# Patient Record
Sex: FEMALE | Race: WHITE | Employment: OTHER | URBAN - METROPOLITAN AREA
[De-identification: names, ages, dates, MRNs, and addresses within clinical notes are randomized per-mention and may not be internally consistent; named-entity substitution may affect disease eponyms.]

---

## 2017-02-14 ENCOUNTER — GENERIC CONVERSION - ENCOUNTER (OUTPATIENT)
Dept: OTHER | Facility: OTHER | Age: 77
End: 2017-02-14

## 2017-03-24 ENCOUNTER — GENERIC CONVERSION - ENCOUNTER (OUTPATIENT)
Dept: OTHER | Facility: OTHER | Age: 77
End: 2017-03-24

## 2017-05-11 ENCOUNTER — GENERIC CONVERSION - ENCOUNTER (OUTPATIENT)
Dept: OTHER | Facility: OTHER | Age: 77
End: 2017-05-11

## 2017-05-18 ENCOUNTER — ALLSCRIPTS OFFICE VISIT (OUTPATIENT)
Dept: OTHER | Facility: OTHER | Age: 77
End: 2017-05-18

## 2017-06-01 ENCOUNTER — ALLSCRIPTS OFFICE VISIT (OUTPATIENT)
Dept: OTHER | Facility: OTHER | Age: 77
End: 2017-06-01

## 2017-06-01 DIAGNOSIS — R10.30 LOWER ABDOMINAL PAIN: ICD-10-CM

## 2017-06-01 DIAGNOSIS — R10.9 ABDOMINAL PAIN: ICD-10-CM

## 2017-06-02 ENCOUNTER — ALLSCRIPTS OFFICE VISIT (OUTPATIENT)
Dept: OTHER | Facility: OTHER | Age: 77
End: 2017-06-02

## 2017-06-02 LAB
BILIRUB UR QL STRIP: NEGATIVE
CLARITY UR: NORMAL
COLOR UR: NORMAL
GLUCOSE (HISTORICAL): NORMAL
HGB UR QL STRIP.AUTO: NEGATIVE
KETONES UR STRIP-MCNC: NORMAL MG/DL
LEUKOCYTE ESTERASE UR QL STRIP: NEGATIVE
NITRITE UR QL STRIP: NEGATIVE
PH UR STRIP.AUTO: 5 [PH]
PROT UR STRIP-MCNC: NORMAL MG/DL
SP GR UR STRIP.AUTO: 1
UROBILINOGEN UR QL STRIP.AUTO: 1

## 2017-06-09 ENCOUNTER — GENERIC CONVERSION - ENCOUNTER (OUTPATIENT)
Dept: OTHER | Facility: OTHER | Age: 77
End: 2017-06-09

## 2017-07-05 ENCOUNTER — GENERIC CONVERSION - ENCOUNTER (OUTPATIENT)
Dept: OTHER | Facility: OTHER | Age: 77
End: 2017-07-05

## 2017-10-03 ENCOUNTER — ALLSCRIPTS OFFICE VISIT (OUTPATIENT)
Dept: OTHER | Facility: OTHER | Age: 77
End: 2017-10-03

## 2017-10-04 NOTE — PROGRESS NOTES
Assessment  1  Left lower quadrant pain (789 04) (R10 32)    Plan  Left lower quadrant pain    · Follow-up visit in 1 day Evaluation and Treatment  Follow-up  Status: Hold For -  Scheduling  Requested for: 25NOI3177   · Eat only clear liquids for 24 hours ; Status:Complete;   Done: 58MVX1896 11:14AM   · Call [phone] if: The symptoms seem worse ; Status:Complete;   Done: 43NRI0919  11:14AM   · Seek Immediate Medical Attention if: The symptoms are suddenly worse ;  Status:Complete;   Done: 51YGF5276 11:14AM   · Seek Immediate Medical Attention if: Your abdomen is getting large and round and you  are not eating more than usual ; Status:Complete;   Done: 06OBB2339 11:14AM   · Seek Immediate Medical Attention if: Your abdominal pain is worse ; Status:Complete;    Done: 64VIA6414 11:14AM    Discussion/Summary    Will call this afternoon if symptoms worsen and tomorrow with progress report  Chief Complaint  Left side abdominal pain (dli)      History of Present Illness  HPI: Patient presents with LLQ pain  Similar to the episode last year thought to be related to adhesions  CT scan and MRI failed to reveal source of pain  No fever  Review of Systems    Constitutional: No fever, no chills, feels well, no tiredness, no recent weight gain or loss  Cardiovascular: no complaints of slow or fast heart rate, no chest pain, no palpitations, no leg claudication or lower extremity edema  Respiratory: no complaints of shortness of breath, no wheezing, no dyspnea on exertion, no orthopnea or PND  Gastrointestinal: as noted in HPI  Genitourinary: no complaints of dysuria, no incontinence, no pelvic pain, no dysmenorrhea, no vaginal discharge or abnormal vaginal bleeding  Active Problems  1  Abdominal pain (789 00) (R10 9)   2  Accidental fall, initial encounter (E888 9) (W19 XXXA)   3  AF (atrial fibrillation) (427 31) (I48 91)   4  Anemia (285 9) (D64 9)   5  Atrial fibrillation (427 31) (I48 91)   6  Chronic anticoagulation (V58 61) (Z79 01)   7  Contusion of rib, left, initial encounter (922 1) (S20 212A)   8  Fracture of wrist, left, closed, initial encounter (814 00) (S62 102A)   9  Hyperlipidemia (272 4) (E78 5)   10  Hypertension (401 9) (I10)   11  Hypothyroidism (244 9) (E03 9)   12  Knee osteoarthritis (715 36) (M17 10)   13  Left sided abdominal pain (789 09) (R10 9)   14  Lower abdominal pain (789 09) (R10 30)   15  Sciatica of left side (724 3) (M54 32)   16  Separation of acromioclavicular joint, left, initial encounter (831 04) (S43 102A)    Past Medical History  1  History of Age related cataract (366 10) (H25 9)   2  History of Atrial premature beats (427 61) (I49 1)   3  History of Cervicitis (616 0) (N72)   4  History of Chronic atrial fibrillation (427 31) (I48 2)   5  History of Degeneration of cervical intervertebral disc (722 4) (M50 90)   6  History of Muscle weakness (728 87) (M62 81)   7  History of Occipital infarction (434 91) (I63 9)   8  History of Osteoporosis (733 00) (M81 0)   9  History of Wrist fracture, right (814 00) (S62 101A)  Active Problems And Past Medical History Reviewed: The active problems and past medical history were reviewed and updated today  Family History  Mother    1  Family history of    2  Family history of Myocardial infarction   3  Family history of Valvular heart disease  Father    4  Family history of    5  Family history of Myocardial infarction  Sister    10  Family history of Hypertension  Family History    7  Denied: FH: mental illness  Family History Reviewed: The family history was reviewed and updated today  Social History   · Dental care, regularly   · Drinks wine (V49 89) (Z78 9)   · Former smoker (V15 82) (F10 798)   · Moderate alcohol use   · No caffeine use   · Tea  The social history was reviewed and updated today  Surgical History  1  History of Colon Surgery   2   History of Knee Arthroscopy With Medial Meniscus Repair  Surgical History Reviewed: The surgical history was reviewed and updated today  Current Meds   1  Adult Aspirin Low Strength 81 MG CHEW; CHEW AND SWALLOW 1 TABLET DAILY; Therapy: (Recorded:17Oct2014) to Recorded   2  Alendronate Sodium 70 MG Oral Tablet; 1 Tab PO Weekly; Therapy: 92WXW9256 to (Evaluate:13May2018)  Requested for: 92ECJ3645; Last   Rx:11Hef4349 Ordered   3  Benazepril HCl - 20 MG Oral Tablet; take one tablet by mouth every day  Requested for:   49XNM9389; Last FJ:85LXU1034 Ordered   4  Eliquis 5 MG Oral Tablet; take 2 tablet daily; Therapy: (Recorded:13Oct2014) to Recorded   5  HydroCHLOROthiazide 12 5 MG Oral Capsule; TAKE 1 CAPSULE DAILY; Therapy: 86Bcg1981 to (Dean Santiago)  Requested for: 86RHN1136; Last   Rx:19Gpw4605 Ordered   6  Levothyroxine Sodium 75 MCG Oral Tablet; take one tablet by mouth every day; Therapy: 69Ydi2643 to (Evaluate:65Dti1500)  Requested for: 11Kkm3018; Last   Rx:03Tmi0188 Ordered   7  Metoprolol Succinate ER 25 MG Oral Tablet Extended Release 24 Hour; TAKE 1 TABLET   DAILY; Therapy: 31Yiu4943 to (Evaluate:15Oct2014) Recorded   8  Risedronate Sodium 35 MG Oral Tablet; TAKE 1 TABLET WEEKLY ON AN EMPTY   STOMACH 30-60 MINUTES BEFORE BREAKFAST WITH 8-12 OUNCES OF WATER  DO NOT LIE DOWN AFTER TAKING PILL; Therapy: 43FXY5197 to (Evaluate:13May2018)  Requested for: 96CBE6114; Last   Rx:32Vdg6249 Ordered   9  Simvastatin 20 MG Oral Tablet; TAKE 1 TABLET DAILY AS     DIRECTED; Therapy: 64ZHG8623 to (Giuseppe Vasquez)  Requested for: 77Xay3066; Last   Rx:77Bps2821; Status: ACTIVE - Transmit to [facility]ryn Verification Ordered    The medication list was reviewed and updated today  Allergies  1  Lidocaine HCl SOLN   2  Penicillins   3   Sulfa Drugs    Vitals   Recorded: 05VLF2599 10:48AM   Temperature 97 8 F   Heart Rate 68   Respiration 16   Systolic 108   Diastolic 78   Height 5 ft 3 in   Weight 124 lb    BMI Calculated 21 97   BSA Calculated 1 58     Physical Exam    Pulmonary   Respiratory effort: No increased work of breathing or signs of respiratory distress  Abdomen   Abdomen: Abnormal  -Mild to moderate tenderness LLQ  +BS  No distension  No hyperactive BS  Liver and spleen: No hepatomegaly or splenomegaly      Psychiatric   Orientation to person, place, and time: Normal     Mood and affect: Normal          Signatures   Electronically signed by : Payton Moser MD; Oct  3 2017 11:19AM EST                       (Author)

## 2018-01-11 NOTE — PROGRESS NOTES
Assessment   1  Encounter for preventive health examination (V70 0) (Z00 00)    Plan  Health Maintenance    · Start: Risedronate Sodium 35 MG Oral Tablet; TAKE 1 TABLET WEEKLY ON AN EMPTY  STOMACH 30-60 MINUTES BEFORE BREAKFAST WITH 8-12 OUNCES OF WATER  DO NOT LIE DOWN AFTER TAKING PILL   · *VB - Urinary Incontinence Screen (Dx Z13 89 Screen for UI); Status:Complete -  Retrospective Authorization;   Done: 07UJQ4420 01:12PM   · Follow-up visit in 1 year Evaluation and Treatment  Follow-up  Status: Hold For -  Scheduling  Requested for: 40MKD6200   · Always use a seat belt and shoulder strap when riding or driving a motor vehicle ;  Status:Complete;   Done: 23KLR4490 01:36PM   · Use a sun block product with an SPF of 15 or more ; Status:Complete;   Done:  35NKS6573 01:36PM   · We recommend that you follow the "Mediterranean diet "; Status:Complete;   Done:  90PSR8892 01:36PM    Discussion/Summary  Impression: Subsequent Annual Wellness Visit  Cardiovascular screening and counseling: screening is current  Diabetes screening and counseling: screening is current  Colorectal cancer screening and counseling: screening is current  Breast cancer screening and counseling: screening is current  Cervical cancer screening and counseling: screening not indicated  Osteoporosis screening and counseling: screening not indicated  Glaucoma screening and counseling: screening is current  HIV screening and counseling: screening not indicated  Advance Directive Planning: complete and up to date  Patient Discussion: plan discussed with the patient, follow-up visit needed in one year  Chief Complaint  AWV      History of Present Illness  Welcome to Medicare and Wellness Visits: The patient is being seen for the subsequent annual wellness visit  Medicare Screening and Risk Factors   Hospitalizations: no previous hospitalizations    Medicare Screening Tests Risk Questions   Abdominal aortic aneurysm risk assessment: none indicated  HIV risk assessment: none indicated  Drug and Alcohol Use: The patient is a former cigarette smoker  The patient reports frequent alcohol use and drinking 3 glasses of wine drinks per day  She has never used illicit drugs  Diet and Physical Activity: Current diet includes well balanced meals and 3 cups of tea per day  The patient does not exercise  Mood Disorder and Cognitive Impairment Screening: PHQ-9 Depression Scale   Over the past 2 weeks, how often have you been bothered by the following problems? 1 ) Little interest or pleasure in doing things? Not at all    2 ) Feeling down, depressed or hopeless? Not at all  Cognitive impairment screening: denies difficulty learning/retaining new information, denies difficulty handling complex tasks, denies difficulty with reasoning, denies difficulty with spatial ability and orientation, denies difficulty with language and denies difficulty with behavior  Functional Ability/Level of Safety: Hearing is slightly decreased and a hearing aid is not used  She denies hearing difficulties  The patient is currently able to do activities of daily living without limitations, able to do instrumental activities of daily living without limitations, able to participate in social activities without limitations and able to drive without limitations  Activities of daily living details: does not need help using the phone, no transportation help needed, does not need help shopping, no meal preparation help needed, does not need help doing housework, does not need help doing laundry, does not need help managing medications and does not need help managing money  Home safety risk factors:  no unfamiliar surroundings, no loose rugs, no poor household lighting, no uneven floors, no household clutter, grab bars in the bathroom and handrails on the stairs     Advance Directives: Advance directives: living will, durable power of  for health care directives and advance directives  end of life decisions were reviewed with the patient and I agree with the patient's decisions  Co-Managers and Medical Equipment/Suppliers: See Patient Care Team   Preventive Quality Program 65 and Older: Falls Risk: The patient fell 1 times in the past 12 months  The patient currently has no urinary incontinence symptoms  Patient Care Team    Care Team Member Role Specialty Office Number   Laureano Martinez MD  Cardiology [phone]   Caitlyn Fritz MD  Obstetrics/Gynecology [phone]     Review of Systems    Constitutional: negative  Eyes: negative  ENT: negative  Cardiovascular: negative  Respiratory: negative  Gastrointestinal: negative  Genitourinary: negative  Musculoskeletal: negative  Integumentary and Breasts: negative  Neurological: negative  Psychiatric: negative  Endocrine: negative  Hematologic and Lymphatic: negative  Active Problems   1  Accidental fall, initial encounter (E888 9) (W19 XXXA)  2  AF (atrial fibrillation) (427 31) (I48 91)  3  Anemia (285 9) (D64 9)  4  Atrial fibrillation (427 31) (I48 91)  5  Chronic anticoagulation (V58 61) (Z79 01)  6  Contusion of rib, left, initial encounter (922 1) (S20 212A)  7  Fracture of wrist, left, closed, initial encounter (814 00) (S62 102A)  8  Hyperlipidemia (272 4) (E78 5)  9  Hypertension (401 9) (I10)  10  Hypothyroidism (244 9) (E03 9)  11  Knee osteoarthritis (715 36) (M17 10)  12  Sciatica of left side (724 3) (M54 32)  13   Separation of acromioclavicular joint, left, initial encounter (831 04) (S43 102A)    Past Medical History    · History of Age related cataract (366 10) (H25 9)   · History of Atrial premature beats (427 61) (I49 1)   · History of Cervicitis (616 0) (N72)   · History of Chronic atrial fibrillation (427 31) (I48 2)   · History of Degeneration of cervical intervertebral disc (722 4) (M50 90)   · History of Muscle weakness (728 87) (M62 81)   · History of Occipital infarction (434 91) (I63 9)   · History of Osteoporosis (733 00) (M81 0)   · History of Wrist fracture, right (814 00) (S62 101A)    The active problems and past medical history were reviewed and updated today  Surgical History    · History of Colon Surgery   · History of Knee Arthroscopy With Medial Meniscus Repair    The surgical history was reviewed and updated today  Family History  Mother    · Family history of    · Family history of Myocardial infarction   · Family history of Valvular heart disease  Father    · Family history of    · Family history of Myocardial infarction  Sister    · Family history of Hypertension  Family History    · Denied: FH: mental illness    The family history was reviewed and updated today  Social History    · Dental care, regularly   · Drinks wine (V49 89) (Z78 9)   · Former smoker (V15 82) (J46 700)   · Moderate alcohol use   · No caffeine use   · Tea  The social history was reviewed and updated today  Current Meds  1  Adult Aspirin Low Strength 81 MG CHEW; CHEW AND SWALLOW 1 TABLET DAILY; Therapy: (Recorded:2014) to Recorded  2  Benazepril HCl - 20 MG Oral Tablet; take one tablet by mouth every day  Requested for:   01GNL8900; Last EL:14IKR0522 Ordered  3  Eliquis 5 MG Oral Tablet; take 2 tablet daily; Therapy: (Recorded:2014) to Recorded  4  HydroCHLOROthiazide 12 5 MG Oral Capsule; TAKE 1 CAPSULE DAILY; Therapy: 01Ltr5372 to (Sonya Cutler)  Requested for: 78WTX6151; Last   Rx:53Tcg7025 Ordered  5  Levothyroxine Sodium 75 MCG Oral Tablet; take one tablet by mouth every day; Therapy: 02Wfd7171 to (Evaluate:95Xss9847)  Requested for: 82Ytk3660; Last   Rx:94Mmz8787 Ordered  6  Metoprolol Succinate ER 25 MG Oral Tablet Extended Release 24 Hour; TAKE 1 TABLET   DAILY; Therapy: 82Zly7480 to (Evaluate:2014) Recorded  7  Simvastatin 20 MG Oral Tablet; TAKE 1 TABLET DAILY AS     DIRECTED;    Therapy: 29MBU3956 to (Mita Saunders)  Requested for: 37Htx4061; Last   Rx:94Udz9571; Status: ACTIVE - Transmit to [facility] Verification Ordered    The medication list was reviewed and updated today  Allergies   1  Lidocaine HCl SOLN  2  Penicillins  3  Sulfa Drugs    Immunizations   1 2    Influenza  26-Aug-2015 31-Aug-2016    PPSV  Approx [id number]      Vitals  Signs    Temperature: 97 7 F  Heart Rate: 76  Respiration: 16  Height: 5 ft 3 in  Weight: 123 lb   BMI Calculated: 21 79  BSA Calculated: 1 57    Physical Exam    Constitutional   General appearance: No acute distress, well appearing and well nourished  Eyes   Conjunctiva and lids: No swelling, erythema or discharge  Pupils and irises: Equal, round, reactive to light  Ophthalmoscopic examination: Normal fundi and optic discs  Ears, Nose, Mouth, and Throat   External inspection of ears and nose: Normal     Otoscopic examination: Tympanic membranes translucent with normal light reflex  Canals patent without erythema  Hearing: Normal     Nasal mucosa, septum, and turbinates: Normal without edema or erythema  Lips, teeth, and gums: Normal, good dentition  Oropharynx: Normal with no erythema, edema, exudate or lesions  Neck   Neck: Supple, symmetric, trachea midline, no masses  Thyroid: Normal, no thyromegaly  Pulmonary   Respiratory effort: No increased work of breathing or signs of respiratory distress  Auscultation of lungs: Clear to auscultation  Cardiovascular   Auscultation of heart: Normal rate and rhythm, normal S1 and S2, no murmurs  Carotid pulses: 2+ bilaterally  Abdominal aorta: Normal     Examination of extremities for edema and/or varicosities: Normal     Abdomen   Abdomen: Non-tender, no masses  Liver and spleen: No hepatomegaly or splenomegaly  Lymphatic   Palpation of lymph nodes in neck: No lymphadenopathy      Musculoskeletal   Gait and station: Normal     Digits and nails: Normal without clubbing or cyanosis  Joints, bones, and muscles: Normal     Range of motion: Normal     Stability: Normal     Muscle strength/tone: Normal     Skin   Skin and subcutaneous tissue: Normal without rashes or lesions  Palpation of skin and subcutaneous tissue: Normal turgor  Neurologic   Cranial nerves: Cranial nerves II-XII intact  Reflexes: 2+ and symmetric  Sensation: No sensory loss  Psychiatric   Judgment and insight: Normal     Orientation to person, place, and time: Normal     Recent and remote memory: Intact  Mood and affect: Normal        Results/Data  PHQ-2 Adult Depression Screening 96GTA2577 01:15PM User, [facility]     Test Name Result Flag Reference   PHQ-2 Adult Depression Score 0     Over the last two weeks, how often have you been bothered by any of the following problems?   Little interest or pleasure in doing things: Not at all - 0  Feeling down, depressed, or hopeless: Not at all - 0   PHQ-2 Adult Depression Screening Negative       *VB - Urinary Incontinence Screen (Dx Z13 89 Screen for UI) 66INM2004 01:12PM Rocio Huitrono     Test Name Result Flag Reference   Urinary Incontinence Assessment 10SDM3830         Signatures   Electronically signed by : Phebe Gosselin, MD; May 18 2017  4:05PM EST                       (Author)

## 2018-01-12 NOTE — MISCELLANEOUS
Provider Comments  Provider Comments:   LMOM FOR PT TO CALL BACK AND RESCHEDULE   SF      Signatures   Electronically signed by : Jerri Galicia MD; May 11 2017  5:05PM EST                       (Author)

## 2018-01-13 VITALS
HEIGHT: 63 IN | RESPIRATION RATE: 16 BRPM | HEART RATE: 76 BPM | WEIGHT: 123 LBS | BODY MASS INDEX: 21.79 KG/M2 | TEMPERATURE: 97.7 F

## 2018-01-14 VITALS
HEART RATE: 72 BPM | SYSTOLIC BLOOD PRESSURE: 110 MMHG | HEIGHT: 63 IN | DIASTOLIC BLOOD PRESSURE: 78 MMHG | BODY MASS INDEX: 21.44 KG/M2 | RESPIRATION RATE: 16 BRPM | TEMPERATURE: 98.9 F | WEIGHT: 121 LBS

## 2018-01-14 VITALS
DIASTOLIC BLOOD PRESSURE: 82 MMHG | HEIGHT: 63 IN | BODY MASS INDEX: 21.44 KG/M2 | HEART RATE: 74 BPM | RESPIRATION RATE: 16 BRPM | WEIGHT: 121 LBS | SYSTOLIC BLOOD PRESSURE: 110 MMHG | TEMPERATURE: 98 F

## 2018-01-14 VITALS
TEMPERATURE: 97.8 F | BODY MASS INDEX: 21.97 KG/M2 | DIASTOLIC BLOOD PRESSURE: 78 MMHG | HEART RATE: 68 BPM | HEIGHT: 63 IN | WEIGHT: 124 LBS | RESPIRATION RATE: 16 BRPM | SYSTOLIC BLOOD PRESSURE: 102 MMHG

## 2018-02-07 ENCOUNTER — OFFICE VISIT (OUTPATIENT)
Dept: FAMILY MEDICINE CLINIC | Facility: CLINIC | Age: 78
End: 2018-02-07
Payer: COMMERCIAL

## 2018-02-07 VITALS
TEMPERATURE: 97.6 F | DIASTOLIC BLOOD PRESSURE: 80 MMHG | HEIGHT: 64 IN | SYSTOLIC BLOOD PRESSURE: 120 MMHG | WEIGHT: 130 LBS | HEART RATE: 60 BPM | RESPIRATION RATE: 18 BRPM | BODY MASS INDEX: 22.2 KG/M2

## 2018-02-07 DIAGNOSIS — E03.9 HYPOTHYROIDISM, UNSPECIFIED TYPE: ICD-10-CM

## 2018-02-07 DIAGNOSIS — R06.00 DYSPNEA ON EXERTION: Primary | ICD-10-CM

## 2018-02-07 PROCEDURE — 99213 OFFICE O/P EST LOW 20 MIN: CPT | Performed by: FAMILY MEDICINE

## 2018-02-07 RX ORDER — SIMVASTATIN 20 MG
1 TABLET ORAL DAILY
COMMUNITY
Start: 2014-10-16

## 2018-02-07 RX ORDER — RISEDRONATE SODIUM 35 MG/1
1 TABLET, FILM COATED ORAL WEEKLY
COMMUNITY
Start: 2017-05-18 | End: 2020-03-26 | Stop reason: ALTCHOICE

## 2018-02-07 RX ORDER — HYDROCHLOROTHIAZIDE 12.5 MG/1
1 CAPSULE, GELATIN COATED ORAL DAILY
COMMUNITY
Start: 2014-08-23

## 2018-02-07 RX ORDER — ASPIRIN 81 MG/1
1 TABLET, CHEWABLE ORAL DAILY
COMMUNITY

## 2018-02-07 RX ORDER — LEVOTHYROXINE SODIUM 0.07 MG/1
1 TABLET ORAL DAILY
COMMUNITY
Start: 2014-08-23 | End: 2018-07-30 | Stop reason: SDUPTHER

## 2018-02-07 RX ORDER — BENAZEPRIL HYDROCHLORIDE 20 MG/1
1 TABLET ORAL DAILY
COMMUNITY
End: 2018-04-30 | Stop reason: SDUPTHER

## 2018-02-07 RX ORDER — ALENDRONATE SODIUM 70 MG/1
1 TABLET ORAL DAILY
COMMUNITY
Start: 2017-05-18 | End: 2022-07-20

## 2018-02-07 RX ORDER — METOPROLOL SUCCINATE 25 MG/1
1 TABLET, EXTENDED RELEASE ORAL DAILY
COMMUNITY
Start: 2014-09-15 | End: 2022-03-28 | Stop reason: SDUPTHER

## 2018-02-07 NOTE — PROGRESS NOTES
Assessment/Plan:  Hypothyroidism-level slightly on the overactive side  Exertional dyspnea  Follow-up TSH in 4 months  Pulmonary function tests  Follow up with pulmonary physician  Consideration for bronchodilators and maintenance medications if obstructive changes are found on PFTs  Diagnoses and all orders for this visit:    Dyspnea on exertion  -     Complete pulmonary function test; Future  -     Ambulatory referral to Pulmonology; Future    Other orders  -     aspirin 81 mg chewable tablet; Chew 1 tablet daily  -     alendronate (FOSAMAX) 70 mg tablet; Take 1 tablet by mouth once a week  -     benazepril (LOTENSIN) 20 mg tablet; Take 1 tablet by mouth daily  -     apixaban (ELIQUIS) 5 mg; Take 2 tablets by mouth daily  -     hydrochlorothiazide (MICROZIDE) 12 5 mg capsule; Take 1 capsule by mouth daily  -     levothyroxine 75 mcg tablet; Take 1 tablet by mouth daily  -     metoprolol succinate (TOPROL-XL) 25 mg 24 hr tablet; Take 1 tablet by mouth daily  -     risedronate (ACTONEL) 35 mg tablet; Take 1 tablet by mouth once a week  -     simvastatin (ZOCOR) 20 mg tablet; Take 1 tablet by mouth daily          Subjective:     Patient ID: Renuka Jackson is a 76 y o  female  This is a 26-year-old female who presents for follow-up of hypothyroidism  Recent TSH low  The patient also notes exertional dyspnea for which she was placed on oxygen  Review of Systems   Constitutional: Negative  Respiratory: Negative for apnea, cough, choking, chest tightness, wheezing and stridor  Shortness of breath: Exertional dyspnea  Cardiovascular: Negative  Objective:     Physical Exam   Constitutional: She appears well-developed and well-nourished  HENT:   Head: Normocephalic and atraumatic  Cardiovascular: Normal rate and regular rhythm  Exam reveals no gallop and no friction rub  Murmur: 1/6 systolic murmur    Pulmonary/Chest: Effort normal and breath sounds normal  No respiratory distress  She has no wheezes  She has no rales  She exhibits no tenderness

## 2018-04-30 DIAGNOSIS — I10 ESSENTIAL HYPERTENSION: Primary | ICD-10-CM

## 2018-04-30 RX ORDER — BENAZEPRIL HYDROCHLORIDE 20 MG/1
TABLET ORAL
Qty: 90 TABLET | Refills: 3 | Status: SHIPPED | OUTPATIENT
Start: 2018-04-30 | End: 2019-04-05 | Stop reason: SDUPTHER

## 2018-06-20 ENCOUNTER — OFFICE VISIT (OUTPATIENT)
Dept: FAMILY MEDICINE CLINIC | Facility: CLINIC | Age: 78
End: 2018-06-20
Payer: COMMERCIAL

## 2018-06-20 VITALS
HEIGHT: 64 IN | OXYGEN SATURATION: 97 % | DIASTOLIC BLOOD PRESSURE: 70 MMHG | WEIGHT: 127 LBS | SYSTOLIC BLOOD PRESSURE: 120 MMHG | RESPIRATION RATE: 18 BRPM | BODY MASS INDEX: 21.68 KG/M2 | HEART RATE: 60 BPM | TEMPERATURE: 97.6 F

## 2018-06-20 DIAGNOSIS — I10 ESSENTIAL HYPERTENSION: Primary | ICD-10-CM

## 2018-06-20 PROCEDURE — 4040F PNEUMOC VAC/ADMIN/RCVD: CPT | Performed by: FAMILY MEDICINE

## 2018-06-20 PROCEDURE — 99213 OFFICE O/P EST LOW 20 MIN: CPT | Performed by: FAMILY MEDICINE

## 2018-06-20 NOTE — PROGRESS NOTES
Assessment/Plan:  Essential hypertension  Continue healthy diet  Walking for exercise  Continue current medications  Follow-up 3 months  Subjective:     Patient ID: Eliot Gonzalez is a [de-identified] y o  female  This is an 29-year-old female following up for central hypertension  Review of Systems   Constitutional: Negative  Respiratory: Negative  Cardiovascular: Negative  Neurological: Negative  Objective:     Physical Exam   Constitutional: She is oriented to person, place, and time  She appears well-developed and well-nourished  HENT:   Head: Normocephalic and atraumatic  Cardiovascular: Normal rate, regular rhythm and normal heart sounds  Exam reveals no gallop and no friction rub  No murmur heard  Pulmonary/Chest: Effort normal and breath sounds normal  No respiratory distress  She has no wheezes  She has no rales  She exhibits no tenderness  Neurological: She is alert and oriented to person, place, and time  No cranial nerve deficit  Psychiatric: She has a normal mood and affect   Her behavior is normal  Judgment and thought content normal

## 2018-07-30 DIAGNOSIS — E03.9 ACQUIRED HYPOTHYROIDISM: Primary | ICD-10-CM

## 2018-07-30 RX ORDER — LEVOTHYROXINE SODIUM 0.07 MG/1
75 TABLET ORAL DAILY
Qty: 30 TABLET | Refills: 0 | Status: SHIPPED | OUTPATIENT
Start: 2018-07-30 | End: 2018-08-27 | Stop reason: SDUPTHER

## 2018-07-30 NOTE — TELEPHONE ENCOUNTER
I gave her a one month supply of her medication but she needs to follow up for thyroid recheck, has not been checked since February (per our records)  Thanks!

## 2018-08-27 DIAGNOSIS — E03.9 ACQUIRED HYPOTHYROIDISM: ICD-10-CM

## 2018-08-27 RX ORDER — LEVOTHYROXINE SODIUM 0.07 MG/1
75 TABLET ORAL DAILY
Qty: 90 TABLET | Refills: 0 | Status: SHIPPED | OUTPATIENT
Start: 2018-08-27 | End: 2018-11-23 | Stop reason: SDUPTHER

## 2018-11-23 DIAGNOSIS — E03.9 ACQUIRED HYPOTHYROIDISM: ICD-10-CM

## 2018-11-23 RX ORDER — LEVOTHYROXINE SODIUM 0.07 MG/1
75 TABLET ORAL DAILY
Qty: 90 TABLET | Refills: 3 | Status: SHIPPED | OUTPATIENT
Start: 2018-11-23 | End: 2019-11-16 | Stop reason: SDUPTHER

## 2019-04-05 DIAGNOSIS — I10 ESSENTIAL HYPERTENSION: ICD-10-CM

## 2019-04-08 RX ORDER — BENAZEPRIL HYDROCHLORIDE 20 MG/1
TABLET ORAL
Qty: 90 TABLET | Refills: 3 | Status: SHIPPED | OUTPATIENT
Start: 2019-04-08 | End: 2020-03-20 | Stop reason: SDUPTHER

## 2019-09-12 ENCOUNTER — OFFICE VISIT (OUTPATIENT)
Dept: FAMILY MEDICINE CLINIC | Facility: CLINIC | Age: 79
End: 2019-09-12
Payer: COMMERCIAL

## 2019-09-12 VITALS
HEART RATE: 88 BPM | DIASTOLIC BLOOD PRESSURE: 58 MMHG | TEMPERATURE: 97.5 F | HEIGHT: 63 IN | SYSTOLIC BLOOD PRESSURE: 104 MMHG | OXYGEN SATURATION: 92 % | BODY MASS INDEX: 22.32 KG/M2 | WEIGHT: 126 LBS | RESPIRATION RATE: 18 BRPM

## 2019-09-12 DIAGNOSIS — M10.9 GOUT, ARTHROPATHY: Primary | ICD-10-CM

## 2019-09-12 DIAGNOSIS — I48.0 PAROXYSMAL ATRIAL FIBRILLATION (HCC): ICD-10-CM

## 2019-09-12 DIAGNOSIS — Z23 NEED FOR INFLUENZA VACCINATION: ICD-10-CM

## 2019-09-12 PROCEDURE — 90662 IIV NO PRSV INCREASED AG IM: CPT

## 2019-09-12 PROCEDURE — 99213 OFFICE O/P EST LOW 20 MIN: CPT | Performed by: FAMILY MEDICINE

## 2019-09-12 PROCEDURE — G0008 ADMIN INFLUENZA VIRUS VAC: HCPCS

## 2019-09-12 RX ORDER — COLCHICINE 0.6 MG/1
TABLET ORAL
Qty: 30 TABLET | Refills: 2 | Status: SHIPPED | OUTPATIENT
Start: 2019-09-12 | End: 2019-12-16 | Stop reason: SDUPTHER

## 2019-09-12 NOTE — PROGRESS NOTES
Assessment/Plan:    No problem-specific Assessment & Plan notes found for this encounter  Diagnoses and all orders for this visit:    Gout, arthropathy  -     colchicine (COLCRYS) 0 6 mg tablet; 1 PO BID X 3-4 DAY OR UNTIL SYMPTOMS RESOLVE    Paroxysmal atrial fibrillation (HCC)          Patient Instructions   REST  TYLENOL    TRIAL OF COLCHICINE    RV IF SYMPTOMS PERSIST OR WORSEN      Return in about 2 months (around 11/12/2019) for  URIC ACID  Subjective:      Patient ID: Jimbo Scott is a 78 y o  female  Chief Complaint   Patient presents with    Gout     right foot       PAINFUL R GREAT TOE  SWOLLEN RED  RECURRENT  PREV DX OF GOUT    CANNOT TAKE NSAID - ON ELIQUIS FOR  A FIB      The following portions of the patient's history were reviewed and updated as appropriate: allergies, current medications, past family history, past medical history, past social history, past surgical history and problem list     Review of Systems   Constitutional: Negative for fever  HENT: Negative for congestion and sore throat  Eyes: Negative for discharge  Respiratory: Negative for chest tightness  Cardiovascular: Negative for chest pain and palpitations  Gastrointestinal: Negative for abdominal pain, diarrhea, nausea and vomiting  Musculoskeletal: Positive for arthralgias and joint swelling  Neurological: Negative for numbness  Psychiatric/Behavioral: The patient is not nervous/anxious            Current Outpatient Medications   Medication Sig Dispense Refill    alendronate (FOSAMAX) 70 mg tablet Take 1 tablet by mouth once a week      apixaban (ELIQUIS) 5 mg Take 2 5 mg by mouth daily        aspirin 81 mg chewable tablet Chew 1 tablet daily      benazepril (LOTENSIN) 20 mg tablet TAKE ONE TABLET BY MOUTH EVERY DAY 90 tablet 3    hydrochlorothiazide (MICROZIDE) 12 5 mg capsule Take 1 capsule by mouth daily      levothyroxine 75 mcg tablet TAKE 1 TABLET (75 MCG TOTAL) BY MOUTH DAILY 90 tablet 3  metoprolol succinate (TOPROL-XL) 25 mg 24 hr tablet Take 1 tablet by mouth daily      risedronate (ACTONEL) 35 mg tablet Take 1 tablet by mouth once a week      simvastatin (ZOCOR) 20 mg tablet Take 1 tablet by mouth daily      colchicine (COLCRYS) 0 6 mg tablet 1 PO BID X 3-4 DAY OR UNTIL SYMPTOMS RESOLVE 30 tablet 2     No current facility-administered medications for this visit  Objective:    /58   Pulse 88   Temp 97 5 °F (36 4 °C) (Temporal)   Resp 18   Ht 5' 3" (1 6 m)   Wt 57 2 kg (126 lb)   SpO2 92%   BMI 22 32 kg/m²        Physical Exam   Constitutional: She is oriented to person, place, and time  She appears well-developed and well-nourished  HENT:   Head: Normocephalic and atraumatic  Eyes: Pupils are equal, round, and reactive to light  Conjunctivae and EOM are normal  Right eye exhibits no discharge  Left eye exhibits no discharge  Neck: Normal range of motion  Neck supple  No thyromegaly present  Cardiovascular: Normal rate, regular rhythm and normal heart sounds  No murmur heard  Pulmonary/Chest: Effort normal and breath sounds normal  No respiratory distress  She has no wheezes  She has no rales  Abdominal: Soft  Bowel sounds are normal  There is no tenderness  Musculoskeletal: She exhibits tenderness  She exhibits no edema  R GREAT TOE  MCT SWELLING AND REDNESS  TENDER TO PALPATION   Lymphadenopathy:     She has no cervical adenopathy  Neurological: She is alert and oriented to person, place, and time  Skin: Skin is warm and dry  No rash noted  No erythema  Psychiatric: She has a normal mood and affect   Her behavior is normal  Judgment and thought content normal               Christel Kapoor MD

## 2019-11-14 ENCOUNTER — OFFICE VISIT (OUTPATIENT)
Dept: FAMILY MEDICINE CLINIC | Facility: CLINIC | Age: 79
End: 2019-11-14
Payer: COMMERCIAL

## 2019-11-14 DIAGNOSIS — M10.9 GOUT, ARTHROPATHY: Primary | ICD-10-CM

## 2019-11-14 PROCEDURE — 36415 COLL VENOUS BLD VENIPUNCTURE: CPT | Performed by: FAMILY MEDICINE

## 2019-11-14 PROCEDURE — 99213 OFFICE O/P EST LOW 20 MIN: CPT | Performed by: FAMILY MEDICINE

## 2019-11-14 NOTE — PROGRESS NOTES
Assessment/Plan:    No problem-specific Assessment & Plan notes found for this encounter  Diagnoses and all orders for this visit:    Gout, arthropathy  -     Uric acid          Patient Instructions   CONTINUE CURRENT TREATMENT PLAN    BW WILL BE OBTAINED    FURTHER PLANS PENDING BW      Return for Next scheduled follow up  Subjective:      Patient ID: Amy Smith is a 78 y o  female  No chief complaint on file  PATIENT RETURNS FOR RE CHECK ON HER GOUT  FEELS WELL      NO CONCERNS  COLCHICINE HELPED      The following portions of the patient's history were reviewed and updated as appropriate: allergies, current medications, past family history, past medical history, past social history, past surgical history and problem list     Review of Systems      Current Outpatient Medications   Medication Sig Dispense Refill    alendronate (FOSAMAX) 70 mg tablet Take 1 tablet by mouth once a week      apixaban (ELIQUIS) 5 mg Take 2 5 mg by mouth daily        aspirin 81 mg chewable tablet Chew 1 tablet daily      benazepril (LOTENSIN) 20 mg tablet TAKE ONE TABLET BY MOUTH EVERY DAY 90 tablet 3    colchicine (COLCRYS) 0 6 mg tablet 1 PO BID X 3-4 DAY OR UNTIL SYMPTOMS RESOLVE 30 tablet 2    hydrochlorothiazide (MICROZIDE) 12 5 mg capsule Take 1 capsule by mouth daily      levothyroxine 75 mcg tablet TAKE 1 TABLET (75 MCG TOTAL) BY MOUTH DAILY 90 tablet 3    metoprolol succinate (TOPROL-XL) 25 mg 24 hr tablet Take 1 tablet by mouth daily      risedronate (ACTONEL) 35 mg tablet Take 1 tablet by mouth once a week      simvastatin (ZOCOR) 20 mg tablet Take 1 tablet by mouth daily       No current facility-administered medications for this visit  Objective: There were no vitals taken for this visit         Physical Exam         NO PE WAS PERFORMED    LENGTH OF VISIT 15 MIN  LENGTH OF  13 MIN    Hari Ramirez MD

## 2019-11-15 LAB — URATE SERPL-MCNC: 8.3 MG/DL (ref 2.5–7.1)

## 2019-11-16 DIAGNOSIS — E03.9 ACQUIRED HYPOTHYROIDISM: ICD-10-CM

## 2019-11-16 RX ORDER — LEVOTHYROXINE SODIUM 0.07 MG/1
75 TABLET ORAL DAILY
Qty: 90 TABLET | Refills: 3 | Status: SHIPPED | OUTPATIENT
Start: 2019-11-16 | End: 2020-11-10 | Stop reason: SDUPTHER

## 2019-11-18 DIAGNOSIS — M10.9 GOUT, ARTHROPATHY: Primary | ICD-10-CM

## 2019-11-18 RX ORDER — ALLOPURINOL 100 MG/1
100 TABLET ORAL DAILY
Qty: 30 TABLET | Refills: 5 | Status: SHIPPED | OUTPATIENT
Start: 2019-11-18 | End: 2020-06-08

## 2019-12-03 ENCOUNTER — OFFICE VISIT (OUTPATIENT)
Dept: FAMILY MEDICINE CLINIC | Facility: CLINIC | Age: 79
End: 2019-12-03
Payer: COMMERCIAL

## 2019-12-03 VITALS
HEIGHT: 63 IN | HEART RATE: 50 BPM | OXYGEN SATURATION: 95 % | BODY MASS INDEX: 22.68 KG/M2 | SYSTOLIC BLOOD PRESSURE: 130 MMHG | WEIGHT: 128 LBS | DIASTOLIC BLOOD PRESSURE: 70 MMHG | RESPIRATION RATE: 18 BRPM | TEMPERATURE: 97.3 F

## 2019-12-03 DIAGNOSIS — J98.01 BRONCHOSPASM: ICD-10-CM

## 2019-12-03 DIAGNOSIS — J40 BRONCHITIS: Primary | ICD-10-CM

## 2019-12-03 PROCEDURE — 99213 OFFICE O/P EST LOW 20 MIN: CPT | Performed by: FAMILY MEDICINE

## 2019-12-03 RX ORDER — AZITHROMYCIN 250 MG/1
TABLET, FILM COATED ORAL
Qty: 6 TABLET | Refills: 0 | Status: SHIPPED | OUTPATIENT
Start: 2019-12-03 | End: 2019-12-06 | Stop reason: SDUPTHER

## 2019-12-03 RX ORDER — PREDNISONE 20 MG/1
40 TABLET ORAL DAILY
Qty: 8 TABLET | Refills: 0 | Status: SHIPPED | OUTPATIENT
Start: 2019-12-03 | End: 2019-12-07

## 2019-12-03 NOTE — PROGRESS NOTES
Assessment/Plan:    No problem-specific Assessment & Plan notes found for this encounter  Diagnoses and all orders for this visit:    Bronchitis  -     azithromycin (ZITHROMAX) 250 mg tablet; Take 2 tablets today then 1 tablet daily x 4 days    Bronchospasm  -     predniSONE 20 mg tablet; Take 2 tablets (40 mg total) by mouth daily for 4 days          Patient Instructions   PLENTY FLUIDS  REST  MUCINEX  MEDICATION AS DIRECTED  IF SYMPTOMS PERSIST OR WORSEN, PLEASE CALL        Return for Next scheduled follow up  Subjective:      Patient ID: Daane Gill is a 78 y o  female  Chief Complaint   Patient presents with    Cough     x 5 days    Fatigue    chest congestion    ear pain in both ears    Sore Throat       Cough   This is a new problem  The current episode started in the past 7 days  The problem has been gradually worsening  The problem occurs every few minutes  The cough is non-productive  Associated symptoms include chills, ear congestion, nasal congestion, postnasal drip, rhinorrhea, a sore throat and wheezing  Pertinent negatives include no chest pain, ear pain, fever, headaches, heartburn, hemoptysis, myalgias, rash, shortness of breath, sweats or weight loss  The symptoms are aggravated by lying down  She has tried body position changes for the symptoms  The treatment provided mild relief  Her past medical history is significant for pneumonia  There is no history of asthma, bronchiectasis, bronchitis, COPD, emphysema or environmental allergies  The following portions of the patient's history were reviewed and updated as appropriate: allergies, current medications, past family history, past medical history, past social history, past surgical history and problem list     Review of Systems   Constitutional: Positive for chills  Negative for fever and weight loss  HENT: Positive for postnasal drip, rhinorrhea and sore throat  Negative for congestion and ear pain      Eyes: Negative for discharge  Respiratory: Positive for cough and wheezing  Negative for hemoptysis, chest tightness and shortness of breath  Cardiovascular: Negative for chest pain and palpitations  Gastrointestinal: Negative for abdominal pain, diarrhea, heartburn, nausea and vomiting  Musculoskeletal: Negative for arthralgias, joint swelling and myalgias  Skin: Negative for rash  Allergic/Immunologic: Negative for environmental allergies  Neurological: Negative for numbness and headaches  Psychiatric/Behavioral: The patient is not nervous/anxious  Current Outpatient Medications   Medication Sig Dispense Refill    alendronate (FOSAMAX) 70 mg tablet Take 1 tablet by mouth once a week      allopurinol (ZYLOPRIM) 100 mg tablet Take 1 tablet (100 mg total) by mouth daily 30 tablet 5    apixaban (ELIQUIS) 5 mg Take 2 5 mg by mouth daily        aspirin 81 mg chewable tablet Chew 1 tablet daily      benazepril (LOTENSIN) 20 mg tablet TAKE ONE TABLET BY MOUTH EVERY DAY 90 tablet 3    colchicine (COLCRYS) 0 6 mg tablet 1 PO BID X 3-4 DAY OR UNTIL SYMPTOMS RESOLVE 30 tablet 2    hydrochlorothiazide (MICROZIDE) 12 5 mg capsule Take 1 capsule by mouth daily      levothyroxine 75 mcg tablet Take 1 tablet (75 mcg total) by mouth daily 90 tablet 3    metoprolol succinate (TOPROL-XL) 25 mg 24 hr tablet Take 1 tablet by mouth daily      risedronate (ACTONEL) 35 mg tablet Take 1 tablet by mouth once a week      simvastatin (ZOCOR) 20 mg tablet Take 1 tablet by mouth daily      azithromycin (ZITHROMAX) 250 mg tablet Take 2 tablets today then 1 tablet daily x 4 days 6 tablet 0    predniSONE 20 mg tablet Take 2 tablets (40 mg total) by mouth daily for 4 days 8 tablet 0     No current facility-administered medications for this visit          Objective:    /70   Pulse (!) 50   Temp (!) 97 3 °F (36 3 °C) (Temporal)   Resp 18   Ht 5' 3" (1 6 m)   Wt 58 1 kg (128 lb)   SpO2 95%   BMI 22 67 kg/m² Physical Exam   Constitutional: She is oriented to person, place, and time  She appears well-developed and well-nourished  HENT:   Head: Normocephalic and atraumatic  Right Ear: External ear normal  A middle ear effusion is present  Left Ear: External ear normal  A middle ear effusion is present  Nose: Mucosal edema and rhinorrhea present  Mouth/Throat: Uvula is midline  Posterior oropharyngeal erythema present  Eyes: Pupils are equal, round, and reactive to light  Right eye exhibits no discharge  Left eye exhibits no discharge  Neck: Normal range of motion  Neck supple  Cardiovascular: Normal rate, regular rhythm and normal heart sounds  No murmur heard  Pulmonary/Chest: Effort normal  No respiratory distress  She has no wheezes  She has rhonchi in the right middle field, the right lower field, the left middle field and the left lower field  She has no rales  SL EXP WHEEZE   Abdominal: Soft  Bowel sounds are normal  There is no tenderness  There is no rebound  Lymphadenopathy:     She has no cervical adenopathy  Neurological: She is alert and oriented to person, place, and time  Skin: Skin is warm and dry  No rash noted  Psychiatric: She has a normal mood and affect   Her behavior is normal  Judgment and thought content normal               Manjinder Taylor MD

## 2019-12-06 ENCOUNTER — TELEPHONE (OUTPATIENT)
Dept: FAMILY MEDICINE CLINIC | Facility: CLINIC | Age: 79
End: 2019-12-06

## 2019-12-06 DIAGNOSIS — J40 BRONCHITIS: ICD-10-CM

## 2019-12-06 RX ORDER — AZITHROMYCIN 250 MG/1
250 TABLET, FILM COATED ORAL EVERY 24 HOURS
Qty: 5 TABLET | Refills: 0 | Status: SHIPPED | OUTPATIENT
Start: 2019-12-06 | End: 2019-12-11

## 2019-12-06 NOTE — TELEPHONE ENCOUNTER
Calling with an update    Feeling much better  Still coughing a lot  Finished her prescription this morning  Can she has another prescription for the next couple of days  Send to [facility]      Cell # [phone]

## 2019-12-16 DIAGNOSIS — M10.9 GOUT, ARTHROPATHY: ICD-10-CM

## 2019-12-16 RX ORDER — COLCHICINE 0.6 MG/1
TABLET ORAL
Qty: 30 TABLET | Refills: 2 | Status: SHIPPED | OUTPATIENT
Start: 2019-12-16 | End: 2020-03-20 | Stop reason: SDUPTHER

## 2020-03-20 DIAGNOSIS — M10.9 GOUT, ARTHROPATHY: ICD-10-CM

## 2020-03-20 DIAGNOSIS — I10 ESSENTIAL HYPERTENSION: ICD-10-CM

## 2020-03-20 RX ORDER — BENAZEPRIL HYDROCHLORIDE 20 MG/1
20 TABLET ORAL DAILY
Qty: 90 TABLET | Refills: 3 | Status: SHIPPED | OUTPATIENT
Start: 2020-03-20 | End: 2021-02-16 | Stop reason: SDUPTHER

## 2020-03-20 RX ORDER — COLCHICINE 0.6 MG/1
0.6 TABLET ORAL 2 TIMES DAILY
Qty: 90 TABLET | Refills: 2 | Status: SHIPPED | OUTPATIENT
Start: 2020-03-20 | End: 2021-01-11 | Stop reason: SDUPTHER

## 2020-03-26 ENCOUNTER — TELEPHONE (OUTPATIENT)
Dept: FAMILY MEDICINE CLINIC | Facility: CLINIC | Age: 80
End: 2020-03-26

## 2020-03-26 ENCOUNTER — TELEMEDICINE (OUTPATIENT)
Dept: FAMILY MEDICINE CLINIC | Facility: CLINIC | Age: 80
End: 2020-03-26
Payer: COMMERCIAL

## 2020-03-26 DIAGNOSIS — E78.2 MIXED HYPERLIPIDEMIA: ICD-10-CM

## 2020-03-26 DIAGNOSIS — I10 ESSENTIAL HYPERTENSION: ICD-10-CM

## 2020-03-26 DIAGNOSIS — M81.0 AGE-RELATED OSTEOPOROSIS WITHOUT CURRENT PATHOLOGICAL FRACTURE: ICD-10-CM

## 2020-03-26 DIAGNOSIS — I48.91 ATRIAL FIBRILLATION, UNSPECIFIED TYPE (HCC): ICD-10-CM

## 2020-03-26 DIAGNOSIS — E03.9 HYPOTHYROIDISM, UNSPECIFIED TYPE: ICD-10-CM

## 2020-03-26 DIAGNOSIS — Z00.00 MEDICARE ANNUAL WELLNESS VISIT, SUBSEQUENT: Primary | ICD-10-CM

## 2020-03-26 PROBLEM — J40 BRONCHITIS: Status: RESOLVED | Noted: 2019-12-03 | Resolved: 2020-03-26

## 2020-03-26 PROCEDURE — 3075F SYST BP GE 130 - 139MM HG: CPT | Performed by: NURSE PRACTITIONER

## 2020-03-26 PROCEDURE — G0439 PPPS, SUBSEQ VISIT: HCPCS | Performed by: NURSE PRACTITIONER

## 2020-03-26 PROCEDURE — 1036F TOBACCO NON-USER: CPT | Performed by: NURSE PRACTITIONER

## 2020-03-26 PROCEDURE — 1125F AMNT PAIN NOTED PAIN PRSNT: CPT | Performed by: NURSE PRACTITIONER

## 2020-03-26 PROCEDURE — 1170F FXNL STATUS ASSESSED: CPT | Performed by: NURSE PRACTITIONER

## 2020-03-26 PROCEDURE — 3078F DIAST BP <80 MM HG: CPT | Performed by: NURSE PRACTITIONER

## 2020-03-26 PROCEDURE — 4040F PNEUMOC VAC/ADMIN/RCVD: CPT | Performed by: NURSE PRACTITIONER

## 2020-03-26 PROCEDURE — 1160F RVW MEDS BY RX/DR IN RCRD: CPT | Performed by: NURSE PRACTITIONER

## 2020-03-26 RX ORDER — APIXABAN 2.5 MG/1
2.5 TABLET, FILM COATED ORAL 2 TIMES DAILY
COMMUNITY
Start: 2020-02-05

## 2020-03-26 RX ORDER — PANTOPRAZOLE SODIUM 20 MG/1
TABLET, DELAYED RELEASE ORAL
COMMUNITY
Start: 2020-02-26 | End: 2022-07-26 | Stop reason: SDUPTHER

## 2020-03-26 NOTE — ASSESSMENT & PLAN NOTE
Tolerating levothyroxine 75mcg  She is due for thyroid check  Advise of indications for testing  Rx to be sent in mail  Verbalized understanding

## 2020-03-26 NOTE — PROGRESS NOTES
Assessment and Plan:     Problem List Items Addressed This Visit        Endocrine    Hypothyroidism     Tolerating levothyroxine 75mcg  She is due for thyroid check  Advise of indications for testing  Rx to be sent in mail  Verbalized understanding  Relevant Orders    TSH, 3rd generation with Free T4 reflex       Cardiovascular and Mediastinum    Atrial fibrillation (HCC)     Taking Eliquis 2 5mg BID without issues  Stable  No changes today  Hypertension     Tolerating BP medications without issues  No chest pain, shortness of breath or swelling reported  No changes  Musculoskeletal and Integument    Age-related osteoporosis without current pathological fracture     No longer taking alendronate for bone health  She is doing well  Recommend calcium and vitamin D supplementation  Other    Hyperlipidemia     Taking simvastatin 20 mg tablets daily without issues  No changes today  Other Visit Diagnoses     Medicare annual wellness visit, subsequent    -  Primary    Age appropriate screenings and recommendations discussed  Falls Plan of Care: Medications that increase falls were reviewed  Home safety education provided  Preventive health issues were discussed with patient, and age appropriate screening tests were ordered as noted in patient's After Visit Summary  Personalized health advice and appropriate referrals for health education or preventive services given if needed, as noted in patient's After Visit Summary       History of Present Illness:     Patient presents for Medicare Annual Wellness visit    Patient Care Team:  Reyna Barrios as PCP - General (Family Medicine)  Barbara Porter MD as PCP - [address] [place], [place] [address]Th [facility]  MD Roldan Lozano MD     Problem List:     Patient Active Problem List   Diagnosis    Anemia    Atrial fibrillation ([address] )    Hyperlipidemia    Hypertension    Hypothyroidism    Knee osteoarthritis    Sciatica of left side    Gout, arthropathy    Bronchospasm    Age-related osteoporosis without current pathological fracture      Past Medical and Surgical History:     Past Medical History:   Diagnosis Date    Anemia     Arthritis     Cataract     Chronic atrial fibrillation     Degeneration of cervical intervertebral disc     Disease of thyroid gland     Occipital infarction (HCC)     Wrist fracture     right      Past Surgical History:   Procedure Laterality Date    COLON SURGERY      KNEE ARTHROSCOPY      with medial meniscus repair       Family History:     Family History   Problem Relation Age of Onset    Heart attack Mother     Valvular heart disease Mother     Heart attack Father     Hypertension Sister     Mental illness Neg Hx       Social History:        Social History     Socioeconomic History    Marital status: Single     Spouse name: Not on file    Number of children: Not on file    Years of education: Not on file    Highest education level: Not on file   Occupational History    Not on file   Social Needs    Financial resource strain: Not on file    Food insecurity:     Worry: Not on file     Inability: Not on file    Transportation needs:     Medical: Not on file     Non-medical: Not on file   Tobacco Use    Smoking status: Former Smoker    Smokeless tobacco: Never Used   Substance and Sexual Activity    Alcohol use: Yes     Comment: occasionally, drinks wine , moderate     Drug use: No    Sexual activity: Not on file   Lifestyle    Physical activity:     Days per week: Not on file     Minutes per session: Not on file    Stress: Not on file   Relationships    Social connections:     Talks on phone: Not on file     Gets together: Not on file     Attends [employer]ism service: Not on file     Active member of club or organization: Not on file     Attends meetings of clubs or organizations: Not on file     Relationship status: Not on file    Intimate 03/07/1951      Medicare Health Risk Assessment:     There were no vitals taken for this visit  Rojas Harvey is here for her Subsequent Wellness visit  Last Medicare Wellness visit information reviewed, patient interviewed and updates made to the record today  Health Risk Assessment:   Patient rates overall health as excellent  Patient feels that their physical health rating is same  Eyesight was rated as same  Hearing was rated as same  Patient feels that their emotional and mental health rating is much better  Pain experienced in the last 7 days has been none  Depression Screening:   PHQ-2 Score: 0      Fall Risk Screening: In the past year, patient has experienced: no history of falling in past year      Urinary Incontinence Screening:   Patient has not leaked urine accidently in the last six months  Home Safety:  Patient does not have trouble with stairs inside or outside of their home  Patient has working smoke alarms and has no working carbon monoxide detector  Home safety hazards include: loose rugs on the floor  Nutrition:   Current diet is Regular and Other (please comment)  vegetarian     Medications:   Patient is not currently taking any over-the-counter supplements  Patient is able to manage medications  Activities of Daily Living (ADLs)/Instrumental Activities of Daily Living (IADLs):   Walk and transfer into and out of bed and chair?: Yes  Dress and groom yourself?: Yes    Bathe or shower yourself?: Yes    Feed yourself? Yes  Do your laundry/housekeeping?: Yes  Manage your money, pay your bills and track your expenses?: Yes  Make your own meals?: Yes    Do your own shopping?: Yes    Previous Hospitalizations:   Any hospitalizations or ED visits within the last 12 months?: No      Advance Care Planning:   Living will: Yes    Durable POA for healthcare:  Yes    Advanced directive: Yes      PREVENTIVE SCREENINGS      Cardiovascular Screening:    General: Screening Not Indicated and History Lipid Disorder      Cervical Cancer Screening:    General: Screening Not Indicated      Osteoporosis Screening:    General: Screening Not Indicated and History Osteoporosis      Lung Cancer Screening:     General: Screening Not Indicated      JORDAN Batres  Virtual AWV Consent    Reason for visit is AWV    Encounter provider JORDAN Batres    Provider located at [address] [place] [address]  [id number]      Recent Visits  No visits were found meeting these conditions  Showing recent visits within past 7 days and meeting all other requirements     Today's Visits  Date Type Provider Dept   03/26/20 Telephone Elliott Barcenas LPN [facility]   03/26/20 Telemedicine Renee Batres [place] [address] [facility]   Showing today's visits and meeting all other requirements     Future Appointments  Date Type Provider Dept   03/26/20 Telephone Elliottveronica Barcenas PIPO [facility]   Showing future appointments within next 150 days and meeting all other requirements        After connecting through [employer], the patient was identified by name and date of birth  Mya Osei was informed that this is a telemedicine visit and that the visit is being conducted through [employer] and patient was informed that this is not a secure, HIPAA-complaint platform  she agrees to proceed  which may not be secure and therefore, might not be HIPAA-compliant  My office door was closed  No one else was in the room  She acknowledged consent and understanding of privacy and security of the video platform   The patient has agreed to participate and understands they can discontinue the visit at any time

## 2020-03-26 NOTE — ASSESSMENT & PLAN NOTE
Tolerating BP medications without issues  No chest pain, shortness of breath or swelling reported  No changes

## 2020-03-26 NOTE — ASSESSMENT & PLAN NOTE
No longer taking alendronate for bone health  She is doing well  Recommend calcium and vitamin D supplementation

## 2020-06-08 DIAGNOSIS — M10.9 GOUT, ARTHROPATHY: ICD-10-CM

## 2020-06-08 RX ORDER — ALLOPURINOL 100 MG/1
100 TABLET ORAL DAILY
Qty: 30 TABLET | Refills: 4 | Status: SHIPPED | OUTPATIENT
Start: 2020-06-08 | End: 2020-11-10 | Stop reason: SDUPTHER

## 2020-08-18 ENCOUNTER — TELEPHONE (OUTPATIENT)
Dept: FAMILY MEDICINE CLINIC | Facility: CLINIC | Age: 80
End: 2020-08-18

## 2020-08-18 DIAGNOSIS — Z12.39 SCREENING FOR BREAST CANCER: Primary | ICD-10-CM

## 2020-08-18 NOTE — TELEPHONE ENCOUNTER
Would like to have her mammo done  Can you please place an order      Fax to Shaun when done and notify patient

## 2020-08-19 NOTE — TELEPHONE ENCOUNTER
Done and faxed to [facility] at [phone] as requested  Informed Patricia orders were faxed  No further action required

## 2020-09-15 DIAGNOSIS — Z12.39 SCREENING FOR BREAST CANCER: ICD-10-CM

## 2020-09-16 DIAGNOSIS — R92.8 ABNORMAL MAMMOGRAM OF RIGHT BREAST: Primary | ICD-10-CM

## 2020-10-22 ENCOUNTER — IMMUNIZATIONS (OUTPATIENT)
Dept: FAMILY MEDICINE CLINIC | Facility: CLINIC | Age: 80
End: 2020-10-22
Payer: COMMERCIAL

## 2020-10-22 DIAGNOSIS — Z23 ENCOUNTER FOR IMMUNIZATION: ICD-10-CM

## 2020-10-22 PROCEDURE — 90662 IIV NO PRSV INCREASED AG IM: CPT

## 2020-10-22 PROCEDURE — G0008 ADMIN INFLUENZA VIRUS VAC: HCPCS

## 2020-11-10 DIAGNOSIS — M10.9 GOUT, ARTHROPATHY: ICD-10-CM

## 2020-11-10 DIAGNOSIS — E03.9 ACQUIRED HYPOTHYROIDISM: ICD-10-CM

## 2020-11-10 RX ORDER — LEVOTHYROXINE SODIUM 0.07 MG/1
75 TABLET ORAL DAILY
Qty: 90 TABLET | Refills: 3 | Status: SHIPPED | OUTPATIENT
Start: 2020-11-10 | End: 2021-04-01

## 2020-11-10 RX ORDER — ALLOPURINOL 100 MG/1
100 TABLET ORAL DAILY
Qty: 30 TABLET | Refills: 4 | Status: SHIPPED | OUTPATIENT
Start: 2020-11-10 | End: 2021-04-28 | Stop reason: SDUPTHER

## 2021-01-11 DIAGNOSIS — M10.9 GOUT, ARTHROPATHY: ICD-10-CM

## 2021-01-11 RX ORDER — COLCHICINE 0.6 MG/1
0.6 TABLET ORAL 2 TIMES DAILY
Qty: 90 TABLET | Refills: 2 | Status: SHIPPED | OUTPATIENT
Start: 2021-01-11 | End: 2021-04-26

## 2021-01-15 ENCOUNTER — APPOINTMENT (OUTPATIENT)
Dept: RADIOLOGY | Facility: CLINIC | Age: 81
End: 2021-01-15
Payer: COMMERCIAL

## 2021-01-15 ENCOUNTER — OFFICE VISIT (OUTPATIENT)
Dept: OBGYN CLINIC | Facility: CLINIC | Age: 81
End: 2021-01-15
Payer: COMMERCIAL

## 2021-01-15 VITALS
HEIGHT: 63 IN | WEIGHT: 122 LBS | HEART RATE: 75 BPM | DIASTOLIC BLOOD PRESSURE: 62 MMHG | SYSTOLIC BLOOD PRESSURE: 96 MMHG | BODY MASS INDEX: 21.62 KG/M2

## 2021-01-15 DIAGNOSIS — M25.561 CHRONIC PAIN OF RIGHT KNEE: ICD-10-CM

## 2021-01-15 DIAGNOSIS — M11.261 CHONDROCALCINOSIS OF RIGHT KNEE: ICD-10-CM

## 2021-01-15 DIAGNOSIS — M17.11 PRIMARY OSTEOARTHRITIS OF RIGHT KNEE: Primary | ICD-10-CM

## 2021-01-15 DIAGNOSIS — G89.29 CHRONIC PAIN OF RIGHT KNEE: ICD-10-CM

## 2021-01-15 PROCEDURE — 99203 OFFICE O/P NEW LOW 30 MIN: CPT | Performed by: ORTHOPAEDIC SURGERY

## 2021-01-15 PROCEDURE — 73562 X-RAY EXAM OF KNEE 3: CPT

## 2021-01-15 PROCEDURE — 20610 DRAIN/INJ JOINT/BURSA W/O US: CPT | Performed by: ORTHOPAEDIC SURGERY

## 2021-01-15 RX ORDER — TRIAMCINOLONE ACETONIDE 40 MG/ML
80 INJECTION, SUSPENSION INTRA-ARTICULAR; INTRAMUSCULAR
Status: COMPLETED | OUTPATIENT
Start: 2021-01-15 | End: 2021-01-15

## 2021-01-15 RX ORDER — FUROSEMIDE 40 MG/1
1 TABLET ORAL DAILY
COMMUNITY
Start: 2020-04-01

## 2021-01-15 RX ADMIN — TRIAMCINOLONE ACETONIDE 80 MG: 40 INJECTION, SUSPENSION INTRA-ARTICULAR; INTRAMUSCULAR at 14:52

## 2021-01-15 NOTE — PROGRESS NOTES
Assessment/Plan:  1  Primary osteoarthritis of right knee     2  Chondrocalcinosis of right knee     3  Chronic pain of right knee  XR knee 3 vw right non injury     Scribe Attestation    I,:  Joycelyn Vicente am acting as a scribe while in the presence of the attending physician :       I,:  Allan Tsai, DO personally performed the services described in this documentation    as scribed in my presence :         Chasidy Lange is a very pleasant 78-year-old male who presents today for initial evaluation of right knee pain  After reviewing her imaging and performing a thorough history and physical exam I explained that she is suffering with underlying osteoarthritis as well as chondrocalcinosis  We discussed treatment options today  She avoids NSAID medication due to Eliquis  She also has a history seizure following a lidocaine injection administered as part of a preoperative nerve block  As such, I did offer to perform a corticosteroid injection today in the office utilizing a mixture of Kenalog and normal saline  After discussing the risks and benefits, she elected to proceed  The injection was administered today in the office without issue is well tolerated by the patient  Post injection instructions were provided  She does plan to use Tylenol for any breakthrough pain  We will see her back on an as-needed basis  Large joint arthrocentesis: R knee  Universal Protocol:  Consent: Verbal consent obtained    Risks and benefits: risks, benefits and alternatives were discussed  Consent given by: patient  Patient understanding: patient states understanding of the procedure being performed  Site marked: the operative site was marked  Patient identity confirmed: verbally with patient    Supporting Documentation  Indications: pain   Procedure Details  Location: knee - R knee  Preparation: Patient was prepped and draped in the usual sterile fashion  Needle size: 20 G  Ultrasound guidance: no  Approach: anterolateral  Medications administered: 80 mg triamcinolone acetonide 40 mg/mL (And 3 cc normal saline)    Patient tolerance: patient tolerated the procedure well with no immediate complications  Dressing:  Sterile dressing applied          Subjective:  Initial evaluation of chronic right knee pain    Patient ID: Mingo Han is a 78 y o  female who presents today for initial evaluation of right knee pain  She does have a history of unicompartmental medial left knee arthroplasty performed approximately six years ago  She states that her right knee became painful on [place] Day  She states that she went for a walk and lost her balance  She did not suffer a fall but she did twist her knee  Since that time she has experienced aching discomfort about the medial aspect of the right knee that can reach 5/10 on the pain scale  She finds her pain is worse with activity and somewhat better at rest   She also complains of start-up stiffness and pain today  She states that she avoids taking medication if necessary  She did take one Tylenol the day the onset of pain but has not taken medications since then  She denies any mechanical symptoms about the knee  She denies any distal paresthesias of the lower extremity  Review of Systems   Constitutional: Negative for chills, fever and unexpected weight change  HENT: Negative for hearing loss, nosebleeds and sore throat  Eyes: Negative for pain, redness and visual disturbance  Respiratory: Negative for cough, shortness of breath and wheezing  Cardiovascular: Negative for chest pain, palpitations and leg swelling  Gastrointestinal: Negative for abdominal pain, nausea and vomiting  Endocrine: Negative for polydipsia and polyuria  Genitourinary: Negative for dysuria and hematuria  Musculoskeletal: Positive for arthralgias  Negative for joint swelling and myalgias  See HPI   Skin: Negative for rash and wound     Neurological: Negative for dizziness, numbness and headaches  Psychiatric/Behavioral: Negative for decreased concentration and suicidal ideas  The patient is not nervous/anxious            Past Medical History:   Diagnosis Date    Anemia     Arthritis     Cataract     Chronic atrial fibrillation (HCC)     Degeneration of cervical intervertebral disc     Disease of thyroid gland     Occipital infarction ([address] )     Wrist fracture     right        Past Surgical History:   Procedure Laterality Date    COLON SURGERY      KNEE ARTHROSCOPY      with medial meniscus repair        Family History   Problem Relation Age of Onset    Heart attack Mother     Valvular heart disease Mother     Heart attack Father     Hypertension Sister     Mental illness Neg Hx        Social History     Occupational History    Not on file   Tobacco Use    Smoking status: Former Smoker    Smokeless tobacco: Never Used   Substance and Sexual Activity    Alcohol use: Yes     Comment: occasionally, drinks wine , moderate     Drug use: No    Sexual activity: Not on file         Current Outpatient Medications:     alendronate (FOSAMAX) 70 mg tablet, Take 1 tablet by mouth once a week, Disp: , Rfl:     allopurinol (ZYLOPRIM) 100 mg tablet, Take 1 tablet (100 mg total) by mouth daily, Disp: 30 tablet, Rfl: 4    aspirin 81 mg chewable tablet, Chew 1 tablet daily, Disp: , Rfl:     benazepril (LOTENSIN) 20 mg tablet, Take 1 tablet (20 mg total) by mouth daily, Disp: 90 tablet, Rfl: 3    colchicine (COLCRYS) 0 6 mg tablet, Take 1 tablet (0 6 mg total) by mouth 2 (two) times a day UNTIL SYMPTOMS RESOLVE, Disp: 90 tablet, Rfl: 2    ELIQUIS 2 5 MG, , Disp: , Rfl:     furosemide (Lasix) 40 mg tablet, Take 1 tablet by mouth Daily, Disp: , Rfl:     hydrochlorothiazide (MICROZIDE) 12 5 mg capsule, Take 1 capsule by mouth daily, Disp: , Rfl:     levothyroxine 75 mcg tablet, Take 1 tablet (75 mcg total) by mouth daily, Disp: 90 tablet, Rfl: 3    metoprolol succinate (TOPROL-XL) 25 mg 24 hr tablet, Take 1 tablet by mouth daily, Disp: , Rfl:     pantoprazole (PROTONIX) 20 mg tablet, , Disp: , Rfl:     simvastatin (ZOCOR) 20 mg tablet, Take 1 tablet by mouth daily, Disp: , Rfl:     Allergies   Allergen Reactions    Lidocaine      [facility] - [id number]: Seizure with 20 cc during a surgical block  FS    Penicillins     Sulfa Antibiotics Edema       Objective:  Vitals:    01/15/21 1352   BP: 96/62   Pulse: 75       Body mass index is 21 61 kg/m²  Right Knee Exam     Tenderness   The patient is experiencing tenderness in the medial joint line  Range of Motion   Right knee extension: 0  Right knee flexion: 120  Tests   Varus: negative Valgus: negative  Drawer:  Anterior - negative    Posterior - negative    Other   Erythema: absent  Scars: absent  Sensation: normal  Pulse: present  Swelling: none  Effusion: no effusion present    Comments:  Stable at 0, 30 and 90°  Neurovascularly intact distally  No warmth, erythema or signs of infection  Parapatellar crepitance noted  Patellofemoral grind:  Negative          Observations     Right Knee   Negative for effusion  Physical Exam  Vitals signs and nursing note reviewed  Constitutional:       Appearance: She is well-developed  HENT:      Head: Normocephalic and atraumatic  Eyes:      General: No scleral icterus  Conjunctiva/sclera: Conjunctivae normal    Neck:      Musculoskeletal: Normal range of motion and neck supple  Cardiovascular:      Rate and Rhythm: Normal rate  Pulmonary:      Effort: Pulmonary effort is normal  No respiratory distress  Musculoskeletal:      Right knee: She exhibits no effusion  Comments: As noted in HPI   Skin:     General: Skin is warm and dry  Neurological:      Mental Status: She is alert and oriented to person, place, and time  Psychiatric:         Behavior: Behavior normal            I have personally reviewed pertinent films in PACS      X-ray of the right knee obtained on 01/15/2021 reviewed demonstrating mild to moderate degenerative change as evidenced by joint space narrowing in the medial patellofemoral compartments, sclerosis, and marginal osteophytosis  There is chondrocalcinosis noted  The visualized left knee demonstrates a well-positioned and aligned unicompartmental medial arthroplasty  There is no acute fracture, dislocation, lytic or blastic lesion

## 2021-01-18 ENCOUNTER — TELEPHONE (OUTPATIENT)
Dept: OBGYN CLINIC | Facility: MEDICAL CENTER | Age: 81
End: 2021-01-18

## 2021-01-18 NOTE — TELEPHONE ENCOUNTER
Patient sees Dr Kathrin Danielson  Patient was in on the 15th and she may have left a pair of pink glasses in a brown leather case with a zipper  If they are found please call her at [phone]

## 2021-01-23 DIAGNOSIS — Z23 ENCOUNTER FOR IMMUNIZATION: ICD-10-CM

## 2021-02-09 ENCOUNTER — IMMUNIZATIONS (OUTPATIENT)
Dept: FAMILY MEDICINE CLINIC | Facility: HOSPITAL | Age: 81
End: 2021-02-09

## 2021-02-09 DIAGNOSIS — Z23 ENCOUNTER FOR IMMUNIZATION: Primary | ICD-10-CM

## 2021-02-09 PROCEDURE — 91300 SARS-COV-2 / COVID-19 MRNA VACCINE (PFIZER-BIONTECH) 30 MCG: CPT

## 2021-02-09 PROCEDURE — 0001A SARS-COV-2 / COVID-19 MRNA VACCINE (PFIZER-BIONTECH) 30 MCG: CPT

## 2021-02-16 DIAGNOSIS — I10 ESSENTIAL HYPERTENSION: ICD-10-CM

## 2021-02-16 RX ORDER — BENAZEPRIL HYDROCHLORIDE 20 MG/1
20 TABLET ORAL DAILY
Qty: 30 TABLET | Refills: 0 | Status: SHIPPED | OUTPATIENT
Start: 2021-02-16 | End: 2021-03-25 | Stop reason: SDUPTHER

## 2021-03-02 ENCOUNTER — IMMUNIZATIONS (OUTPATIENT)
Dept: FAMILY MEDICINE CLINIC | Facility: HOSPITAL | Age: 81
End: 2021-03-02

## 2021-03-02 DIAGNOSIS — Z23 ENCOUNTER FOR IMMUNIZATION: Primary | ICD-10-CM

## 2021-03-02 PROCEDURE — 0002A SARS-COV-2 / COVID-19 MRNA VACCINE (PFIZER-BIONTECH) 30 MCG: CPT

## 2021-03-02 PROCEDURE — 91300 SARS-COV-2 / COVID-19 MRNA VACCINE (PFIZER-BIONTECH) 30 MCG: CPT

## 2021-03-25 DIAGNOSIS — I10 ESSENTIAL HYPERTENSION: ICD-10-CM

## 2021-03-25 RX ORDER — BENAZEPRIL HYDROCHLORIDE 20 MG/1
20 TABLET ORAL DAILY
Qty: 30 TABLET | Refills: 0 | Status: SHIPPED | OUTPATIENT
Start: 2021-03-25 | End: 2021-04-19 | Stop reason: SDUPTHER

## 2021-03-26 RX ORDER — TRIAMCINOLONE ACETONIDE 1 MG/G
CREAM TOPICAL
COMMUNITY
Start: 2021-02-16

## 2021-03-26 RX ORDER — MOMETASONE FUROATE 1 MG/ML
SOLUTION TOPICAL
COMMUNITY
Start: 2021-01-28 | End: 2022-07-27 | Stop reason: ALTCHOICE

## 2021-03-29 ENCOUNTER — OFFICE VISIT (OUTPATIENT)
Dept: FAMILY MEDICINE CLINIC | Facility: CLINIC | Age: 81
End: 2021-03-29
Payer: COMMERCIAL

## 2021-03-29 VITALS
TEMPERATURE: 96.1 F | WEIGHT: 125 LBS | DIASTOLIC BLOOD PRESSURE: 70 MMHG | BODY MASS INDEX: 22.15 KG/M2 | HEART RATE: 68 BPM | SYSTOLIC BLOOD PRESSURE: 140 MMHG | RESPIRATION RATE: 16 BRPM | HEIGHT: 63 IN

## 2021-03-29 DIAGNOSIS — M81.6 LOCALIZED OSTEOPOROSIS WITHOUT CURRENT PATHOLOGICAL FRACTURE: ICD-10-CM

## 2021-03-29 DIAGNOSIS — I71.2 THORACIC AORTIC ANEURYSM, WITHOUT RUPTURE (HCC): ICD-10-CM

## 2021-03-29 DIAGNOSIS — Z00.00 MEDICARE ANNUAL WELLNESS VISIT, SUBSEQUENT: Primary | ICD-10-CM

## 2021-03-29 DIAGNOSIS — H25.9 SENILE CATARACT, UNSPECIFIED AGE-RELATED CATARACT TYPE, UNSPECIFIED LATERALITY: ICD-10-CM

## 2021-03-29 DIAGNOSIS — I48.91 ATRIAL FIBRILLATION, UNSPECIFIED TYPE (HCC): ICD-10-CM

## 2021-03-29 DIAGNOSIS — M54.32 LEFT SIDED SCIATICA: ICD-10-CM

## 2021-03-29 DIAGNOSIS — E03.9 HYPOTHYROIDISM, UNSPECIFIED TYPE: ICD-10-CM

## 2021-03-29 DIAGNOSIS — I27.20 PULMONARY HYPERTENSION, UNSPECIFIED (HCC): ICD-10-CM

## 2021-03-29 DIAGNOSIS — I10 ESSENTIAL HYPERTENSION: ICD-10-CM

## 2021-03-29 PROBLEM — Z79.82 LONG TERM CURRENT USE OF ASPIRIN: Status: ACTIVE | Noted: 2019-02-06

## 2021-03-29 PROBLEM — Z86.73 HISTORY OF CEREBROVASCULAR ACCIDENT: Status: ACTIVE | Noted: 2019-02-06

## 2021-03-29 PROBLEM — E78.00 PURE HYPERCHOLESTEROLEMIA: Status: ACTIVE | Noted: 2019-02-06

## 2021-03-29 PROBLEM — Z79.01 LONG TERM CURRENT USE OF ANTICOAGULANT: Status: ACTIVE | Noted: 2019-02-06

## 2021-03-29 PROBLEM — I71.20 THORACIC AORTIC ANEURYSM, WITHOUT RUPTURE: Status: ACTIVE | Noted: 2021-03-29

## 2021-03-29 PROBLEM — I07.1 TRICUSPID INCOMPETENCE: Status: ACTIVE | Noted: 2019-03-18

## 2021-03-29 PROBLEM — I25.10 ATHEROSCLEROSIS OF NATIVE CORONARY ARTERY OF NATIVE HEART WITHOUT ANGINA PECTORIS: Status: ACTIVE | Noted: 2019-02-06

## 2021-03-29 PROCEDURE — 36415 COLL VENOUS BLD VENIPUNCTURE: CPT | Performed by: NURSE PRACTITIONER

## 2021-03-29 PROCEDURE — 99214 OFFICE O/P EST MOD 30 MIN: CPT | Performed by: NURSE PRACTITIONER

## 2021-03-29 PROCEDURE — G0439 PPPS, SUBSEQ VISIT: HCPCS | Performed by: NURSE PRACTITIONER

## 2021-03-29 RX ORDER — DEXAMETHASONE SODIUM PHOSPHATE 4 MG/ML
4 INJECTION, SOLUTION INTRA-ARTICULAR; INTRALESIONAL; INTRAMUSCULAR; INTRAVENOUS; SOFT TISSUE ONCE
Status: COMPLETED | OUTPATIENT
Start: 2021-03-29 | End: 2021-03-29

## 2021-03-29 RX ADMIN — DEXAMETHASONE SODIUM PHOSPHATE 4 MG: 4 INJECTION, SOLUTION INTRA-ARTICULAR; INTRALESIONAL; INTRAMUSCULAR; INTRAVENOUS; SOFT TISSUE at 12:04

## 2021-03-29 NOTE — PROGRESS NOTES
Assessment/Plan:      1  Left sided sciatica  -     dexamethasone (DECADRON) injection 4 mg    2  Pulmonary hypertension, unspecified ([address] )  Assessment & Plan:  No issues at this time  Advise continued medication use as tolerated  3  Thoracic aortic aneurysm, without rupture [facility])  Assessment & Plan:  Following up with Dr Asher Barcenas, cardiology  Stable  4  Localized osteoporosis without current pathological fracture  -     DXA bone density spine hip and pelvis; Future; Expected date: 03/29/2021    5  Senile cataract, unspecified age-related cataract type, unspecified laterality  -     Ambulatory referral to Ophthalmology; Future    6  Hypothyroidism, unspecified type  Assessment & Plan: Tolerating levothyroxine 75 mcg without issues  Recheck thyroid studies today  Orders:  -     TSH, 3rd generation  -     T4, free    7  Atrial fibrillation, unspecified type [facility])  Assessment & Plan:  Pt taking eliquis  No signs of bleeding currently  Continue as directed  Stable, no changes today  8  Essential hypertension  Assessment & Plan:  BP wnl in office today  Continue medications and cardiology follow up as directed  Exercise and nutrition as tolerated  Return in about 1 year (around 3/29/2022) for Annual physical     Subjective:      Patient ID: Allan Barnard is a 78 y o  female  Chief Complaint   Patient presents with    Medicare Wellness Visit     NO BW DONE    Medication Management     would like to change some meds to 90day suppy - Allopurinol & Maximiano Remedies is an 78year old female who presents to the office for her annual medicare wellness visit  In addition, she reports she has been having left leg pain that radiates downward  States she fell a few months ago on her right hip and had been overcomensating  States the left hip has started hurting her now despite her right hip pain being resolved  Denies specific injury   Denies fever, chills, chest pain, shortness of breath, n/v/d  Reports she is doing well overall  The following portions of the patient's history were reviewed and updated as appropriate: allergies, current medications, past family history, past medical history, past social history, past surgical history and problem list     Review of Systems   Constitutional: Negative for diaphoresis, fatigue and fever  HENT: Negative for ear pain and hearing loss  Eyes: Negative for pain and visual disturbance  Respiratory: Negative for chest tightness and shortness of breath  Cardiovascular: Negative for chest pain, palpitations and leg swelling  Gastrointestinal: Negative for abdominal pain, constipation and diarrhea  Genitourinary: Negative for difficulty urinating  Musculoskeletal: Positive for back pain and gait problem  Negative for arthralgias, joint swelling and myalgias  Skin: Negative for rash  Neurological: Negative for dizziness, numbness and headaches  Psychiatric/Behavioral: Negative for sleep disturbance           Current Outpatient Medications   Medication Sig Dispense Refill    alendronate (FOSAMAX) 70 mg tablet Take 1 tablet by mouth once a week      allopurinol (ZYLOPRIM) 100 mg tablet Take 1 tablet (100 mg total) by mouth daily 30 tablet 4    aspirin 81 mg chewable tablet Chew 1 tablet daily      benazepril (LOTENSIN) 20 mg tablet Take 1 tablet (20 mg total) by mouth daily 30 tablet 0    colchicine (COLCRYS) 0 6 mg tablet Take 1 tablet (0 6 mg total) by mouth 2 (two) times a day UNTIL SYMPTOMS RESOLVE 90 tablet 2    ELIQUIS 2 5 MG       furosemide (Lasix) 40 mg tablet Take 1 tablet by mouth Daily      hydrochlorothiazide (MICROZIDE) 12 5 mg capsule Take 1 capsule by mouth daily      levothyroxine 75 mcg tablet Take 1 tablet (75 mcg total) by mouth daily 90 tablet 3    metoprolol succinate (TOPROL-XL) 25 mg 24 hr tablet Take 1 tablet by mouth daily      mometasone (ELOCON) 0 1 % lotion       pantoprazole (PROTONIX) 20 mg tablet       simvastatin (ZOCOR) 20 mg tablet Take 1 tablet by mouth daily      triamcinolone (KENALOG) 0 1 % cream        No current facility-administered medications for this visit  Objective:    /70   Pulse 68   Temp (!) 96 1 °F (35 6 °C) (Temporal)   Resp 16   Ht 5' 2 5" (1 588 m)   Wt 56 7 kg (125 lb)   BMI 22 50 kg/m²        Physical Exam  Vitals signs reviewed  Constitutional:       General: She is not in acute distress  Appearance: She is well-developed  She is not diaphoretic  HENT:      Head: Normocephalic and atraumatic  Right Ear: Tympanic membrane, ear canal and external ear normal       Left Ear: Tympanic membrane, ear canal and external ear normal    Eyes:      General: Lids are normal          Right eye: No discharge  Left eye: No discharge  Extraocular Movements: Extraocular movements intact  Conjunctiva/sclera: Conjunctivae normal       Pupils: Pupils are equal, round, and reactive to light  Pupils are equal    Neck:      Musculoskeletal: Full passive range of motion without pain, normal range of motion and neck supple  Thyroid: No thyroid mass or thyromegaly  Vascular: No carotid bruit  Cardiovascular:      Rate and Rhythm: Normal rate and regular rhythm  Pulses: Normal pulses  Heart sounds: Normal heart sounds  Pulmonary:      Effort: Pulmonary effort is normal       Breath sounds: Normal breath sounds  No decreased breath sounds, wheezing, rhonchi or rales  Abdominal:      General: Bowel sounds are normal  There is no distension  Tenderness: There is no abdominal tenderness  Musculoskeletal:      Lumbar back: She exhibits normal range of motion and no tenderness  Right lower leg: No edema  Left lower leg: No edema  Lymphadenopathy:      Cervical: No cervical adenopathy  Skin:     General: Skin is warm and dry  Findings: No rash     Neurological:      Mental Status: She is alert and oriented to person, place, and time  Motor: Motor function is intact  Deep Tendon Reflexes: Reflexes are normal and symmetric  Psychiatric:         Behavior: Behavior normal          Thought Content:  Thought content normal          Judgment: Judgment normal                 JORDAN Edouard

## 2021-03-29 NOTE — PROGRESS NOTES
Assessment and Plan:         Medicare annual wellness visit, subsequent    -  Primary    Age appropriate screenings and recommendations discussed  Preventive health issues were discussed with patient, and age appropriate screening tests were ordered as noted in patient's After Visit Summary  Personalized health advice and appropriate referrals for health education or preventive services given if needed, as noted in patient's After Visit Summary       History of Present Illness:     Patient presents for Medicare Annual Wellness visit    Patient Care Team:  Jennifer Zamora as PCP - General (Family Medicine)  Yanet Lipscomb MD as PCP - [address] [place] [address] ([facility])  Hiral Glasgow MD     Problem List:     Patient Active Problem List   Diagnosis    Anemia    Atrial fibrillation ([address] )    Hyperlipidemia    Hypertension    Hypothyroidism    Knee osteoarthritis    Sciatica of left side    Gout, arthropathy    Bronchospasm    Age-related osteoporosis without current pathological fracture    Pulmonary hypertension, unspecified ([address] )    Thoracic aortic aneurysm, without rupture ([address] )      Past Medical and Surgical History:     Past Medical History:   Diagnosis Date    Anemia     Arthritis     Cataract     Chronic atrial fibrillation (HCC)     Degeneration of cervical intervertebral disc     Disease of thyroid gland     Occipital infarction ([address] )     Wrist fracture     right      Past Surgical History:   Procedure Laterality Date    COLON SURGERY      KNEE ARTHROSCOPY      with medial meniscus repair       Family History:     Family History   Problem Relation Age of Onset    Heart attack Mother     Valvular heart disease Mother     Heart attack Father     Hypertension Sister     Mental illness Neg Hx       Social History:     E-Cigarette/Vaping    E-Cigarette Use Never User      E-Cigarette/Vaping Substances    Nicotine No     THC No     CBD No     Flavoring No     Other No     Unknown No      Social History     Socioeconomic History    Marital status: Single     Spouse name: None    Number of children: None    Years of education: None    Highest education level: None   Occupational History    None   Social Needs    Financial resource strain: None    Food insecurity     Worry: None     Inability: None    Transportation needs     Medical: None     Non-medical: None   Tobacco Use    Smoking status: Former Smoker    Smokeless tobacco: Never Used   Substance and Sexual Activity    Alcohol use: Yes     Frequency: 4 or more times a week     Drinks per session: 1 or 2     Comment: occasionally, drinks wine , moderate     Drug use: No    Sexual activity: None   Lifestyle    Physical activity     Days per week: None     Minutes per session: None    Stress: None   Relationships    Social connections     Talks on phone: None     Gets together: None     Attends Congregation service: None     Active member of club or organization: None     Attends meetings of clubs or organizations: None     Relationship status: None    Intimate partner violence     Fear of current or ex partner: None     Emotionally abused: None     Physically abused: None     Forced sexual activity: None   Other Topics Concern    None   Social History Narrative    Dental care reg    No caffeine use      Medications and Allergies:     Current Outpatient Medications   Medication Sig Dispense Refill    alendronate (FOSAMAX) 70 mg tablet Take 1 tablet by mouth once a week      allopurinol (ZYLOPRIM) 100 mg tablet Take 1 tablet (100 mg total) by mouth daily 30 tablet 4    aspirin 81 mg chewable tablet Chew 1 tablet daily      benazepril (LOTENSIN) 20 mg tablet Take 1 tablet (20 mg total) by mouth daily 30 tablet 0    colchicine (COLCRYS) 0 6 mg tablet Take 1 tablet (0 6 mg total) by mouth 2 (two) times a day UNTIL SYMPTOMS RESOLVE 90 tablet 2    ELIQUIS 2 5 MG       furosemide (Lasix) 40 mg tablet Take 1 tablet by mouth Daily      hydrochlorothiazide (MICROZIDE) 12 5 mg capsule Take 1 capsule by mouth daily      levothyroxine 75 mcg tablet Take 1 tablet (75 mcg total) by mouth daily 90 tablet 3    metoprolol succinate (TOPROL-XL) 25 mg 24 hr tablet Take 1 tablet by mouth daily      mometasone (ELOCON) 0 1 % lotion       pantoprazole (PROTONIX) 20 mg tablet       simvastatin (ZOCOR) 20 mg tablet Take 1 tablet by mouth daily      triamcinolone (KENALOG) 0 1 % cream        No current facility-administered medications for this visit  Allergies   Allergen Reactions    Lidocaine      Annotation - [id number]: Seizure with 20 cc during a surgical block  FS    Penicillins     Sulfa Antibiotics Edema      Immunizations:     Immunization History   Administered Date(s) Administered    Influenza Split High Dose Preservative Free IM 08/26/2015, 08/31/2016, 08/11/2017, 09/24/2018    Influenza Whole 11/02/2000, 10/18/2001    Influenza, high dose seasonal 0 7 mL 09/12/2019, 10/22/2020    Influenza, seasonal, injectable 10/11/2002, 10/06/2003    Pneumococcal Conjugate 13-Valent 08/23/2017    Pneumococcal Polysaccharide PPV23 03/01/2014    SARS-CoV-2 / COVID-19 mRNA IM ([place]) 02/09/2021, 03/02/2021      Health Maintenance: There are no preventive care reminders to display for this patient  Topic Date Due    DTaP,Tdap,and Td Vaccines (1 - Tdap) Never done      Medicare Health Risk Assessment:     /70   Pulse 68   Temp (!) 96 1 °F (35 6 °C) (Temporal)   Resp 16   Ht 5' 2 5" (1 588 m)   Wt 56 7 kg (125 lb)   BMI 22 50 kg/m²      Kathy Bean is here for her Subsequent Wellness visit  Last Medicare Wellness visit information reviewed, patient interviewed and updates made to the record today  Health Risk Assessment:   Patient rates overall health as very good  Patient feels that their physical health rating is same  Patient is satisfied with their life  Eyesight was rated as same   Hearing was rated as same  Patient feels that their emotional and mental health rating is same  Patients states they are never, rarely angry  Patient states they are never, rarely unusually tired/fatigued  Pain experienced in the last 7 days has been some  Patient's pain rating has been 2/10  Patient states that she has experienced no weight loss or gain in last 6 months  Depression Screening:   PHQ-2 Score: 0      Fall Risk Screening: In the past year, patient has experienced: no history of falling in past year      Urinary Incontinence Screening:   Patient has leaked urine accidently in the last six months  Home Safety:  Patient does not have trouble with stairs inside or outside of their home  Patient has working smoke alarms and has no working carbon monoxide detector  Home safety hazards include: none  Nutrition:   Current diet is Regular  vegatarian    Medications:   Patient is currently taking over-the-counter supplements  OTC medications include: see medication list  Patient is able to manage medications  Activities of Daily Living (ADLs)/Instrumental Activities of Daily Living (IADLs):   Walk and transfer into and out of bed and chair?: Yes  Dress and groom yourself?: Yes    Bathe or shower yourself?: Yes    Feed yourself? Yes  Do your laundry/housekeeping?: Yes  Manage your money, pay your bills and track your expenses?: Yes  Make your own meals?: Yes    Do your own shopping?: Yes    Previous Hospitalizations:   Any hospitalizations or ED visits within the last 12 months?: No      Advance Care Planning:   Living will: Yes    Durable POA for healthcare:  Yes    Advanced directive: Yes    Five wishes given: No      Cognitive Screening:   Provider or family/friend/caregiver concerned regarding cognition?: No    PREVENTIVE SCREENINGS      Cardiovascular Screening:    General: Screening Not Indicated and History Lipid Disorder      Breast Cancer Screening:     General: Screening Current      Cervical Cancer Screening:    General: Screening Not Indicated      Osteoporosis Screening:    General: Screening Not Indicated and History Osteoporosis      Lung Cancer Screening:     General: Screening Not Indicated    Other Counseling Topics:   Car/seat belt/driving safety, skin self-exam, sunscreen and calcium and vitamin D intake and regular weightbearing exercise         Patric Macias  [facility]

## 2021-03-29 NOTE — PATIENT INSTRUCTIONS
Medicare Preventive Visit Patient Instructions  Thank you for completing your Welcome to Medicare Visit or Medicare Annual Wellness Visit today  Your next wellness visit will be due in one year (3/30/2022)  The screening/preventive services that you may require over the next 5-10 years are detailed below  Some tests may not apply to you based off risk factors and/or age  Screening tests ordered at today's visit but not completed yet may show as past due  Also, please note that scanned in results may not display below  Preventive Screenings:  Service Recommendations Previous Testing/Comments   Colorectal Cancer Screening  * Colonoscopy    * Fecal Occult Blood Test (FOBT)/Fecal Immunochemical Test (FIT)  * Fecal DNA/Cologuard Test  * Flexible Sigmoidoscopy Age: 54-65 years old   Colonoscopy: every 10 years (may be performed more frequently if at higher risk)  OR  FOBT/FIT: every 1 year  OR  Cologuard: every 3 years  OR  Sigmoidoscopy: every 5 years  Screening may be recommended earlier than age 48 if at higher risk for colorectal cancer  Also, an individualized decision between you and your healthcare provider will decide whether screening between the ages of 74-80 would be appropriate  Colonoscopy: Not on file  FOBT/FIT: Not on file  Cologuard: Not on file  Sigmoidoscopy: Not on file          Breast Cancer Screening Age: 36 years old  Frequency: every 1-2 years  Not required if history of left and right mastectomy Mammogram: 09/10/2020    Screening Current   Cervical Cancer Screening Between the ages of 21-29, pap smear recommended once every 3 years  Between the ages of 33-67, can perform pap smear with HPV co-testing every 5 years     Recommendations may differ for women with a history of total hysterectomy, cervical cancer, or abnormal pap smears in past  Pap Smear: Not on file    Screening Not Indicated   Hepatitis C Screening Once for adults born between 1945 and 1965  More frequently in patients at high risk for Hepatitis C Hep C Antibody: Not on file        Diabetes Screening 1-2 times per year if you're at risk for diabetes or have pre-diabetes Fasting glucose: No results in last 5 years   A1C: No results in last 5 years        Cholesterol Screening Once every 5 years if you don't have a lipid disorder  May order more often based on risk factors  Lipid panel: Not on file    Screening Not Indicated  History Lipid Disorder     Other Preventive Screenings Covered by Medicare:  1  Abdominal Aortic Aneurysm (AAA) Screening: covered once if your at risk  You're considered to be at risk if you have a family history of AAA  2  Lung Cancer Screening: covers low dose CT scan once per year if you meet all of the following conditions: (1) Age 50-69; (2) No signs or symptoms of lung cancer; (3) Current smoker or have quit smoking within the last 15 years; (4) You have a tobacco smoking history of at least 30 pack years (packs per day multiplied by number of years you smoked); (5) You get a written order from a healthcare provider  3  Glaucoma Screening: covered annually if you're considered high risk: (1) You have diabetes OR (2) Family history of glaucoma OR (3)  aged 48 and older OR (3)  American aged 72 and older  3  Osteoporosis Screening: covered every 2 years if you meet one of the following conditions: (1) You're estrogen deficient and at risk for osteoporosis based off medical history and other findings; (2) Have a vertebral abnormality; (3) On glucocorticoid therapy for more than 3 months; (4) Have primary hyperparathyroidism; (5) On osteoporosis medications and need to assess response to drug therapy  · Last bone density test (DXA Scan): Not on file  5  HIV Screening: covered annually if you're between the age of 12-76  Also covered annually if you are younger than 13 and older than 72 with risk factors for HIV infection   For pregnant patients, it is covered up to 3 times per pregnancy  Immunizations:  Immunization Recommendations   Influenza Vaccine Annual influenza vaccination during flu season is recommended for all persons aged >= 6 months who do not have contraindications   Pneumococcal Vaccine (Prevnar and Pneumovax)  * Prevnar = PCV13  * Pneumovax = PPSV23   Adults 25-60 years old: 1-3 doses may be recommended based on certain risk factors  Adults 72 years old: Prevnar (PCV13) vaccine recommended followed by Pneumovax (PPSV23) vaccine  If already received PPSV23 since turning 65, then PCV13 recommended at least one year after PPSV23 dose  Hepatitis B Vaccine 3 dose series if at intermediate or high risk (ex: diabetes, end stage renal disease, liver disease)   Tetanus (Td) Vaccine - COST NOT COVERED BY MEDICARE PART B Following completion of primary series, a booster dose should be given every 10 years to maintain immunity against tetanus  Td may also be given as tetanus wound prophylaxis  Tdap Vaccine - COST NOT COVERED BY MEDICARE PART B Recommended at least once for all adults  For pregnant patients, recommended with each pregnancy  Shingles Vaccine (Shingrix) - COST NOT COVERED BY MEDICARE PART B  2 shot series recommended in those aged 48 and above     Health Maintenance Due:  There are no preventive care reminders to display for this patient  Immunizations Due:      Topic Date Due    DTaP,Tdap,and Td Vaccines (1 - Tdap) Never done     Advance Directives   What are advance directives? Advance directives are legal documents that state your wishes and plans for medical care  These plans are made ahead of time in case you lose your ability to make decisions for yourself  Advance directives can apply to any medical decision, such as the treatments you want, and if you want to donate organs  What are the types of advance directives? There are many types of advance directives, and each state has rules about how to use them   You may choose a combination of any of the following:  · Living will: This is a written record of the treatment you want  You can also choose which treatments you do not want, which to limit, and which to stop at a certain time  This includes surgery, medicine, IV fluid, and tube feedings  · Durable power of  for healthcare [place] [facility]): This is a written record that states who you want to make healthcare choices for you when you are unable to make them for yourself  This person, called a proxy, is usually a family member or a friend  You may choose more than 1 proxy  · Do not resuscitate (DNR) order:  A DNR order is used in case your heart stops beating or you stop breathing  It is a request not to have certain forms of treatment, such as CPR  A DNR order may be included in other types of advance directives  · Medical directive: This covers the care that you want if you are in a coma, near death, or unable to make decisions for yourself  You can list the treatments you want for each condition  Treatment may include pain medicine, surgery, blood transfusions, dialysis, IV or tube feedings, and a ventilator (breathing machine)  · Values history: This document has questions about your views, beliefs, and how you feel and think about life  This information can help others choose the care that you would choose  Why are advance directives important? An advance directive helps you control your care  Although spoken wishes may be used, it is better to have your wishes written down  Spoken wishes can be misunderstood, or not followed  Treatments may be given even if you do not want them  An advance directive may make it easier for your family to make difficult choices about your care  Urinary Incontinence   Urinary incontinence (UI)  is when you lose control of your bladder  UI develops because your bladder cannot store or empty urine properly  The 3 most common types of UI are stress incontinence, urge incontinence, or both    Medicines:   · May be given to help strengthen your bladder control  Report any side effects of medication to your healthcare provider  Do pelvic muscle exercises often:  Your pelvic muscles help you stop urinating  Squeeze these muscles tight for 5 seconds, then relax for 5 seconds  Gradually work up to squeezing for 10 seconds  Do 3 sets of 15 repetitions a day, or as directed  This will help strengthen your pelvic muscles and improve bladder control  Train your bladder:  Go to the bathroom at set times, such as every 2 hours, even if you do not feel the urge to go  You can also try to hold your urine when you feel the urge to go  For example, hold your urine for 5 minutes when you feel the urge to go  As that becomes easier, hold your urine for 10 minutes  Self-care:   · Keep a UI record  Write down how often you leak urine and how much you leak  Make a note of what you were doing when you leaked urine  · Drink liquids as directed  You may need to limit the amount of liquid you drink to help control your urine leakage  Do not drink any liquid right before you go to bed  Limit or do not have drinks that contain caffeine or alcohol  · Prevent constipation  Eat a variety of high-fiber foods  Good examples are high-fiber cereals, beans, vegetables, and whole-grain breads  Walking is the best way to trigger your intestines to have a bowel movement  · Exercise regularly and maintain a healthy weight  Weight loss and exercise will decrease pressure on your bladder and help you control your leakage  · Use a catheter as directed  to help empty your bladder  A catheter is a tiny, plastic tube that is put into your bladder to drain your urine  · Go to behavior therapy as directed  Behavior therapy may be used to help you learn to control your urge to urinate  © Copyright [employer] 2018 Information is for End User's use only and may not be sold, redistributed or otherwise used for commercial purposes   All illustrations and images included in CareNotes® are the copyrighted property of A D A M , Inc  or Neeraj Newberry

## 2021-03-29 NOTE — ASSESSMENT & PLAN NOTE
BP wnl in office today  Continue medications and cardiology follow up as directed  Exercise and nutrition as tolerated

## 2021-03-30 LAB
T4 FREE SERPL-MCNC: 1.36 NG/DL (ref 0.82–1.77)
TSH SERPL DL<=0.005 MIU/L-ACNC: 0.28 UIU/ML (ref 0.45–4.5)

## 2021-04-01 DIAGNOSIS — E03.9 HYPOTHYROIDISM, UNSPECIFIED TYPE: Primary | ICD-10-CM

## 2021-04-01 RX ORDER — LEVOTHYROXINE SODIUM 0.05 MG/1
50 TABLET ORAL
Qty: 90 TABLET | Refills: 3 | Status: SHIPPED | OUTPATIENT
Start: 2021-04-01 | End: 2022-03-29 | Stop reason: SDUPTHER

## 2021-04-19 DIAGNOSIS — I10 ESSENTIAL HYPERTENSION: ICD-10-CM

## 2021-04-19 RX ORDER — BENAZEPRIL HYDROCHLORIDE 20 MG/1
20 TABLET ORAL DAILY
Qty: 90 TABLET | Refills: 3 | Status: SHIPPED | OUTPATIENT
Start: 2021-04-19 | End: 2022-03-11 | Stop reason: SDUPTHER

## 2021-04-25 DIAGNOSIS — M10.9 GOUT, ARTHROPATHY: ICD-10-CM

## 2021-04-26 RX ORDER — COLCHICINE 0.6 MG/1
TABLET ORAL
Qty: 90 TABLET | Refills: 1 | Status: SHIPPED | OUTPATIENT
Start: 2021-04-26 | End: 2021-08-30 | Stop reason: SDUPTHER

## 2021-04-28 DIAGNOSIS — M10.9 GOUT, ARTHROPATHY: ICD-10-CM

## 2021-04-28 RX ORDER — ALLOPURINOL 100 MG/1
100 TABLET ORAL DAILY
Qty: 90 TABLET | Refills: 3 | Status: SHIPPED | OUTPATIENT
Start: 2021-04-28 | End: 2021-09-30 | Stop reason: SDUPTHER

## 2021-04-28 RX ORDER — ALLOPURINOL 100 MG/1
100 TABLET ORAL DAILY
Qty: 30 TABLET | Refills: 4 | Status: SHIPPED | OUTPATIENT
Start: 2021-04-28 | End: 2021-04-28 | Stop reason: SDUPTHER

## 2021-05-19 ENCOUNTER — HOSPITAL ENCOUNTER (OUTPATIENT)
Dept: RADIOLOGY | Facility: HOSPITAL | Age: 81
Discharge: HOME | End: 2021-05-19
Attending: ORTHOPAEDIC SURGERY
Payer: COMMERCIAL

## 2021-05-19 ENCOUNTER — TELEPHONE (OUTPATIENT)
Dept: PAIN MEDICINE | Facility: CLINIC | Age: 81
End: 2021-05-19

## 2021-05-19 ENCOUNTER — APPOINTMENT (OUTPATIENT)
Dept: RADIOLOGY | Facility: CLINIC | Age: 81
End: 2021-05-19
Payer: COMMERCIAL

## 2021-05-19 ENCOUNTER — OFFICE VISIT (OUTPATIENT)
Dept: OBGYN CLINIC | Facility: CLINIC | Age: 81
End: 2021-05-19
Payer: COMMERCIAL

## 2021-05-19 VITALS
WEIGHT: 131 LBS | DIASTOLIC BLOOD PRESSURE: 84 MMHG | HEART RATE: 70 BPM | SYSTOLIC BLOOD PRESSURE: 138 MMHG | BODY MASS INDEX: 23.21 KG/M2 | HEIGHT: 63 IN

## 2021-05-19 DIAGNOSIS — M25.552 PAIN IN LEFT HIP: ICD-10-CM

## 2021-05-19 DIAGNOSIS — M76.32 ILIOTIBIAL BAND SYNDROME AFFECTING LOWER LEG, LEFT: ICD-10-CM

## 2021-05-19 DIAGNOSIS — M16.12 PRIMARY OSTEOARTHRITIS OF ONE HIP, LEFT: Primary | ICD-10-CM

## 2021-05-19 PROCEDURE — 99214 OFFICE O/P EST MOD 30 MIN: CPT | Performed by: ORTHOPAEDIC SURGERY

## 2021-05-19 PROCEDURE — 73502 X-RAY EXAM HIP UNI 2-3 VIEWS: CPT

## 2021-05-19 NOTE — TELEPHONE ENCOUNTER
Scheduled pt for hip injection for 5/28/21  Went over pre-procedure instructions below:  Nothing to eat or drink 1 hr prior to procedure  Need to arrange transportation  Proper clothing for procedure  No vaccines 2 weeks prior or after procedure  If ill or placed on antibiotics please call to reschedule  COVID test schedule for 5/22/21 at [facility]      L/m at [address] office for a medication hold  P#[phone]   Need a fax #

## 2021-05-19 NOTE — PROGRESS NOTES
Assessment/Plan:  1  Primary osteoarthritis of one hip, left     2  Iliotibial band syndrome affecting lower leg, left     3  Pain in left hip  XR hip/pelv 2-3 vws left if performed    CANCELED: XR hip/pelv 2-3 vws left if performed     Scribe Attestation    I,:  Lauryn Richter am acting as a scribe while in the presence of the attending physician :       I,:  Lauren Linares, DO personally performed the services described in this documentation    as scribed in my presence :         Tam Trujillo is a pleasant 78-year-old female who returns today for initial evaluation of her left hip pain  After reviewing her imaging and performing a thorough history and physical exam, I explained that she does have severe underlying osteoarthritis in her left hip, however her clinical presentation today also demonstrates signs and symptoms consistent with iliotibial band syndrome and potentially lumbar radiculopathy  We discussed treatment options and I recommended undergoing an intra-articular corticosteroid injection for diagnostic and hopefully therapeutic purposes  This will be administered under fluoroscopic versus ultrasound guidance by Dr Ld Cotto  We will reach out to her to schedule the injection  I would like to see her back 4 weeks following the injection to review her pain profile and adjust our treatment plan if necessary  Subjective: Initial evaluation for left hip pain    Patient ID: Florinda Perez is a 78 y o  female who returns today for initial evaluation of her left hip pain  This has been ongoing over the last few months  At today's visit, she complains of aching pain about the lateral aspect of her leg that can reach 5/10 on the pain scale  She also complains more sharp and aching pain about the posterior aspect of her hip that can reach 8/10 on the pain scale  She states that she has difficulty with start-up stiffness and pain  She is quite active but states that she has been sedentary recently  She denies any recent injury or trauma  She denies any groin pain  Review of Systems   Constitutional: Positive for activity change  Negative for chills, fever and unexpected weight change  HENT: Negative for hearing loss, nosebleeds and sore throat  Eyes: Negative for pain, redness and visual disturbance  Respiratory: Negative for cough, shortness of breath and wheezing  Cardiovascular: Negative for chest pain, palpitations and leg swelling  Gastrointestinal: Negative for abdominal pain, nausea and vomiting  Endocrine: Negative for polydipsia and polyuria  Genitourinary: Negative for dysuria and hematuria  Musculoskeletal: Positive for arthralgias and myalgias  Negative for joint swelling  See HPI   Skin: Negative for rash and wound  Neurological: Negative for dizziness, numbness and headaches  Psychiatric/Behavioral: Negative for decreased concentration and suicidal ideas  The patient is not nervous/anxious            Past Medical History:   Diagnosis Date    Anemia     Arthritis     Atherosclerosis of native coronary artery of native heart without angina pectoris 2/6/2019    Cataract     Chronic atrial fibrillation (HCC)     Degeneration of cervical intervertebral disc     Disease of thyroid gland     Occipital infarction ([address] )     Wrist fracture     right        Past Surgical History:   Procedure Laterality Date    COLON SURGERY      KNEE ARTHROSCOPY      with medial meniscus repair        Family History   Problem Relation Age of Onset    Heart attack Mother     Valvular heart disease Mother     Heart attack Father     Hypertension Sister     Mental illness Neg Hx        Social History     Occupational History    Not on file   Tobacco Use    Smoking status: Former Smoker    Smokeless tobacco: Never Used   Substance and Sexual Activity    Alcohol use: Yes     Frequency: 4 or more times a week     Drinks per session: 1 or 2     Comment: occasionally, drinks wine , moderate     Drug use: No    Sexual activity: Not on file         Current Outpatient Medications:     alendronate (FOSAMAX) 70 mg tablet, Take 1 tablet by mouth once a week, Disp: , Rfl:     allopurinol (ZYLOPRIM) 100 mg tablet, Take 1 tablet (100 mg total) by mouth daily, Disp: 90 tablet, Rfl: 3    aspirin 81 mg chewable tablet, Chew 1 tablet daily, Disp: , Rfl:     benazepril (LOTENSIN) 20 mg tablet, Take 1 tablet (20 mg total) by mouth daily, Disp: 90 tablet, Rfl: 3    colchicine (COLCRYS) 0 6 mg tablet, TAKE 1 TABLET (0 6 MG TOTAL) BY MOUTH TWO (TWO) TIMES A DAY UNTIL SYMPTOMS RESOLVE, Disp: 90 tablet, Rfl: 1    ELIQUIS 2 5 MG, , Disp: , Rfl:     furosemide (Lasix) 40 mg tablet, Take 1 tablet by mouth Daily, Disp: , Rfl:     hydrochlorothiazide (MICROZIDE) 12 5 mg capsule, Take 1 capsule by mouth daily, Disp: , Rfl:     levothyroxine 50 mcg tablet, Take 1 tablet (50 mcg total) by mouth daily in the early morning, Disp: 90 tablet, Rfl: 3    metoprolol succinate (TOPROL-XL) 25 mg 24 hr tablet, Take 1 tablet by mouth daily, Disp: , Rfl:     mometasone (ELOCON) 0 1 % lotion, , Disp: , Rfl:     pantoprazole (PROTONIX) 20 mg tablet, , Disp: , Rfl:     simvastatin (ZOCOR) 20 mg tablet, Take 1 tablet by mouth daily, Disp: , Rfl:     triamcinolone (KENALOG) 0 1 % cream, , Disp: , Rfl:     Allergies   Allergen Reactions    Lidocaine      Annotation - [id number]: Seizure with 20 cc during a surgical block  FS    Penicillins     Sulfa Antibiotics Edema       Objective:  Vitals:    05/19/21 1115   BP: 138/84   Pulse: 70       Body mass index is 23 58 kg/m²  Left Hip Exam     Tenderness   The patient is experiencing tenderness in the lateral     Range of Motion   Left hip abduction: 45  Left hip adduction: 30  Left hip extension: 0  Left hip flexion: 120 with posterior pain  Left hip external rotation: 40     Left hip internal rotation: 20      Muscle Strength   Flexion: 5/5     Tests   KASH: negative    Other   Erythema: absent  Scars: absent  Sensation: normal  Pulse: present    Comments:  Stinchfield: negative  FADIR: negative  No tenderness at the SI joint            Physical Exam  Vitals signs and nursing note reviewed  Constitutional:       Appearance: She is well-developed  HENT:      Head: Normocephalic and atraumatic  Eyes:      General: No scleral icterus  Conjunctiva/sclera: Conjunctivae normal    Neck:      Musculoskeletal: Normal range of motion and neck supple  Cardiovascular:      Rate and Rhythm: Normal rate  Pulmonary:      Effort: Pulmonary effort is normal  No respiratory distress  Musculoskeletal:      Comments: As noted in HPI   Skin:     General: Skin is warm and dry  Neurological:      Mental Status: She is alert and oriented to person, place, and time  Psychiatric:         Behavior: Behavior normal          I have personally reviewed pertinent films in PACS  X-ray of the left hip obtained on 05/19/2021 reviewed demonstrating severe degenerative change with joint space narrowing, sclerosis, and osteophytosis  There is no acute fracture, dislocation, lytic or blastic lesion

## 2021-05-19 NOTE — TELEPHONE ENCOUNTER
----- Message from [facility] sent at 5/19/2021 12:22 PM EDT -----  Regarding: Left hip intra-articular corticosteroid injection   Please call this patient to schedule a fluoroscopic versus ultrasound-guided left hip corticosteroid injection  Thank you!

## 2021-05-19 NOTE — H&P (VIEW-ONLY)
Assessment/Plan:  1  Primary osteoarthritis of one hip, left     2  Iliotibial band syndrome affecting lower leg, left     3  Pain in left hip  XR hip/pelv 2-3 vws left if performed    CANCELED: XR hip/pelv 2-3 vws left if performed     Scribe Attestation    I,:  Santana Bartholomew am acting as a scribe while in the presence of the attending physician :       I,:  Maurizio Lynn DO personally performed the services described in this documentation    as scribed in my presence :         Matt Colon is a pleasant 75-year-old female who returns today for initial evaluation of her left hip pain  After reviewing her imaging and performing a thorough history and physical exam, I explained that she does have severe underlying osteoarthritis in her left hip, however her clinical presentation today also demonstrates signs and symptoms consistent with iliotibial band syndrome and potentially lumbar radiculopathy  We discussed treatment options and I recommended undergoing an intra-articular corticosteroid injection for diagnostic and hopefully therapeutic purposes  This will be administered under fluoroscopic versus ultrasound guidance by Dr Leigh Gerber  We will reach out to her to schedule the injection  I would like to see her back 4 weeks following the injection to review her pain profile and adjust our treatment plan if necessary  Subjective: Initial evaluation for left hip pain    Patient ID: Evalee Kawasaki is a 78 y o  female who returns today for initial evaluation of her left hip pain  This has been ongoing over the last few months  At today's visit, she complains of aching pain about the lateral aspect of her leg that can reach 5/10 on the pain scale  She also complains more sharp and aching pain about the posterior aspect of her hip that can reach 8/10 on the pain scale  She states that she has difficulty with start-up stiffness and pain  She is quite active but states that she has been sedentary recently  She denies any recent injury or trauma  She denies any groin pain  Review of Systems   Constitutional: Positive for activity change  Negative for chills, fever and unexpected weight change  HENT: Negative for hearing loss, nosebleeds and sore throat  Eyes: Negative for pain, redness and visual disturbance  Respiratory: Negative for cough, shortness of breath and wheezing  Cardiovascular: Negative for chest pain, palpitations and leg swelling  Gastrointestinal: Negative for abdominal pain, nausea and vomiting  Endocrine: Negative for polydipsia and polyuria  Genitourinary: Negative for dysuria and hematuria  Musculoskeletal: Positive for arthralgias and myalgias  Negative for joint swelling  See HPI   Skin: Negative for rash and wound  Neurological: Negative for dizziness, numbness and headaches  Psychiatric/Behavioral: Negative for decreased concentration and suicidal ideas  The patient is not nervous/anxious            Past Medical History:   Diagnosis Date    Anemia     Arthritis     Atherosclerosis of native coronary artery of native heart without angina pectoris 2/6/2019    Cataract     Chronic atrial fibrillation (HCC)     Degeneration of cervical intervertebral disc     Disease of thyroid gland     Occipital infarction ([address] )     Wrist fracture     right        Past Surgical History:   Procedure Laterality Date    COLON SURGERY      KNEE ARTHROSCOPY      with medial meniscus repair        Family History   Problem Relation Age of Onset    Heart attack Mother     Valvular heart disease Mother     Heart attack Father     Hypertension Sister     Mental illness Neg Hx        Social History     Occupational History    Not on file   Tobacco Use    Smoking status: Former Smoker    Smokeless tobacco: Never Used   Substance and Sexual Activity    Alcohol use: Yes     Frequency: 4 or more times a week     Drinks per session: 1 or 2     Comment: occasionally, drinks wine , moderate     Drug use: No    Sexual activity: Not on file         Current Outpatient Medications:     alendronate (FOSAMAX) 70 mg tablet, Take 1 tablet by mouth once a week, Disp: , Rfl:     allopurinol (ZYLOPRIM) 100 mg tablet, Take 1 tablet (100 mg total) by mouth daily, Disp: 90 tablet, Rfl: 3    aspirin 81 mg chewable tablet, Chew 1 tablet daily, Disp: , Rfl:     benazepril (LOTENSIN) 20 mg tablet, Take 1 tablet (20 mg total) by mouth daily, Disp: 90 tablet, Rfl: 3    colchicine (COLCRYS) 0 6 mg tablet, TAKE 1 TABLET (0 6 MG TOTAL) BY MOUTH TWO (TWO) TIMES A DAY UNTIL SYMPTOMS RESOLVE, Disp: 90 tablet, Rfl: 1    ELIQUIS 2 5 MG, , Disp: , Rfl:     furosemide (Lasix) 40 mg tablet, Take 1 tablet by mouth Daily, Disp: , Rfl:     hydrochlorothiazide (MICROZIDE) 12 5 mg capsule, Take 1 capsule by mouth daily, Disp: , Rfl:     levothyroxine 50 mcg tablet, Take 1 tablet (50 mcg total) by mouth daily in the early morning, Disp: 90 tablet, Rfl: 3    metoprolol succinate (TOPROL-XL) 25 mg 24 hr tablet, Take 1 tablet by mouth daily, Disp: , Rfl:     mometasone (ELOCON) 0 1 % lotion, , Disp: , Rfl:     pantoprazole (PROTONIX) 20 mg tablet, , Disp: , Rfl:     simvastatin (ZOCOR) 20 mg tablet, Take 1 tablet by mouth daily, Disp: , Rfl:     triamcinolone (KENALOG) 0 1 % cream, , Disp: , Rfl:     Allergies   Allergen Reactions    Lidocaine      Annotation - [id number]: Seizure with 20 cc during a surgical block  FS    Penicillins     Sulfa Antibiotics Edema       Objective:  Vitals:    05/19/21 1115   BP: 138/84   Pulse: 70       Body mass index is 23 58 kg/m²  Left Hip Exam     Tenderness   The patient is experiencing tenderness in the lateral     Range of Motion   Left hip abduction: 45  Left hip adduction: 30  Left hip extension: 0  Left hip flexion: 120 with posterior pain  Left hip external rotation: 40     Left hip internal rotation: 20      Muscle Strength   Flexion: 5/5     Tests   KASH: negative    Other   Erythema: absent  Scars: absent  Sensation: normal  Pulse: present    Comments:  Stinchfield: negative  FADIR: negative  No tenderness at the SI joint            Physical Exam  Vitals signs and nursing note reviewed  Constitutional:       Appearance: She is well-developed  HENT:      Head: Normocephalic and atraumatic  Eyes:      General: No scleral icterus  Conjunctiva/sclera: Conjunctivae normal    Neck:      Musculoskeletal: Normal range of motion and neck supple  Cardiovascular:      Rate and Rhythm: Normal rate  Pulmonary:      Effort: Pulmonary effort is normal  No respiratory distress  Musculoskeletal:      Comments: As noted in HPI   Skin:     General: Skin is warm and dry  Neurological:      Mental Status: She is alert and oriented to person, place, and time  Psychiatric:         Behavior: Behavior normal          I have personally reviewed pertinent films in PACS  X-ray of the left hip obtained on 05/19/2021 reviewed demonstrating severe degenerative change with joint space narrowing, sclerosis, and osteophytosis  There is no acute fracture, dislocation, lytic or blastic lesion

## 2021-05-22 DIAGNOSIS — Z11.59 SPECIAL SCREENING EXAMINATION FOR UNSPECIFIED VIRAL DISEASE: ICD-10-CM

## 2021-05-22 PROCEDURE — U0003 INFECTIOUS AGENT DETECTION BY NUCLEIC ACID (DNA OR RNA); SEVERE ACUTE RESPIRATORY SYNDROME CORONAVIRUS 2 (SARS-COV-2) (CORONAVIRUS DISEASE [COVID-19]), AMPLIFIED PROBE TECHNIQUE, MAKING USE OF HIGH THROUGHPUT TECHNOLOGIES AS DESCRIBED BY CMS-2020-01-R: HCPCS

## 2021-05-22 PROCEDURE — U0005 INFEC AGEN DETEC AMPLI PROBE: HCPCS

## 2021-05-23 LAB — SARS-COV-2 RNA RESP QL NAA+PROBE: NEGATIVE

## 2021-05-26 ENCOUNTER — TELEPHONE (OUTPATIENT)
Dept: OBGYN CLINIC | Facility: HOSPITAL | Age: 81
End: 2021-05-26

## 2021-05-26 NOTE — TELEPHONE ENCOUNTER
Patient sees Dr Grace Alcala  Patient called asking, at what time she needs to be in the hospital for the hip injection on Friday, 5/28?

## 2021-05-28 ENCOUNTER — HOSPITAL ENCOUNTER (OUTPATIENT)
Facility: AMBULARY SURGERY CENTER | Age: 81
Setting detail: OUTPATIENT SURGERY
Discharge: HOME | End: 2021-05-28
Attending: ANESTHESIOLOGY | Admitting: ANESTHESIOLOGY
Payer: COMMERCIAL

## 2021-05-28 ENCOUNTER — APPOINTMENT (OUTPATIENT)
Dept: RADIOLOGY | Facility: HOSPITAL | Age: 81
End: 2021-05-28
Payer: COMMERCIAL

## 2021-05-28 VITALS
SYSTOLIC BLOOD PRESSURE: 144 MMHG | TEMPERATURE: 96.9 F | WEIGHT: 123 LBS | BODY MASS INDEX: 21 KG/M2 | RESPIRATION RATE: 16 BRPM | HEART RATE: 63 BPM | DIASTOLIC BLOOD PRESSURE: 70 MMHG | HEIGHT: 64 IN | OXYGEN SATURATION: 97 %

## 2021-05-28 PROCEDURE — 77002 NEEDLE LOCALIZATION BY XRAY: CPT

## 2021-05-28 PROCEDURE — 20610 DRAIN/INJ JOINT/BURSA W/O US: CPT | Performed by: ANESTHESIOLOGY

## 2021-05-28 PROCEDURE — 20610 DRAIN/INJ JOINT/BURSA W/O US: CPT

## 2021-05-28 PROCEDURE — 77002 NEEDLE LOCALIZATION BY XRAY: CPT | Performed by: ANESTHESIOLOGY

## 2021-05-28 RX ORDER — METHYLPREDNISOLONE ACETATE 80 MG/ML
INJECTION, SUSPENSION INTRA-ARTICULAR; INTRALESIONAL; INTRAMUSCULAR; SOFT TISSUE AS NEEDED
Status: DISCONTINUED | OUTPATIENT
Start: 2021-05-28 | End: 2021-05-28 | Stop reason: HOSPADM

## 2021-05-28 RX ORDER — LIDOCAINE WITH 8.4% SOD BICARB 0.9%(10ML)
SYRINGE (ML) INJECTION AS NEEDED
Status: DISCONTINUED | OUTPATIENT
Start: 2021-05-28 | End: 2021-05-28 | Stop reason: HOSPADM

## 2021-05-28 RX ORDER — BUPIVACAINE HYDROCHLORIDE 2.5 MG/ML
INJECTION, SOLUTION EPIDURAL; INFILTRATION; INTRACAUDAL AS NEEDED
Status: DISCONTINUED | OUTPATIENT
Start: 2021-05-28 | End: 2021-05-28 | Stop reason: HOSPADM

## 2021-05-28 NOTE — INTERVAL H&P NOTE
H&P reviewed  After examining the patient I find no changes in the patients condition since the H&P had been written      Vitals:    05/28/21 0936   BP: 141/82   Pulse: 65   Resp: 18   Temp: (!) 96 9 °F (36 1 °C)   SpO2: 100%

## 2021-05-28 NOTE — OP NOTE
ATTENDING PHYSICIAN:  Esther Zamora MD      PREPROCEDURE DIAGNOSIS:  Left hip pain  POSTPROCEDURE DIAGNOSIS: Left hip pain  PROCEDURE: Left intraarticular hip injection under fluoroscopic guidance  ANESTHESIA:  Local     ESTIMATED BLOOD LOSS:  Minimal     COMPLICATIONS:  None  LOCATION:  [address]  CONSENT:  Today's procedure, its risks, benefits, and alternatives were explained in detail to the patient  Risks include, but are not limited bleeding, infection, hematoma formation, abscess formation, weakness, nerve irritation or damage, failure of the pain to improve, or potential worsening of the pain  The patient verbalized understanding and wished to proceed with the procedure  Written informed consent was thereby obtained  DESCRIPTION OF THE PROCEDURE:  After written informed consent was obtained, the patient was taken to the fluoroscopy suite and placed in the supine position  Anatomical landmarks were identified by way of fluoroscopy in the AP and oblique views  The patient's hip region was prepped using antiseptic solution and draped in the usual sterile fashion  Strict aseptic technique was utilized  The skin and subcutaneous tissues at the needle entry site was infiltrated with a small amount of 1% preservative free Lidocaine using a 25-gauge 1-1/2 inch needle  A 22 gauge 3-1/2 inch needle was then advanced incrementally under fluoroscopic guidance towards the identified point until os was contacted and the joint space was entered  After negative aspiration, 1 ml of contrast solution was injected showing an appropriate arthrogram   Subsequently, a solution consisting of 4 ml of 0 25% Bupivacaine mixed with 1 ml of Depo-Medrol 80 mg/ml was injected slowly  All needles were removed with the tips intact and hemostasis was maintained throughout    The patient tolerated the procedure well, and there were no apparent paresthesias or complications noted during or following this procedure  The antiseptic was wiped clean and Band-Aids were placed as appropriate  The patient was transferred to the recovery area and was observed for an appropriate period of time during which the patient remained hemodynamically stable and neurovascularly intact, as the patient was prior to the procedure  The patient was subsequently discharged to home in stable condition with supervision  The patient was instructed to call the office in a few days for an update  Discharge instructions were provided  I was present and participated in all key and critical portions of this procedure      Sagar Nieves MD  5/28/2021  10:01 AM

## 2021-05-28 NOTE — PERIOPERATIVE NURSING NOTE
Pt states that she drove herself here today, Dr Dat Keyes advised and said she can stay here for 1 hour then leave  Pt in a chair comfortable until 11:01 AM as instructed, warm blanket placed, will wait

## 2021-05-28 NOTE — DISCHARGE INSTRUCTIONS

## 2021-06-30 ENCOUNTER — OFFICE VISIT (OUTPATIENT)
Dept: OBGYN CLINIC | Facility: CLINIC | Age: 81
End: 2021-06-30
Payer: COMMERCIAL

## 2021-06-30 VITALS
BODY MASS INDEX: 21 KG/M2 | DIASTOLIC BLOOD PRESSURE: 86 MMHG | WEIGHT: 123 LBS | HEIGHT: 64 IN | HEART RATE: 75 BPM | SYSTOLIC BLOOD PRESSURE: 130 MMHG

## 2021-06-30 DIAGNOSIS — M76.32 ILIOTIBIAL BAND SYNDROME AFFECTING LOWER LEG, LEFT: ICD-10-CM

## 2021-06-30 DIAGNOSIS — M54.16 LUMBAR RADICULOPATHY: Primary | ICD-10-CM

## 2021-06-30 DIAGNOSIS — M16.12 PRIMARY OSTEOARTHRITIS OF ONE HIP, LEFT: ICD-10-CM

## 2021-06-30 PROCEDURE — 99213 OFFICE O/P EST LOW 20 MIN: CPT | Performed by: ORTHOPAEDIC SURGERY

## 2021-06-30 NOTE — PROGRESS NOTES
Assessment/Plan:  1  Lumbar radiculopathy  Ambulatory referral to Physical Therapy   2  Iliotibial band syndrome affecting lower leg, left  Ambulatory referral to Physical Therapy   3  Primary osteoarthritis of one hip, left  Ambulatory referral to Physical Therapy     Scribe Attestation    I,:  Cherry Ozuna am acting as a scribe while in the presence of the attending physician :       I,:  Danya Cobb,  personally performed the services described in this documentation    as scribed in my presence :         Patient has lumbar radiculopathy, left hip osteoarthritis, left IT band syndrome  She had little relief out of the intra-articular left hip injection and states she is asymptomatic today  At this point recommend a course of physical therapy focusing on the lumbar spine and left IT band  She may be WBAT, Tylenol/NSAIDs as needed for pain  Contact the office with any further questions or concerns prior to next follow-up  We will plan to see her back in 3 months for re-evaluation  If she has had no improvement with physical therapy will consider MRI of the lumbar spine and follow up with pain management  Subjective: Left hip pain    Patient ID: Sravanthi Martínez is a 78 y o  female presenting for follow-up of left hip osteoarthritis and IT band syndrome  Our last visit we ordered a fluoroscopic guided left hip injection which she had done on 05/28/2021  She states she had about 3 days of complete relief from the injection, after that her pain returned intermittently  She continues to have anterior left hip pain and lateral thigh pain down to her knee  She also complains of night cramps in both feet and calves  She states it "feels like nerve pain"  Patient does state that she is completely asymptomatic today  Denies numbness and tingling  Patient offers no additional complaints at this time  Review of Systems   Constitutional: Negative for appetite change and unexpected weight change     HENT: Negative for congestion and trouble swallowing  Eyes: Negative for visual disturbance  Respiratory: Negative for cough and shortness of breath  Cardiovascular: Negative for chest pain and palpitations  Gastrointestinal: Negative for nausea and vomiting  Endocrine: Negative for cold intolerance and heat intolerance  Musculoskeletal: Positive for arthralgias and myalgias  Skin: Negative for rash  Neurological: Negative for numbness  Past Medical History:   Diagnosis Date    Anemia     Arthritis     Atherosclerosis of native coronary artery of native heart without angina pectoris 2/6/2019    Cataract     Chronic atrial fibrillation (HCC)     Degeneration of cervical intervertebral disc     Disease of thyroid gland     Occipital infarction ([address] )     Wrist fracture     right        Past Surgical History:   Procedure Laterality Date    COLON SURGERY      FL INJECTION LEFT HIP (NON ARTHROGRAM)  5/28/2021    KNEE ARTHROSCOPY      with medial meniscus repair     WA ARTHROCENTESIS ASPIR&/INJ MAJOR JT/BURSA W/O US Left 5/28/2021    Procedure: Hip corticosteroid injection ([id number]);   Surgeon: Vincent Klinefelter, MD;  Location: [facility] MAIN OR;  Service: Pain Management        Family History   Problem Relation Age of Onset    Heart attack Mother     Valvular heart disease Mother     Heart attack Father     Hypertension Sister     Mental illness Neg Hx        Social History     Occupational History    Not on file   Tobacco Use    Smoking status: Former Smoker    Smokeless tobacco: Never Used   Vaping Use    Vaping Use: Never used   Substance and Sexual Activity    Alcohol use: Yes     Comment: occasionally, drinks wine , moderate     Drug use: No    Sexual activity: Not on file         Current Outpatient Medications:     alendronate (FOSAMAX) 70 mg tablet, Take 1 tablet by mouth once a week, Disp: , Rfl:     allopurinol (ZYLOPRIM) 100 mg tablet, Take 1 tablet (100 mg total) by mouth daily, Disp: 90 tablet, Rfl: 3    aspirin 81 mg chewable tablet, Chew 1 tablet daily, Disp: , Rfl:     benazepril (LOTENSIN) 20 mg tablet, Take 1 tablet (20 mg total) by mouth daily, Disp: 90 tablet, Rfl: 3    colchicine (COLCRYS) 0 6 mg tablet, TAKE 1 TABLET (0 6 MG TOTAL) BY MOUTH TWO (TWO) TIMES A DAY UNTIL SYMPTOMS RESOLVE, Disp: 90 tablet, Rfl: 1    ELIQUIS 2 5 MG, , Disp: , Rfl:     furosemide (Lasix) 40 mg tablet, Take 1 tablet by mouth Daily, Disp: , Rfl:     hydrochlorothiazide (MICROZIDE) 12 5 mg capsule, Take 1 capsule by mouth daily, Disp: , Rfl:     levothyroxine 50 mcg tablet, Take 1 tablet (50 mcg total) by mouth daily in the early morning, Disp: 90 tablet, Rfl: 3    metoprolol succinate (TOPROL-XL) 25 mg 24 hr tablet, Take 1 tablet by mouth daily, Disp: , Rfl:     mometasone (ELOCON) 0 1 % lotion, , Disp: , Rfl:     pantoprazole (PROTONIX) 20 mg tablet, , Disp: , Rfl:     simvastatin (ZOCOR) 20 mg tablet, Take 1 tablet by mouth daily, Disp: , Rfl:     triamcinolone (KENALOG) 0 1 % cream, , Disp: , Rfl:     Allergies   Allergen Reactions    Lidocaine      Annotation - [id number]: Seizure with 20 cc during a surgical block  FS    Penicillins     Sulfa Antibiotics Edema       Objective:  Vitals:    06/30/21 1108   BP: 130/86   Pulse: 75       Body mass index is 21 11 kg/m²  Left Hip Exam     Tenderness   Left hip tenderness location: No SI joint or lumbar paraspinal tenderness  Range of Motion   Left hip abduction: 45  Left hip adduction: 30  Left hip extension: 0  Left hip flexion: 120 with posterior pain  Left hip external rotation: 40  Left hip internal rotation: 20      Muscle Strength   Flexion: 5/5     Tests   KASH: negative    Other   Erythema: absent  Scars: absent  Sensation: normal  Pulse: present    Comments:  Stinchfield: negative  FADIR: negative  Negative neuro SLR            Physical Exam  Constitutional:       Appearance: Normal appearance     HENT: Head: Normocephalic  Right Ear: Tympanic membrane normal       Left Ear: Tympanic membrane normal    Eyes:      Extraocular Movements: Extraocular movements intact  Pupils: Pupils are equal, round, and reactive to light  Pulmonary:      Effort: Pulmonary effort is normal       Breath sounds: Normal breath sounds  Musculoskeletal:      Cervical back: Normal range of motion  Neurological:      General: No focal deficit present  Mental Status: She is alert and oriented to person, place, and time  Psychiatric:         Mood and Affect: Mood normal          Behavior: Behavior normal          I have personally reviewed pertinent films in PACS  None performed today

## 2021-07-01 ENCOUNTER — EVALUATION (OUTPATIENT)
Dept: PHYSICAL THERAPY | Facility: CLINIC | Age: 81
End: 2021-07-01
Payer: COMMERCIAL

## 2021-07-01 DIAGNOSIS — M54.16 LUMBAR RADICULOPATHY: ICD-10-CM

## 2021-07-01 DIAGNOSIS — M16.12 PRIMARY OSTEOARTHRITIS OF ONE HIP, LEFT: ICD-10-CM

## 2021-07-01 DIAGNOSIS — M76.32 ILIOTIBIAL BAND SYNDROME AFFECTING LOWER LEG, LEFT: ICD-10-CM

## 2021-07-01 PROCEDURE — 97161 PT EVAL LOW COMPLEX 20 MIN: CPT

## 2021-07-01 PROCEDURE — 97110 THERAPEUTIC EXERCISES: CPT

## 2021-07-01 NOTE — PROGRESS NOTES
PT Evaluation     Today's date: 2021  Patient name: Alonzo Ring  : 1940  MRN: [id number]  Referring provider: Dina Cooks, DO  Dx:   Encounter Diagnosis     ICD-10-CM    1  Primary osteoarthritis of one hip, left  M16 12 Ambulatory referral to Physical Therapy   2  Lumbar radiculopathy  M54 16 Ambulatory referral to Physical Therapy   3  Iliotibial band syndrome affecting lower leg, left  M76 32 Ambulatory referral to Physical Therapy       Start Time: 5  Stop Time: 141  Total time in clinic (min): 60 minutes    Assessment  Assessment details: Pt reports to the clinic with complaints of anterior and lateral L hip pain that radiates to the L knee for the past several months  Pt has normalized lumbar AROM, with minimal limitations into extension that produced a light stretch in the anterior L hip  Pt demonstrates a normalized gait pattern with a reduced gait speed  Pt has decreased L hip MMT strength in all planes with pain exhibited during abduction and adduction testing  Hip special testing resulted in possible BASIL  Pt did not exhibit any back symptoms or reproduction with movement, palpation, or mobilizations  Pt hx and clinical findings are consistent with possible BASIL of the L hip and nerve impingement in the hip  Pt would benefit from skilled physical therapy to address her deficits/limitations, decrease her pain, progress toward her goals, and return to her PLOF  Impairments: abnormal or restricted ROM, activity intolerance, impaired balance, impaired physical strength, lacks appropriate home exercise program, pain with function, poor posture  and poor body mechanics    Symptom irritability: lowUnderstanding of Dx/Px/POC: fair   Prognosis: good    Goals  Short Term Goals:  1) Pt will initiate and progress her HEP in 4 weeks  2) Pt will improve hip MMT strength by 1 with no pain in 4 weeks  3) Pt will be able to perform 10 sit-to-stands without any pain in 4 weeks      Long Term Goals:  1) Pt will be able to ambulate for 20 mins without pain in 8 weeks  2) Pt will be able to perform light exercises for 20 mins without pain in 8 weeks  3) Pt will be able to perform all ADL's without pain in 8 weeks  Plan  Patient would benefit from: skilled physical therapy and PT eval  Planned modality interventions: cryotherapy, TENS and thermotherapy: hydrocollator packs  Planned therapy interventions: activity modification, joint mobilization, manual therapy, neuromuscular re-education, patient education, postural training, self care, strengthening, stretching, ADL retraining, body mechanics training, therapeutic activities, therapeutic exercise, functional ROM exercises, flexibility, graded exercise and home exercise program  Frequency: 1-2x/week  Duration in weeks: 8  Treatment plan discussed with: patient        Subjective Evaluation    History of Present Illness  Mechanism of injury: Pt reports 4-5 months ago she was walking on a trail and got a steroid injection in her R knee was fine, pt then realized she had pain in her L hip all the way down to the knee  Pt went to the hospital on May 27th, got a shot and felt fine  Pt saw Dr Ariel Meneses, had no pain that day, and currently has no pain  Pt used to go to the chiropractor for her back  Getting up from a sitting position was painful, but then moving around was fine  Pt feels like it is nerve pain  Bending forward to clean up after her dog caused her to lock up and have pain  Pt sleeps on her side, has occasional pain  Pt has to get out of bed due to cramping  Pt has to get up 2x/night to go to the bathroom from the medication she is on  Pt used to have constant pain but now it is very infrequent and seems to be getting better  Pain  Current pain ratin  At best pain ratin  At worst pain ratin  Quality: shock    Relieving factors: change in position  Aggravating factors: running  Progression: improved    Social Support  Steps to enter house: yes  Stairs in house: yes   Lives in: multiple-level home  Lives with: adult children    Employment status: not working  Exercise history: None      Diagnostic Tests  X-ray: normal  Treatments  Previous treatment: chiropractic and physical therapy  Patient Goals  Patient goal: Pt wants to go about her life normally, exercise without pain  Objective     Concurrent Complaints  Positive for disturbed sleep  Negative for night pain, bladder dysfunction, bowel dysfunction and saddle (S4) numbness    Active Range of Motion     Lumbar   Flexion:  WFL  Extension:  Restriction level: minimal  Left lateral flexion:  WFL  Right lateral flexion:  WFL  Left rotation:  WFL  Right rotation:  [place] [facility]    Joint Play     Hypomobile: T12, L1, L2, L3, L4 and L5     Strength/Myotome Testing     Left Hip   Planes of Motion   Flexion: 3+  Extension: 3-  Abduction: 3  Adduction: 3-  External rotation: 4  Internal rotation: 4    Right Hip   Planes of Motion   Flexion: 3+  Extension: 3-  Abduction: 3+  Adduction: 3  External rotation: 4  Internal rotation: 4    Tests     Lumbar     Left   Negative passive SLR and slump test      Right   Negative passive SLR and slump test      Left Hip   Positive INGRID HEWTIT Ober and tricia  90/90 SLR: Negative  SLR: Negative                   Precautions: past hx of colon and breast cancer, A-fib, hx of stroke       Manuals 1        7/1                               Neuro Re-Ed         miya berman                                                Ther Ex        Supine hip flexor str        Stand calf str        Piriformis str                                                Ther Activity                        Gait Training                        Modalities

## 2021-07-08 ENCOUNTER — OFFICE VISIT (OUTPATIENT)
Dept: PHYSICAL THERAPY | Facility: CLINIC | Age: 81
End: 2021-07-08
Payer: COMMERCIAL

## 2021-07-08 DIAGNOSIS — M16.12 PRIMARY OSTEOARTHRITIS OF ONE HIP, LEFT: Primary | ICD-10-CM

## 2021-07-08 DIAGNOSIS — M76.32 ILIOTIBIAL BAND SYNDROME AFFECTING LOWER LEG, LEFT: ICD-10-CM

## 2021-07-08 DIAGNOSIS — M54.16 LUMBAR RADICULOPATHY: ICD-10-CM

## 2021-07-08 PROCEDURE — 97112 NEUROMUSCULAR REEDUCATION: CPT

## 2021-07-08 PROCEDURE — 97110 THERAPEUTIC EXERCISES: CPT

## 2021-07-08 NOTE — PROGRESS NOTES
Daily Note     Today's date: 2021  Patient name: Darcy Pirnce  : 1940  MRN: [id number]  Referring provider: Maryla Gottron, DO  Dx:   Encounter Diagnosis     ICD-10-CM    1  Primary osteoarthritis of one hip, left  M16 12    2  Lumbar radiculopathy  M54 16    3  Iliotibial band syndrome affecting lower leg, left  M76 32        Start Time: 1140  Stop Time: 1225  Total time in clinic (min): 45 minutes    Subjective: Pt reports that she had L hip pain, that she described as nerve pain, every morning  No changes since her appointment last week  After a few hours in the morning she feels fine with no pain  Pt presents to the clinic with no pain, pt denies pain throughout the session  Objective: See treatment diary below      Assessment: Tolerated treatment well  Patient demonstrated fatigue post treatment, exhibited good technique with therapeutic exercises and would benefit from continued PT  Pt was introduced to several exercises in standing to progress hip strength, flexibility, and balance  Pt tolerated all new exercises without any pain  Plan: Continue per plan of care  Progress treatment as tolerated            Precautions: past hx of colon and breast cancer, A-fib, hx of stroke       Manuals 1 2                                     Neuro Re-Ed         bridges  10x10s      clamshells  10x10s      Sciatic nerve glide  2x30s L side      SLS  2x30s B      Squats  20x      Biodex  Weight shift- static, L11, 9  LOS- static              Ther Ex        Supine hip flexor str  3x30s B      Stand calf str  2x30s B      Piriformis str  3x30s B      Bike  5 mins L3      Standing hip abd  10x5s B                              Ther Activity                        Gait Training                        Modalities

## 2021-07-15 ENCOUNTER — OFFICE VISIT (OUTPATIENT)
Dept: PHYSICAL THERAPY | Facility: CLINIC | Age: 81
End: 2021-07-15
Payer: COMMERCIAL

## 2021-07-15 DIAGNOSIS — M16.12 PRIMARY OSTEOARTHRITIS OF ONE HIP, LEFT: Primary | ICD-10-CM

## 2021-07-15 DIAGNOSIS — M76.32 ILIOTIBIAL BAND SYNDROME AFFECTING LOWER LEG, LEFT: ICD-10-CM

## 2021-07-15 DIAGNOSIS — M54.16 LUMBAR RADICULOPATHY: ICD-10-CM

## 2021-07-15 PROCEDURE — 97110 THERAPEUTIC EXERCISES: CPT

## 2021-07-15 PROCEDURE — 97112 NEUROMUSCULAR REEDUCATION: CPT

## 2021-07-15 NOTE — PROGRESS NOTES
Daily Note     Today's date: 7/15/2021  Patient name: Micky Tapia  : 1940  MRN: [id number]  Referring provider: Angelica Ortiz DO  Dx:   Encounter Diagnosis     ICD-10-CM    1  Primary osteoarthritis of one hip, left  M16 12    2  Lumbar radiculopathy  M54 16    3  Iliotibial band syndrome affecting lower leg, left  M76 32        Start Time: 1400  Stop Time: 1443  Total time in clinic (min): 43 minutes    Subjective: Pt reports she is feeling a lot better, she isn't having the same morning pain she has had in the past  Pt denies any pain prior to the start of her session  Pt states, "what we are doing is working"  Objective: See treatment diary below      Assessment: Tolerated treatment well  Patient demonstrated fatigue post treatment, exhibited good technique with therapeutic exercises and would benefit from continued PT      Plan: Continue per plan of care  Progress treatment as tolerated            Precautions: past hx of colon and breast cancer, A-fib, hx of stroke       Manuals 1 2 3      7/1 7/8 7/15                             Neuro Re-Ed         bridges  10x10s 10x10s     clamshells  10x10s 10x10s     Sciatic nerve glide  2x30s L side 3x30s L side     SLS  2x30s B 2x30s B     Squats  20x 20x     Biodex  Weight shift- static, L11, 9  LOS- static Weight shift- L 9, 7, 4 30s ea  LOS- static     SLR   Abd/add/ext 10x5s     Ther Ex        Supine hip flexor str  3x30s B 3x30s B stand     Stand calf str  2x30s B 2x30s B     Piriformis str  3x30s B 3x30s B     Bike  5 mins L3 6 mins L3     Standing hip abd  10x5s B 15x5s B     Prone Press-Ups                        Ther Activity                        Gait Training                        Modalities

## 2021-07-22 ENCOUNTER — APPOINTMENT (OUTPATIENT)
Dept: PHYSICAL THERAPY | Facility: CLINIC | Age: 81
End: 2021-07-22
Payer: COMMERCIAL

## 2021-07-29 ENCOUNTER — APPOINTMENT (OUTPATIENT)
Dept: PHYSICAL THERAPY | Facility: CLINIC | Age: 81
End: 2021-07-29
Payer: COMMERCIAL

## 2021-08-26 NOTE — PROGRESS NOTES
Pt was seen for 3 visits of skilled physical therapy for low back and L hip pain  Pt cancelled her remaining appointments and stated she did not want to continue PT due to the payments  Pt has been discharged

## 2021-08-30 DIAGNOSIS — M10.9 GOUT, ARTHROPATHY: ICD-10-CM

## 2021-08-30 RX ORDER — COLCHICINE 0.6 MG/1
0.6 TABLET ORAL 2 TIMES DAILY
Qty: 90 TABLET | Refills: 1 | Status: SHIPPED | OUTPATIENT
Start: 2021-08-30

## 2021-09-30 DIAGNOSIS — M10.9 GOUT, ARTHROPATHY: ICD-10-CM

## 2021-09-30 RX ORDER — ALLOPURINOL 100 MG/1
100 TABLET ORAL DAILY
Qty: 90 TABLET | Refills: 3 | Status: SHIPPED | OUTPATIENT
Start: 2021-09-30

## 2022-02-21 DIAGNOSIS — I10 PRIMARY HYPERTENSION: ICD-10-CM

## 2022-02-21 DIAGNOSIS — Z00.00 MEDICARE ANNUAL WELLNESS VISIT, SUBSEQUENT: Primary | ICD-10-CM

## 2022-02-21 DIAGNOSIS — E03.9 HYPOTHYROIDISM, UNSPECIFIED TYPE: ICD-10-CM

## 2022-02-21 DIAGNOSIS — E78.2 MIXED HYPERLIPIDEMIA: ICD-10-CM

## 2022-03-09 DIAGNOSIS — I10 ESSENTIAL HYPERTENSION: ICD-10-CM

## 2022-03-09 RX ORDER — BENAZEPRIL HYDROCHLORIDE 20 MG/1
20 TABLET ORAL DAILY
Qty: 90 TABLET | Refills: 3 | Status: CANCELLED | OUTPATIENT
Start: 2022-03-09

## 2022-03-11 DIAGNOSIS — I10 ESSENTIAL HYPERTENSION: ICD-10-CM

## 2022-03-11 RX ORDER — BENAZEPRIL HYDROCHLORIDE 20 MG/1
20 TABLET ORAL DAILY
Qty: 90 TABLET | Refills: 3 | Status: SHIPPED | OUTPATIENT
Start: 2022-03-11

## 2022-03-28 DIAGNOSIS — I48.91 ATRIAL FIBRILLATION, UNSPECIFIED TYPE (HCC): Primary | ICD-10-CM

## 2022-03-28 RX ORDER — METOPROLOL SUCCINATE 25 MG/1
25 TABLET, EXTENDED RELEASE ORAL DAILY
Qty: 90 TABLET | Refills: 0 | Status: SHIPPED | OUTPATIENT
Start: 2022-03-28

## 2022-03-28 NOTE — TELEPHONE ENCOUNTER
Requested medication(s) are due for refill today: No  Patient has already received a courtesy refill: No  Other reason request has been forwarded to provider:  The requested medication is not on the active medication list

## 2022-03-29 DIAGNOSIS — E03.9 HYPOTHYROIDISM, UNSPECIFIED TYPE: ICD-10-CM

## 2022-03-29 RX ORDER — LEVOTHYROXINE SODIUM 0.05 MG/1
50 TABLET ORAL
Qty: 30 TABLET | Refills: 0 | Status: SHIPPED | OUTPATIENT
Start: 2022-03-29 | End: 2022-05-02 | Stop reason: SDUPTHER

## 2022-03-30 ENCOUNTER — OFFICE VISIT (OUTPATIENT)
Dept: FAMILY MEDICINE CLINIC | Facility: CLINIC | Age: 82
End: 2022-03-30
Payer: COMMERCIAL

## 2022-03-30 VITALS
HEART RATE: 80 BPM | SYSTOLIC BLOOD PRESSURE: 120 MMHG | HEIGHT: 62 IN | WEIGHT: 130 LBS | DIASTOLIC BLOOD PRESSURE: 70 MMHG | TEMPERATURE: 96.2 F | BODY MASS INDEX: 23.92 KG/M2 | RESPIRATION RATE: 12 BRPM

## 2022-03-30 DIAGNOSIS — Z85.038 HISTORY OF COLON CANCER: ICD-10-CM

## 2022-03-30 DIAGNOSIS — R13.19 ESOPHAGEAL DYSPHAGIA: ICD-10-CM

## 2022-03-30 DIAGNOSIS — I48.91 ATRIAL FIBRILLATION, UNSPECIFIED TYPE (HCC): Primary | ICD-10-CM

## 2022-03-30 DIAGNOSIS — R14.0 ABDOMINAL DISTENTION: ICD-10-CM

## 2022-03-30 DIAGNOSIS — I71.2 THORACIC AORTIC ANEURYSM, WITHOUT RUPTURE (HCC): ICD-10-CM

## 2022-03-30 DIAGNOSIS — I10 PRIMARY HYPERTENSION: ICD-10-CM

## 2022-03-30 DIAGNOSIS — I27.20 PULMONARY HYPERTENSION, UNSPECIFIED (HCC): ICD-10-CM

## 2022-03-30 DIAGNOSIS — Z00.00 MEDICARE ANNUAL WELLNESS VISIT, SUBSEQUENT: ICD-10-CM

## 2022-03-30 PROCEDURE — G0439 PPPS, SUBSEQ VISIT: HCPCS | Performed by: NURSE PRACTITIONER

## 2022-03-30 PROCEDURE — 99214 OFFICE O/P EST MOD 30 MIN: CPT | Performed by: NURSE PRACTITIONER

## 2022-03-30 NOTE — PROGRESS NOTES
Assessment and Plan:      Medicare annual wellness visit, subsequent    -  Primary    Age appropriate screenings and recommendations discussed  Preventive health issues were discussed with patient, and age appropriate screening tests were ordered as noted in patient's After Visit Summary  Personalized health advice and appropriate referrals for health education or preventive services given if needed, as noted in patient's After Visit Summary       History of Present Illness:     Patient presents for Medicare Annual Wellness visit    Patient Care Team:  Og You as PCP - General (Family Medicine)  Arlene Hopper MD as PCP - [address] [place] [address] (RTE)  MD Linus Muller MD (Cardiology)     Problem List:     Patient Active Problem List   Diagnosis    Anemia    Atrial fibrillation ([address] )    Hyperlipidemia    Hypertension    Hypothyroidism    Knee osteoarthritis    Left sided sciatica    Gout, arthropathy    Bronchospasm    Age-related osteoporosis without current pathological fracture    Pulmonary hypertension, unspecified ([address] )    Thoracic aortic aneurysm, without rupture ([address] )    Atherosclerosis of native coronary artery of native heart without angina pectoris    History of cerebrovascular accident    Long term current use of anticoagulant    Long term current use of aspirin    Pure hypercholesterolemia    Tricuspid incompetence      Past Medical and Surgical History:     Past Medical History:   Diagnosis Date    Anemia     Arthritis     Atherosclerosis of native coronary artery of native heart without angina pectoris 2/6/2019    Cataract     Chronic atrial fibrillation (HCC)     Degeneration of cervical intervertebral disc     Disease of thyroid gland     Occipital infarction ([address] )     Wrist fracture     right      Past Surgical History:   Procedure Laterality Date    COLON SURGERY      FL INJECTION LEFT HIP (NON ARTHROGRAM)  5/28/2021    KNEE ARTHROSCOPY      with medial meniscus repair     NC ARTHROCENTESIS ASPIR&/INJ MAJOR JT/BURSA W/O US Left 5/28/2021    Procedure: Hip corticosteroid injection ([id number]);   Surgeon: Esther Zamora MD;  Location: [facility] MAIN OR;  Service: Pain Management       Family History:     Family History   Problem Relation Age of Onset    Heart attack Mother     Valvular heart disease Mother     Heart attack Father     Hypertension Sister     Mental illness Neg Hx       Social History:     Social History     Socioeconomic History    Marital status: Single     Spouse name: None    Number of children: None    Years of education: None    Highest education level: None   Occupational History    None   Tobacco Use    Smoking status: Former Smoker    Smokeless tobacco: Never Used   Vaping Use    Vaping Use: Never used   Substance and Sexual Activity    Alcohol use: Yes     Comment: occasionally, drinks wine , moderate     Drug use: No    Sexual activity: None   Other Topics Concern    None   Social History Narrative    Dental care reg    No caffeine use     Social Determinants of Health     Financial Resource Strain: Not on file   Food Insecurity: Not on file   Transportation Needs: Not on file   Physical Activity: Not on file   Stress: Not on file   Social Connections: Not on file   Intimate Partner Violence: Not on file   Housing Stability: Not on file      Medications and Allergies:     Current Outpatient Medications   Medication Sig Dispense Refill    alendronate (FOSAMAX) 70 mg tablet Take 1 tablet by mouth in the morning        allopurinol (ZYLOPRIM) 100 mg tablet Take 1 tablet (100 mg total) by mouth daily 90 tablet 3    aspirin 81 mg chewable tablet Chew 1 tablet daily      benazepril (LOTENSIN) 20 mg tablet Take 1 tablet (20 mg total) by mouth daily 90 tablet 3    colchicine (COLCRYS) 0 6 mg tablet Take 1 tablet (0 6 mg total) by mouth 2 (two) times a day 90 tablet 1    ELIQUIS 2 5 MG 2 5 mg 2 (two) times a day        furosemide (Lasix) 40 mg tablet Take 1 tablet by mouth Daily      hydrochlorothiazide (MICROZIDE) 12 5 mg capsule Take 1 capsule by mouth daily      levothyroxine 50 mcg tablet Take 1 tablet (50 mcg total) by mouth daily in the early morning 30 tablet 0    metoprolol succinate (TOPROL-XL) 25 mg 24 hr tablet Take 1 tablet (25 mg total) by mouth daily 90 tablet 0    simvastatin (ZOCOR) 20 mg tablet Take 1 tablet by mouth daily      mometasone (ELOCON) 0 1 % lotion  (Patient not taking: Reported on 3/30/2022 )      pantoprazole (PROTONIX) 20 mg tablet  (Patient not taking: Reported on 3/30/2022 )      triamcinolone (KENALOG) 0 1 % cream  (Patient not taking: Reported on 3/30/2022 )       No current facility-administered medications for this visit  Allergies   Allergen Reactions    Lidocaine      Annotation - [id number]: Seizure with 20 cc during a surgical block   FS    Penicillins     Sulfa Antibiotics Edema      Immunizations:     Immunization History   Administered Date(s) Administered    COVID-19 PFIZER VACCINE 0 3 ML IM 02/09/2021, 03/02/2021, 09/28/2021    Influenza Split High Dose Preservative Free IM 08/26/2015, 08/31/2016, 08/11/2017, 09/24/2018    Influenza Whole 11/02/2000, 10/18/2001    Influenza, high dose seasonal 0 7 mL 09/12/2019, 10/22/2020    Influenza, seasonal, injectable 10/11/2002, 10/06/2003    Pneumococcal Conjugate 13-Valent 08/23/2017    Pneumococcal Polysaccharide PPV23 03/01/2014      Health Maintenance:         Topic Date Due    Breast Cancer Screening: Mammogram  09/10/2022         Topic Date Due    DTaP,Tdap,and Td Vaccines (1 - Tdap) Never done    Influenza Vaccine (1) 09/01/2021      Medicare Health Risk Assessment:     /70 (BP Location: Left arm, Patient Position: Sitting, Cuff Size: Adult)   Pulse 80   Temp (!) 96 2 °F (35 7 °C) (Temporal)   Resp 12   Ht 5' 2" (1 575 m)   Wt 59 kg (130 lb)   BMI 23 78 kg/m²      Lizandro morse here for her Subsequent Wellness visit  Health Risk Assessment:   Patient rates overall health as very good  Patient feels that their physical health rating is same  Patient is very satisfied with their life  Eyesight was rated as same  Hearing was rated as slightly worse  Patient feels that their emotional and mental health rating is same  Patients states they are never, rarely angry  Patient states they are sometimes unusually tired/fatigued  Pain experienced in the last 7 days has been none  Patient states that she has experienced no weight loss or gain in last 6 months  Depression Screening:   PHQ-2 Score: 0      Fall Risk Screening: In the past year, patient has experienced: no history of falling in past year      Urinary Incontinence Screening:   Patient has leaked urine accidently in the last six months  Home Safety:  Patient does not have trouble with stairs inside or outside of their home  Patient has working smoke alarms and has no working carbon monoxide detector  Home safety hazards include: loose rugs on the floor  Nutrition:   Current diet is Regular  Medications:   Patient is not currently taking any over-the-counter supplements  Patient is able to manage medications  Activities of Daily Living (ADLs)/Instrumental Activities of Daily Living (IADLs):   Walk and transfer into and out of bed and chair?: Yes  Dress and groom yourself?: Yes    Bathe or shower yourself?: Yes    Feed yourself? Yes  Do your laundry/housekeeping?: Yes  Manage your money, pay your bills and track your expenses?: Yes  Make your own meals?: Yes    Do your own shopping?: Yes    Previous Hospitalizations:   Any hospitalizations or ED visits within the last 12 months?: No      Advance Care Planning:   Living will: Yes    Durable POA for healthcare:  Yes    Advanced directive: Yes      PREVENTIVE SCREENINGS      Cardiovascular Screening:    General: Screening Not Indicated and History Lipid Disorder Breast Cancer Screening:     General: Screening Current      Cervical Cancer Screening:    General: Screening Not Indicated      Osteoporosis Screening:    General: Screening Not Indicated and History Osteoporosis      Lung Cancer Screening:     General: Screening Not Indicated    Screening, Brief Intervention, and Referral to Treatment (SBIRT)    Screening  Typical number of drinks in a day: 3  Typical number of drinks in a week: 21  Interpretation: Low risk drinking behavior  AUDIT-C Screenin) How often did you have a drink containing alcohol in the past year? 4 or more times a week  2) How many drinks did you have on a typical day when you were drinking in the past year? 3 to 4  3) How often did you have 6 or more drinks on one occasion in the past year? never    AUDIT-C Score: 4  Interpretation: Score 3-12 (female): POSITIVE screen for alcohol misuse    AUDIT Screenin) How often during the last year have you found that you were not able to stop drinking once you had started? 0 - never  5) How often during the last year have you failed to do what was normally expected from you because of drinking? 0 - never  6) How often during the last year have you needed a first drink in the morning to get yourself going after a heavy drinking session?  0 - never  7) How often during the last year have you had a feeling of guilt or remorse after drinking? 0 - never  8) How often during the last year have you been unable to remember what happened the night before because you had been drinking? 0 - never  9) Have you or someone else been injured as a result of your drinking? 0 - no  10) Has a relative or friend or a doctor or another health worker been concerned about your drinking or suggested you cut down? 0 - no    AUDIT Score: 4  Interpretation: Low risk alcohol consumption    Single Item Drug Screening:  How often have you used an illegal drug (including marijuana) or a prescription medication for non-medical reasons in the past year? never    Single Item Drug Screen Score: 0  Interpretation: Negative screen for possible drug use disorder      JORDAN Locke

## 2022-03-30 NOTE — PATIENT INSTRUCTIONS
Medicare Preventive Visit Patient Instructions  Thank you for completing your Welcome to Medicare Visit or Medicare Annual Wellness Visit today  Your next wellness visit will be due in one year (3/31/2023)  The screening/preventive services that you may require over the next 5-10 years are detailed below  Some tests may not apply to you based off risk factors and/or age  Screening tests ordered at today's visit but not completed yet may show as past due  Also, please note that scanned in results may not display below  Preventive Screenings:  Service Recommendations Previous Testing/Comments   Colorectal Cancer Screening  * Colonoscopy    * Fecal Occult Blood Test (FOBT)/Fecal Immunochemical Test (FIT)  * Fecal DNA/Cologuard Test  * Flexible Sigmoidoscopy Age: 54-65 years old   Colonoscopy: every 10 years (may be performed more frequently if at higher risk)  OR  FOBT/FIT: every 1 year  OR  Cologuard: every 3 years  OR  Sigmoidoscopy: every 5 years  Screening may be recommended earlier than age 48 if at higher risk for colorectal cancer  Also, an individualized decision between you and your healthcare provider will decide whether screening between the ages of 74-80 would be appropriate  Colonoscopy: Not on file  FOBT/FIT: Not on file  Cologuard: Not on file  Sigmoidoscopy: Not on file          Breast Cancer Screening Age: 36 years old  Frequency: every 1-2 years  Not required if history of left and right mastectomy Mammogram: 09/10/2020    Screening Current   Cervical Cancer Screening Between the ages of 21-29, pap smear recommended once every 3 years  Between the ages of 33-67, can perform pap smear with HPV co-testing every 5 years     Recommendations may differ for women with a history of total hysterectomy, cervical cancer, or abnormal pap smears in past  Pap Smear: Not on file    Screening Not Indicated   Hepatitis C Screening Once for adults born between 1945 and 1965  More frequently in patients at high risk for Hepatitis C Hep C Antibody: Not on file        Diabetes Screening 1-2 times per year if you're at risk for diabetes or have pre-diabetes Fasting glucose: No results in last 5 years   A1C: No results in last 5 years        Cholesterol Screening Once every 5 years if you don't have a lipid disorder  May order more often based on risk factors  Lipid panel: Not on file    Screening Not Indicated  History Lipid Disorder     Other Preventive Screenings Covered by Medicare:  1  Abdominal Aortic Aneurysm (AAA) Screening: covered once if your at risk  You're considered to be at risk if you have a family history of AAA  2  Lung Cancer Screening: covers low dose CT scan once per year if you meet all of the following conditions: (1) Age 50-69; (2) No signs or symptoms of lung cancer; (3) Current smoker or have quit smoking within the last 15 years; (4) You have a tobacco smoking history of at least 30 pack years (packs per day multiplied by number of years you smoked); (5) You get a written order from a healthcare provider  3  Glaucoma Screening: covered annually if you're considered high risk: (1) You have diabetes OR (2) Family history of glaucoma OR (3)  aged 48 and older OR (3)  American aged 72 and older  3  Osteoporosis Screening: covered every 2 years if you meet one of the following conditions: (1) You're estrogen deficient and at risk for osteoporosis based off medical history and other findings; (2) Have a vertebral abnormality; (3) On glucocorticoid therapy for more than 3 months; (4) Have primary hyperparathyroidism; (5) On osteoporosis medications and need to assess response to drug therapy  · Last bone density test (DXA Scan): Not on file  5  HIV Screening: covered annually if you're between the age of 12-76  Also covered annually if you are younger than 13 and older than 72 with risk factors for HIV infection   For pregnant patients, it is covered up to 3 times per pregnancy  Immunizations:  Immunization Recommendations   Influenza Vaccine Annual influenza vaccination during flu season is recommended for all persons aged >= 6 months who do not have contraindications   Pneumococcal Vaccine (Prevnar and Pneumovax)  * Prevnar = PCV13  * Pneumovax = PPSV23   Adults 25-60 years old: 1-3 doses may be recommended based on certain risk factors  Adults 72 years old: Prevnar (PCV13) vaccine recommended followed by Pneumovax (PPSV23) vaccine  If already received PPSV23 since turning 65, then PCV13 recommended at least one year after PPSV23 dose  Hepatitis B Vaccine 3 dose series if at intermediate or high risk (ex: diabetes, end stage renal disease, liver disease)   Tetanus (Td) Vaccine - COST NOT COVERED BY MEDICARE PART B Following completion of primary series, a booster dose should be given every 10 years to maintain immunity against tetanus  Td may also be given as tetanus wound prophylaxis  Tdap Vaccine - COST NOT COVERED BY MEDICARE PART B Recommended at least once for all adults  For pregnant patients, recommended with each pregnancy  Shingles Vaccine (Shingrix) - COST NOT COVERED BY MEDICARE PART B  2 shot series recommended in those aged 48 and above     Health Maintenance Due:      Topic Date Due    Breast Cancer Screening: Mammogram  09/10/2022     Immunizations Due:      Topic Date Due    DTaP,Tdap,and Td Vaccines (1 - Tdap) Never done    COVID-19 Vaccine (3 - Booster for Pfizer series) 08/02/2021    Influenza Vaccine (1) 09/01/2021     Advance Directives   What are advance directives? Advance directives are legal documents that state your wishes and plans for medical care  These plans are made ahead of time in case you lose your ability to make decisions for yourself  Advance directives can apply to any medical decision, such as the treatments you want, and if you want to donate organs  What are the types of advance directives?   There are many types of advance directives, and each state has rules about how to use them  You may choose a combination of any of the following:  · Living will: This is a written record of the treatment you want  You can also choose which treatments you do not want, which to limit, and which to stop at a certain time  This includes surgery, medicine, IV fluid, and tube feedings  · Durable power of  for healthcare [facility]): This is a written record that states who you want to make healthcare choices for you when you are unable to make them for yourself  This person, called a proxy, is usually a family member or a friend  You may choose more than 1 proxy  · Do not resuscitate (DNR) order:  A DNR order is used in case your heart stops beating or you stop breathing  It is a request not to have certain forms of treatment, such as CPR  A DNR order may be included in other types of advance directives  · Medical directive: This covers the care that you want if you are in a coma, near death, or unable to make decisions for yourself  You can list the treatments you want for each condition  Treatment may include pain medicine, surgery, blood transfusions, dialysis, IV or tube feedings, and a ventilator (breathing machine)  · Values history: This document has questions about your views, beliefs, and how you feel and think about life  This information can help others choose the care that you would choose  Why are advance directives important? An advance directive helps you control your care  Although spoken wishes may be used, it is better to have your wishes written down  Spoken wishes can be misunderstood, or not followed  Treatments may be given even if you do not want them  An advance directive may make it easier for your family to make difficult choices about your care  Urinary Incontinence   Urinary incontinence (UI)  is when you lose control of your bladder   UI develops because your bladder cannot store or empty urine properly  The 3 most common types of UI are stress incontinence, urge incontinence, or both  Medicines:   · May be given to help strengthen your bladder control  Report any side effects of medication to your healthcare provider  Do pelvic muscle exercises often:  Your pelvic muscles help you stop urinating  Squeeze these muscles tight for 5 seconds, then relax for 5 seconds  Gradually work up to squeezing for 10 seconds  Do 3 sets of 15 repetitions a day, or as directed  This will help strengthen your pelvic muscles and improve bladder control  Train your bladder:  Go to the bathroom at set times, such as every 2 hours, even if you do not feel the urge to go  You can also try to hold your urine when you feel the urge to go  For example, hold your urine for 5 minutes when you feel the urge to go  As that becomes easier, hold your urine for 10 minutes  Self-care:   · Keep a UI record  Write down how often you leak urine and how much you leak  Make a note of what you were doing when you leaked urine  · Drink liquids as directed  You may need to limit the amount of liquid you drink to help control your urine leakage  Do not drink any liquid right before you go to bed  Limit or do not have drinks that contain caffeine or alcohol  · Prevent constipation  Eat a variety of high-fiber foods  Good examples are high-fiber cereals, beans, vegetables, and whole-grain breads  Walking is the best way to trigger your intestines to have a bowel movement  · Exercise regularly and maintain a healthy weight  Weight loss and exercise will decrease pressure on your bladder and help you control your leakage  · Use a catheter as directed  to help empty your bladder  A catheter is a tiny, plastic tube that is put into your bladder to drain your urine  · Go to behavior therapy as directed  Behavior therapy may be used to help you learn to control your urge to urinate      Alcohol Use and Your Health    Drinking too much can harm your health  Excessive alcohol use leads to about 88,000 death in the [place] each year, and shortens the life of those who diet by almost 30 years  Further, excessive drinking cost the economy $249 billion in 2010  Most excessive drinkers are not alcohol dependent  Excessive alcohol use has immediate effects that increase the risk of many harmful health conditions  These are most often the result of binge drinking  Over time, excessive alcohol use can lead to the development of chronic diseases and other series health problems  What is considered a "drink"? Excessive alcohol use includes:  · Binge Drinking: For women, 4 or more drinks consumed on one occasion  For men, 5 or more drinks consumed on one occasion  · Heavy Drinking: For women, 8 or more drinks per week  For men, 15 or more drinks per week  · Any alcohol used by pregnant women  · Any alcohol used by those under the age of 21 years    If you choose to drink, do so in moderation:  · Do not drink at all if you are under the age of 24, or if you are or may be pregnant, or have health problems that could be made worse by drinking    · For women, up to 1 drink per day  · For men, up to 2 drinks a day    No one should begin drinking or drink more frequently based on potential health benefits    Short-Term Health Risks:  · Injuries: motor vehicle crashes, falls, drownings, burns  · Violence: homicide, suicide, sexual assault, intimate partner violence  · Alcohol poisoning  · Reproductive health: risky sexual behaviors, unintended prengnacy, sexually transmitted diseases, miscarriage, stillbirth, fetal alcohol syndrome    Long-Term Health Risks:  · Chronic diseases: high blood pressure, heart disease, stroke, liver disease, digestive problems  · Cancers: breast, mouth and throat, liver, colon  · Learning and memory problems: dementia, poor school performance  · Mental health: depression, anxiety, insomnia  · Social problems: lost productivity, family problems, unemployment  · Alcohol dependence    For support and more information:  · Substance Abuse and [address] [place]  Web Address: https://[employer]/    · Alcoholics Anonymous        Web Address: http://www jo info/    https://www cdc gov/alcohol/fact-sheets/alcohol-use htm     © 244[address] [facility] 2018 Information is for End User's use only and may not be sold, redistributed or otherwise used for commercial purposes   All illustrations and images included in CareNotes® are the copyrighted property of [employer]  or [address] [place] [address]

## 2022-03-30 NOTE — PROGRESS NOTES
Assessment/Plan:    1  Atrial fibrillation, unspecified type [facility])  Assessment & Plan:  Pt taking eliquis  Following up with cardiology  Stable  2  Pulmonary hypertension, unspecified ([address] )  Assessment & Plan:  Denies shortness of breath or swelling  Stable  3  Thoracic aortic aneurysm, without rupture [facility])  Assessment & Plan:  Cardiology following  4  Esophageal dysphagia  -     FL Barium Swallow Motility Study; Future; Expected date: 03/30/2022    5  History of colon cancer  -     Ambulatory Referral to Gastroenterology; Future  -     CT abdomen pelvis wo contrast; Future; Expected date: 03/30/2022    6  Abdominal distention  -     Ambulatory Referral to Gastroenterology; Future  -     CT abdomen pelvis wo contrast; Future; Expected date: 03/30/2022    7  Primary hypertension  Assessment & Plan:  BP stable in office today  No changes  Return in about 4 weeks (around 4/27/2022) for Recheck  Subjective:      Patient ID: German Baer is a 78 y o  female  Chief Complaint   Patient presents with   Ximena Elder is an 78year old female with hypertension, afib and history of colon cancer,  who presents to the office for her medicare annual wellness exam  Pt reports that she has been having trouble swallowing, but only with cold water  States that she is able to eat and drink other foods and liquids without issue  States that she feels a globulus sensation in her throat when drinking cold liquid  States she has also been having an increased gag reflex over the past 2 years which was not present before  Additionally, she reports new onset abdominal distention and bloating  Denies diarrhea or constipation  Denies excessive gas  Reports that she eats regular meals throughout the day  She does have a glass of wine with dinner nightly  Denies fever, chills, chest pain, shortness of breath, n/v        The following portions of the patient's history were reviewed and updated as appropriate: allergies, current medications, past family history, past medical history, past social history, past surgical history and problem list     Review of Systems   Constitutional: Negative for diaphoresis, fatigue and fever  HENT: Positive for trouble swallowing  Negative for ear pain, hearing loss, sore throat and voice change  Eyes: Negative for pain and visual disturbance  Respiratory: Negative for chest tightness and shortness of breath  Cardiovascular: Negative for chest pain, palpitations and leg swelling  Gastrointestinal: Positive for abdominal distention  Negative for abdominal pain, constipation, diarrhea, nausea and vomiting  Genitourinary: Negative for difficulty urinating  Musculoskeletal: Negative for arthralgias and myalgias  Skin: Negative for rash  Neurological: Negative for dizziness, numbness and headaches  Psychiatric/Behavioral: Negative for sleep disturbance           Current Outpatient Medications   Medication Sig Dispense Refill    alendronate (FOSAMAX) 70 mg tablet Take 1 tablet by mouth in the morning        allopurinol (ZYLOPRIM) 100 mg tablet Take 1 tablet (100 mg total) by mouth daily 90 tablet 3    aspirin 81 mg chewable tablet Chew 1 tablet daily      benazepril (LOTENSIN) 20 mg tablet Take 1 tablet (20 mg total) by mouth daily 90 tablet 3    colchicine (COLCRYS) 0 6 mg tablet Take 1 tablet (0 6 mg total) by mouth 2 (two) times a day 90 tablet 1    ELIQUIS 2 5 MG 2 5 mg 2 (two) times a day        furosemide (Lasix) 40 mg tablet Take 1 tablet by mouth Daily      hydrochlorothiazide (MICROZIDE) 12 5 mg capsule Take 1 capsule by mouth daily      levothyroxine 50 mcg tablet Take 1 tablet (50 mcg total) by mouth daily in the early morning 30 tablet 0    metoprolol succinate (TOPROL-XL) 25 mg 24 hr tablet Take 1 tablet (25 mg total) by mouth daily 90 tablet 0    simvastatin (ZOCOR) 20 mg tablet Take 1 tablet by mouth daily      mometasone (ELOCON) 0 1 % lotion  (Patient not taking: Reported on 3/30/2022 )      pantoprazole (PROTONIX) 20 mg tablet  (Patient not taking: Reported on 3/30/2022 )      triamcinolone (KENALOG) 0 1 % cream  (Patient not taking: Reported on 3/30/2022 )       No current facility-administered medications for this visit  Objective:    /70 (BP Location: Left arm, Patient Position: Sitting, Cuff Size: Adult)   Pulse 80   Temp (!) 96 2 °F (35 7 °C) (Temporal)   Resp 12   Ht 5' 2" (1 575 m)   Wt 59 kg (130 lb)   BMI 23 78 kg/m²        Physical Exam  Vitals reviewed  Constitutional:       General: She is not in acute distress  Appearance: Normal appearance  She is well-developed  She is not diaphoretic  HENT:      Head: Normocephalic and atraumatic  Right Ear: Tympanic membrane, ear canal and external ear normal       Left Ear: Tympanic membrane, ear canal and external ear normal       Nose: Nose normal       Mouth/Throat:      Pharynx: Oropharynx is clear  Uvula midline  Eyes:      General: Lids are normal       Extraocular Movements: Extraocular movements intact  Conjunctiva/sclera: Conjunctivae normal       Pupils: Pupils are equal, round, and reactive to light  Funduscopic exam:     Right eye: No hemorrhage or exudate  Red reflex present  Left eye: No hemorrhage or exudate  Red reflex present  Neck:      Thyroid: No thyroid mass or thyromegaly  Vascular: No carotid bruit  Cardiovascular:      Rate and Rhythm: Normal rate  Rhythm irregularly irregular  Pulses: Normal pulses  Heart sounds: Normal heart sounds, S1 normal and S2 normal  No murmur heard  Pulmonary:      Effort: Pulmonary effort is normal       Breath sounds: Normal breath sounds  No decreased breath sounds, wheezing, rhonchi or rales  Chest:   Breasts:      Right: No supraclavicular adenopathy  Left: No supraclavicular adenopathy         Abdominal:      General: Bowel sounds are normal  There is distension  There is no abdominal bruit  Palpations: Abdomen is soft  There is no shifting dullness, hepatomegaly or splenomegaly  Tenderness: There is no abdominal tenderness  Hernia: No hernia is present  Comments: Healed surgical scar   Musculoskeletal:         General: Normal range of motion  Cervical back: Full passive range of motion without pain, normal range of motion and neck supple  Right lower leg: No edema  Left lower leg: No edema  Lymphadenopathy:      Cervical: No cervical adenopathy  Upper Body:      Right upper body: No supraclavicular adenopathy  Left upper body: No supraclavicular adenopathy  Skin:     General: Skin is warm and dry  Findings: No rash  Neurological:      Mental Status: She is alert and oriented to person, place, and time  Cranial Nerves: No cranial nerve deficit  Sensory: No sensory deficit  Coordination: Coordination normal       Deep Tendon Reflexes: Reflexes are normal and symmetric  Psychiatric:         Speech: Speech normal          Behavior: Behavior normal          Thought Content:  Thought content normal          Judgment: Judgment normal                 Laisha Orion, CRNP

## 2022-03-31 ENCOUNTER — TELEPHONE (OUTPATIENT)
Dept: ADMINISTRATIVE | Facility: OTHER | Age: 82
End: 2022-03-31

## 2022-03-31 NOTE — TELEPHONE ENCOUNTER
Upon review of the In Basket request and the patient's chart, initial outreach has been made via fax, please see Contacts section for details       Thank you  Huber Waller MA

## 2022-03-31 NOTE — TELEPHONE ENCOUNTER
----- Message from Lexie Foreman sent at 3/30/2022 11:43 AM EDT -----  Regarding: care gap request - flu shot  03/30/22 11:43 AM    Hello, our patient attached above has had Immunization(s) completed/performed  Please assist in updating the patient chart by making an External outreach to [address] [facility] [address] located in [place], [place]  The date of service is 2021      Thank you,  Lexie Foreman  [address]

## 2022-03-31 NOTE — LETTER
Vaccination Request Form: Influenza      Date Requested: 22  Patient: Mingo Han  Patient : [id number]   Referring Provider: Judson Díaz       The above patient has informed us that they have had their   most recent Influenza administered at your facility  Please   complete this form and attach all corresponding documentation  Date of Vaccine(s) Given  ______________________________    Lot Number(s) _______________________________________    Manufacture(s) ______________________________________    Dose Amount (s) _____________________________________    Expiration Date(s) ____________________________________    Comments __________________________________________________________  ____________________________________________________________________  ____________________________________________________________________  ____________________________________________________________________    Administering Facility  ________________________________________________    Vaccine Administered By (print name) ___________________________________      Form Completed By (print name) _______________________________________      Signature ___________________________________________________________      These reports are needed for  compliance  Please fax this completed form and a copy of the Vaccine Document(s) to our office located at [place] [address] as soon as possible to [phone] attention Jane: Phone [phone]    We thank you for your assistance in treating our mutual patient     (sent to [facility])

## 2022-04-01 NOTE — TELEPHONE ENCOUNTER
Upon review of the In Basket request we were able to locate, review, and update the patient chart as requested for Immunization(s)  (Influenza 11-)  Any additional questions or concerns should be emailed to the Practice Liaisons via [email or web address] email, please do not reply via In Basket      Thank you  Whitney Byrnes MA

## 2022-04-06 ENCOUNTER — HOSPITAL ENCOUNTER (OUTPATIENT)
Dept: RADIOLOGY | Facility: HOSPITAL | Age: 82
Discharge: HOME | End: 2022-04-06
Payer: COMMERCIAL

## 2022-04-06 DIAGNOSIS — Z85.038 HISTORY OF COLON CANCER: ICD-10-CM

## 2022-04-06 DIAGNOSIS — R14.0 ABDOMINAL DISTENTION: ICD-10-CM

## 2022-04-06 PROCEDURE — G1004 CDSM NDSC: HCPCS

## 2022-04-06 PROCEDURE — 74176 CT ABD & PELVIS W/O CONTRAST: CPT

## 2022-04-08 ENCOUNTER — HOSPITAL ENCOUNTER (OUTPATIENT)
Dept: RADIOLOGY | Facility: HOSPITAL | Age: 82
Discharge: HOME | End: 2022-04-08
Payer: COMMERCIAL

## 2022-04-08 DIAGNOSIS — R13.19 ESOPHAGEAL DYSPHAGIA: ICD-10-CM

## 2022-04-08 PROCEDURE — 74220 X-RAY XM ESOPHAGUS 1CNTRST: CPT

## 2022-04-14 LAB
ALBUMIN SERPL-MCNC: 4.6 G/DL (ref 3.6–4.6)
ALBUMIN/GLOB SERPL: 2 {RATIO} (ref 1.2–2.2)
ALP SERPL-CCNC: 128 IU/L (ref 44–121)
ALT SERPL-CCNC: 45 IU/L (ref 0–32)
AST SERPL-CCNC: 64 IU/L (ref 0–40)
BILIRUB SERPL-MCNC: 0.5 MG/DL (ref 0–1.2)
BUN SERPL-MCNC: 18 MG/DL (ref 8–27)
BUN/CREAT SERPL: 19 (ref 12–28)
CALCIUM SERPL-MCNC: 9.8 MG/DL (ref 8.7–10.3)
CHLORIDE SERPL-SCNC: 99 MMOL/L (ref 96–106)
CHOLEST SERPL-MCNC: 200 MG/DL (ref 100–199)
CHOLEST/HDLC SERPL: 1.8 RATIO (ref 0–4.4)
CO2 SERPL-SCNC: 22 MMOL/L (ref 20–29)
CREAT SERPL-MCNC: 0.95 MG/DL (ref 0.57–1)
EGFR: 59 ML/MIN/1.73
GLOBULIN SER-MCNC: 2.3 G/DL (ref 1.5–4.5)
GLUCOSE SERPL-MCNC: 78 MG/DL (ref 65–99)
HDLC SERPL-MCNC: 114 MG/DL
LDLC SERPL CALC-MCNC: 76 MG/DL (ref 0–99)
POTASSIUM SERPL-SCNC: 5.1 MMOL/L (ref 3.5–5.2)
PROT SERPL-MCNC: 6.9 G/DL (ref 6–8.5)
SL AMB VLDL CHOLESTEROL CALC: 10 MG/DL (ref 5–40)
SODIUM SERPL-SCNC: 142 MMOL/L (ref 134–144)
T4 FREE SERPL-MCNC: 1.17 NG/DL (ref 0.82–1.77)
TRIGL SERPL-MCNC: 52 MG/DL (ref 0–149)
TSH SERPL DL<=0.005 MIU/L-ACNC: 2.57 UIU/ML (ref 0.45–4.5)

## 2022-04-27 ENCOUNTER — OFFICE VISIT (OUTPATIENT)
Dept: FAMILY MEDICINE CLINIC | Facility: CLINIC | Age: 82
End: 2022-04-27
Payer: COMMERCIAL

## 2022-04-27 VITALS
DIASTOLIC BLOOD PRESSURE: 70 MMHG | SYSTOLIC BLOOD PRESSURE: 120 MMHG | TEMPERATURE: 96.2 F | RESPIRATION RATE: 12 BRPM | BODY MASS INDEX: 24.29 KG/M2 | HEIGHT: 62 IN | HEART RATE: 64 BPM | WEIGHT: 132 LBS

## 2022-04-27 DIAGNOSIS — R10.84 GENERALIZED ABDOMINAL PAIN: Primary | ICD-10-CM

## 2022-04-27 DIAGNOSIS — R14.0 ABDOMINAL DISTENTION: ICD-10-CM

## 2022-04-27 DIAGNOSIS — R13.19 ESOPHAGEAL DYSPHAGIA: ICD-10-CM

## 2022-04-27 DIAGNOSIS — Z85.038 HISTORY OF COLON CANCER: ICD-10-CM

## 2022-04-27 DIAGNOSIS — K86.89 PANCREATIC CALCIFICATION: ICD-10-CM

## 2022-04-27 PROCEDURE — 99214 OFFICE O/P EST MOD 30 MIN: CPT | Performed by: NURSE PRACTITIONER

## 2022-04-27 NOTE — PROGRESS NOTES
Assessment/Plan:    1  Generalized abdominal pain    2  Abdominal distention    3  Pancreatic calcification    4  Esophageal dysphagia    5  History of colon cancer    Reviewed CT abdomen with patient and swallow study results  Pt is scheduled to see Dr Keanu Luke, GI, for further evaluation of abdominal pain  Advise pt to keep food diary and monitor if any specific food triggers her symptoms  Studies wnl - although pancreatic head calcification and inflammation is noted  Follow up with GI as scheduled  Return in about 3 months (around 7/27/2022) for Recheck  Subjective:      Patient ID: Fredi Mao is a 78 y o  female  Chief Complaint   Patient presents with    Follow-up     pt here to go over labs       Opal Yanes is an 78year old female who returns to the office for discussion of recent CT scan of the abdomen and swallow study  Pt had been experiencing difficulty swallowing cold water in the morning and she states that she has changed to warm water and her symptoms resolved  Reports ongoing abdominal pain  The pain occurs after eating, resolves after 1 5-2 hours after eating  Reports the pain is not everyday and only in the early day, not in the evenings  Pain is generalized and uncomfrtable  States that she feels distended and feels as if she eats too much even though she has not overeaten  The following portions of the patient's history were reviewed and updated as appropriate: allergies, current medications, past family history, past medical history, past social history, past surgical history and problem list     Review of Systems   Constitutional: Negative for chills, fatigue and fever  Respiratory: Negative for cough, chest tightness and shortness of breath  Cardiovascular: Negative for chest pain  Gastrointestinal: Positive for abdominal pain           Current Outpatient Medications   Medication Sig Dispense Refill    alendronate (FOSAMAX) 70 mg tablet Take 1 tablet by mouth in the morning        allopurinol (ZYLOPRIM) 100 mg tablet Take 1 tablet (100 mg total) by mouth daily 90 tablet 3    aspirin 81 mg chewable tablet Chew 1 tablet daily      benazepril (LOTENSIN) 20 mg tablet Take 1 tablet (20 mg total) by mouth daily 90 tablet 3    colchicine (COLCRYS) 0 6 mg tablet Take 1 tablet (0 6 mg total) by mouth 2 (two) times a day 90 tablet 1    ELIQUIS 2 5 MG 2 5 mg 2 (two) times a day        furosemide (Lasix) 40 mg tablet Take 1 tablet by mouth Daily      hydrochlorothiazide (MICROZIDE) 12 5 mg capsule Take 1 capsule by mouth daily      levothyroxine 50 mcg tablet Take 1 tablet (50 mcg total) by mouth daily in the early morning 30 tablet 0    metoprolol succinate (TOPROL-XL) 25 mg 24 hr tablet Take 1 tablet (25 mg total) by mouth daily 90 tablet 0    simvastatin (ZOCOR) 20 mg tablet Take 1 tablet by mouth daily      mometasone (ELOCON) 0 1 % lotion  (Patient not taking: Reported on 3/30/2022 )      pantoprazole (PROTONIX) 20 mg tablet  (Patient not taking: Reported on 3/30/2022 )      triamcinolone (KENALOG) 0 1 % cream  (Patient not taking: Reported on 3/30/2022 )       No current facility-administered medications for this visit  Objective:    /70   Pulse 64   Temp (!) 96 2 °F (35 7 °C) (Temporal)   Resp 12   Ht 5' 2" (1 575 m)   Wt 59 9 kg (132 lb)   BMI 24 14 kg/m²        Physical Exam  Vitals reviewed  Constitutional:       General: She is not in acute distress  Appearance: She is well-developed  She is not diaphoretic  HENT:      Head: Normocephalic and atraumatic  Eyes:      General:         Right eye: No discharge  Left eye: No discharge  Conjunctiva/sclera: Conjunctivae normal    Neck:      Thyroid: No thyromegaly  Cardiovascular:      Rate and Rhythm: Normal rate and regular rhythm  Heart sounds: Normal heart sounds  Pulmonary:      Effort: Pulmonary effort is normal  No respiratory distress        Breath sounds: Normal breath sounds  No decreased breath sounds, wheezing, rhonchi or rales  Skin:     General: Skin is warm and dry  Findings: No rash  Neurological:      Mental Status: She is alert and oriented to person, place, and time  Psychiatric:         Behavior: Behavior normal          Thought Content:  Thought content normal          Judgment: Judgment normal                 JORDAN Bush

## 2022-04-27 NOTE — PATIENT INSTRUCTIONS
Vitamin B12 Deficiency   WHAT YOU NEED TO KNOW:   Vitamin B12 deficiency is a low level of vitamin B12 in your body  Vitamin B12 is only found in foods that come from animal sources such as fish, beef, dairy products, and eggs  Vitamin B12 deficiency should be treated as early as possible  Without treatment, it can cause permanent nerve damage over time  DISCHARGE INSTRUCTIONS:   Call your doctor or dietitian if:   · You continue to have symptoms, or your symptoms get worse  · You have questions or concerns about your condition or care  Medicines:   · Vitamin B12 supplements  may be needed to increase your levels back to normal     · Take your medicine as directed  Contact your healthcare provider if you think your medicine is not helping or if you have side effects  Tell him or her if you are allergic to any medicine  Keep a list of the medicines, vitamins, and herbs you take  Include the amounts, and when and why you take them  Bring the list or the pill bottles to follow-up visits  Carry your medicine list with you in case of an emergency      Good sources of vitamin B12:       · 3 ounces of cooked clams, 84 1 mcg    · 3 ounces of cooked beef liver, 70 7 mcg    · Fortified breakfast cereals, 1 5 to 6 mcg per serving    · 3 ounces of salmon, rainbow trout, or canned tuna fish, 2 5 to 4 8 mcg    · 3 ounces of top sirloin beef, 1 4 mcg    · 1 cup of milk or yogurt, 1 1 to 1 2 mcg    · 1 cup of a soy milk product, 0 9 to 3 3 mcg    · 1 ounce of a meat substitute, 0 5 to 1 2 mcg    · 1 ounce Swiss cheese, 0 9 mcg    · 1 large egg, 0 6 mcg    Amount of vitamin B12 you need each day:   · Infants 0 to 12 months: 0 4 micrograms (mcg) to 0 5 mcg    · Children 1 to 3 years: 0 9 mcg    · Children 4 to 8 years: 1 2 mcg    · Children 9 to 13 years: 1 8 mcg    · Children over 14 years and adults: 1 8 mcg    · Pregnant women and adolescents (over 14 years): 2 6 mcg    · Breastfeeding women and adolescents (over 14 years): 2 8 mcg    Follow up with your doctor or dietitian as directed:  Write down your questions so you remember to ask them during your visits  © Copyright [employer] 2022 Information is for End User's use only and may not be sold, redistributed or otherwise used for commercial purposes  All illustrations and images included in CareNotes® are the copyrighted property of A D A M [employer] Inc  or [facility]   The above information is an  only  It is not intended as medical advice for individual conditions or treatments  Talk to your doctor, nurse or pharmacist before following any medical regimen to see if it is safe and effective for you

## 2022-05-02 DIAGNOSIS — E03.9 HYPOTHYROIDISM, UNSPECIFIED TYPE: ICD-10-CM

## 2022-05-03 RX ORDER — LEVOTHYROXINE SODIUM 0.05 MG/1
50 TABLET ORAL
Qty: 90 TABLET | Refills: 0 | Status: SHIPPED | OUTPATIENT
Start: 2022-05-03 | End: 2022-07-28

## 2022-05-20 ENCOUNTER — CONSULT (OUTPATIENT)
Dept: GASTROENTEROLOGY | Facility: CLINIC | Age: 82
End: 2022-05-20
Payer: COMMERCIAL

## 2022-05-20 ENCOUNTER — TELEPHONE (OUTPATIENT)
Dept: OTHER | Facility: OTHER | Age: 82
End: 2022-05-20

## 2022-05-20 VITALS
DIASTOLIC BLOOD PRESSURE: 79 MMHG | SYSTOLIC BLOOD PRESSURE: 124 MMHG | TEMPERATURE: 97.2 F | BODY MASS INDEX: 24.33 KG/M2 | WEIGHT: 132.2 LBS | HEART RATE: 61 BPM | HEIGHT: 62 IN

## 2022-05-20 DIAGNOSIS — R14.0 ABDOMINAL DISTENTION: ICD-10-CM

## 2022-05-20 DIAGNOSIS — R13.19 ESOPHAGEAL DYSPHAGIA: ICD-10-CM

## 2022-05-20 DIAGNOSIS — K86.2 PANCREATIC CYST: ICD-10-CM

## 2022-05-20 DIAGNOSIS — Z85.038 HISTORY OF COLON CANCER: ICD-10-CM

## 2022-05-20 DIAGNOSIS — Z85.038 HISTORY OF COLON CANCER: Primary | ICD-10-CM

## 2022-05-20 PROCEDURE — 99204 OFFICE O/P NEW MOD 45 MIN: CPT | Performed by: INTERNAL MEDICINE

## 2022-05-20 RX ORDER — SODIUM PICOSULFATE, MAGNESIUM OXIDE, AND ANHYDROUS CITRIC ACID 10; 3.5; 12 MG/160ML; G/160ML; G/160ML
1 LIQUID ORAL ONCE
Qty: 160 ML | Refills: 0 | Status: SHIPPED | OUTPATIENT
Start: 2022-05-20 | End: 2022-05-20 | Stop reason: SDUPTHER

## 2022-05-20 RX ORDER — SODIUM PICOSULFATE, MAGNESIUM OXIDE, AND ANHYDROUS CITRIC ACID 10; 3.5; 12 MG/160ML; G/160ML; G/160ML
1 LIQUID ORAL ONCE
Qty: 320 ML | Refills: 0 | Status: SHIPPED | OUTPATIENT
Start: 2022-05-20 | End: 2022-05-20

## 2022-05-20 NOTE — TELEPHONE ENCOUNTER
I clarified dose and readjusted Rx; needed to be adjusted to disp:320 mL instead of 160 mL since two bottles are needed for prep    Thanks 28 y/o female, with a PmHx of DM with h/o gastroparesis, SI, HTN, multiple admissions for DKA, Cholecystectomy, Colitis, presented to the [facility] ED with abdominal pain consistent with prior DKA/abd.  admissions. Pt states yesterday 2/16 at around 1400hrs she started to get an 9/10, non-radiating, sharp, stabbing, abd pain located in the center of her stomach right above her bladder region. She states she was having associated symptoms of HA, nausea and vomiting but denies any diarrhea, blurred vision, fever, chills, sob, chest pain, dizziness. Pt states she thought the pain would go away so waited to come to the hospital. She also states prior to her arrival to the ED she had taken her blood sugar and it was high so she gave herself 4u of admelog. She states these symptoms are similar to when she gets dka and admits to being recently admitted to [facility] x 1 week ago in their ICU on an insulin gtt for 2 days.  Today, in the [facility] ED, she was found to be initially in DKA with a BHB of 4.9 and a positive anion gap. She was started on an insulin gtt and was given Lantus 20u x 1. Her gap has now closed and she feels much better. She also received several doses of Dilaudid for abd pain (was also given benadryl for itching associated with the Dilaudid). She appears comfortable at this time and her abd pain is about a 3/10. She is now being admitted to medicine for management of her uncontrolled diabetes and abdominal pain.    30 y/o female, with a PmHx of T1DM with h/o gastroparesis, SI, HTN, multiple admissions for DKA, Cholecystectomy, Colitis, presented to the [facility] ED with abdominal pain consistent with prior DKA/abd.  admissions. Pt states yesterday 2/16 at around 1400hrs she started to get an 9/10, non-radiating, sharp, stabbing, abd pain located in the center of her stomach right above her bladder region. She states she was having associated symptoms of HA, nausea and vomiting but denies any diarrhea, blurred vision, fever, chills, sob, chest pain, dizziness. Pt states she thought the pain would go away so waited to come to the hospital. She also states prior to her arrival to the ED she had taken her blood sugar and it was high so she gave herself 4u of admelog. She states these symptoms are similar to when she gets dka and admits to being recently admitted to [facility] x 1 week ago in their ICU on an insulin gtt for 2 days.  Today, in the [facility] ED, she was found to be initially in DKA with a BHB of 4.9 and a positive anion gap. She was started on an insulin gtt and was given Lantus 20u x 1. Her gap has now closed and she feels much better. She also received several doses of Dilaudid for abd pain (was also given benadryl for itching associated with the Dilaudid). She appears comfortable at this time and her abd pain is about a 3/10. She is now being admitted to medicine for management of her uncontrolled diabetes and abdominal pain.

## 2022-05-20 NOTE — TELEPHONE ENCOUNTER
Pharmacy called for clarification on medication that was sent over today  The medication is Clenpiq   Please call the pharmacist

## 2022-05-20 NOTE — LETTER
May 20, 2022      [employer] [id number]    Patient: Chata Camara   YOB: 1940   Date of Visit: 5/20/2022       Dear Dr Mart Sutton: Thank you for referring Chata Camara to me for evaluation  Below are my notes for this consultation  If you have questions, please do not hesitate to call me  I look forward to following your patient along with you  Sincerely,        Fatmata Ayala MD        CC: No Recipients  Fatmata Ayala MD  5/20/2022 12:39 PM  Sign when Signing Visit  Consultation - [address] [facility]  Chata Camara 78 y o  female MRN: [id number]          Assessment & Plan:    47-year-old female, history of colon cancer status post resection 2014, moderate to heavy alcohol consumption regular basis, reports abdominal bloating and distention with normal imaging studies  Loose stools  And noted to have a pancreatic cyst       1  pancreatic cyst:  Notable calcifications in the pancreas seen on recent CT scan, incidental finding  -most likely secondary to chronic alcohol consumption  Will proceed with an MRI/MRCP to exclude underlying cyst versus malignant lesion    2  Abdominal bloating and distention colon may be due to recent weight gain, no notable distention on examination  -The patient have further pancreatic insufficiency given her calcifications and moderate alcohol use  -we will proceed with an upper endoscopy and colonoscopy to exclude a luminal pathology    3  History of colon cancer colon with loose stool  Will proceed with a colonoscopy she is overdue     4  Loose stools:  Most likely secondary to right hemicolectomy, she may have a component pancreatic insufficiency  -we will check pancreatic elastase  -we will check for H pylori, celiac sprue and microscopic colitis at the time of endoscopic evaluation     Pavan Ragland was seen today for consult      Diagnoses and all orders for this visit:    History of colon cancer  - Ambulatory Referral to Gastroenterology  -     Sod Picosulfate-Mag Ox-Cit Acd (Clenpiq) 10-3 5-12 MG-GM -GM/160ML SOLN; Take 1 kit by mouth 1 (one) time for 1 dose  -     Colonoscopy; Future    Abdominal distention  -     Ambulatory Referral to Gastroenterology  -     Pancreatic elastase, fecal; Future  -     Pancreatic elastase, fecal  -     Colonoscopy; Future    Esophageal dysphagia  -     EGD; Future    Pancreatic cyst  -     MRI abdomen w wo contrast and mrcp; Future  -     Pancreatic elastase, fecal; Future  -     Pancreatic elastase, fecal    Other orders  -     Diet NPO; Sips with meds; Standing  -     Void on call to OR; Standing            _____________________________________________________________        CC:  Abdominal distention    HPI:  Vicki Weldon is a 78 y  o female who was referred for evaluation of abdominal distention  This is a pleasant 64-year-old female, with a history of colon cancer, resected in 2014, did not require chemotherapy  Last colonoscopy was in 2017  She reports over the last 1 year she has noted abdominal distention but she is very vague, she reports protrudes in her abdomen  Reports, postprandial abdominal bloating and discomfort usually after eating her lunch  Typically has about 3 loose stools per day usually after eating but this is near her baseline, patient is a poor historian, does not have very good recollection of her prior bowel function  Denies any melena or rectal bleeding  He is on anticoagulation  She has gained 6 lb, weight has been stable  Her recent CT scan which demonstrated small cyst in the pancreas, but was otherwise unremarkable  The patient's laboratory studies were normal   She denies any nausea vomiting but she does report having some dysphagia typically with very cold water  She had a recent upper GI series and esophagram which was unremarkable except for some mild reflux  And denies any severe abdominal pain      Medical history is notable as mentioned above for colon cancer, hypertension, on anticoagulation for history of TIA and stroke  Surgical history is notable for right hemicolectomy, left total knee replacement    Denies any tobacco     She drinks about 3 or 4 glasses of wine per night has been doing this for many years  She is retired  Family history is negative for GI or associated malignancies  ROS:  The remainder of the ROS was negative except for the pertinent positives mentioned in HPI           Allergies: Lidocaine, Penicillins, and Sulfa antibiotics    Medications:   Current Outpatient Medications:     alendronate (FOSAMAX) 70 mg tablet, Take 1 tablet by mouth in the morning  , Disp: , Rfl:     allopurinol (ZYLOPRIM) 100 mg tablet, Take 1 tablet (100 mg total) by mouth daily, Disp: 90 tablet, Rfl: 3    aspirin 81 mg chewable tablet, Chew 1 tablet daily, Disp: , Rfl:     benazepril (LOTENSIN) 20 mg tablet, Take 1 tablet (20 mg total) by mouth daily, Disp: 90 tablet, Rfl: 3    colchicine (COLCRYS) 0 6 mg tablet, Take 1 tablet (0 6 mg total) by mouth 2 (two) times a day, Disp: 90 tablet, Rfl: 1    ELIQUIS 2 5 MG, 2 5 mg 2 (two) times a day  , Disp: , Rfl:     furosemide (LASIX) 40 mg tablet, Take 1 tablet by mouth Daily, Disp: , Rfl:     hydrochlorothiazide (MICROZIDE) 12 5 mg capsule, Take 1 capsule by mouth daily, Disp: , Rfl:     levothyroxine 50 mcg tablet, Take 1 tablet (50 mcg total) by mouth daily in the early morning, Disp: 90 tablet, Rfl: 0    metoprolol succinate (TOPROL-XL) 25 mg 24 hr tablet, Take 1 tablet (25 mg total) by mouth daily, Disp: 90 tablet, Rfl: 0    simvastatin (ZOCOR) 20 mg tablet, Take 1 tablet by mouth daily, Disp: , Rfl:     Sod Picosulfate-Mag Ox-Cit Acd (Clenpiq) 10-3 5-12 MG-GM -GM/160ML SOLN, Take 1 kit by mouth 1 (one) time for 1 dose, Disp: 160 mL, Rfl: 0    mometasone (ELOCON) 0 1 % lotion, , Disp: , Rfl:     pantoprazole (PROTONIX) 20 mg tablet, , Disp: , Rfl:    triamcinolone (KENALOG) 0 1 % cream, , Disp: , Rfl: '    Past Medical History:   Diagnosis Date    Anemia     Arthritis     Atherosclerosis of native coronary artery of native heart without angina pectoris 2/6/2019    Cataract     Chronic atrial fibrillation (HCC)     Degeneration of cervical intervertebral disc     Disease of thyroid gland     Occipital infarction ([address] )     Wrist fracture     right        Past Surgical History:   Procedure Laterality Date    COLON SURGERY      FL INJECTION LEFT HIP (NON ARTHROGRAM)  5/28/2021    KNEE ARTHROSCOPY      with medial meniscus repair     NH ARTHROCENTESIS ASPIR&/INJ MAJOR JT/BURSA W/O US Left 5/28/2021    Procedure: Hip corticosteroid injection ([id number]); Surgeon: Adelina Trotter MD;  Location: [facility] MAIN OR;  Service: Pain Management        Family History   Problem Relation Age of Onset    Heart attack Mother     Valvular heart disease Mother     Heart attack Father     Hypertension Sister     Mental illness Neg Hx         reports that she has quit smoking  She has never used smokeless tobacco  She reports current alcohol use of about 7 0 standard drinks of alcohol per week  She reports that she does not use drugs            Physical Exam:     /79 (BP Location: Left arm, Patient Position: Sitting, Cuff Size: Standard)   Pulse 61   Temp (!) 97 2 °F (36 2 °C) (Temporal)   Ht 5' 2" (1 575 m)   Wt 60 kg (132 lb 3 2 oz)   BMI 24 18 kg/m²     Gen: wn/wd, NAD, elderly female but healthy appearance  HEENT: anicteric, MMM, no cervical LAD  CVS: RRR, no m/r/g  CHEST: CTA b/l  ABD: +BS, soft, NT,ND, no hepatosplenomegaly 1 0 procedure verbal distention  EXT: no c/c/e  NEURO: aaox3  SKIN: NO rashes

## 2022-05-20 NOTE — PROGRESS NOTES
Consultation - [facility]  Anikafarzaneh Evon 78 y o  female MRN: [id number]          Assessment & Plan:    78-year-old female, history of colon cancer status post resection 2014, moderate to heavy alcohol consumption regular basis, reports abdominal bloating and distention with normal imaging studies  Loose stools  And noted to have a pancreatic cyst       1  pancreatic cyst:  Notable calcifications in the pancreas seen on recent CT scan, incidental finding  -most likely secondary to chronic alcohol consumption  Will proceed with an MRI/MRCP to exclude underlying cyst versus malignant lesion    2  Abdominal bloating and distention colon may be due to recent weight gain, no notable distention on examination  -The patient have further pancreatic insufficiency given her calcifications and moderate alcohol use  -we will proceed with an upper endoscopy and colonoscopy to exclude a luminal pathology    3  History of colon cancer colon with loose stool  Will proceed with a colonoscopy she is overdue     4  Loose stools:  Most likely secondary to right hemicolectomy, she may have a component pancreatic insufficiency  -we will check pancreatic elastase  -we will check for H pylori, celiac sprue and microscopic colitis at the time of endoscopic evaluation     Raji Elliott was seen today for consult  Diagnoses and all orders for this visit:    History of colon cancer  -     Ambulatory Referral to Gastroenterology  -     Sod Picosulfate-Mag Ox-Cit Acd (Clenpiq) 10-3 5-12 MG-GM -GM/160ML SOLN; Take 1 kit by mouth 1 (one) time for 1 dose  -     Colonoscopy; Future    Abdominal distention  -     Ambulatory Referral to Gastroenterology  -     Pancreatic elastase, fecal; Future  -     Pancreatic elastase, fecal  -     Colonoscopy; Future    Esophageal dysphagia  -     EGD; Future    Pancreatic cyst  -     MRI abdomen w wo contrast and mrcp;  Future  -     Pancreatic elastase, fecal; Future  -     Pancreatic elastase, fecal    Other orders  -     Diet NPO; Sips with meds; Standing  -     Void on call to OR; Standing            _____________________________________________________________        CC:  Abdominal distention    HPI:  Marilin Barriga is a 78 y  o female who was referred for evaluation of abdominal distention  This is a pleasant 42-year-old female, with a history of colon cancer, resected in 2014, did not require chemotherapy  Last colonoscopy was in 2017  She reports over the last 1 year she has noted abdominal distention but she is very vague, she reports protrudes in her abdomen  Reports, postprandial abdominal bloating and discomfort usually after eating her lunch  Typically has about 3 loose stools per day usually after eating but this is near her baseline, patient is a poor historian, does not have very good recollection of her prior bowel function  Denies any melena or rectal bleeding  He is on anticoagulation  She has gained 6 lb, weight has been stable  Her recent CT scan which demonstrated small cyst in the pancreas, but was otherwise unremarkable  The patient's laboratory studies were normal   She denies any nausea vomiting but she does report having some dysphagia typically with very cold water  She had a recent upper GI series and esophagram which was unremarkable except for some mild reflux  And denies any severe abdominal pain  Medical history is notable as mentioned above for colon cancer, hypertension, on anticoagulation for history of TIA and stroke  Surgical history is notable for right hemicolectomy, left total knee replacement    Denies any tobacco     She drinks about 3 or 4 glasses of wine per night has been doing this for many years  She is retired  Family history is negative for GI or associated malignancies  ROS:  The remainder of the ROS was negative except for the pertinent positives mentioned in HPI           Allergies: Lidocaine, Penicillins, and Sulfa antibiotics    Medications:   Current Outpatient Medications:     alendronate (FOSAMAX) 70 mg tablet, Take 1 tablet by mouth in the morning  , Disp: , Rfl:     allopurinol (ZYLOPRIM) 100 mg tablet, Take 1 tablet (100 mg total) by mouth daily, Disp: 90 tablet, Rfl: 3    aspirin 81 mg chewable tablet, Chew 1 tablet daily, Disp: , Rfl:     benazepril (LOTENSIN) 20 mg tablet, Take 1 tablet (20 mg total) by mouth daily, Disp: 90 tablet, Rfl: 3    colchicine (COLCRYS) 0 6 mg tablet, Take 1 tablet (0 6 mg total) by mouth 2 (two) times a day, Disp: 90 tablet, Rfl: 1    ELIQUIS 2 5 MG, 2 5 mg 2 (two) times a day  , Disp: , Rfl:     furosemide (LASIX) 40 mg tablet, Take 1 tablet by mouth Daily, Disp: , Rfl:     hydrochlorothiazide (MICROZIDE) 12 5 mg capsule, Take 1 capsule by mouth daily, Disp: , Rfl:     levothyroxine 50 mcg tablet, Take 1 tablet (50 mcg total) by mouth daily in the early morning, Disp: 90 tablet, Rfl: 0    metoprolol succinate (TOPROL-XL) 25 mg 24 hr tablet, Take 1 tablet (25 mg total) by mouth daily, Disp: 90 tablet, Rfl: 0    simvastatin (ZOCOR) 20 mg tablet, Take 1 tablet by mouth daily, Disp: , Rfl:     Sod Picosulfate-Mag Ox-Cit Acd (Clenpiq) 10-3 5-12 MG-GM -GM/160ML SOLN, Take 1 kit by mouth 1 (one) time for 1 dose, Disp: 160 mL, Rfl: 0    mometasone (ELOCON) 0 1 % lotion, , Disp: , Rfl:     pantoprazole (PROTONIX) 20 mg tablet, , Disp: , Rfl:     triamcinolone (KENALOG) 0 1 % cream, , Disp: , Rfl: '    Past Medical History:   Diagnosis Date    Anemia     Arthritis     Atherosclerosis of native coronary artery of native heart without angina pectoris 2/6/2019    Cataract     Chronic atrial fibrillation (HCC)     Degeneration of cervical intervertebral disc     Disease of thyroid gland     Occipital infarction ([address] )     Wrist fracture     right        Past Surgical History:   Procedure Laterality Date    COLON SURGERY      FL INJECTION LEFT HIP (NON ARTHROGRAM) 5/28/2021    KNEE ARTHROSCOPY      with medial meniscus repair     DE ARTHROCENTESIS ASPIR&/INJ MAJOR JT/BURSA W/O US Left 5/28/2021    Procedure: Hip corticosteroid injection (03290 11523-48); Surgeon: Hill Win MD;  Location: [facility] MAIN OR;  Service: Pain Management        Family History   Problem Relation Age of Onset    Heart attack Mother     Valvular heart disease Mother     Heart attack Father     Hypertension Sister     Mental illness Neg Hx         reports that she has quit smoking  She has never used smokeless tobacco  She reports current alcohol use of about 7 0 standard drinks of alcohol per week  She reports that she does not use drugs            Physical Exam:     /79 (BP Location: Left arm, Patient Position: Sitting, Cuff Size: Standard)   Pulse 61   Temp (!) 97 2 °F (36 2 °C) (Temporal)   Ht 5' 2" (1 575 m)   Wt 60 kg (132 lb 3 2 oz)   BMI 24 18 kg/m²     Gen: wn/wd, NAD, elderly female but healthy appearance  HEENT: anicteric, MMM, no cervical LAD  CVS: RRR, no m/r/g  CHEST: CTA b/l  ABD: +BS, soft, NT,ND, no hepatosplenomegaly 1 0 procedure verbal distention  EXT: no c/c/e  NEURO: aaox3  SKIN: NO rashes

## 2022-05-20 NOTE — PATIENT INSTRUCTIONS
Scheduled date of colonoscopy/EGD (as of today): 07/26/22  Physician performing colonoscopy: Humberto Roberts  [facility] of colonoscopy[facility]  Bowel prep reviewed with patient: CLENPIQ  Instructions reviewed with patient by: VIR[place]  Clearances: VACCINATED, [address] [facility] [address] FAXED TO DR Saleem Ramirez

## 2022-05-28 LAB — ELASTASE PANC STL-MCNT: 407 UG ELAST./G

## 2022-06-14 ENCOUNTER — TELEPHONE (OUTPATIENT)
Dept: FAMILY MEDICINE CLINIC | Facility: CLINIC | Age: 82
End: 2022-06-14

## 2022-06-14 DIAGNOSIS — R74.8 ABNORMAL LIVER ENZYMES: Primary | ICD-10-CM

## 2022-06-29 ENCOUNTER — HOSPITAL ENCOUNTER (OUTPATIENT)
Dept: RADIOLOGY | Facility: HOSPITAL | Age: 82
Discharge: HOME | End: 2022-06-29
Attending: INTERNAL MEDICINE
Payer: COMMERCIAL

## 2022-06-29 ENCOUNTER — APPOINTMENT (OUTPATIENT)
Dept: LAB | Facility: CLINIC | Age: 82
End: 2022-06-29
Payer: COMMERCIAL

## 2022-06-29 DIAGNOSIS — R74.8 ABNORMAL LIVER ENZYMES: ICD-10-CM

## 2022-06-29 DIAGNOSIS — R74.8 ELEVATED LIVER ENZYMES: Primary | ICD-10-CM

## 2022-06-29 DIAGNOSIS — K86.2 PANCREATIC CYST: ICD-10-CM

## 2022-06-29 LAB
ALBUMIN SERPL BCP-MCNC: 3.7 G/DL (ref 3.5–5)
ALP SERPL-CCNC: 108 U/L (ref 46–116)
ALT SERPL W P-5'-P-CCNC: 63 U/L (ref 12–78)
AST SERPL W P-5'-P-CCNC: 70 U/L (ref 5–45)
BILIRUB DIRECT SERPL-MCNC: 0.25 MG/DL (ref 0–0.2)
BILIRUB SERPL-MCNC: 0.76 MG/DL (ref 0.2–1)
PROT SERPL-MCNC: 7.3 G/DL (ref 6.4–8.2)

## 2022-06-29 PROCEDURE — 36415 COLL VENOUS BLD VENIPUNCTURE: CPT

## 2022-06-29 PROCEDURE — 76376 3D RENDER W/INTRP POSTPROCES: CPT

## 2022-06-29 PROCEDURE — 80076 HEPATIC FUNCTION PANEL: CPT

## 2022-06-29 PROCEDURE — 74183 MRI ABD W/O CNTR FLWD CNTR: CPT

## 2022-06-29 PROCEDURE — A9585 GADOBUTROL INJECTION: HCPCS | Performed by: INTERNAL MEDICINE

## 2022-06-29 RX ADMIN — GADOBUTROL 6 ML: 604.72 INJECTION INTRAVENOUS at 08:25

## 2022-07-05 ENCOUNTER — TELEPHONE (OUTPATIENT)
Dept: GASTROENTEROLOGY | Facility: AMBULARY SURGERY CENTER | Age: 82
End: 2022-07-05

## 2022-07-05 NOTE — TELEPHONE ENCOUNTER
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

## 2022-07-05 NOTE — TELEPHONE ENCOUNTER
Patients GI provider:  Dr Jude Murdock     Number to return call: [phone]    Reason for call: Charlotte Sage from Radiology called stated there are significant findings ready to be read in epic   MRI Pancreatic cyst    TIGER TEXTED     Scheduled procedure/appointment date if applicable: Apt/procedure 7/26/22

## 2022-07-05 NOTE — TELEPHONE ENCOUNTER
I spoke to patient on the phone  She is aware of results of MRI on a  She is also aware that she will need further evaluation with an EUS in the the office will contact her to schedule this procedure    Thank you

## 2022-07-05 NOTE — TELEPHONE ENCOUNTER
Patient is scheduled for EUS on October 6, 2022 at [facility] with Luisito Tabares MD  Patient is aware of pre-procedure prep of Nothing to eat of drink after midnight and they will be called the day prior between 2 and 6 pm for time to report for procedure  Pre-procedure prep has been given to the patient  via [address] [facility] [address] mail on July 5 , 2022

## 2022-07-25 RX ORDER — SODIUM CHLORIDE, SODIUM LACTATE, POTASSIUM CHLORIDE, CALCIUM CHLORIDE 600; 310; 30; 20 MG/100ML; MG/100ML; MG/100ML; MG/100ML
75 INJECTION, SOLUTION INTRAVENOUS CONTINUOUS
Status: CANCELLED | OUTPATIENT
Start: 2022-07-25

## 2022-07-26 ENCOUNTER — ANESTHESIA (OUTPATIENT)
Dept: GASTROENTEROLOGY | Facility: AMBULARY SURGERY CENTER | Age: 82
End: 2022-07-26

## 2022-07-26 ENCOUNTER — HOSPITAL ENCOUNTER (OUTPATIENT)
Dept: GASTROENTEROLOGY | Facility: AMBULARY SURGERY CENTER | Age: 82
Setting detail: OUTPATIENT SURGERY
Discharge: HOME | End: 2022-07-26
Attending: INTERNAL MEDICINE
Payer: COMMERCIAL

## 2022-07-26 ENCOUNTER — ANESTHESIA EVENT (OUTPATIENT)
Dept: GASTROENTEROLOGY | Facility: AMBULARY SURGERY CENTER | Age: 82
End: 2022-07-26

## 2022-07-26 VITALS
WEIGHT: 132 LBS | TEMPERATURE: 97.7 F | HEART RATE: 72 BPM | OXYGEN SATURATION: 98 % | HEIGHT: 62 IN | DIASTOLIC BLOOD PRESSURE: 80 MMHG | SYSTOLIC BLOOD PRESSURE: 128 MMHG | RESPIRATION RATE: 18 BRPM | BODY MASS INDEX: 24.29 KG/M2

## 2022-07-26 DIAGNOSIS — R14.0 ABDOMINAL DISTENTION: ICD-10-CM

## 2022-07-26 DIAGNOSIS — Z85.038 HISTORY OF COLON CANCER: ICD-10-CM

## 2022-07-26 DIAGNOSIS — R13.19 ESOPHAGEAL DYSPHAGIA: ICD-10-CM

## 2022-07-26 PROBLEM — G45.9 TIA (TRANSIENT ISCHEMIC ATTACK): Status: ACTIVE | Noted: 2022-07-26

## 2022-07-26 PROCEDURE — 45380 COLONOSCOPY AND BIOPSY: CPT | Performed by: INTERNAL MEDICINE

## 2022-07-26 PROCEDURE — 88313 SPECIAL STAINS GROUP 2: CPT | Performed by: PATHOLOGY

## 2022-07-26 PROCEDURE — 88305 TISSUE EXAM BY PATHOLOGIST: CPT | Performed by: PATHOLOGY

## 2022-07-26 PROCEDURE — 43239 EGD BIOPSY SINGLE/MULTIPLE: CPT | Performed by: INTERNAL MEDICINE

## 2022-07-26 RX ORDER — PROPOFOL 10 MG/ML
INJECTION, EMULSION INTRAVENOUS CONTINUOUS PRN
Status: DISCONTINUED | OUTPATIENT
Start: 2022-07-26 | End: 2022-07-26

## 2022-07-26 RX ORDER — SODIUM CHLORIDE, SODIUM LACTATE, POTASSIUM CHLORIDE, CALCIUM CHLORIDE 600; 310; 30; 20 MG/100ML; MG/100ML; MG/100ML; MG/100ML
75 INJECTION, SOLUTION INTRAVENOUS CONTINUOUS
Status: DISCONTINUED | OUTPATIENT
Start: 2022-07-26 | End: 2022-07-30 | Stop reason: HOSPADM

## 2022-07-26 RX ORDER — SODIUM PICOSULFATE, MAGNESIUM OXIDE, AND ANHYDROUS CITRIC ACID 10; 3.5; 12 MG/160ML; G/160ML; G/160ML
LIQUID ORAL
COMMUNITY
Start: 2022-05-20 | End: 2022-07-27 | Stop reason: ALTCHOICE

## 2022-07-26 RX ORDER — PROPOFOL 10 MG/ML
INJECTION, EMULSION INTRAVENOUS AS NEEDED
Status: DISCONTINUED | OUTPATIENT
Start: 2022-07-26 | End: 2022-07-26

## 2022-07-26 RX ORDER — PANTOPRAZOLE SODIUM 20 MG/1
40 TABLET, DELAYED RELEASE ORAL 2 TIMES DAILY
Qty: 60 TABLET | Refills: 3 | Status: SHIPPED | OUTPATIENT
Start: 2022-07-26

## 2022-07-26 RX ORDER — SODIUM CHLORIDE, SODIUM LACTATE, POTASSIUM CHLORIDE, CALCIUM CHLORIDE 600; 310; 30; 20 MG/100ML; MG/100ML; MG/100ML; MG/100ML
INJECTION, SOLUTION INTRAVENOUS CONTINUOUS PRN
Status: DISCONTINUED | OUTPATIENT
Start: 2022-07-26 | End: 2022-07-26

## 2022-07-26 RX ADMIN — PROPOFOL 70 MCG/KG/MIN: 10 INJECTION, EMULSION INTRAVENOUS at 10:26

## 2022-07-26 RX ADMIN — SODIUM CHLORIDE, SODIUM LACTATE, POTASSIUM CHLORIDE, AND CALCIUM CHLORIDE: .6; .31; .03; .02 INJECTION, SOLUTION INTRAVENOUS at 10:16

## 2022-07-26 RX ADMIN — PROPOFOL 100 MG: 10 INJECTION, EMULSION INTRAVENOUS at 10:21

## 2022-07-26 NOTE — ANESTHESIA PREPROCEDURE EVALUATION
Procedure:  COLONOSCOPY  EGD    Relevant Problems   CARDIO   (+) Atherosclerosis of native coronary artery of native heart without angina pectoris   (+) Atrial fibrillation (HCC)   (+) Hyperlipidemia   (+) Hypertension   (+) Pure hypercholesterolemia   (+) Thoracic aortic aneurysm, without rupture (HCC)      ENDO   (+) Hypothyroidism      GI/HEPATIC   (+) Esophageal dysphagia   (+) Pancreatic cyst      HEMATOLOGY   (+) Anemia      MUSCULOSKELETAL   (+) Gout, arthropathy   (+) Knee osteoarthritis   (+) Left sided sciatica      NEURO/PSYCH   (+) History of cerebrovascular accident   (+) TIA (transient ischemic attack)        Physical Exam    Airway    Mallampati score: II  TM Distance: >3 FB  Neck ROM: full     Dental       Cardiovascular  Rhythm: regular, Rate: normal,     Pulmonary  Breath sounds clear to auscultation,     Other Findings        Anesthesia Plan  ASA Score- 3     Anesthesia Type- IV sedation with anesthesia with ASA Monitors  Additional Monitors:   Airway Plan:           Plan Factors-    Chart reviewed  Patient is not a current smoker  Induction- intravenous  Postoperative Plan-     Informed Consent- Anesthetic plan and risks discussed with patient  I personally reviewed this patient with the CRNA  Discussed and agreed on the Anesthesia Plan with the CRNA  Lara Doll

## 2022-07-26 NOTE — H&P
History and Physical - [facility]  Allan Greene 78 y o  female MRN: [id number]    HPI: Allan Greene is a 78y o  year old female who presents with abdominal bloating, loose stools, history of colon cancer      Review of Systems    Historical Information   Past Medical History:   Diagnosis Date    Anemia     Arthritis     Atherosclerosis of native coronary artery of native heart without angina pectoris 2019    Cataract     Chronic atrial fibrillation (HCC)     Degeneration of cervical intervertebral disc     Diarrhea     onset 07/10    Disease of thyroid gland     Occipital infarction ([address] )     Wrist fracture     right      Past Surgical History:   Procedure Laterality Date    COLON SURGERY      FL INJECTION LEFT HIP (NON ARTHROGRAM)  2021    KNEE ARTHROSCOPY      with medial meniscus repair     AR ARTHROCENTESIS ASPIR&/INJ MAJOR JT/BURSA W/O US Left 2021    Procedure: Hip corticosteroid injection ([id number] [id number]); Surgeon: Melchor Rios MD;  Location: [facility] MAIN OR;  Service: Pain Management      Social History   Social History     Substance and Sexual Activity   Alcohol Use Yes    Alcohol/week: 7 0 standard drinks    Types: 7 Glasses of wine per week    Comment: occasionally, drinks wine , moderate      Social History     Substance and Sexual Activity   Drug Use No     Social History     Tobacco Use   Smoking Status Former Smoker    Packs/day: 1 00    Years: 7 00    Pack years: 7 00    Types: Cigarettes    Quit date: 26    Years since quittin 6   Smokeless Tobacco Never Used     Family History   Problem Relation Age of Onset    Heart attack Mother     Valvular heart disease Mother     Heart attack Father     Hypertension Sister     Mental illness Neg Hx        Meds/Allergies     (Not in a hospital admission)      Allergies   Allergen Reactions    Lidocaine      Annotation - [id number]: Seizure with 20 cc during a surgical block   FS    Penicillins     Sulfa Antibiotics Edema       Objective     /71   Pulse 60   Temp 97 7 °F (36 5 °C) (Axillary)   Resp 18   Ht 5' 2" (1 575 m)   Wt 59 9 kg (132 lb)   SpO2 96%   BMI 24 14 kg/m²       PHYSICAL EXAM    Gen: NAD  CV: RRR  CHEST: Clear  ABD: soft, NT/ND  EXT: no edema  Neuro: AAO      ASSESSMENT/PLAN:  This is a 78y o  year old female here for abdominal bloating, loose stools, history of colon cancer      PLAN:   Procedure: egd/colonoscopy

## 2022-07-26 NOTE — ANESTHESIA POSTPROCEDURE EVALUATION
Post-Op Assessment Note    CV Status:  Stable  Pain Score: 0    Pain management: adequate     Mental Status:  Sleepy   Hydration Status:  Stable   PONV Controlled:  None   Airway Patency:  Patent   Two or more mitigation strategies used for obstructive sleep apnea   Post Op Vitals Reviewed: Yes      Staff: CRNA         No complications documented      BP   92/51   Temp      Pulse 65   Resp 16   SpO2 99

## 2022-07-27 ENCOUNTER — OFFICE VISIT (OUTPATIENT)
Dept: FAMILY MEDICINE CLINIC | Facility: CLINIC | Age: 82
End: 2022-07-27
Payer: COMMERCIAL

## 2022-07-27 VITALS
HEART RATE: 63 BPM | OXYGEN SATURATION: 100 % | RESPIRATION RATE: 16 BRPM | BODY MASS INDEX: 24.29 KG/M2 | TEMPERATURE: 96.5 F | SYSTOLIC BLOOD PRESSURE: 122 MMHG | DIASTOLIC BLOOD PRESSURE: 74 MMHG | HEIGHT: 62 IN | WEIGHT: 132 LBS

## 2022-07-27 DIAGNOSIS — N18.30 STAGE 3 CHRONIC KIDNEY DISEASE, UNSPECIFIED WHETHER STAGE 3A OR 3B CKD (HCC): ICD-10-CM

## 2022-07-27 DIAGNOSIS — R10.84 GENERALIZED ABDOMINAL PAIN: ICD-10-CM

## 2022-07-27 DIAGNOSIS — R74.8 ABNORMAL LIVER ENZYMES: Primary | ICD-10-CM

## 2022-07-27 DIAGNOSIS — R14.0 ABDOMINAL DISTENTION: ICD-10-CM

## 2022-07-27 PROCEDURE — 99214 OFFICE O/P EST MOD 30 MIN: CPT | Performed by: NURSE PRACTITIONER

## 2022-07-28 DIAGNOSIS — E03.9 HYPOTHYROIDISM, UNSPECIFIED TYPE: ICD-10-CM

## 2022-07-28 RX ORDER — LEVOTHYROXINE SODIUM 0.05 MG/1
50 TABLET ORAL
Qty: 90 TABLET | Refills: 0 | Status: SHIPPED | OUTPATIENT
Start: 2022-07-28 | End: 2022-10-27

## 2022-07-29 PROBLEM — R74.8 ABNORMAL LIVER ENZYMES: Status: ACTIVE | Noted: 2022-07-29

## 2022-07-29 PROBLEM — N18.30 STAGE 3 CHRONIC KIDNEY DISEASE, UNSPECIFIED WHETHER STAGE 3A OR 3B CKD (HCC): Status: ACTIVE | Noted: 2022-07-29

## 2022-07-29 NOTE — PROGRESS NOTES
Assessment/Plan:    1  Abnormal liver enzymes  Assessment & Plan:  Reviewed hepatic panel  Recommend consult with hepatology  Orders:  -     Ambulatory Referral to Hepatology; Future    2  Abdominal distention  Assessment & Plan:  Following up with GI for further evaluation and management  3  Generalized abdominal pain  Assessment & Plan:  Symptoms currently stable  No issues at this time  4  Stage 3 chronic kidney disease, unspecified whether stage 3a or 3b CKD ([facility] [address] )  Assessment & Plan:  Stable, no issues  Return in about 5 months (around 12/27/2022) for Recheck  Subjective:      Patient ID: Renuka Jackson is a 78 y o  female  Chief Complaint   Patient presents with   Hernando Gastelum is an 78year old female who returns to the office for a recheck of elevated liver enzymes  Pt reports she continues to have abdominal distension and bloating associated with meals  Denies nausea or vomiting  Reports following up with GI  No new acute complaints today  The following portions of the patient's history were reviewed and updated as appropriate: allergies, current medications, past family history, past medical history, past social history, past surgical history and problem list     Review of Systems   Constitutional: Negative for chills, fatigue and fever  Respiratory: Negative for cough and shortness of breath  Cardiovascular: Negative for chest pain  Gastrointestinal: Positive for abdominal distention  Negative for abdominal pain, diarrhea and nausea           Current Outpatient Medications   Medication Sig Dispense Refill    allopurinol (ZYLOPRIM) 100 mg tablet Take 1 tablet (100 mg total) by mouth daily 90 tablet 3    aspirin 81 mg chewable tablet Chew 1 tablet daily      benazepril (LOTENSIN) 20 mg tablet Take 1 tablet (20 mg total) by mouth daily (Patient taking differently: Take 20 mg by mouth every evening) 90 tablet 3    colchicine (COLCRYS) 0 6 mg tablet Take 1 tablet (0 6 mg total) by mouth 2 (two) times a day 90 tablet 1    ELIQUIS 2 5 MG 2 5 mg 2 (two) times a day        furosemide (LASIX) 40 mg tablet Take 1 tablet by mouth Daily      hydrochlorothiazide (MICROZIDE) 12 5 mg capsule Take 1 capsule by mouth daily      metoprolol succinate (TOPROL-XL) 25 mg 24 hr tablet Take 1 tablet (25 mg total) by mouth daily 90 tablet 0    pantoprazole (PROTONIX) 20 mg tablet Take 2 tablets (40 mg total) by mouth 2 (two) times a day 60 tablet 3    simvastatin (ZOCOR) 20 mg tablet Take 1 tablet by mouth daily      levothyroxine 50 mcg tablet TAKE 1 TABLET (50 MCG TOTAL) BY MOUTH DAILY IN THE EARLY MORNING 90 tablet 0    triamcinolone (KENALOG) 0 1 % cream  (Patient not taking: Reported on 7/27/2022)       No current facility-administered medications for this visit  Facility-Administered Medications Ordered in Other Visits   Medication Dose Route Frequency Provider Last Rate Last Admin    lactated ringers infusion  75 mL/hr Intravenous Continuous Valerie Parada MD           Objective:    /74   Pulse 63   Temp (!) 96 5 °F (35 8 °C) (Temporal)   Resp 16   Ht 5' 2" (1 575 m)   Wt 59 9 kg (132 lb)   SpO2 100%   BMI 24 14 kg/m²        Physical Exam  Vitals reviewed  Constitutional:       General: She is not in acute distress  Appearance: Normal appearance  She is well-developed  She is not diaphoretic  HENT:      Head: Normocephalic and atraumatic  Eyes:      General: Lids are normal          Right eye: No discharge  Left eye: No discharge  Extraocular Movements: Extraocular movements intact  Conjunctiva/sclera: Conjunctivae normal    Neck:      Thyroid: No thyromegaly  Cardiovascular:      Rate and Rhythm: Normal rate and regular rhythm  Heart sounds: Normal heart sounds, S1 normal and S2 normal    Pulmonary:      Effort: Pulmonary effort is normal  No respiratory distress        Breath sounds: Normal breath sounds  No decreased breath sounds, wheezing, rhonchi or rales  Musculoskeletal:      Cervical back: Full passive range of motion without pain, normal range of motion and neck supple  Lymphadenopathy:      Cervical: No cervical adenopathy  Skin:     General: Skin is warm and dry  Findings: No rash  Neurological:      Mental Status: She is alert and oriented to person, place, and time  Psychiatric:         Behavior: Behavior normal          Thought Content:  Thought content normal          Judgment: Judgment normal                 JORDAN Bennett

## 2022-08-08 ENCOUNTER — OFFICE VISIT (OUTPATIENT)
Dept: GASTROENTEROLOGY | Facility: CLINIC | Age: 82
End: 2022-08-08
Payer: COMMERCIAL

## 2022-08-08 VITALS
HEIGHT: 62 IN | SYSTOLIC BLOOD PRESSURE: 118 MMHG | DIASTOLIC BLOOD PRESSURE: 76 MMHG | BODY MASS INDEX: 24.51 KG/M2 | HEART RATE: 62 BPM | RESPIRATION RATE: 16 BRPM | TEMPERATURE: 98.4 F | WEIGHT: 133.2 LBS | OXYGEN SATURATION: 92 %

## 2022-08-08 DIAGNOSIS — Z11.59 ENCOUNTER FOR SCREENING FOR OTHER VIRAL DISEASES: ICD-10-CM

## 2022-08-08 DIAGNOSIS — K76.0 FATTY LIVER: Primary | ICD-10-CM

## 2022-08-08 DIAGNOSIS — R74.8 ABNORMAL LIVER ENZYMES: ICD-10-CM

## 2022-08-08 PROCEDURE — 99205 OFFICE O/P NEW HI 60 MIN: CPT | Performed by: INTERNAL MEDICINE

## 2022-08-08 NOTE — PROGRESS NOTES
[facility] - Outpatient Follow-up Note  Evalee Kawasaki 78 y o  female MRN: [id number]  Encounter: [id number]          ASSESSMENT AND PLAN:        Elevated LFTs, Fatty liver on imaging  Likely etiology is alcohol related liver disease  She has 2 components of the metabolic syndrome:  Hypertension and hyperlipidemia  This does put her at some degree of risk for the development of nonalcoholic fatty liver disease as well  We will rule out other causes of chronic liver disease including viral and autoimmune hepatitis, hemochromatosis, Francisco's disease and alpha-1 antitrypsin deficiency  She has no physical, laboratory or radiologic evidence of advanced liver disease, or cirrhosis  I have requested abdominal ultrasound elastography for noninvasive measurement of hepatic fibrosis and quantification of hepatic fat      2  Alcohol over-use: She was counseled that this is likely the etiology of her liver disease  I emphasized how important it is for her to cut back and ultimately quit drinking alcohol  She does not seem to be dependent on alcohol and should have no problems cutting back slowly      3  Pancreatic cysts:  Evaluation initiated  MRI MRCP showed the cyst to be 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   EUS has been scheduled in October  I will request liver biopsy be done at that time  4   Smoking cessation:  Patient counseled to cut back and eventually quit smoking  FOLLOW-UP:  Return in about 3 months (around 11/8/2022)  VISIT DIAGNOSES AND ORDERS:      1  Fatty liver    2  Abnormal liver enzymes    3   Encounter for screening for other viral diseases       Orders Placed This Encounter   Procedures    US elastography    CBC and differential    Protime-INR    Comprehensive metabolic panel    Chronic Hepatitis Panel    Hepatitis A antibody, total    Hepatitis B surface antibody    BARBARA Screen w/ Reflex to Titer/Pattern    Antimitochondrial antibody    Anti-smooth muscle antibody, IgG    IgG, IgA, IgM    Alpha-1-antitrypsin    Ceruloplasmin    MILIAN Fibrosure     ______________________________________________________________________    SUBJECTIVE:           Ms Chapo Kitchen is a 78 y o  female with long history of heavy alcohol use, hypertension, hyperlipidemia, hypothyroidism, anemia, AFib, CKD, history of TIA x2, and who was found to have a fatty appearing liver on imaging with elevated transaminases who co in May   mes in today for initial consultation with hepatology after last seeing my colleague Dr Yahaira Eagle in May  She has been undergoing evaluation for pancreatic cysts, which appear to be side branch IPMNs on imaging  She has an EUS scheduled in a couple months  Blood work showed transaminases in the 2-3 times the upper limit of normal range  She denies risk factors for viral hepatitis  She denies any known previous personal or family history of liver disease  He does admit to drinking several glasses of wine per day up to 4 glasses daily for the past 20-30 years  She denies being an alcoholic and does not meet the usual CAGE criteria  Ms Siena Montez denies recent or history of yellow eyes/skin, dark urine, GI bleeding, abdominal distention with fluid, lower extremity swelling, pruritus or confusion  She does have  Easy bruising/excessive bleeding secondary to eliquis  She denies abdominal pain, nausea, vomiting, heartburn, reflux, difficulty swallowing, early satiety, bloating, diarrhea, constipation or straining with passing stools  REVIEW OF SYSTEMS     Review of Systems   Constitutional: Positive for fatigue  Negative for fever and unexpected weight change  HENT: Negative for nosebleeds, tinnitus and trouble swallowing  Eyes: Negative for pain and visual disturbance  Respiratory: Negative for chest tightness and shortness of breath (mild ESTES when hiking trails)  Cardiovascular: Negative for chest pain and leg swelling  Gastrointestinal:        Per HPI   Endocrine: Positive for polyuria  Negative for cold intolerance and heat intolerance  Genitourinary: Negative for difficulty urinating, flank pain and menstrual problem  Musculoskeletal: Positive for arthralgias and back pain  Negative for gait problem and joint swelling  Neurological: Negative for dizziness and numbness  Hematological: Bruises/bleeds easily  Psychiatric/Behavioral: Negative for sleep disturbance  The patient is not nervous/anxious          Historical Information   Patient Active Problem List   Diagnosis    Anemia    Atrial fibrillation ([facility] )    Hyperlipidemia    Hypertension    Hypothyroidism    Knee osteoarthritis    Left sided sciatica    Gout, arthropathy    Bronchospasm    Age-related osteoporosis without current pathological fracture    Pulmonary hypertension, unspecified ([facility] )    Thoracic aortic aneurysm, without rupture ([place] [address] )    Atherosclerosis of native coronary artery of native heart without angina pectoris    History of cerebrovascular accident    Long term current use of anticoagulant    Long term current use of aspirin    Pure hypercholesterolemia    Tricuspid incompetence    Generalized abdominal pain    Abdominal distention    Esophageal dysphagia    Pancreatic cyst    TIA (transient ischemic attack)    Abnormal liver enzymes    Stage 3 chronic kidney disease, unspecified whether stage 3a or 3b CKD (HCC)     Social History     Substance and Sexual Activity   Alcohol Use Yes    Alcohol/week: 7 0 standard drinks    Types: 7 Glasses of wine per week    Comment: occasionally, drinks wine , moderate      Social History     Substance and Sexual Activity   Drug Use No     Social History     Tobacco Use   Smoking Status Former Smoker    Packs/day: 1 00    Years: 7 00    Pack years: 7 00    Types: Cigarettes    Quit date: 26    Years since quittin 6   Smokeless Tobacco Never Used       Meds/Allergies Current Outpatient Medications:     allopurinol (ZYLOPRIM) 100 mg tablet    aspirin 81 mg chewable tablet    benazepril (LOTENSIN) 20 mg tablet    colchicine (COLCRYS) 0 6 mg tablet    ELIQUIS 2 5 MG    furosemide (LASIX) 40 mg tablet    hydrochlorothiazide (MICROZIDE) 12 5 mg capsule    levothyroxine 50 mcg tablet    metoprolol succinate (TOPROL-XL) 25 mg 24 hr tablet    pantoprazole (PROTONIX) 20 mg tablet    simvastatin (ZOCOR) 20 mg tablet    triamcinolone (KENALOG) 0 1 % cream    Allergies   Allergen Reactions    Lidocaine      Annotation - [id number]: Seizure with 20 cc during a surgical block  FS    Penicillins     Sulfa Antibiotics Edema           Objective     Blood pressure 118/76, pulse 62, temperature 98 4 °F (36 9 °C), resp  rate 16, height 5' 2" (1 575 m), weight 60 4 kg (133 lb 3 2 oz), SpO2 92 %  Body mass index is 24 36 kg/m²  PHYSICAL EXAM:      Physical Exam  Vitals reviewed  Constitutional:       General: She is not in acute distress  Appearance: Normal appearance  She is not ill-appearing  HENT:      Head: Normocephalic and atraumatic  Nose: Nose normal       Mouth/Throat:      Mouth: Mucous membranes are moist       Pharynx: Oropharynx is clear  Eyes:      General: No scleral icterus  Extraocular Movements: Extraocular movements intact  Cardiovascular:      Rate and Rhythm: Normal rate and regular rhythm  Heart sounds: No murmur heard  Pulmonary:      Effort: Pulmonary effort is normal  No respiratory distress  Breath sounds: Normal breath sounds  Abdominal:      General: Abdomen is flat  Palpations: Abdomen is soft  There is no shifting dullness, fluid wave, hepatomegaly or splenomegaly  Tenderness: There is no abdominal tenderness  Hernia: No hernia is present  Genitourinary:     Comments: deferred  Musculoskeletal:         General: No swelling  Normal range of motion        Cervical back: Normal range of motion and neck supple  No tenderness  Skin:     General: Skin is warm  Coloration: Skin is not jaundiced  Findings: No bruising or rash  Neurological:      General: No focal deficit present  Mental Status: She is alert and oriented to person, place, and time  Psychiatric:         Mood and Affect: Mood normal          Lab Results:   Lab Results   Component Value Date    K 5 1 04/13/2022    CO2 22 04/13/2022    CL 99 04/13/2022    BUN 18 04/13/2022    CREATININE 0 95 04/13/2022     No results found for: WBC, HGB, HCT, MCV, PLT  Lab Results   Component Value Date    TP 7 3 06/29/2022    AST 70 (H) 06/29/2022    ALT 63 06/29/2022    BILIDIR 0 25 (H) 06/29/2022      No results found for: AFP, IRON, LABIRON, FERRITIN  Lab Results   Component Value Date     04/13/2022    TRIG 52 04/13/2022         Radiology Results:   EGD    Result Date: 7/26/2022  Narrative: [address] [place] [address] [phone] DATE OF SERVICE: 7/26/22 PHYSICIAN(S): Attending: Miguel Babb MD Fellow: No Staff Documented INDICATION: Esophageal dysphagia POST-OP DIAGNOSIS: See the impression below  PREPROCEDURE: Informed consent was obtained for the procedure, including sedation  Risks of perforation, hemorrhage, adverse drug reaction and aspiration were discussed  The patient was placed in the left lateral decubitus position  Patient was explained about the risks and benefits of the procedure  Risks including but not limited to bleeding, infection, and perforation were explained in detail  Also explained about less than 100% sensitivity with the exam and other alternatives  DETAILS OF PROCEDURE: Patient was taken to the procedure room where a time out was performed to confirm correct patient and correct procedure   The patient underwent monitored anesthesia care, which was administered by an anesthesia professional  The patient's blood pressure, heart rate, level of consciousness, respirations and oxygen were monitored throughout the procedure  The scope was advanced to the second part of the duodenum  Retroflexion was performed in the fundus  The patient experienced no blood loss  The procedure was not difficult  The patient tolerated the procedure well  There were no apparent complications  ANESTHESIA INFORMATION: ASA: III Anesthesia Type: IV Sedation with Anesthesia MEDICATIONS: No administrations occurring from 1001 to 1032 on 07/26/22 FINDINGS: The duodenum appeared normal  Performed random biopsy using biopsy forceps  Mild erythematous mucosa in the antrum; performed cold forceps biopsy The cardia appeared normal  Moderate erythematous and nodular mucosa in the GE junction; performed cold forceps biopsy SPECIMENS: ID Type Source Tests Collected by Time Destination 1 : Duodenal Biopsy Rule Out Celiac Disease Tissue Duodenum TISSUE EXAM Renetta Ochoa MD 7/26/2022 10:28 AM  2 : Gastric Biopsy for Gastritis Rule Out H Pylori Tissue Stomach TISSUE EXAM Renetta Ochoa MD 7/26/2022 10:30 AM  3 : GE Junction Nodule Tissue Esophagogastric junction TISSUE EXAM Renetta Ochoa MD 7/26/2022 10:31 AM      Impression: Normal duodenum, random biopsies taken Erosive gastritis biopsy taken Normal retroflexion Grade B erosive esophagitis the nodule the GE junction at 39 cm, biopsies taken RECOMMENDATION: Await pathology results  Discharge home Resume regular diet Resume home medications Recommend pantoprazole 40 mg twice daily Follow-up biopsy results Call with any abdominal pain, bleeding, fevers  Renetta Ochoa MD     Colonoscopy    Result Date: 7/26/2022  Narrative: [address] [place], [place] [id number] [phone] DATE OF SERVICE: 7/26/22 PHYSICIAN(S): Attending: Renetta Ochoa MD Fellow: No Staff Documented INDICATION: Abdominal distention, History of colon cancer POST-OP DIAGNOSIS: See the impression below  HISTORY: Prior colonoscopy: 5 years ago   BOWEL PREPARATION: Clenpiq PREPROCEDURE: Informed consent was obtained for the procedure, including sedation  Risks including but not limited to bleeding, infection, perforation, adverse drug reaction and aspiration were explained in detail  Also explained about less than 100% sensitivity with the exam and other alternatives  The patient was placed in the left lateral decubitus position  DETAILS OF PROCEDURE: Patient was taken to the procedure room where a time out was performed to confirm correct patient and correct procedure  The patient underwent monitored anesthesia care, which was administered by an anesthesia professional  The patient's blood pressure, heart rate, level of consciousness, oxygen and respirations were monitored throughout the procedure  A digital rectal exam was performed  The scope was introduced through the anus advanced to the site of the anastomosis  Retroflexion was performed in the rectum  The quality of bowel preparation was evaluated using the [facility] Bowel Preparation Scale with scores of: right colon = 2, transverse colon = 2, left colon = 2  The total BBPS score was 6  Bowel prep was adequate  The patient experienced no blood loss  The procedure was not difficult  The patient tolerated the procedure well  There were no apparent complications  ANESTHESIA INFORMATION: ASA: III Anesthesia Type: IV Sedation with Anesthesia MEDICATIONS: No administrations occurring from 1001 to 1049 on 07/26/22 FINDINGS: Healthy anastomosis in the ascending colon Performed random biopsy using biopsy forceps   Otherwise normal colon Internal hemorrhoids EVENTS: Procedure Events Event Event Time ENDO SCOPE OUT TIME 7/26/2022 10:32 AM ENDO CECUM REACHED 7/26/2022 10:40 AM ENDO SCOPE OUT TIME 7/26/2022 10:47 AM SPECIMENS: ID Type Source Tests Collected by Time Destination 1 : Duodenal Biopsy Rule Out Celiac Disease Tissue Duodenum TISSUE EXAM Hai Brown MD 7/26/2022 10:28 AM  2 : Gastric Biopsy for Gastritis Rule Out H Pylori Tissue Stomach TISSUE EXAM Anson Grullon MD 7/26/2022 10:30 AM  3 : GE Junction Nodule Tissue Esophagogastric junction TISSUE EXAM Anson Grullon MD 7/26/2022 10:31 AM  4 : Random Colon Biopsies Rule Out Microscopic Colitis Tissue Colon TISSUE EXAM Anson Grullon MD 7/26/2022 10:41 AM  EQUIPMENT: Colonoscope -     Impression: Healthy-appearing ileocolonic anastomosis Random biopsies taken throughout the colon Internal hemorrhoids RECOMMENDATION: Repeat colonoscopy in 5 years due to a personal history of colon cancer Discharge home Resume regular diet Resume home medications Take a daily fiber supplement If continued loose stool consider cholestyramine Call with any abdominal pain, bleeding, fevers  MD Chrissie Laura MD

## 2022-08-08 NOTE — PATIENT INSTRUCTIONS
Have blood work done at your earliest convenience  Schedule ultrasound elastography to check liver fat and scarring  Decrease, or better stop completely, alcohol intake

## 2022-08-11 ENCOUNTER — HOSPITAL ENCOUNTER (OUTPATIENT)
Dept: RADIOLOGY | Facility: HOSPITAL | Age: 82
Discharge: HOME | End: 2022-08-11
Payer: COMMERCIAL

## 2022-08-11 ENCOUNTER — APPOINTMENT (OUTPATIENT)
Dept: LAB | Facility: CLINIC | Age: 82
End: 2022-08-11
Payer: COMMERCIAL

## 2022-08-11 DIAGNOSIS — R74.8 ABNORMAL LIVER ENZYMES: ICD-10-CM

## 2022-08-11 DIAGNOSIS — K76.0 FATTY LIVER: ICD-10-CM

## 2022-08-11 DIAGNOSIS — Z11.59 ENCOUNTER FOR SCREENING FOR OTHER VIRAL DISEASES: ICD-10-CM

## 2022-08-11 LAB
ALBUMIN SERPL BCP-MCNC: 4 G/DL (ref 3.5–5)
ALP SERPL-CCNC: 114 U/L (ref 46–116)
ALT SERPL W P-5'-P-CCNC: 64 U/L (ref 12–78)
ANION GAP SERPL CALCULATED.3IONS-SCNC: 6 MMOL/L (ref 4–13)
AST SERPL W P-5'-P-CCNC: 72 U/L (ref 5–45)
BASOPHILS # BLD AUTO: 0.06 THOUSANDS/ΜL (ref 0–0.1)
BASOPHILS NFR BLD AUTO: 2 % (ref 0–1)
BILIRUB SERPL-MCNC: 0.53 MG/DL (ref 0.2–1)
BUN SERPL-MCNC: 16 MG/DL (ref 5–25)
CALCIUM SERPL-MCNC: 9.5 MG/DL (ref 8.3–10.1)
CHLORIDE SERPL-SCNC: 106 MMOL/L (ref 96–108)
CO2 SERPL-SCNC: 28 MMOL/L (ref 21–32)
CREAT SERPL-MCNC: 1.01 MG/DL (ref 0.6–1.3)
EOSINOPHIL # BLD AUTO: 0.15 THOUSAND/ΜL (ref 0–0.61)
EOSINOPHIL NFR BLD AUTO: 4 % (ref 0–6)
ERYTHROCYTE [DISTWIDTH] IN BLOOD BY AUTOMATED COUNT: 15.1 % (ref 11.6–15.1)
FERRITIN SERPL-MCNC: 94 NG/ML (ref 8–388)
GFR SERPL CREATININE-BSD FRML MDRD: 51 ML/MIN/1.73SQ M
GLUCOSE P FAST SERPL-MCNC: 79 MG/DL (ref 65–99)
HAV AB SER QL IA: NORMAL
HBV CORE AB SER QL: NORMAL
HBV CORE IGM SER QL: NORMAL
HBV SURFACE AB SER-ACNC: <3.1 MIU/ML
HBV SURFACE AG SER QL: NORMAL
HCT VFR BLD AUTO: 37.3 % (ref 34.8–46.1)
HCV AB SER QL: NORMAL
HGB BLD-MCNC: 12.2 G/DL (ref 11.5–15.4)
IGA SERPL-MCNC: 250 MG/DL (ref 70–400)
IGG SERPL-MCNC: 1010 MG/DL (ref 700–1600)
IGM SERPL-MCNC: 60 MG/DL (ref 40–230)
IMM GRANULOCYTES # BLD AUTO: 0.01 THOUSAND/UL (ref 0–0.2)
IMM GRANULOCYTES NFR BLD AUTO: 0 % (ref 0–2)
INR PPP: 1.12 (ref 0.84–1.19)
IRON SATN MFR SERPL: 29 % (ref 15–50)
IRON SERPL-MCNC: 111 UG/DL (ref 50–170)
LYMPHOCYTES # BLD AUTO: 1.26 THOUSANDS/ΜL (ref 0.6–4.47)
LYMPHOCYTES NFR BLD AUTO: 36 % (ref 14–44)
MCH RBC QN AUTO: 34.3 PG (ref 26.8–34.3)
MCHC RBC AUTO-ENTMCNC: 32.7 G/DL (ref 31.4–37.4)
MCV RBC AUTO: 105 FL (ref 82–98)
MONOCYTES # BLD AUTO: 0.4 THOUSAND/ΜL (ref 0.17–1.22)
MONOCYTES NFR BLD AUTO: 11 % (ref 4–12)
NEUTROPHILS # BLD AUTO: 1.63 THOUSANDS/ΜL (ref 1.85–7.62)
NEUTS SEG NFR BLD AUTO: 47 % (ref 43–75)
NRBC BLD AUTO-RTO: 0 /100 WBCS
PLATELET # BLD AUTO: 199 THOUSANDS/UL (ref 149–390)
PMV BLD AUTO: 11.7 FL (ref 8.9–12.7)
POTASSIUM SERPL-SCNC: 4.1 MMOL/L (ref 3.5–5.3)
PROT SERPL-MCNC: 7.6 G/DL (ref 6.4–8.4)
PROTHROMBIN TIME: 14.7 SECONDS (ref 11.6–14.5)
RBC # BLD AUTO: 3.56 MILLION/UL (ref 3.81–5.12)
SODIUM SERPL-SCNC: 140 MMOL/L (ref 135–147)
TIBC SERPL-MCNC: 388 UG/DL (ref 250–450)
WBC # BLD AUTO: 3.51 THOUSAND/UL (ref 4.31–10.16)

## 2022-08-11 PROCEDURE — 84478 ASSAY OF TRIGLYCERIDES: CPT

## 2022-08-11 PROCEDURE — 80053 COMPREHEN METABOLIC PANEL: CPT

## 2022-08-11 PROCEDURE — 86705 HEP B CORE ANTIBODY IGM: CPT

## 2022-08-11 PROCEDURE — 82947 ASSAY GLUCOSE BLOOD QUANT: CPT

## 2022-08-11 PROCEDURE — 82728 ASSAY OF FERRITIN: CPT

## 2022-08-11 PROCEDURE — 82247 BILIRUBIN TOTAL: CPT

## 2022-08-11 PROCEDURE — 86706 HEP B SURFACE ANTIBODY: CPT

## 2022-08-11 PROCEDURE — 83540 ASSAY OF IRON: CPT

## 2022-08-11 PROCEDURE — 84450 TRANSFERASE (AST) (SGOT): CPT

## 2022-08-11 PROCEDURE — 82977 ASSAY OF GGT: CPT

## 2022-08-11 PROCEDURE — 86381 MITOCHONDRIAL ANTIBODY EACH: CPT

## 2022-08-11 PROCEDURE — 85610 PROTHROMBIN TIME: CPT

## 2022-08-11 PROCEDURE — 86704 HEP B CORE ANTIBODY TOTAL: CPT

## 2022-08-11 PROCEDURE — 76981 USE PARENCHYMA: CPT

## 2022-08-11 PROCEDURE — 87340 HEPATITIS B SURFACE AG IA: CPT

## 2022-08-11 PROCEDURE — 86038 ANTINUCLEAR ANTIBODIES: CPT

## 2022-08-11 PROCEDURE — 36415 COLL VENOUS BLD VENIPUNCTURE: CPT

## 2022-08-11 PROCEDURE — 82465 ASSAY BLD/SERUM CHOLESTEROL: CPT

## 2022-08-11 PROCEDURE — 86803 HEPATITIS C AB TEST: CPT

## 2022-08-11 PROCEDURE — 86015 ACTIN ANTIBODY EACH: CPT

## 2022-08-11 PROCEDURE — 83883 ASSAY NEPHELOMETRY NOT SPEC: CPT

## 2022-08-11 PROCEDURE — 83010 ASSAY OF HAPTOGLOBIN QUANT: CPT

## 2022-08-11 PROCEDURE — 83550 IRON BINDING TEST: CPT

## 2022-08-11 PROCEDURE — 85025 COMPLETE CBC W/AUTO DIFF WBC: CPT

## 2022-08-11 PROCEDURE — 82390 ASSAY OF CERULOPLASMIN: CPT

## 2022-08-11 PROCEDURE — 86708 HEPATITIS A ANTIBODY: CPT

## 2022-08-11 PROCEDURE — 82172 ASSAY OF APOLIPOPROTEIN: CPT

## 2022-08-11 PROCEDURE — 84460 ALANINE AMINO (ALT) (SGPT): CPT

## 2022-08-11 PROCEDURE — 82784 ASSAY IGA/IGD/IGG/IGM EACH: CPT

## 2022-08-11 PROCEDURE — 82103 ALPHA-1-ANTITRYPSIN TOTAL: CPT

## 2022-08-12 LAB
A1AT SERPL-MCNC: 145 MG/DL (ref 101–187)
ACTIN IGG SERPL-ACNC: 14 UNITS (ref 0–19)
CERULOPLASMIN SERPL-MCNC: 22.9 MG/DL (ref 19–39)
MITOCHONDRIA M2 IGG SER-ACNC: <20 UNITS (ref 0–20)
RYE IGE QN: NEGATIVE

## 2022-08-14 LAB
A2 MACROGLOB SERPL-MCNC: 215 MG/DL (ref 110–276)
ALT SERPL W P-5'-P-CCNC: 56 IU/L (ref 0–40)
APO A-I SERPL-MCNC: 206 MG/DL (ref 114–214)
AST SERPL W P-5'-P-CCNC: 71 IU/L (ref 0–40)
BILIRUB SERPL-MCNC: 0.4 MG/DL (ref 0–1.2)
CHOLEST SERPL-MCNC: 185 MG/DL (ref 100–199)
FIBROSIS SCORING:: ABNORMAL
FIBROSIS STAGE SERPL QL: ABNORMAL
GGT SERPL-CCNC: 109 IU/L (ref 0–60)
GLUCOSE SERPL-MCNC: 76 MG/DL (ref 65–99)
HAPTOGLOB SERPL-MCNC: 117 MG/DL (ref 41–333)
LABORATORY COMMENT REPORT: ABNORMAL
LIVER FIBR SCORE SERPL CALC.FIBROSURE: 0.33 (ref 0–0.21)
NECROINFLAMMATORY ACT GRADE SERPL QL: ABNORMAL
NECROINFLAMMATORY ACT SCORE SERPL: 0.25
SERVICE CMNT-IMP: ABNORMAL
SL AMB INTERPRETATION: ABNORMAL
SL AMB NASH SCORING: ABNORMAL
SL AMB STEATOSIS GRADE: ABNORMAL
SL AMB STEATOSIS SCORE: 0.51 (ref 0–0.3)
STEATOSIS GRADING: ABNORMAL
TRIGL SERPL-MCNC: 72 MG/DL (ref 0–149)

## 2022-08-23 DIAGNOSIS — M10.9 GOUT, ARTHROPATHY: ICD-10-CM

## 2022-08-23 RX ORDER — COLCHICINE 0.6 MG/1
TABLET ORAL
Qty: 90 TABLET | Refills: 0 | Status: SHIPPED | OUTPATIENT
Start: 2022-08-23

## 2022-09-27 DIAGNOSIS — M10.9 GOUT, ARTHROPATHY: ICD-10-CM

## 2022-09-27 RX ORDER — ALLOPURINOL 100 MG/1
100 TABLET ORAL DAILY
Qty: 90 TABLET | Refills: 2 | Status: SHIPPED | OUTPATIENT
Start: 2022-09-27

## 2022-10-03 ENCOUNTER — TELEPHONE (OUTPATIENT)
Dept: GASTROENTEROLOGY | Facility: CLINIC | Age: 82
End: 2022-10-03

## 2022-10-03 NOTE — TELEPHONE ENCOUNTER
Patients GI provider:  Dr Donell Sheehan    Number to return call: [phone]    Reason for call: Pt calling to reschedule her endoscopic ultrasound due to her son testing positive for Covid      Scheduled procedure/appointment date if applicable: Procedure: 48/54/8214

## 2022-10-12 ENCOUNTER — TELEPHONE (OUTPATIENT)
Dept: FAMILY MEDICINE CLINIC | Facility: CLINIC | Age: 82
End: 2022-10-12

## 2022-10-12 NOTE — TELEPHONE ENCOUNTER
I reviewed the notes from GI and I think she is OK to wait until January  She could call their office and ask as well  But I think she is OK

## 2022-10-27 DIAGNOSIS — E03.9 HYPOTHYROIDISM, UNSPECIFIED TYPE: ICD-10-CM

## 2022-10-27 RX ORDER — LEVOTHYROXINE SODIUM 0.05 MG/1
50 TABLET ORAL
Qty: 90 TABLET | Refills: 0 | Status: SHIPPED | OUTPATIENT
Start: 2022-10-27

## 2022-11-21 ENCOUNTER — OFFICE VISIT (OUTPATIENT)
Dept: GASTROENTEROLOGY | Facility: CLINIC | Age: 82
End: 2022-11-21

## 2022-11-21 VITALS
WEIGHT: 126.8 LBS | HEART RATE: 66 BPM | DIASTOLIC BLOOD PRESSURE: 68 MMHG | HEIGHT: 62 IN | SYSTOLIC BLOOD PRESSURE: 114 MMHG | BODY MASS INDEX: 23.34 KG/M2

## 2022-11-21 DIAGNOSIS — R74.8 ABNORMAL LIVER ENZYMES: Primary | ICD-10-CM

## 2022-11-21 DIAGNOSIS — K70.9 ALCOHOLIC LIVER DISEASE (HCC): ICD-10-CM

## 2022-11-21 RX ORDER — CLINDAMYCIN HYDROCHLORIDE 300 MG/1
CAPSULE ORAL
COMMUNITY
Start: 2022-11-16

## 2022-11-21 NOTE — PROGRESS NOTES
[facility] - Outpatient Consultation  Nathalie Meng 78 y o  female MRN: [id number]  Encounter: [id number]    PCP:  Tran Bess, Leonard [facility], [phone]  Referring Provider:  Leonard Mcclain [facility], [phone]    Patient: Nathalie Meng, 1940  Reason for Referral: EtOH liver disease    ASSESSMENT/PLAN:  78 y o  female with history of HTN, HLD, hypothyroidism, Afib on Eliquis, colon cancer s/p R hemicolectomy and EtOH liver disorder who presents for follow up evaluation for abnormal liver tests  She has no acute liver specific complaints or clinical evidence of hepatic decompensation  She has had longstanding history of EtOH use disorder and was noted to have mildly elevated serum transaminases with AST predominance and macrocytosis  Her liver synthetic function and platelet count are normal  She had an US elastography which showed minimal fibrosis but did have an MRI/MRCP which showed likely IPMN in HOP with sequelae of chronic pancreatitis  We discussed that she needs to cut down on her EtOH consumption to prevent progression of disease  She is a pre-contemplative state and declined pharmacologic therapy for relapse prevention  Fortunately, she does not have advanced liver disease but I advised that she should have an annual labs and ultrasound elastoraphy to monitor for advanced hepatic fibrosis  Would also recommend HAV and HBV vaccination given non-immunity  Otherwise, she will follow up 1 year or sooner if needed  Thank you for the opportunity to consult in her care      - Advised EtOH abstinence  - Consider naltrexone in combination with behavioral counseling for relapse prevention   - CBC, CMP and INR  - US elastography  - Needs HAV and HBV vaccination with PCP   - Follow up EUS in January 2023 with Dr Jak Shaver MD  Division of Gastroenterology and Hepatology  [address] [place] [address],[address] System    ============================================================================  CC/HPI: 78 y o  female with history of HTN, HLD, hypothyroidism, Afib on Eliquis, colon cancer s/p R hemicolectomy and EtOH liver disorder who presents for follow up evaluation for abnormal liver tests  She has had mild elevations in serum transaminases dating back to April 2022 with AST predominance  She had a CT abdomen which showed pancreatic calcifications and later had an MRI/MRCP in June 2022 which showed mild hepatic steatosis and 2 0 cm cyst in the HOP  She is scheduled for EUS next month with Dr Fermin Soto  She had a US elastography with UGAP which showed minimal fibrosis  Her serologic work-up was also negative for metabolic and autoimmune liver disease  Etiology was felt to be due to ALD vs  NAFLD and it was recommended that she cut down on her EtOH intake  She reports longstanding history of alcohol use for the past 20-30 years  She drinks 3-4 glasses of white wine daily  She denies history of EtOH withdrawal as well as hospitalizations for EtOH hepatitis or pancreatitis  She also denies history of DUI or work-related issues due to EtOH  She lives with her son  ROS: Complete review of systems otherwise negative       PAST MEDICAL/SURGICAL HISTORY:  Past Medical History:   Diagnosis Date   • Anemia    • Arthritis    • Atherosclerosis of native coronary artery of native heart without angina pectoris 02/06/2019   • Cataract    • Chronic atrial fibrillation (HCC)    • Degeneration of cervical intervertebral disc    • Diarrhea     onset 07/10   • Disease of thyroid gland    • Occipital infarction [facility])    • Wrist fracture     right         Past Surgical History:   Procedure Laterality Date   • COLON SURGERY     • FL INJECTION LEFT HIP (NON ARTHROGRAM)  5/28/2021   • KNEE ARTHROSCOPY      with medial meniscus repair    • ME ARTHROCENTESIS ASPIR&/INJ MAJOR JT/BURSA W/O US Left 5/28/2021    Procedure: Hip corticosteroid injection ([id number]); Surgeon: Artem Pineda MD;  Location: [facility] MAIN OR;  Service: Pain Management        FAMILY/SOCIAL HISTORY:  Family History   Problem Relation Age of Onset   • Heart attack Mother    • Valvular heart disease Mother    • Heart attack Father    • Hypertension Sister    • Mental illness Neg Hx        Social History     Tobacco Use   • Smoking status: Former     Packs/day: 1 00     Years: 7 00     Pack years: 7 00     Types: Cigarettes     Quit date:      Years since quittin 9   • Smokeless tobacco: Never   Vaping Use   • Vaping Use: Never used   Substance Use Topics   • Alcohol use:  Yes     Alcohol/week: 7 0 standard drinks     Types: 7 Glasses of wine per week     Comment: occasionally, drinks wine , moderate    • Drug use: No       MEDICATIONS:  Current Outpatient Medications on File Prior to Visit   Medication Sig Dispense Refill   • allopurinol (ZYLOPRIM) 100 mg tablet Take 1 tablet (100 mg total) by mouth daily 90 tablet 2   • aspirin 81 mg chewable tablet Chew 1 tablet daily     • colchicine (COLCRYS) 0 6 mg tablet TAKE 1 TABLET (0 6 MG TOTAL) BY MOUTH TWO (TWO) TIMES A DAY 90 tablet 0   • ELIQUIS 2 5 MG 2 5 mg 2 (two) times a day       • furosemide (LASIX) 40 mg tablet Take 1 tablet by mouth Daily     • hydrochlorothiazide (MICROZIDE) 12 5 mg capsule Take 1 capsule by mouth daily     • levothyroxine 50 mcg tablet TAKE 1 TABLET (50 MCG TOTAL) BY MOUTH DAILY IN THE EARLY MORNING 90 tablet 0   • metoprolol succinate (TOPROL-XL) 25 mg 24 hr tablet Take 1 tablet (25 mg total) by mouth daily 90 tablet 0   • pantoprazole (PROTONIX) 20 mg tablet Take 2 tablets (40 mg total) by mouth 2 (two) times a day 60 tablet 3   • simvastatin (ZOCOR) 20 mg tablet Take 1 tablet by mouth daily     • benazepril (LOTENSIN) 20 mg tablet Take 1 tablet (20 mg total) by mouth daily (Patient taking differently: Take 20 mg by mouth every evening) 90 tablet 3   • clindamycin (CLEOCIN) 300 MG capsule      • triamcinolone (KENALOG) 0 1 % cream  (Patient not taking: Reported on 7/27/2022)       No current facility-administered medications on file prior to visit  Allergies   Allergen Reactions   • Lidocaine      Annotation - [id number]: Seizure with 20 cc during a surgical block  FS   • Penicillins    • Sulfa Antibiotics Edema       PHYSICAL EXAM:  /68   Pulse 66   Ht 5' 2" (1 575 m)   Wt 57 5 kg (126 lb 12 8 oz)   BMI 23 19 kg/m²   GENERAL: NAD, AAO  HEENT: anicteric, OP clear, MMM  ABDOMEN: S/ND/NT, normoactive BS, no hepatomegaly, spleen not palpable  EXTREMITIES: no edema  SKIN: no rashes, no palmar erythema, no spider angiomata   NEURO: normal gait, no tremor, no asterixis     LABS/RADIOLOGY/ENDOSCOPY:  Lab Results   Component Value Date    WBC 3 51 (L) 08/11/2022    HGB 12 2 08/11/2022    HCT 37 3 08/11/2022     08/11/2022    GLUF 79 08/11/2022    BUN 16 08/11/2022    CREATININE 1 01 08/11/2022    K 4 1 08/11/2022     08/11/2022    CO2 28 08/11/2022    BILIDIR 0 25 (H) 06/29/2022    ALKPHOS 114 08/11/2022    ALT 56 (H) 08/11/2022    ALT 64 08/11/2022    AST 71 (H) 08/11/2022    AST 72 (H) 08/11/2022    GLOB 2 3 04/13/2022    CALCIUM 9 5 08/11/2022    EGFR 51 08/11/2022    TRIG 72 08/11/2022     04/13/2022    INR 1 12 08/11/2022     (H) 08/11/2022     EGD (5/2022)  Normal duodenum, random biopsies taken  Erosive gastritis biopsy taken  Normal retroflexion  Grade B erosive esophagitis the nodule the GE junction at 39 cm, biopsies taken    COY (5/2022)  • Healthy anastomosis in the ascending colon  • Performed random biopsy using biopsy forceps   Otherwise normal colon  • Internal hemorrhoids     US elastography/UGAP (8/2022)  Metavir score: F0 - F1, Absent or Mild Fibrosis  Liver steatosis grading:  S3 ( > 67% steatosis)     MRI abdomen/MRCP (6/2022)  Scattered pancreatic cysts measuring up to 2 0 cm, some with thin internal septations, without suspicious features, likely with connection to the pancreatic duct, 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

## 2022-11-21 NOTE — PATIENT INSTRUCTIONS
Please cut down your alcohol intake  If you need help with cravings, we can start a medication called naltrexone  Please vaccinate for hepatitis A and B with your PCP or local pharmacy  You are at risk for severe infections due to your liver disease 
6

## 2022-11-25 DIAGNOSIS — M10.9 GOUT, ARTHROPATHY: ICD-10-CM

## 2022-11-25 DIAGNOSIS — E03.9 HYPOTHYROIDISM, UNSPECIFIED TYPE: ICD-10-CM

## 2022-11-25 RX ORDER — LEVOTHYROXINE SODIUM 0.05 MG/1
50 TABLET ORAL
Qty: 90 TABLET | Refills: 0 | Status: SHIPPED | OUTPATIENT
Start: 2022-11-25

## 2022-11-25 RX ORDER — COLCHICINE 0.6 MG/1
TABLET ORAL
Qty: 90 TABLET | Refills: 0 | Status: SHIPPED | OUTPATIENT
Start: 2022-11-25

## 2022-12-28 NOTE — TELEPHONE ENCOUNTER
Chrissie Singh states Dr Conrad Avendano rescheduled her biopsy to Jan 19 because she had the flu last week  Chrissie Singh asked if its ok to wait? She states she doesn't know how serious this is? Please advise  Home and self care measures reviewed  Will start abx due to duration of sx  Inhaler as needed, pharmacy to instruct on use    -in person appt for any worsening/continued symptoms    Appt for annual exam with PCP when next available    Pt agreeable to care plan

## 2023-01-09 ENCOUNTER — TELEPHONE (OUTPATIENT)
Dept: FAMILY MEDICINE CLINIC | Facility: CLINIC | Age: 83
End: 2023-01-09

## 2023-01-09 NOTE — TELEPHONE ENCOUNTER
Chrissie Singh was admitted to [facility] Friday night for a fall and broke half her hip  She was operated on 1/8 and it went well  She is being told she has to go to rehab, but the only place they say is available is in [place]  Chrissie Singh does not want to go that far  Chrissie Singh asked if Dr Yolette Ybarra or Libby Cordero has any pull to get her in someplace closer?   Also should she still go for her biopsy with Dr Martines Manner 1/19, because she will probably be in rehab     [phone]

## 2023-01-09 NOTE — TELEPHONE ENCOUNTER
REALLY DONT HAVE ANY PULL WITH ANY FACILITIES IN THE AREA  I WOULD JUST REFUSE TO GO UNTIL THEY FOUND SOMEWHERE CLOSER

## 2023-01-10 NOTE — TELEPHONE ENCOUNTER
Informed Amaury Oliva of Dr Canales's response  Liz Barksdale, Please advise about your thoughts about Dr Linda Reed appt

## 2023-01-18 ENCOUNTER — TELEPHONE (OUTPATIENT)
Dept: GASTROENTEROLOGY | Facility: HOSPITAL | Age: 83
End: 2023-01-18

## 2023-01-30 ENCOUNTER — TELEPHONE (OUTPATIENT)
Dept: FAMILY MEDICINE CLINIC | Facility: CLINIC | Age: 83
End: 2023-01-30

## 2023-01-30 NOTE — TELEPHONE ENCOUNTER
Admitted 01/06 to [facility] Transferred 1/16 to [facility], discharged on 1/28 had her Left hip replaced    Scheduled for AQUILES on 2/2 with Kwabena Newberry for records - LM on Elaine's voicemail

## 2023-01-31 ENCOUNTER — RA CDI HCC (OUTPATIENT)
Dept: OTHER | Facility: HOSPITAL | Age: 83
End: 2023-01-31

## 2023-01-31 NOTE — PROGRESS NOTES
Rafaela [facility]  coding opportunities       Chart reviewed, no opportunity found:   [address]        Patients Insurance     Medicare Insurance: [employer]

## 2023-02-01 RX ORDER — FOLIC ACID 1 MG/1
TABLET ORAL
COMMUNITY
Start: 2023-01-28

## 2023-02-01 RX ORDER — APIXABAN 5 MG/1
TABLET, FILM COATED ORAL
COMMUNITY
Start: 2023-01-28 | End: 2023-02-02 | Stop reason: HOSPADM

## 2023-02-01 RX ORDER — PANTOPRAZOLE SODIUM 40 MG/1
TABLET, DELAYED RELEASE ORAL
COMMUNITY
Start: 2023-01-28

## 2023-02-01 RX ORDER — LEVOTHYROXINE SODIUM 0.07 MG/1
TABLET ORAL
COMMUNITY
Start: 2023-01-28 | End: 2023-02-02 | Stop reason: DRUGHIGH

## 2023-02-02 ENCOUNTER — APPOINTMENT (OUTPATIENT)
Dept: RADIOLOGY | Facility: CLINIC | Age: 83
End: 2023-02-02

## 2023-02-02 ENCOUNTER — OFFICE VISIT (OUTPATIENT)
Dept: FAMILY MEDICINE CLINIC | Facility: CLINIC | Age: 83
End: 2023-02-02

## 2023-02-02 VITALS
DIASTOLIC BLOOD PRESSURE: 66 MMHG | WEIGHT: 125 LBS | RESPIRATION RATE: 16 BRPM | HEART RATE: 80 BPM | HEIGHT: 62 IN | BODY MASS INDEX: 23 KG/M2 | SYSTOLIC BLOOD PRESSURE: 110 MMHG | TEMPERATURE: 97.8 F

## 2023-02-02 DIAGNOSIS — I71.20 THORACIC AORTIC ANEURYSM WITHOUT RUPTURE, UNSPECIFIED PART: ICD-10-CM

## 2023-02-02 DIAGNOSIS — I48.91 ATRIAL FIBRILLATION, UNSPECIFIED TYPE (HCC): ICD-10-CM

## 2023-02-02 DIAGNOSIS — M53.3 SACRAL PAIN: ICD-10-CM

## 2023-02-02 DIAGNOSIS — N18.30 STAGE 3 CHRONIC KIDNEY DISEASE, UNSPECIFIED WHETHER STAGE 3A OR 3B CKD (HCC): ICD-10-CM

## 2023-02-02 DIAGNOSIS — Z13.820 SCREENING FOR OSTEOPOROSIS: ICD-10-CM

## 2023-02-02 DIAGNOSIS — Z87.81 S/P LEFT HIP FRACTURE: ICD-10-CM

## 2023-02-02 DIAGNOSIS — Z78.0 ASYMPTOMATIC MENOPAUSAL STATE: ICD-10-CM

## 2023-02-02 DIAGNOSIS — Z78.0 POST-MENOPAUSAL: ICD-10-CM

## 2023-02-02 DIAGNOSIS — I27.20 PULMONARY HYPERTENSION, UNSPECIFIED (HCC): ICD-10-CM
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
## 2023-02-02 DIAGNOSIS — I10 PRIMARY HYPERTENSION: ICD-10-CM

## 2023-02-02 DIAGNOSIS — K70.9 ALCOHOLIC LIVER DISEASE (HCC): Primary | ICD-10-CM

## 2023-02-02 DIAGNOSIS — E03.9 HYPOTHYROIDISM, UNSPECIFIED TYPE: ICD-10-CM

## 2023-02-03 ENCOUNTER — TELEPHONE (OUTPATIENT)
Dept: ADMINISTRATIVE | Facility: OTHER | Age: 83
End: 2023-02-03

## 2023-02-03 NOTE — LETTER
Vaccination Request Form: Influenza      Date Requested: 23  Patient: Lázaro Holding  Patient : 1940   Referring Provider: Alice Tillman       The above patient has informed us that they have had their   most recent Influenza administered at your facility  Please   complete this form and attach all corresponding documentation  Date of Vaccine(s) Given  ______________________________    Lot Number(s) _______________________________________    Manufacture(s) ______________________________________    Dose Amount (s) _____________________________________    Expiration Date(s) ____________________________________    Comments __________________________________________________________  ____________________________________________________________________  ____________________________________________________________________  ____________________________________________________________________    Administering Facility  ________________________________________________    Vaccine Administered By (print name) ___________________________________      Form Completed By (print name) _______________________________________      Signature ___________________________________________________________      These reports are needed for  compliance  Please fax this completed form and a copy of the Vaccine Document(s) to our office located at [place] [address] as soon as possible to Fax [phone] attention Jane: Phone [phone]    We thank you for your assistance in treating our mutual patient     (sent to [facility])

## 2023-02-03 NOTE — TELEPHONE ENCOUNTER
----- Message from Winifred Mallory sent at 2/2/2023  3:03 PM EST -----  Regarding: care gap request - flu shot  02/02/23 3:03 PM    Hello, our patient attached above has had Immunization(s) completed/performed  Please assist in updating the patient chart by making an External outreach to [facility] located in [place], [place]  The date of service is 2022      Thank you,  Winifred Mallory  [address]

## 2023-02-07 ENCOUNTER — TELEPHONE (OUTPATIENT)
Dept: FAMILY MEDICINE CLINIC | Facility: CLINIC | Age: 83
End: 2023-02-07

## 2023-02-07 NOTE — TELEPHONE ENCOUNTER
Reduce salt intake and I recommend compression socks during the day  She can get them at the pharmacy  Elevate legs when resting

## 2023-02-07 NOTE — TELEPHONE ENCOUNTER
Dudley Jimenes states both feet have been swelling during the afternoon and night  Then wakes up in the morning and the swelling is down but not completely gone    Currently taking Furosemide 40mg 1 tab daily at 1pm   Do you think there is something else she can take or do?  [phone]

## 2023-02-07 NOTE — TELEPHONE ENCOUNTER
Informed Payal's response  Yaron Irby agreed to call back if not better  No further action at this time

## 2023-02-08 NOTE — TELEPHONE ENCOUNTER
Upon review of the In Basket request we have found as a result of outreach that patient did not have the requested item(s) completed  Any additional questions or concerns should be emailed to the Practice Liaisons via the appropriate education email address, please do not reply via In Basket      Thank you  Rupa Boykin MA

## 2023-02-08 NOTE — TELEPHONE ENCOUNTER
As a follow-up, a second attempt has been made for outreach via telephone call to facility  Please see Contacts section for details      Thank you  Mary Fodr MA

## 2023-02-14 NOTE — ASSESSMENT & PLAN NOTE
Lab Results   Component Value Date    EGFR 51 08/11/2022    EGFR 59 (L) 04/13/2022    CREATININE 1 01 08/11/2022    CREATININE 0 95 04/13/2022     Stable, no issues at this time

## 2023-02-14 NOTE — PROGRESS NOTES
Assessment/Plan:    1  Alcoholic liver disease ([facility] )  Assessment & Plan:  Following up with GI  No issues at this time  2  Pulmonary hypertension, unspecified ([place] [address] )  Assessment & Plan:  Stable, no issues  3  Atrial fibrillation, unspecified type [facility])  Assessment & Plan:  Following up with cardiology  No issues at this time  4  Stage 3 chronic kidney disease, unspecified whether stage 3a or 3b CKD ([place] [address] )  Assessment & Plan:  Lab Results   Component Value Date    EGFR 51 08/11/2022    EGFR 59 (L) 04/13/2022    CREATININE 1 01 08/11/2022    CREATININE 0 95 04/13/2022     Stable, no issues at this time  5  Screening for osteoporosis  -     DXA bone density spine hip and pelvis; Future; Expected date: 02/02/2023    6  Post-menopausal  -     DXA bone density spine hip and pelvis; Future; Expected date: 02/02/2023    7  Hypothyroidism, unspecified type  Assessment & Plan:  Stable, no issues  8  Primary hypertension  Assessment & Plan:  BP stable in office today  9  Sacral pain  -     DXA bone density spine hip and pelvis; Future; Expected date: 02/02/2023  -     XR hip/pelv 2-3 vws left if performed; Future; Expected date: 02/02/2023  -     XR sacrum and coccyx; Future; Expected date: 02/02/2023    10  Fall, sequela  -     DXA bone density spine hip and pelvis; Future; Expected date: 02/02/2023  -     XR hip/pelv 2-3 vws left if performed; Future; Expected date: 02/02/2023  -     XR sacrum and coccyx; Future; Expected date: 02/02/2023    11  S/p left hip fracture  -     DXA bone density spine hip and pelvis; Future; Expected date: 02/02/2023  -     XR hip/pelv 2-3 vws left if performed; Future; Expected date: 02/02/2023  -     XR sacrum and coccyx; Future; Expected date: 02/02/2023    12  Asymptomatic menopausal state  -     DXA bone density spine hip and pelvis; Future; Expected date: 02/02/2023    13   Thoracic aortic aneurysm without rupture, unspecified part          Return if symptoms worsen or fail to improve  Subjective:      Patient ID: Alicia Otoole is a 78 y o  female  Chief Complaint   Patient presents with   • Follow-up     Pt here for a hospital/care center f/u for a hip replacement  Pt is feeling ok but fell three days ago and her tailbone hurts  Ramonita Lopez is an 78year old female who presents to the office, accompanied by her daughter, for a hospital follow up s/p fall  Pt recently had left hip replacement and states that she fell the the other day in her kitchen and hurt her tailbone  Reports the pain has persisted without much improvement with conservative management  The following portions of the patient's history were reviewed and updated as appropriate: allergies, current medications, past family history, past medical history, past social history, past surgical history and problem list     Review of Systems   Constitutional: Negative for chills, fatigue and fever  Respiratory: Negative for cough and shortness of breath  Musculoskeletal: Positive for arthralgias          Tailbone dell         Current Outpatient Medications   Medication Sig Dispense Refill   • allopurinol (ZYLOPRIM) 100 mg tablet Take 1 tablet (100 mg total) by mouth daily 90 tablet 2   • aspirin 81 mg chewable tablet Chew 1 tablet daily     • benazepril (LOTENSIN) 20 mg tablet Take 1 tablet (20 mg total) by mouth daily (Patient taking differently: Take 20 mg by mouth every evening) 90 tablet 3   • ELIQUIS 2 5 MG 2 5 mg 2 (two) times a day       • furosemide (LASIX) 40 mg tablet Take 1 tablet by mouth Daily     • hydrochlorothiazide (MICROZIDE) 12 5 mg capsule Take 1 capsule by mouth daily     • levothyroxine 50 mcg tablet TAKE 1 TABLET (50 MCG TOTAL) BY MOUTH DAILY IN THE EARLY MORNING 90 tablet 0   • metoprolol succinate (TOPROL-XL) 25 mg 24 hr tablet Take 1 tablet (25 mg total) by mouth daily 90 tablet 0   • simvastatin (ZOCOR) 20 mg tablet Take 1 tablet by mouth daily     • clindamycin (CLEOCIN) 300 MG capsule      • colchicine (COLCRYS) 0 6 mg tablet TAKE 1 TABLET (0 6 MG TOTAL) BY MOUTH TWO TIMES A DAY 90 tablet 0   • folic acid (FOLVITE) 1 mg tablet  (Patient not taking: Reported on 2/2/2023)     • pantoprazole (PROTONIX) 20 mg tablet Take 2 tablets (40 mg total) by mouth 2 (two) times a day 60 tablet 3   • pantoprazole (PROTONIX) 40 mg tablet        No current facility-administered medications for this visit  Objective:    /66 (BP Location: Left arm, Patient Position: Sitting, Cuff Size: Adult)   Pulse 80   Temp 97 8 °F (36 6 °C) (Temporal)   Resp 16   Ht 5' 2" (1 575 m)   Wt 56 7 kg (125 lb)   BMI 22 86 kg/m²        Physical Exam  Vitals reviewed  Constitutional:       Appearance: Normal appearance  HENT:      Head: Normocephalic and atraumatic  Cardiovascular:      Rate and Rhythm: Normal rate and regular rhythm  Heart sounds: Normal heart sounds  Pulmonary:      Breath sounds: Normal breath sounds  Musculoskeletal:      Lumbar back: Tenderness present  Negative right straight leg raise test and negative left straight leg raise test    Skin:     General: Skin is warm and dry  Neurological:      Mental Status: She is alert and oriented to person, place, and time     Psychiatric:         Mood and Affect: Mood normal                 JORDAN Kendrick

## 2023-02-15 ENCOUNTER — OFFICE VISIT (OUTPATIENT)
Dept: FAMILY MEDICINE CLINIC | Facility: CLINIC | Age: 83
End: 2023-02-15

## 2023-02-15 VITALS
HEART RATE: 72 BPM | HEIGHT: 62 IN | BODY MASS INDEX: 23.92 KG/M2 | SYSTOLIC BLOOD PRESSURE: 124 MMHG | RESPIRATION RATE: 12 BRPM | WEIGHT: 130 LBS | DIASTOLIC BLOOD PRESSURE: 72 MMHG | TEMPERATURE: 97.3 F

## 2023-02-15 DIAGNOSIS — I48.91 ATRIAL FIBRILLATION, UNSPECIFIED TYPE (HCC): ICD-10-CM

## 2023-02-15 DIAGNOSIS — D64.9 ANEMIA, UNSPECIFIED TYPE: ICD-10-CM

## 2023-02-15 DIAGNOSIS — R60.9 EDEMA, UNSPECIFIED TYPE: Primary | ICD-10-CM

## 2023-02-15 DIAGNOSIS — E03.9 HYPOTHYROIDISM, UNSPECIFIED TYPE: ICD-10-CM

## 2023-02-15 PROBLEM — J98.01 BRONCHOSPASM: Status: RESOLVED | Noted: 2019-12-03 | Resolved: 2023-02-15

## 2023-02-15 RX ORDER — POTASSIUM CHLORIDE 20 MEQ/1
20 TABLET, EXTENDED RELEASE ORAL DAILY
Qty: 30 TABLET | Refills: 5 | Status: SHIPPED | OUTPATIENT
Start: 2023-02-15

## 2023-02-15 NOTE — PROGRESS NOTES
Name: Mulu Conrad      : 1940      MRN: [id number]  Encounter Provider: Jose Angel Ayala MD  Encounter Date: 2/15/2023   Encounter department: [address] [place] [address]     1  Edema, unspecified type  -     TSH, 3rd generation  -     Comprehensive metabolic panel  -     potassium chloride (K-DUR,KLOR-CON) 20 mEq tablet; Take 1 tablet (20 mEq total) by mouth daily    2  Atrial fibrillation, unspecified type (HCC)  -     TSH, 3rd generation  -     Comprehensive metabolic panel    3  Anemia, unspecified type  -     CBC and differential    4  Hypothyroidism, unspecified type  -     TSH, 3rd generation         Subjective      PATIENT IS S/P R HIP REPAIR APPROX 1 MONTH AGO  WAS HEALING WELL  2 WEEKS AGO STARTED NOTING INCREASED BILATERAL LEG SWELLING  NO PAIN  WORSENING    DENIES ANY CP, SOB, PALPITATIONS  NO NVD  DENIES ANY CHANGE IN DIET    Review of Systems   Constitutional: Negative for chills, fatigue and fever  HENT: Negative for congestion, ear discharge, ear pain, mouth sores, postnasal drip, sore throat and trouble swallowing  Eyes: Negative for pain, discharge and visual disturbance  Respiratory: Negative for cough, shortness of breath and wheezing  Cardiovascular: Positive for leg swelling  Negative for chest pain and palpitations  Gastrointestinal: Negative for abdominal distention, abdominal pain, blood in stool, diarrhea and nausea  Endocrine: Negative for polydipsia, polyphagia and polyuria  Genitourinary: Negative for dysuria, frequency, hematuria and urgency  Musculoskeletal: Positive for arthralgias  Negative for gait problem and joint swelling  Skin: Negative for pallor and rash  Neurological: Negative for dizziness, syncope, speech difficulty, weakness, light-headedness, numbness and headaches  Hematological: Negative for adenopathy  Psychiatric/Behavioral: Negative for behavioral problems, confusion and sleep disturbance   The patient is not nervous/anxious  Current Outpatient Medications on File Prior to Visit   Medication Sig   • allopurinol (ZYLOPRIM) 100 mg tablet Take 1 tablet (100 mg total) by mouth daily   • aspirin 81 mg chewable tablet Chew 1 tablet daily   • benazepril (LOTENSIN) 20 mg tablet Take 1 tablet (20 mg total) by mouth daily (Patient taking differently: Take 20 mg by mouth every evening)   • clindamycin (CLEOCIN) 300 MG capsule    • colchicine (COLCRYS) 0 6 mg tablet TAKE 1 TABLET (0 6 MG TOTAL) BY MOUTH TWO TIMES A DAY   • ELIQUIS 2 5 MG 2 5 mg 2 (two) times a day     • hydrochlorothiazide (MICROZIDE) 12 5 mg capsule Take 1 capsule by mouth daily   • levothyroxine 50 mcg tablet TAKE 1 TABLET (50 MCG TOTAL) BY MOUTH DAILY IN THE EARLY MORNING   • metoprolol succinate (TOPROL-XL) 25 mg 24 hr tablet Take 1 tablet (25 mg total) by mouth daily   • simvastatin (ZOCOR) 20 mg tablet Take 1 tablet by mouth daily   • folic acid (FOLVITE) 1 mg tablet  (Patient not taking: Reported on 2/2/2023)   • furosemide (LASIX) 40 mg tablet Take 1 tablet by mouth Daily   • pantoprazole (PROTONIX) 20 mg tablet Take 2 tablets (40 mg total) by mouth 2 (two) times a day (Patient not taking: Reported on 2/15/2023)   • pantoprazole (PROTONIX) 40 mg tablet  (Patient not taking: Reported on 2/15/2023)       Objective     /72   Pulse 72   Temp (!) 97 3 °F (36 3 °C) (Temporal)   Resp 12   Ht 5' 2" (1 575 m)   Wt 59 kg (130 lb)   BMI 23 78 kg/m²     Physical Exam  Constitutional:       General: She is not in acute distress  Appearance: Normal appearance  She is well-developed and normal weight  She is not ill-appearing  HENT:      Head: Normocephalic and atraumatic  Eyes:      General:         Right eye: No discharge  Left eye: No discharge  Conjunctiva/sclera: Conjunctivae normal       Pupils: Pupils are equal, round, and reactive to light  Neck:      Thyroid: No thyromegaly        Comments: MILD JVD  Cardiovascular:      Rate and Rhythm: Normal rate and regular rhythm  Heart sounds: Normal heart sounds  No murmur heard  Pulmonary:      Effort: Pulmonary effort is normal  No respiratory distress  Breath sounds: No wheezing or rales  Comments: MINIMAL BIBASILAR CRACKLES  Abdominal:      General: Bowel sounds are normal       Palpations: Abdomen is soft  Tenderness: There is no abdominal tenderness  Musculoskeletal:         General: No tenderness  Cervical back: Normal range of motion and neck supple  Right lower leg: Edema present  Left lower leg: Edema present  Comments: MODERATE DJD CHANGES    BILATERAL 1+ EDEMA  NO CALF TENDERNESS  NEG CARLOS   Lymphadenopathy:      Cervical: No cervical adenopathy  Skin:     General: Skin is warm and dry  Findings: No erythema or rash  Neurological:      General: No focal deficit present  Mental Status: She is alert and oriented to person, place, and time  Psychiatric:         Behavior: Behavior normal          Thought Content:  Thought content normal          Judgment: Judgment normal        Hira Thornton MD

## 2023-02-15 NOTE — PATIENT INSTRUCTIONS
REST  ELEVATE LEGS AS MUCH AS POSSIBLE  INCREASE LASIX TO TWICE A DAY FOR 3 DAYS, THEN RETURN TO DAILY  POTASSIUM 1 DAILY    BW TODAY  RV 1 WEEK, CALL SOONER PRN

## 2023-02-16 LAB
ALBUMIN SERPL-MCNC: 4.3 G/DL (ref 3.6–4.6)
ALBUMIN/GLOB SERPL: 1.7 {RATIO} (ref 1.2–2.2)
ALP SERPL-CCNC: 122 IU/L (ref 44–121)
ALT SERPL-CCNC: 15 IU/L (ref 0–32)
AST SERPL-CCNC: 30 IU/L (ref 0–40)
BASOPHILS # BLD AUTO: 0.1 X10E3/UL (ref 0–0.2)
BASOPHILS NFR BLD AUTO: 1 %
BILIRUB SERPL-MCNC: 0.5 MG/DL (ref 0–1.2)
BUN SERPL-MCNC: 17 MG/DL (ref 8–27)
BUN/CREAT SERPL: 22 (ref 12–28)
CALCIUM SERPL-MCNC: 9.3 MG/DL (ref 8.7–10.3)
CHLORIDE SERPL-SCNC: 99 MMOL/L (ref 96–106)
CO2 SERPL-SCNC: 25 MMOL/L (ref 20–29)
CREAT SERPL-MCNC: 0.77 MG/DL (ref 0.57–1)
EGFR: 76 ML/MIN/1.73
EOSINOPHIL # BLD AUTO: 0.1 X10E3/UL (ref 0–0.4)
EOSINOPHIL NFR BLD AUTO: 3 %
ERYTHROCYTE [DISTWIDTH] IN BLOOD BY AUTOMATED COUNT: 13.8 % (ref 11.7–15.4)
GLOBULIN SER-MCNC: 2.5 G/DL (ref 1.5–4.5)
GLUCOSE SERPL-MCNC: 83 MG/DL (ref 70–99)
HCT VFR BLD AUTO: 32.5 % (ref 34–46.6)
HGB BLD-MCNC: 10.5 G/DL (ref 11.1–15.9)
IMM GRANULOCYTES # BLD: 0 X10E3/UL (ref 0–0.1)
IMM GRANULOCYTES NFR BLD: 0 %
LYMPHOCYTES # BLD AUTO: 1.4 X10E3/UL (ref 0.7–3.1)
LYMPHOCYTES NFR BLD AUTO: 29 %
MCH RBC QN AUTO: 33.5 PG (ref 26.6–33)
MCHC RBC AUTO-ENTMCNC: 32.3 G/DL (ref 31.5–35.7)
MCV RBC AUTO: 104 FL (ref 79–97)
MONOCYTES # BLD AUTO: 0.5 X10E3/UL (ref 0.1–0.9)
MONOCYTES NFR BLD AUTO: 10 %
NEUTROPHILS # BLD AUTO: 2.7 X10E3/UL (ref 1.4–7)
NEUTROPHILS NFR BLD AUTO: 57 %
PLATELET # BLD AUTO: 282 X10E3/UL (ref 150–450)
POTASSIUM SERPL-SCNC: 4.4 MMOL/L (ref 3.5–5.2)
PROT SERPL-MCNC: 6.8 G/DL (ref 6–8.5)
RBC # BLD AUTO: 3.13 X10E6/UL (ref 3.77–5.28)
SODIUM SERPL-SCNC: 139 MMOL/L (ref 134–144)
TSH SERPL DL<=0.005 MIU/L-ACNC: 1.63 UIU/ML (ref 0.45–4.5)
WBC # BLD AUTO: 4.7 X10E3/UL (ref 3.4–10.8)

## 2023-02-22 ENCOUNTER — OFFICE VISIT (OUTPATIENT)
Dept: FAMILY MEDICINE CLINIC | Facility: CLINIC | Age: 83
End: 2023-02-22

## 2023-02-22 VITALS
WEIGHT: 129 LBS | SYSTOLIC BLOOD PRESSURE: 120 MMHG | DIASTOLIC BLOOD PRESSURE: 60 MMHG | RESPIRATION RATE: 12 BRPM | BODY MASS INDEX: 23.74 KG/M2 | TEMPERATURE: 97.1 F | HEART RATE: 80 BPM | HEIGHT: 62 IN

## 2023-02-22 DIAGNOSIS — R60.9 EDEMA, UNSPECIFIED TYPE: Primary | ICD-10-CM

## 2023-02-22 DIAGNOSIS — I27.20 PULMONARY HYPERTENSION, UNSPECIFIED (HCC): ICD-10-CM

## 2023-02-22 DIAGNOSIS — I10 PRIMARY HYPERTENSION: ICD-10-CM

## 2023-02-22 DIAGNOSIS — I48.91 ATRIAL FIBRILLATION, UNSPECIFIED TYPE (HCC): ICD-10-CM

## 2023-02-22 DIAGNOSIS — N18.30 STAGE 3 CHRONIC KIDNEY DISEASE, UNSPECIFIED WHETHER STAGE 3A OR 3B CKD (HCC): ICD-10-CM

## 2023-02-22 NOTE — PATIENT INSTRUCTIONS
MONITOR / DECREASE DIETARY SODIUM INTAKE  PLENTY OF FREE WATER  ELEVATE LEGS AS MUCH AS POSSIBLE    INCREASE LASIX - ADD 40 MG EVERY OTHER DAY    RV 2 WEEKS FOR FOLLOW UP, SOONER PRN

## 2023-02-22 NOTE — PROGRESS NOTES
Name: Chuck Petty      : 1940      MRN: [id number]  Encounter Provider: Jennifer Clifford MD  Encounter Date: 2023   Encounter department: [facility]     1  Edema, unspecified type    2  Atrial fibrillation, unspecified type ([place] [address] )    3  Primary hypertension    4  Pulmonary hypertension, unspecified (HCC)    5  Stage 3 chronic kidney disease, unspecified whether stage 3a or 3b CKD ([place] [address] )         Subjective      FOLLOW UP    NO REAL CHANGE IN LEG SWELLING  DENIES ANY SOB, PALPITATIONS   NO CHEST PAIN  SAW CARDIOLOGY  DOPPLERS WERE REPORTED BY PATIENT TO BE NEGATIVE    DISCUSSED THERAPEUTIC OPTIONS    Review of Systems   Constitutional: Negative for chills, fatigue and fever  HENT: Negative for congestion, ear discharge, ear pain, mouth sores, postnasal drip, sore throat and trouble swallowing  Eyes: Negative for pain, discharge and visual disturbance  Respiratory: Negative for cough, shortness of breath and wheezing  Cardiovascular: Positive for leg swelling  Negative for chest pain and palpitations  Gastrointestinal: Negative for abdominal distention, abdominal pain, blood in stool, diarrhea and nausea  Endocrine: Negative for polydipsia, polyphagia and polyuria  Genitourinary: Negative for dysuria, frequency, hematuria and urgency  Musculoskeletal: Positive for arthralgias  Negative for gait problem and joint swelling  Skin: Negative for pallor and rash  Neurological: Negative for dizziness, syncope, speech difficulty, weakness, light-headedness, numbness and headaches  Hematological: Negative for adenopathy  Psychiatric/Behavioral: Negative for behavioral problems, confusion and sleep disturbance  The patient is not nervous/anxious          Current Outpatient Medications on File Prior to Visit   Medication Sig   • allopurinol (ZYLOPRIM) 100 mg tablet Take 1 tablet (100 mg total) by mouth daily   • aspirin 81 mg chewable tablet Chew 1 tablet daily   • benazepril (LOTENSIN) 20 mg tablet Take 1 tablet (20 mg total) by mouth daily (Patient taking differently: Take 20 mg by mouth every evening)   • clindamycin (CLEOCIN) 300 MG capsule    • colchicine (COLCRYS) 0 6 mg tablet TAKE 1 TABLET (0 6 MG TOTAL) BY MOUTH TWO TIMES A DAY   • ELIQUIS 2 5 MG 2 5 mg 2 (two) times a day     • folic acid (FOLVITE) 1 mg tablet    • furosemide (LASIX) 40 mg tablet Take 1 tablet by mouth Daily   • hydrochlorothiazide (MICROZIDE) 12 5 mg capsule Take 1 capsule by mouth daily   • levothyroxine 50 mcg tablet TAKE 1 TABLET (50 MCG TOTAL) BY MOUTH DAILY IN THE EARLY MORNING   • metoprolol succinate (TOPROL-XL) 25 mg 24 hr tablet Take 1 tablet (25 mg total) by mouth daily   • pantoprazole (PROTONIX) 20 mg tablet Take 2 tablets (40 mg total) by mouth 2 (two) times a day   • pantoprazole (PROTONIX) 40 mg tablet    • potassium chloride (K-DUR,KLOR-CON) 20 mEq tablet Take 1 tablet (20 mEq total) by mouth daily   • simvastatin (ZOCOR) 20 mg tablet Take 1 tablet by mouth daily       Objective     /60 (BP Location: Right arm, Patient Position: Sitting, Cuff Size: Adult)   Pulse 80   Temp (!) 97 1 °F (36 2 °C) (Temporal)   Resp 12   Ht 5' 2" (1 575 m)   Wt 58 5 kg (129 lb)   BMI 23 59 kg/m²     Physical Exam  Constitutional:       Appearance: Normal appearance  She is well-developed and normal weight  She is not ill-appearing  HENT:      Head: Normocephalic and atraumatic  Eyes:      General:         Right eye: No discharge  Left eye: No discharge  Conjunctiva/sclera: Conjunctivae normal       Pupils: Pupils are equal, round, and reactive to light  Neck:      Thyroid: No thyromegaly  Cardiovascular:      Rate and Rhythm: Normal rate  Rhythm irregular  Heart sounds: Murmur heard  Pulmonary:      Effort: Pulmonary effort is normal  No respiratory distress  Breath sounds: No wheezing or rales        Comments: MINIMAL BIBASILAR CRACKLES  Abdominal:      General: Bowel sounds are normal       Palpations: Abdomen is soft  Tenderness: There is no abdominal tenderness  Musculoskeletal:         General: No tenderness  Cervical back: Normal range of motion and neck supple  Comments: MODERATE DJD CHANGES  1-2+ EDEMA BILAT   Lymphadenopathy:      Cervical: No cervical adenopathy  Skin:     General: Skin is warm and dry  Findings: No erythema or rash  Neurological:      General: No focal deficit present  Mental Status: She is alert and oriented to person, place, and time  Psychiatric:         Mood and Affect: Mood normal          Behavior: Behavior normal          Thought Content:  Thought content normal          Judgment: Judgment normal        Jostin Cohen MD

## 2023-02-23 ENCOUNTER — TELEPHONE (OUTPATIENT)
Dept: ADMINISTRATIVE | Facility: OTHER | Age: 83
End: 2023-02-23

## 2023-02-23 NOTE — TELEPHONE ENCOUNTER
----- Message from Daria Keith sent at 2/22/2023  2:07 PM EST -----  Regarding: care gap request - flu shot  02/22/23 2:07 PM    Hello, our patient attached above has had Immunization(s) completed/performed  Please assist in updating the patient chart by making an External outreach to [facility] located in [place], [place]  The date of service is 2022      Thank you,  Daria Keith  [address]

## 2023-02-23 NOTE — TELEPHONE ENCOUNTER
Upon review of the In Basket request and the patient's chart, initial outreach has been made via fax to facility  Please see Contacts section for details       Thank you  Coral Gomez

## 2023-02-23 NOTE — LETTER
Vaccination Request Form: Influenza      Date Requested: 23  Patient: Mamadou Shaver  Patient : 1940   Referring Provider: Bhumi Armstrong       The above patient has informed us that they have had their   most recent Influenza administered at your facility  Please   complete this form and attach all corresponding documentation  Date of Vaccine(s) Given  ______________________________    Lot Number(s) _______________________________________    Manufacture(s) ______________________________________    Dose Amount (s) _____________________________________    Expiration Date(s) ____________________________________    Comments __________________________________________________________  ____________________________________________________________________  ____________________________________________________________________  ____________________________________________________________________    Administering Facility  ________________________________________________    Vaccine Administered By (print name) ___________________________________      Form Completed By (print name) _______________________________________      Signature ___________________________________________________________      These reports are needed for  compliance  Please fax this completed form and a copy of the Vaccine Document(s) to our office located at [place] [address] as soon as possible to Fax [phone] attention Walker Vivian: Phone [phone]    We thank you for your assistance in treating our mutual patient

## 2023-02-27 DIAGNOSIS — I10 ESSENTIAL HYPERTENSION: ICD-10-CM

## 2023-02-27 RX ORDER — BENAZEPRIL HYDROCHLORIDE 20 MG/1
20 TABLET ORAL DAILY
Qty: 90 TABLET | Refills: 2 | Status: SHIPPED | OUTPATIENT
Start: 2023-02-27

## 2023-03-06 NOTE — TELEPHONE ENCOUNTER
As a follow-up, a second attempt has been made for outreach via fax to facility  Please see Contacts section for details      Thank you  Toño Parker

## 2023-03-08 NOTE — TELEPHONE ENCOUNTER
Upon review of the In Basket request we received immunization form with same date that is already in [facility]    Any additional questions or concerns should be emailed to the Practice Liaisons via the appropriate education email address, please do not reply via In Basket      Thank you  Daria Grant

## 2023-03-13 PROBLEM — R10.84 GENERALIZED ABDOMINAL PAIN: Status: RESOLVED | Noted: 2022-04-27 | Resolved: 2023-03-13

## 2023-03-14 ENCOUNTER — OFFICE VISIT (OUTPATIENT)
Dept: FAMILY MEDICINE CLINIC | Facility: CLINIC | Age: 83
End: 2023-03-14

## 2023-03-14 VITALS
BODY MASS INDEX: 23.37 KG/M2 | WEIGHT: 127 LBS | HEIGHT: 62 IN | SYSTOLIC BLOOD PRESSURE: 118 MMHG | RESPIRATION RATE: 12 BRPM | HEART RATE: 60 BPM | DIASTOLIC BLOOD PRESSURE: 74 MMHG | TEMPERATURE: 97.5 F

## 2023-03-14 DIAGNOSIS — D64.9 ANEMIA, UNSPECIFIED TYPE: ICD-10-CM

## 2023-03-14 DIAGNOSIS — E78.2 MIXED HYPERLIPIDEMIA: ICD-10-CM

## 2023-03-14 DIAGNOSIS — I48.91 ATRIAL FIBRILLATION, UNSPECIFIED TYPE (HCC): ICD-10-CM

## 2023-03-14 DIAGNOSIS — I10 PRIMARY HYPERTENSION: ICD-10-CM

## 2023-03-14 DIAGNOSIS — N18.30 STAGE 3 CHRONIC KIDNEY DISEASE, UNSPECIFIED WHETHER STAGE 3A OR 3B CKD (HCC): ICD-10-CM

## 2023-03-14 DIAGNOSIS — R60.9 EDEMA, UNSPECIFIED TYPE: Primary | ICD-10-CM

## 2023-03-14 NOTE — PROGRESS NOTES
Name: Ash Alves      : 1940      MRN: [id number]  Encounter Provider: Gama Gomez MD  Encounter Date: 3/14/2023   Encounter department: [facility]     1  Edema, unspecified type  -     Comprehensive metabolic panel    2  Atrial fibrillation, unspecified type ([place] [id number] )  -     Comprehensive metabolic panel    3  Primary hypertension  -     Comprehensive metabolic panel    4  Mixed hyperlipidemia    5  Stage 3 chronic kidney disease, unspecified whether stage 3a or 3b CKD ([place] [address] )    6  Anemia, unspecified type  -     CBC and differential         Subjective      FOLLOW UP    DOING BETTER  SWELLING DOWN  WEIGHT DOWN  DENIES ANY LEG CRAMPS  NO ISSUES WITH MEDICATION INCREASE    REVIEWED THERAPEUTIC PLAN    Review of Systems   Constitutional: Negative for fever  HENT: Negative for congestion and sore throat  Eyes: Negative for discharge  Respiratory: Negative for chest tightness  Cardiovascular: Positive for leg swelling  Negative for chest pain and palpitations  Gastrointestinal: Negative for abdominal pain, diarrhea, nausea and vomiting  Musculoskeletal: Positive for arthralgias  Negative for joint swelling  Neurological: Negative for numbness  Psychiatric/Behavioral: The patient is not nervous/anxious          Current Outpatient Medications on File Prior to Visit   Medication Sig   • allopurinol (ZYLOPRIM) 100 mg tablet Take 1 tablet (100 mg total) by mouth daily   • aspirin 81 mg chewable tablet Chew 1 tablet daily   • benazepril (LOTENSIN) 20 mg tablet TAKE 1 TABLET (20 MG TOTAL) BY MOUTH DAILY   • clindamycin (CLEOCIN) 300 MG capsule    • colchicine (COLCRYS) 0 6 mg tablet TAKE 1 TABLET (0 6 MG TOTAL) BY MOUTH TWO TIMES A DAY   • ELIQUIS 2 5 MG 2 5 mg 2 (two) times a day     • folic acid (FOLVITE) 1 mg tablet    • furosemide (LASIX) 40 mg tablet Take 1 tablet by mouth Daily   • hydrochlorothiazide (MICROZIDE) 12 5 mg capsule Take 1 capsule by mouth daily   • levothyroxine 50 mcg tablet TAKE 1 TABLET (50 MCG TOTAL) BY MOUTH DAILY IN THE EARLY MORNING   • metoprolol succinate (TOPROL-XL) 25 mg 24 hr tablet Take 1 tablet (25 mg total) by mouth daily   • pantoprazole (PROTONIX) 20 mg tablet Take 2 tablets (40 mg total) by mouth 2 (two) times a day   • pantoprazole (PROTONIX) 40 mg tablet    • potassium chloride (K-DUR,KLOR-CON) 20 mEq tablet Take 1 tablet (20 mEq total) by mouth daily   • simvastatin (ZOCOR) 20 mg tablet Take 1 tablet by mouth daily       Objective     /74 (BP Location: Right arm, Patient Position: Sitting, Cuff Size: Large)   Pulse 60   Temp 97 5 °F (36 4 °C) (Temporal)   Resp 12   Ht 5' 2" (1 575 m)   Wt 57 6 kg (127 lb)   BMI 23 23 kg/m²     Physical Exam  Constitutional:       Appearance: She is well-developed  HENT:      Head: Normocephalic and atraumatic  Eyes:      General:         Right eye: No discharge  Left eye: No discharge  Conjunctiva/sclera: Conjunctivae normal       Pupils: Pupils are equal, round, and reactive to light  Neck:      Thyroid: No thyromegaly  Cardiovascular:      Rate and Rhythm: Normal rate and regular rhythm  Heart sounds: Murmur heard  Pulmonary:      Effort: Pulmonary effort is normal  No respiratory distress  Breath sounds: Normal breath sounds  No wheezing or rales  Abdominal:      General: Bowel sounds are normal       Palpations: Abdomen is soft  Tenderness: There is no abdominal tenderness  Musculoskeletal:         General: No tenderness  Normal range of motion  Cervical back: Normal range of motion and neck supple  Right lower leg: Edema present  Left lower leg: Edema present  Comments: EDEMA IMPROVED BILAT   Lymphadenopathy:      Cervical: No cervical adenopathy  Skin:     General: Skin is warm and dry  Findings: No erythema or rash  Neurological:      General: No focal deficit present        Mental Status: She is alert and oriented to person, place, and time  Psychiatric:         Mood and Affect: Mood normal          Behavior: Behavior normal          Thought Content:  Thought content normal          Judgment: Judgment normal        Gama Gomez MD

## 2023-03-15 ENCOUNTER — TELEPHONE (OUTPATIENT)
Dept: FAMILY MEDICINE CLINIC | Facility: CLINIC | Age: 83
End: 2023-03-15

## 2023-03-15 LAB
ALBUMIN SERPL-MCNC: 4.5 G/DL (ref 3.6–4.6)
ALBUMIN/GLOB SERPL: 1.7 {RATIO} (ref 1.2–2.2)
ALP SERPL-CCNC: 111 IU/L (ref 44–121)
ALT SERPL-CCNC: 14 IU/L (ref 0–32)
AST SERPL-CCNC: 23 IU/L (ref 0–40)
BASOPHILS # BLD AUTO: 0.1 X10E3/UL (ref 0–0.2)
BASOPHILS NFR BLD AUTO: 2 %
BILIRUB SERPL-MCNC: 0.4 MG/DL (ref 0–1.2)
BUN SERPL-MCNC: 18 MG/DL (ref 8–27)
BUN/CREAT SERPL: 19 (ref 12–28)
CALCIUM SERPL-MCNC: 9.8 MG/DL (ref 8.7–10.3)
CHLORIDE SERPL-SCNC: 98 MMOL/L (ref 96–106)
CO2 SERPL-SCNC: 26 MMOL/L (ref 20–29)
CREAT SERPL-MCNC: 0.96 MG/DL (ref 0.57–1)
EGFR: 58 ML/MIN/1.73
EOSINOPHIL # BLD AUTO: 0.2 X10E3/UL (ref 0–0.4)
EOSINOPHIL NFR BLD AUTO: 4 %
ERYTHROCYTE [DISTWIDTH] IN BLOOD BY AUTOMATED COUNT: 14.6 % (ref 11.7–15.4)
GLOBULIN SER-MCNC: 2.7 G/DL (ref 1.5–4.5)
GLUCOSE SERPL-MCNC: 92 MG/DL (ref 70–99)
HCT VFR BLD AUTO: 31.9 % (ref 34–46.6)
HGB BLD-MCNC: 10.8 G/DL (ref 11.1–15.9)
IMM GRANULOCYTES # BLD: 0 X10E3/UL (ref 0–0.1)
IMM GRANULOCYTES NFR BLD: 0 %
LYMPHOCYTES # BLD AUTO: 1.2 X10E3/UL (ref 0.7–3.1)
LYMPHOCYTES NFR BLD AUTO: 32 %
MCH RBC QN AUTO: 34.6 PG (ref 26.6–33)
MCHC RBC AUTO-ENTMCNC: 33.9 G/DL (ref 31.5–35.7)
MCV RBC AUTO: 102 FL (ref 79–97)
MONOCYTES # BLD AUTO: 0.5 X10E3/UL (ref 0.1–0.9)
MONOCYTES NFR BLD AUTO: 13 %
NEUTROPHILS # BLD AUTO: 1.9 X10E3/UL (ref 1.4–7)
NEUTROPHILS NFR BLD AUTO: 49 %
PLATELET # BLD AUTO: 282 X10E3/UL (ref 150–450)
POTASSIUM SERPL-SCNC: 4.4 MMOL/L (ref 3.5–5.2)
PROT SERPL-MCNC: 7.2 G/DL (ref 6–8.5)
RBC # BLD AUTO: 3.12 X10E6/UL (ref 3.77–5.28)
SODIUM SERPL-SCNC: 141 MMOL/L (ref 134–144)
WBC # BLD AUTO: 3.8 X10E3/UL (ref 3.4–10.8)

## 2023-03-15 NOTE — TELEPHONE ENCOUNTER
Cheryl Marte called and she stated she was reading her after visit summary    She is questioning     Edema  Hypertension  Hyperlipidemia   chronic kidney disease    See below from AVS    Please advise

## 2023-03-16 NOTE — TELEPHONE ENCOUNTER
NOT EXACTLY SURE WHAT SHE IS ASKING  YOU CAN TELL HER THE OTHER CONDITIONS ARE THE REASONS ARE CONTRIBUTING TO HER EDEMA / WATER RETENTION

## 2023-03-16 NOTE — TELEPHONE ENCOUNTER
Spoke to Mukesh Salinas and advised that Dr Yadi Valdovinos will talk to her the next time she is in

## 2023-03-22 ENCOUNTER — TELEPHONE (OUTPATIENT)
Dept: FAMILY MEDICINE CLINIC | Facility: CLINIC | Age: 83
End: 2023-03-22

## 2023-03-22 DIAGNOSIS — I10 PRIMARY HYPERTENSION: Primary | ICD-10-CM

## 2023-03-22 DIAGNOSIS — D64.9 ANEMIA, UNSPECIFIED TYPE: ICD-10-CM

## 2023-04-16 ENCOUNTER — APPOINTMENT (EMERGENCY)
Dept: RADIOLOGY | Facility: HOSPITAL | Age: 83
DRG: 872 | End: 2023-04-16
Payer: COMMERCIAL

## 2023-04-16 ENCOUNTER — HOSPITAL ENCOUNTER (INPATIENT)
Facility: HOSPITAL | Age: 83
LOS: 4 days | Discharge: HOME | DRG: 872 | End: 2023-04-20
Attending: EMERGENCY MEDICINE | Admitting: STUDENT IN AN ORGANIZED HEALTH CARE EDUCATION/TRAINING PROGRAM
Payer: COMMERCIAL

## 2023-04-16 DIAGNOSIS — I73.00 RAYNAUD'S DISEASE WITHOUT GANGRENE: ICD-10-CM

## 2023-04-16 DIAGNOSIS — M10.9 GOUT, ARTHROPATHY: ICD-10-CM

## 2023-04-16 DIAGNOSIS — N19 RENAL FAILURE: Primary | ICD-10-CM

## 2023-04-16 DIAGNOSIS — E43 SEVERE PROTEIN-CALORIE MALNUTRITION (HCC): ICD-10-CM

## 2023-04-16 DIAGNOSIS — K52.9 ENTEROCOLITIS: ICD-10-CM

## 2023-04-16 DIAGNOSIS — I48.91 ATRIAL FIBRILLATION (HCC): ICD-10-CM

## 2023-04-16 DIAGNOSIS — E87.1 HYPONATREMIA: ICD-10-CM

## 2023-04-16 DIAGNOSIS — I10 PRIMARY HYPERTENSION: ICD-10-CM

## 2023-04-16 DIAGNOSIS — M17.9 KNEE OSTEOARTHRITIS: ICD-10-CM

## 2023-04-16 DIAGNOSIS — R19.7 DIARRHEA: ICD-10-CM

## 2023-04-16 DIAGNOSIS — N17.9 ACUTE RENAL FAILURE, UNSPECIFIED ACUTE RENAL FAILURE TYPE (HCC): ICD-10-CM

## 2023-04-16 PROBLEM — R65.21 SEPTIC SHOCK (HCC): Status: ACTIVE | Noted: 2023-04-16

## 2023-04-16 PROBLEM — R82.71 BACTERIURIA: Status: ACTIVE | Noted: 2023-04-16

## 2023-04-16 PROBLEM — E87.29 METABOLIC ACIDOSIS, INCREASED ANION GAP: Status: ACTIVE | Noted: 2023-04-16

## 2023-04-16 PROBLEM — A41.9 SEPSIS (HCC): Status: ACTIVE | Noted: 2023-04-16

## 2023-04-16 LAB
2HR DELTA HS TROPONIN: -8 NG/L
ALBUMIN SERPL BCP-MCNC: 5 G/DL (ref 3.5–5)
ALP SERPL-CCNC: 102 U/L (ref 34–104)
ALT SERPL W P-5'-P-CCNC: 25 U/L (ref 7–52)
ANION GAP SERPL CALCULATED.3IONS-SCNC: 17 MMOL/L (ref 4–13)
APTT PPP: 29 SECONDS (ref 23–37)
AST SERPL W P-5'-P-CCNC: 41 U/L (ref 13–39)
BACTERIA UR QL AUTO: NORMAL /HPF
BASE EX.OXY STD BLDV CALC-SCNC: 69.6 % (ref 60–80)
BASE EXCESS BLDV CALC-SCNC: -14.2 MMOL/L
BASOPHILS # BLD AUTO: 0.09 THOUSANDS/ΜL (ref 0–0.1)
BASOPHILS NFR BLD AUTO: 2 % (ref 0–1)
BILIRUB SERPL-MCNC: 0.55 MG/DL (ref 0.2–1)
BILIRUB UR QL STRIP: NEGATIVE
BUN SERPL-MCNC: 100 MG/DL (ref 5–25)
CALCIUM SERPL-MCNC: 9.3 MG/DL (ref 8.4–10.2)
CARDIAC TROPONIN I PNL SERPL HS: 19 NG/L
CARDIAC TROPONIN I PNL SERPL HS: 27 NG/L
CHLORIDE SERPL-SCNC: 103 MMOL/L (ref 96–108)
CLARITY UR: CLEAR
CO2 SERPL-SCNC: 9 MMOL/L (ref 21–32)
COLOR UR: YELLOW
CREAT SERPL-MCNC: 4.59 MG/DL (ref 0.6–1.3)
EOSINOPHIL # BLD AUTO: 0.09 THOUSAND/ΜL (ref 0–0.61)
EOSINOPHIL NFR BLD AUTO: 2 % (ref 0–6)
ERYTHROCYTE [DISTWIDTH] IN BLOOD BY AUTOMATED COUNT: 15.4 % (ref 11.6–15.1)
FLUAV RNA RESP QL NAA+PROBE: NEGATIVE
FLUBV RNA RESP QL NAA+PROBE: NEGATIVE
GFR SERPL CREATININE-BSD FRML MDRD: 8 ML/MIN/1.73SQ M
GLUCOSE SERPL-MCNC: 82 MG/DL (ref 65–140)
GLUCOSE UR STRIP-MCNC: NEGATIVE MG/DL
HCO3 BLDV-SCNC: 12.7 MMOL/L (ref 24–30)
HCT VFR BLD AUTO: 47.4 % (ref 34.8–46.1)
HGB BLD-MCNC: 15.2 G/DL (ref 11.5–15.4)
HGB UR QL STRIP.AUTO: ABNORMAL
IMM GRANULOCYTES # BLD AUTO: 0.04 THOUSAND/UL (ref 0–0.2)
IMM GRANULOCYTES NFR BLD AUTO: 1 % (ref 0–2)
INR PPP: 1.04 (ref 0.84–1.19)
KETONES UR STRIP-MCNC: ABNORMAL MG/DL
LACTATE SERPL-SCNC: 0.7 MMOL/L (ref 0.5–2)
LEUKOCYTE ESTERASE UR QL STRIP: ABNORMAL
LYMPHOCYTES # BLD AUTO: 1.14 THOUSANDS/ΜL (ref 0.6–4.47)
LYMPHOCYTES NFR BLD AUTO: 20 % (ref 14–44)
MCH RBC QN AUTO: 33.6 PG (ref 26.8–34.3)
MCHC RBC AUTO-ENTMCNC: 32.1 G/DL (ref 31.4–37.4)
MCV RBC AUTO: 105 FL (ref 82–98)
MONOCYTES # BLD AUTO: 0.51 THOUSAND/ΜL (ref 0.17–1.22)
MONOCYTES NFR BLD AUTO: 9 % (ref 4–12)
NEUTROPHILS # BLD AUTO: 3.9 THOUSANDS/ΜL (ref 1.85–7.62)
NEUTS SEG NFR BLD AUTO: 66 % (ref 43–75)
NITRITE UR QL STRIP: NEGATIVE
NON-SQ EPI CELLS URNS QL MICRO: NORMAL /HPF
NRBC BLD AUTO-RTO: 0 /100 WBCS
O2 CT BLDV-SCNC: 12.6 ML/DL
PCO2 BLDV: 32.9 MM HG (ref 42–50)
PH BLDV: 7.2 [PH] (ref 7.3–7.4)
PH UR STRIP.AUTO: 5.5 [PH]
PLATELET # BLD AUTO: 224 THOUSANDS/UL (ref 149–390)
PMV BLD AUTO: 12.1 FL (ref 8.9–12.7)
PO2 BLDV: 40.9 MM HG (ref 35–45)
POTASSIUM SERPL-SCNC: 4.5 MMOL/L (ref 3.5–5.3)
PROCALCITONIN SERPL-MCNC: 0.17 NG/ML
PROT SERPL-MCNC: 8 G/DL (ref 6.4–8.4)
PROT UR STRIP-MCNC: NEGATIVE MG/DL
PROTHROMBIN TIME: 13.8 SECONDS (ref 11.6–14.5)
RBC # BLD AUTO: 4.53 MILLION/UL (ref 3.81–5.12)
RBC #/AREA URNS AUTO: NORMAL /HPF
RSV RNA RESP QL NAA+PROBE: NEGATIVE
SARS-COV-2 RNA RESP QL NAA+PROBE: NEGATIVE
SODIUM SERPL-SCNC: 129 MMOL/L (ref 135–147)
SP GR UR STRIP.AUTO: 1.01 (ref 1–1.03)
UROBILINOGEN UR QL STRIP.AUTO: 0.2 E.U./DL
WBC # BLD AUTO: 5.77 THOUSAND/UL (ref 4.31–10.16)
WBC #/AREA URNS AUTO: NORMAL /HPF

## 2023-04-16 PROCEDURE — 74176 CT ABD & PELVIS W/O CONTRAST: CPT

## 2023-04-16 PROCEDURE — 82805 BLOOD GASES W/O2 SATURATION: CPT | Performed by: INTERNAL MEDICINE

## 2023-04-16 PROCEDURE — 85025 COMPLETE CBC W/AUTO DIFF WBC: CPT | Performed by: EMERGENCY MEDICINE

## 2023-04-16 PROCEDURE — 87040 BLOOD CULTURE FOR BACTERIA: CPT | Performed by: EMERGENCY MEDICINE

## 2023-04-16 PROCEDURE — 81001 URINALYSIS AUTO W/SCOPE: CPT | Performed by: EMERGENCY MEDICINE

## 2023-04-16 PROCEDURE — 36415 COLL VENOUS BLD VENIPUNCTURE: CPT | Performed by: EMERGENCY MEDICINE

## 2023-04-16 PROCEDURE — 80048 BASIC METABOLIC PNL TOTAL CA: CPT | Performed by: STUDENT IN AN ORGANIZED HEALTH CARE EDUCATION/TRAINING PROGRAM

## 2023-04-16 PROCEDURE — 99291 CRITICAL CARE FIRST HOUR: CPT | Performed by: EMERGENCY MEDICINE

## 2023-04-16 PROCEDURE — 99285 EMERGENCY DEPT VISIT HI MDM: CPT

## 2023-04-16 PROCEDURE — 84484 ASSAY OF TROPONIN QUANT: CPT | Performed by: EMERGENCY MEDICINE

## 2023-04-16 PROCEDURE — 83605 ASSAY OF LACTIC ACID: CPT | Performed by: EMERGENCY MEDICINE

## 2023-04-16 PROCEDURE — 0241U HB NFCT DS VIR RESP RNA 4 TRGT: CPT | Performed by: EMERGENCY MEDICINE

## 2023-04-16 PROCEDURE — G1004 CDSM NDSC: HCPCS

## 2023-04-16 PROCEDURE — 84443 ASSAY THYROID STIM HORMONE: CPT | Performed by: STUDENT IN AN ORGANIZED HEALTH CARE EDUCATION/TRAINING PROGRAM

## 2023-04-16 PROCEDURE — 85610 PROTHROMBIN TIME: CPT | Performed by: EMERGENCY MEDICINE

## 2023-04-16 PROCEDURE — 96361 HYDRATE IV INFUSION ADD-ON: CPT

## 2023-04-16 PROCEDURE — 84145 PROCALCITONIN (PCT): CPT | Performed by: EMERGENCY MEDICINE

## 2023-04-16 PROCEDURE — 96365 THER/PROPH/DIAG IV INF INIT: CPT

## 2023-04-16 PROCEDURE — 80053 COMPREHEN METABOLIC PANEL: CPT | Performed by: EMERGENCY MEDICINE

## 2023-04-16 PROCEDURE — 93005 ELECTROCARDIOGRAM TRACING: CPT

## 2023-04-16 PROCEDURE — 99222 1ST HOSP IP/OBS MODERATE 55: CPT | Performed by: STUDENT IN AN ORGANIZED HEALTH CARE EDUCATION/TRAINING PROGRAM

## 2023-04-16 PROCEDURE — 85730 THROMBOPLASTIN TIME PARTIAL: CPT | Performed by: EMERGENCY MEDICINE

## 2023-04-16 RX ORDER — LISINOPRIL 20 MG/1
20 TABLET ORAL DAILY
Status: DISCONTINUED | OUTPATIENT
Start: 2023-04-17 | End: 2023-04-16

## 2023-04-16 RX ORDER — HEPARIN SODIUM 5000 [USP'U]/ML
5000 INJECTION, SOLUTION INTRAVENOUS; SUBCUTANEOUS EVERY 12 HOURS SCHEDULED
Status: DISCONTINUED | OUTPATIENT
Start: 2023-04-16 | End: 2023-04-17

## 2023-04-16 RX ORDER — FUROSEMIDE 40 MG/1
40 TABLET ORAL DAILY
Status: DISCONTINUED | OUTPATIENT
Start: 2023-04-17 | End: 2023-04-16

## 2023-04-16 RX ORDER — CEFEPIME HYDROCHLORIDE 2 G/50ML
2000 INJECTION, SOLUTION INTRAVENOUS EVERY 24 HOURS
Status: DISCONTINUED | OUTPATIENT
Start: 2023-04-17 | End: 2023-04-17

## 2023-04-16 RX ORDER — PANTOPRAZOLE SODIUM 20 MG/1
20 TABLET, DELAYED RELEASE ORAL
Status: DISCONTINUED | OUTPATIENT
Start: 2023-04-17 | End: 2023-04-20 | Stop reason: HOSPADM

## 2023-04-16 RX ORDER — CEFEPIME HYDROCHLORIDE 2 G/50ML
2000 INJECTION, SOLUTION INTRAVENOUS ONCE
Status: COMPLETED | OUTPATIENT
Start: 2023-04-16 | End: 2023-04-16

## 2023-04-16 RX ORDER — SODIUM CHLORIDE 9 MG/ML
100 INJECTION, SOLUTION INTRAVENOUS CONTINUOUS
Status: DISCONTINUED | OUTPATIENT
Start: 2023-04-16 | End: 2023-04-16

## 2023-04-16 RX ORDER — POTASSIUM CHLORIDE 20 MEQ/1
20 TABLET, EXTENDED RELEASE ORAL DAILY
Status: DISCONTINUED | OUTPATIENT
Start: 2023-04-17 | End: 2023-04-16

## 2023-04-16 RX ORDER — PRAVASTATIN SODIUM 40 MG
40 TABLET ORAL
Status: DISCONTINUED | OUTPATIENT
Start: 2023-04-16 | End: 2023-04-20 | Stop reason: HOSPADM

## 2023-04-16 RX ORDER — METOPROLOL SUCCINATE 25 MG/1
25 TABLET, EXTENDED RELEASE ORAL DAILY
Status: DISCONTINUED | OUTPATIENT
Start: 2023-04-17 | End: 2023-04-20

## 2023-04-16 RX ORDER — LEVOTHYROXINE SODIUM 0.05 MG/1
50 TABLET ORAL
Status: DISCONTINUED | OUTPATIENT
Start: 2023-04-17 | End: 2023-04-20 | Stop reason: HOSPADM

## 2023-04-16 RX ORDER — METOPROLOL TARTRATE 5 MG/5ML
5 INJECTION INTRAVENOUS EVERY 6 HOURS PRN
Status: DISCONTINUED | OUTPATIENT
Start: 2023-04-16 | End: 2023-04-19

## 2023-04-16 RX ORDER — ONDANSETRON 2 MG/ML
4 INJECTION INTRAMUSCULAR; INTRAVENOUS EVERY 6 HOURS PRN
Status: DISCONTINUED | OUTPATIENT
Start: 2023-04-16 | End: 2023-04-20 | Stop reason: HOSPADM

## 2023-04-16 RX ORDER — DICYCLOMINE HYDROCHLORIDE 10 MG/1
20 CAPSULE ORAL 3 TIMES DAILY
Status: DISCONTINUED | OUTPATIENT
Start: 2023-04-16 | End: 2023-04-19

## 2023-04-16 RX ORDER — SIMETHICONE 80 MG
80 TABLET,CHEWABLE ORAL 4 TIMES DAILY PRN
Status: DISCONTINUED | OUTPATIENT
Start: 2023-04-16 | End: 2023-04-20 | Stop reason: HOSPADM

## 2023-04-16 RX ADMIN — CEFEPIME HYDROCHLORIDE 2000 MG: 2 INJECTION, SOLUTION INTRAVENOUS at 13:43

## 2023-04-16 RX ADMIN — SODIUM BICARBONATE 100 ML/HR: 84 INJECTION, SOLUTION INTRAVENOUS at 19:38

## 2023-04-16 RX ADMIN — VANCOMYCIN HYDROCHLORIDE 1500 MG: 10 INJECTION, POWDER, LYOPHILIZED, FOR SOLUTION INTRAVENOUS at 14:24

## 2023-04-16 RX ADMIN — PRAVASTATIN SODIUM 40 MG: 40 TABLET ORAL at 19:39

## 2023-04-16 RX ADMIN — SODIUM CHLORIDE 1000 ML: 0.9 INJECTION, SOLUTION INTRAVENOUS at 13:36

## 2023-04-16 RX ADMIN — DICYCLOMINE HYDROCHLORIDE 20 MG: 10 CAPSULE ORAL at 20:30

## 2023-04-16 RX ADMIN — HEPARIN SODIUM 5000 UNITS: 5000 INJECTION INTRAVENOUS; SUBCUTANEOUS at 19:39

## 2023-04-16 RX ADMIN — SODIUM CHLORIDE 1000 ML: 0.9 INJECTION, SOLUTION INTRAVENOUS at 13:37

## 2023-04-16 NOTE — ASSESSMENT & PLAN NOTE
POA, unresolved for 1 week    · CT abdomen:Scattered air-fluid levels in the small and large bowel, nonspecific but can be seen with underlying enterocolitis    · Vanco and cefepime; (PCN allergy in childhood) given in the ED, continue Vanco  · Bolus and IVF maintenance given  · Stool enteric panel/O&P/C  difficile ordered in ED  · GI consulted  · As needed medicine for pain

## 2023-04-16 NOTE — ASSESSMENT & PLAN NOTE
Asymptomatic,    · UA positive for leukocytes  · Given dose of cefepime and Vanco  · Follow urine cultures

## 2023-04-16 NOTE — QUICK NOTE
Consult received  Chart reviewed  28-year-old female coming in with several days of poor oral intake and diarrhea  Initially hypotensive  Lab work shows sodium of 129, serum bicarbonate of 9, anion gap of 17, BUN of 100 and creatinine of 4 59  Urinalysis revealing trace ketones, no RBCs, no WBCs  We are consulted for acute kidney injury  · Acute kidney injury most likely due to prerenal azotemia in setting of severe volume depletion from ongoing poor oral intake and diarrhea +/- hemodynamic changes in setting of diuretics and ACE inhibitor use  · Mixed anion gap and non-anion gap metabolic acidosis in setting of acute kidney injury and diarrheal losses  · Mild hyponatremia in setting of acute kidney injury  · Possible gastroenteritis    Plan  · Discontinue normal saline  · Hold furosemide  · Hold potassium supplements  · Hold lisinopril  · Start D5W with 150 mEq of sodium bicarbonate at 100 cc/h  · No indication for renal replacement therapy  · Full consult to be done in a m

## 2023-04-16 NOTE — ASSESSMENT & PLAN NOTE
POA, evidence with tachycardia, leukocytosis, tachypnea, hypotension and hypothermia likely secondary to enterocolitis versus UTI    · Blood cultures-pending  · Lactic acid-WNL/Pro-Crow-pending  · Bolus and IVF maintenance fluid given in ED, will continue  · Vanco and cefepime in ED, continue Vanco  · We will trend

## 2023-04-16 NOTE — ASSESSMENT & PLAN NOTE
POA, likely secondary to diarrhea and severe dehydration    · Creatinine 4 59  · Baseline 0 79- 1 10  · Bolus and maintenance IVF given  · Hold home lisinopril, Klor-Con and Lasix  · Avoid nephrotoxic's  · Repeat BMP for midnight, will trend  · Nephro consulted: Initiate bicarb drip

## 2023-04-16 NOTE — ED NOTES
OK to bring pt to [facility] per Meryle Greenland RN charge 4N   Hospitalist at bedside previously to nina Moreno RN  04/16/23 [id number]

## 2023-04-16 NOTE — ED PROVIDER NOTES
History  Chief Complaint   Patient presents with   • Diarrhea     Diarrhea for about 10 days  Has not seen anyone for this  Nailbeds cyanotic  C/o pain to ;neck and ribs  No vomiting temp low in triage     HPI  Patient is an 61-year-old female history of chronic atrial fibrillation anticoagulated on Eliquis, CVA, diarrhea presenting for evaluation of about 10 days of diarrhea  Patient states that she has been having about 8 episodes of loose nonbloody nonmelanotic stool per day  Patient has had nausea but has not vomited  Per patient's daughter and patient she has not been eating or drinking anything due to this nausea  Patient states lightheadedness and fatigue worsening today  Patient denies chest pain, shortness of breath, palpitations, diaphoresis, syncope or near syncope  Patient complains of 6 out of 10 severity generalized lower abdominal pain most pronounced in the suprapubic area  Patient denies dysuria, hematuria, flank pain, history of nephrolithiasis or pyelonephritis  Patient denies any difficulty urinating or decreased urination  Patient denies any recent hospitalization, antibiotic use, history of C  difficile, recent travel or sick contacts  Patient states that she has been feeling chills over the same interval that she has been having diarrhea  Prior to Admission Medications   Prescriptions Last Dose Informant Patient Reported? Taking?    ELIQUIS 2 5 MG 4/15/2023 at 2000  Yes Yes   Si 5 mg 2 (two) times a day     allopurinol (ZYLOPRIM) 100 mg tablet 2023 at 0800  No No   Sig: Take 1 tablet (100 mg total) by mouth daily   aspirin 81 mg chewable tablet 4/15/2023 at 0800  Yes Yes   Sig: Chew 1 tablet daily   benazepril (LOTENSIN) 20 mg tablet   No No   Sig: TAKE 1 TABLET (20 MG TOTAL) BY MOUTH DAILY   clindamycin (CLEOCIN) 300 MG capsule Not Taking  Yes No   Patient not taking: Reported on 2023   colchicine (COLCRYS) 0 6 mg tablet   No No   Sig: TAKE 1 TABLET (0 6 MG TOTAL) BY MOUTH TWO TIMES A DAY   folic acid (FOLVITE) 1 mg tablet 4/15/2023 at 0800  Yes Yes   furosemide (LASIX) 40 mg tablet 4/15/2023 at 0800  Yes Yes   Sig: Take 1 tablet by mouth Daily   hydrochlorothiazide (MICROZIDE) 12 5 mg capsule   Yes No   Sig: Take 1 capsule by mouth daily   levothyroxine 50 mcg tablet 4/16/2023 at 0800  No Yes   Sig: TAKE 1 TABLET (50 MCG TOTAL) BY MOUTH DAILY IN THE EARLY MORNING   metoprolol succinate (TOPROL-XL) 25 mg 24 hr tablet   No No   Sig: Take 1 tablet (25 mg total) by mouth daily   pantoprazole (PROTONIX) 20 mg tablet   No No   Sig: Take 2 tablets (40 mg total) by mouth 2 (two) times a day   pantoprazole (PROTONIX) 40 mg tablet   Yes No   potassium chloride (K-DUR,KLOR-CON) 20 mEq tablet   No No   Sig: Take 1 tablet (20 mEq total) by mouth daily   simvastatin (ZOCOR) 20 mg tablet   Yes No   Sig: Take 1 tablet by mouth daily      Facility-Administered Medications: None       Past Medical History:   Diagnosis Date   • Anemia    • Arthritis    • Atherosclerosis of native coronary artery of native heart without angina pectoris 02/06/2019   • Cataract    • Chronic atrial fibrillation (HCC)    • Degeneration of cervical intervertebral disc    • Diarrhea     onset 07/10   • Disease of thyroid gland    • Occipital infarction [facility])    • Wrist fracture     right        Past Surgical History:   Procedure Laterality Date   • COLON SURGERY     • FL INJECTION LEFT HIP (NON ARTHROGRAM)  5/28/2021   • KNEE ARTHROSCOPY      with medial meniscus repair    • CT ARTHROCENTESIS ASPIR&/INJ MAJOR JT/BURSA W/O US Left 5/28/2021    Procedure: Hip corticosteroid injection ([id number]);   Surgeon: Lane Gonzalez MD;  Location: [facility] MAIN OR;  Service: Pain Management        Family History   Problem Relation Age of Onset   • Heart attack Mother    • Valvular heart disease Mother    • Heart attack Father    • Hypertension Sister    • Mental illness Neg Hx      I have reviewed and agree with the history as documented  E-Cigarette/Vaping   • E-Cigarette Use Never User      E-Cigarette/Vaping Substances   • Nicotine No    • THC No    • CBD No    • Flavoring No    • Other No    • Unknown No      Social History     Tobacco Use   • Smoking status: Former     Packs/day: 1 00     Years: 7 00     Pack years: 7 00     Types: Cigarettes     Quit date:      Years since quittin 3   • Smokeless tobacco: Never   Vaping Use   • Vaping Use: Never used   Substance Use Topics   • Alcohol use: Yes     Alcohol/week: 7 0 standard drinks     Types: 7 Glasses of wine per week     Comment: occasionally, drinks wine , moderate    • Drug use: No       Review of Systems   Constitutional: Positive for chills and fatigue  Negative for fever  Respiratory: Negative for cough and shortness of breath  Gastrointestinal: Positive for abdominal pain, diarrhea and nausea  Negative for abdominal distention, anal bleeding, blood in stool, constipation and vomiting  Genitourinary: Negative for difficulty urinating, flank pain, frequency, pelvic pain, urgency, vaginal bleeding and vaginal discharge  Musculoskeletal: Negative for arthralgias and myalgias  Neurological: Positive for weakness (Generalized) and light-headedness  Psychiatric/Behavioral: Negative for confusion  All other systems reviewed and are negative  Physical Exam  Physical Exam  Vitals and nursing note reviewed  Constitutional:       General: She is not in acute distress  Appearance: Normal appearance  She is ill-appearing  She is not toxic-appearing or diaphoretic  Comments: Awake but somewhat drowsy, fully oriented  Ill-appearing   HENT:      Head: Normocephalic and atraumatic  Comments: Dry mucous membranes  Not actively vomiting     Right Ear: External ear normal       Left Ear: External ear normal    Eyes:      General:         Right eye: No discharge  Left eye: No discharge     Cardiovascular:      Comments: Atrial fibrillation rate of 107  No murmurs rubs or gallops  Extremities cool, cyanotic, capillary refill of 3 seconds  Pulmonary:      Effort: No respiratory distress  Comments: No increased work of breathing  Speaking complete sentences  Lungs clear to auscultation bilaterally without wheezes, rales, rhonchi  Difficult to obtain accurate pulse oximetry  Satting 92% with poor waveform  Abdominal:      General: There is no distension  Tenderness: There is abdominal tenderness  Comments: Generalized lower abdominal tenderness with voluntary guarding  Abdomen nondistended with hyperactive bowel sounds  Musculoskeletal:         General: No deformity  Cervical back: Normal range of motion  Skin:     Findings: No lesion or rash  Neurological:      Mental Status: She is alert and oriented to person, place, and time  Mental status is at baseline        Comments: AAOx4   Psychiatric:         Mood and Affect: Mood and affect normal          Vital Signs  ED Triage Vitals [04/16/23 1307]   Temperature Pulse Respirations Blood Pressure SpO2   (!) 96 °F (35 6 °C) (!) 107 (!) 24 (!) 60/42 92 %      Temp Source Heart Rate Source Patient Position - Orthostatic VS BP Location FiO2 (%)   Tympanic Monitor Sitting Left arm --      Pain Score       8           Vitals:    04/16/23 1800 04/16/23 1815 04/16/23 1910 04/16/23 2211   BP: 115/68 112/72 114/68 104/51   Pulse: 69 84 71 60   Patient Position - Orthostatic VS:   Lying Lying         Visual Acuity      ED Medications  Medications   metoprolol succinate (TOPROL-XL) 24 hr tablet 25 mg (has no administration in time range)   levothyroxine tablet 50 mcg (has no administration in time range)   pantoprazole (PROTONIX) EC tablet 20 mg (has no administration in time range)   pravastatin (PRAVACHOL) tablet 40 mg (40 mg Oral Given 4/16/23 1939)   ondansetron (ZOFRAN) injection 4 mg (has no administration in time range)   simethicone (MYLICON) chewable tablet 80 mg (has no administration in time range)   dicyclomine (BENTYL) capsule 20 mg (20 mg Oral Given 4/16/23 2030)   cefepime (MAXIPIME) IVPB (premix in dextrose) 2,000 mg 50 mL (has no administration in time range)   heparin (porcine) subcutaneous injection 5,000 Units (5,000 Units Subcutaneous Given 4/16/23 1939)   metoprolol (LOPRESSOR) injection 5 mg (has no administration in time range)   sodium bicarbonate 150 mEq in dextrose 5 % 1,000 mL infusion (100 mL/hr Intravenous New Bag 4/1938)   vancomycin (VANCOCIN) IVPB (premix in dextrose) 750 mg 150 mL (has no administration in time range)   sodium chloride 0 9 % bolus 1,000 mL (0 mL Intravenous Stopped 4/16/23 1537)   sodium chloride 0 9 % bolus 1,000 mL (0 mL Intravenous Stopped 4/16/23 1536)   vancomycin (VANCOCIN) 1500 mg in sodium chloride 0 9% 250 mL IVPB (0 mg Intravenous Stopped 4/16/23 1554)   cefepime (MAXIPIME) IVPB (premix in dextrose) 2,000 mg 50 mL (0 mg Intravenous Stopped 4/16/23 1443)       Diagnostic Studies  Results Reviewed     Procedure Component Value Units Date/Time    TSH, 3rd generation with Free T4 reflex [[id number]]     Lab Status: No result Specimen: Blood     Blood culture #1 [[id number]] Collected: 04/16/23 1332    Lab Status: Preliminary result Specimen: Blood from Arm, Right Updated: 04/16/23 1701     Blood Culture Received in Microbiology Lab  Culture in Progress  Blood culture #2 [[id number]] Collected: 04/16/23 1334    Lab Status: Preliminary result Specimen: Blood from Arm, Right Updated: 04/16/23 1701     Blood Culture Received in Microbiology Lab  Culture in Progress      Urine Microscopic [[id number]]  (Normal) Collected: 04/16/23 1551    Lab Status: Final result Specimen: Urine, Indwelling Peña Catheter Updated: 04/16/23 1616     RBC, UA 0-1 /hpf      WBC, UA 0-1 /hpf      Epithelial Cells Occasional /hpf      Bacteria, UA Occasional /hpf     HS Troponin I 2hr [[id number]]  (Normal) Collected: 04/16/23 1537    Lab Status: Final result Specimen: Blood from Arm, Right Updated: 04/16/23 1608     hs TnI 2hr 19 ng/L      Delta 2hr hsTnI -8 ng/L     UA w Reflex to Microscopic w Reflex to Culture [[id number]]  (Abnormal) Collected: 04/16/23 1551    Lab Status: Final result Specimen: Urine, Indwelling Peña Catheter Updated: 04/16/23 1601     Color, UA Yellow     Clarity, UA Clear     Specific Gravity, UA 1 015     pH, UA 5 5     Leukocytes, UA Trace     Nitrite, UA Negative     Protein, UA Negative mg/dl      Glucose, UA Negative mg/dl      Ketones, UA Trace mg/dl      Urobilinogen, UA 0 2 E U /dl      Bilirubin, UA Negative     Occult Blood, UA Small    COVID/FLU/RSV [[id number]]  (Normal) Collected: 04/16/23 1330    Lab Status: Final result Specimen: Nares from Nose Updated: 04/16/23 1427     SARS-CoV-2 Negative     INFLUENZA A PCR Negative     INFLUENZA B PCR Negative     RSV PCR Negative    Narrative:      FOR PEDIATRIC PATIENTS - copy/paste COVID Guidelines URL to browser: [email or web address]  FreeWavzx    SARS-CoV-2 assay is a Nucleic Acid Amplification assay intended for the  qualitative detection of nucleic acid from SARS-CoV-2 in nasopharyngeal  swabs  Results are for the presumptive identification of SARS-CoV-2 RNA  Positive results are indicative of infection with SARS-CoV-2, the virus  causing COVID-19, but do not rule out bacterial infection or co-infection  with other viruses  Laboratories within the [place] and its  territories are required to report all positive results to the appropriate  public health authorities  Negative results do not preclude SARS-CoV-2  infection and should not be used as the sole basis for treatment or other  patient management decisions  Negative results must be combined with  clinical observations, patient history, and epidemiological information  This test has not been FDA cleared or approved      This test has been authorized by FDA under an Emergency Use Authorization  (EUA)  This test is only authorized for the duration of time the  declaration that circumstances exist justifying the authorization of the  emergency use of an in vitro diagnostic tests for detection of SARS-CoV-2  virus and/or diagnosis of COVID-19 infection under section 564(b)(1) of  the Act, 21 U  S C  303YJB-6(R)(1), unless the authorization is terminated  or revoked sooner  The test has been validated but independent review by FDA  and CLIA is pending  Test performed using [employer] GeneXpert: This RT-PCR assay targets N2,  a region unique to SARS-CoV-2  A conserved region in the E-gene was chosen  for pan-Sarbecovirus detection which includes SARS-CoV-2  According to CMS-2020-01-R, this platform meets the definition of high-throughput technology      Comprehensive metabolic panel [[id number]]  (Abnormal) Collected: 04/16/23 1330    Lab Status: Final result Specimen: Blood from Arm, Right Updated: 04/16/23 1426     Sodium 129 mmol/L      Potassium 4 5 mmol/L      Chloride 103 mmol/L      CO2 9 mmol/L      ANION GAP 17 mmol/L       mg/dL      Creatinine 4 59 mg/dL      Glucose 82 mg/dL      Calcium 9 3 mg/dL      AST 41 U/L      ALT 25 U/L      Alkaline Phosphatase 102 U/L      Total Protein 8 0 g/dL      Albumin 5 0 g/dL      Total Bilirubin 0 55 mg/dL      eGFR 8 ml/min/1 73sq m     Narrative:      [place] guidelines for Chronic Kidney Disease (CKD):   •  Stage 1 with normal or high GFR (GFR > 90 mL/min/1 73 square meters)  •  Stage 2 Mild CKD (GFR = 60-89 mL/min/1 73 square meters)  •  Stage 3A Moderate CKD (GFR = 45-59 mL/min/1 73 square meters)  •  Stage 3B Moderate CKD (GFR = 30-44 mL/min/1 73 square meters)  •  Stage 4 Severe CKD (GFR = 15-29 mL/min/1 73 square meters)  •  Stage 5 End Stage CKD (GFR <15 mL/min/1 73 square meters)  Note: GFR calculation is accurate only with a steady state creatinine    Lactic acid [[id number]]  (Normal) Collected: 04/16/23 1330    Lab Status: Final result Specimen: Blood from Arm, Right Updated: 04/16/23 1420     LACTIC ACID 0 7 mmol/L     Narrative:      Result may be elevated if tourniquet was used during collection      Procalcitonin [[id number]]  (Normal) Collected: 04/16/23 1330    Lab Status: Final result Specimen: Blood from Arm, Right Updated: 04/16/23 1416     Procalcitonin 0 17 ng/ml     HS Troponin 0hr (reflex protocol) [[id number]]  (Normal) Collected: 04/16/23 1330    Lab Status: Final result Specimen: Blood from Arm, Right Updated: 04/16/23 1413     hs TnI 0hr 27 ng/L     Protime-INR [[id number]]  (Normal) Collected: 04/16/23 1330    Lab Status: Final result Specimen: Blood from Arm, Right Updated: 04/16/23 1401     Protime 13 8 seconds      INR 1 04    APTT [[id number]]  (Normal) Collected: 04/16/23 1330    Lab Status: Final result Specimen: Blood from Arm, Right Updated: 04/16/23 1401     PTT 29 seconds     CBC and differential [[id number]]  (Abnormal) Collected: 04/16/23 1330    Lab Status: Final result Specimen: Blood from Arm, Right Updated: 04/16/23 1348     WBC 5 77 Thousand/uL      RBC 4 53 Million/uL      Hemoglobin 15 2 g/dL      Hematocrit 47 4 %       fL      MCH 33 6 pg      MCHC 32 1 g/dL      RDW 15 4 %      MPV 12 1 fL      Platelets 240 Thousands/uL      nRBC 0 /100 WBCs      Neutrophils Relative 66 %      Immat GRANS % 1 %      Lymphocytes Relative 20 %      Monocytes Relative 9 %      Eosinophils Relative 2 %      Basophils Relative 2 %      Neutrophils Absolute 3 90 Thousands/µL      Immature Grans Absolute 0 04 Thousand/uL      Lymphocytes Absolute 1 14 Thousands/µL      Monocytes Absolute 0 51 Thousand/µL      Eosinophils Absolute 0 09 Thousand/µL      Basophils Absolute 0 09 Thousands/µL     Clostridium difficile toxin by PCR with EIA [[id number]]     Lab Status: No result Specimen: Stool from Per Rectum     Stool Enteric Bacterial Panel by PCR [[id number]]     Lab Status: No result Specimen: Stool Ova and parasite examination [[id number]]     Lab Status: No result Specimen: Stool from Rectum                  CT abdomen pelvis wo contrast   Final Result by Carolee Narvaez MD (04/16 6299)   Exam is limited without IV and oral contrast particularly for soft tissue and bowel evaluation  1   Scattered air-fluid levels in the small and large bowel, nonspecific but can be seen with underlying enterocolitis  2  Small cyst in the region of the pancreatic head without significant change given the limitations of this exam  Follow up as per previous guidelines  Workstation performed: [id number]                    Procedures  CriticalCare Time    Date/Time: 4/16/2023 10:20 PM  Performed by: Sravani Wesley MD  Authorized by: Sravani Wesley MD     Critical care provider statement:     Critical care time (minutes):  32    Critical care was necessary to treat or prevent imminent or life-threatening deterioration of the following conditions:  Shock and dehydration    Critical care was time spent personally by me on the following activities:  Ordering and performing treatments and interventions, obtaining history from patient or surrogate, development of treatment plan with patient or surrogate, ordering and review of laboratory studies, ordering and review of radiographic studies, re-evaluation of patient's condition, evaluation of patient's response to treatment, review of old charts and examination of patient             ED Course                               SBIRT 22yo+    Flowsheet Row Most Recent Value   Initial Alcohol Screen: US AUDIT-C     1  How often do you have a drink containing alcohol? 6 Filed at: 04/16/2023 1310   2  How many drinks containing alcohol do you have on a typical day you are drinking? 2 Filed at: 04/16/2023 1310   3b  FEMALE Any Age, or MALE 65+: How often do you have 4 or more drinks on one occassion?  1 Filed at: 04/16/2023 1310   Audit-C Score 9 Filed at: 04/16/2023 1310   DAYDAY: How many times in the past year have you    Used an illegal drug or used a prescription medication for non-medical reasons? Never Filed at: 04/16/2023 1310                    Medical Decision Making  I obtained history from the patient and from the patient's daughter  I ordered and reviewed external medical documentation noting a primary care provider visit on 3/14/2023 for edema which had been improving at that time, no additional medical complaint  I ordered and reviewed labs to evaluate for infection including CBC, CMP, lactate, procalcitonin, blood cultures  Given patient's persistent heavy diarrhea, I ordered stool studies including stool enteric panel, stool C  difficile, O&P to evaluate for infectious causes of diarrhea  Patient hypotensive, cyanotic with prolonged capillary refill concerning for shock  IV placed and patient treated with initial bolus of 2 L normal saline, treated with broad-spectrum antibiotic coverage vanc/cefepime for potential infectious cause  I ordered and reviewed a CT abdomen pelvis which demonstrated findings consistent with enterocolitis without additional acute intra-abdominal changes  Patient unable to provide a stool sample in the emergency department  Patient with improvement of blood pressure following multiple fluid boluses  Patient with a grossly elevated creatinine and BUN suggestive of severe dehydration and renal failure  Patient and family updated on these lab findings  Patient remaining stable in the emergency department and admitted to medicine service for further evaluation and management  I discussed patient with the hospitalist     Amount and/or Complexity of Data Reviewed  Labs: ordered  Radiology: ordered  Risk  Prescription drug management  Decision regarding hospitalization            Disposition  Final diagnoses:   Renal failure   Diarrhea   Hyponatremia     Time reflects when diagnosis was documented in both MDM as applicable and the Disposition within this note     Time User Action Codes Description Comment    4/16/2023  4:49 PM Shaina Motto Add [N19] Renal failure     4/16/2023  4:49 PM Shaina Motto Add [R19 7] Diarrhea     4/16/2023  4:49 PM Shaina Motto Add [E87 1] Hyponatremia     4/16/2023  6:50 PM Zeny Moras Add [I48 91] Atrial fibrillation ([address] )     4/16/2023  7:04 PM Zeny Moras Add [K52 9] Enterocolitis       ED Disposition     ED Disposition   Admit    Condition   Stable    Date/Time   Sun Apr 16, 2023  4:49 PM    Comment   Case was discussed with TK and the patient's admission status was agreed to be Admission Status: inpatient status to the service of Dr Natty Chua              Follow-up Information    None         Current Discharge Medication List      CONTINUE these medications which have NOT CHANGED    Details   aspirin 81 mg chewable tablet Chew 1 tablet daily      ELIQUIS 2 5 MG 2 5 mg 2 (two) times a day        folic acid (FOLVITE) 1 mg tablet       furosemide (LASIX) 40 mg tablet Take 1 tablet by mouth Daily      levothyroxine 50 mcg tablet TAKE 1 TABLET (50 MCG TOTAL) BY MOUTH DAILY IN THE EARLY MORNING  Qty: 90 tablet, Refills: 0    Associated Diagnoses: Hypothyroidism, unspecified type      allopurinol (ZYLOPRIM) 100 mg tablet Take 1 tablet (100 mg total) by mouth daily  Qty: 90 tablet, Refills: 2    Associated Diagnoses: Gout, arthropathy      benazepril (LOTENSIN) 20 mg tablet TAKE 1 TABLET (20 MG TOTAL) BY MOUTH DAILY  Qty: 90 tablet, Refills: 2    Associated Diagnoses: Essential hypertension      clindamycin (CLEOCIN) 300 MG capsule       colchicine (COLCRYS) 0 6 mg tablet TAKE 1 TABLET (0 6 MG TOTAL) BY MOUTH TWO TIMES A DAY  Qty: 90 tablet, Refills: 0    Associated Diagnoses: Gout, arthropathy      hydrochlorothiazide (MICROZIDE) 12 5 mg capsule Take 1 capsule by mouth daily      metoprolol succinate (TOPROL-XL) 25 mg 24 hr tablet Take 1 tablet (25 mg total) by mouth daily  Qty: 90 tablet, Refills: 0    Associated Diagnoses: Atrial fibrillation, unspecified type ([facility] [address] )      ! ! pantoprazole (PROTONIX) 20 mg tablet Take 2 tablets (40 mg total) by mouth 2 (two) times a day  Qty: 60 tablet, Refills: 3    Associated Diagnoses: Esophageal dysphagia      ! ! pantoprazole (PROTONIX) 40 mg tablet       potassium chloride (K-DUR,KLOR-CON) 20 mEq tablet Take 1 tablet (20 mEq total) by mouth daily  Qty: 30 tablet, Refills: 5    Associated Diagnoses: Edema, unspecified type      simvastatin (ZOCOR) 20 mg tablet Take 1 tablet by mouth daily       ! ! - Potential duplicate medications found  Please discuss with provider  No discharge procedures on file      PDMP Review     None          ED Provider  Electronically Signed by           Cuca Figueroa MD  04/16/23 3461

## 2023-04-16 NOTE — H&P
[facility] [address]  H&P  Name: Dianne Mathews 78 y o  female I MRN: [id number]  Unit/Bed#: [address] [address] I Date of Admission: 4/16/2023   Date of Service: 4/16/2023 I Hospital Day: 0      Assessment/Plan   Enterocolitis  Assessment & Plan  POA, unresolved for 1 week    · CT abdomen:Scattered air-fluid levels in the small and large bowel, nonspecific but can be seen with underlying enterocolitis    · Vanco and cefepime; (PCN allergy in childhood) given in the ED, continue Vanco  · Bolus and IVF maintenance given  · Stool enteric panel/O&P/C  difficile ordered in ED  · GI consulted  · As needed medicine for pain    Acute renal failure (ARF) ([facility] [address] )  Assessment & Plan  POA, likely secondary to diarrhea and severe dehydration    · Creatinine 4 59  · Baseline 0 79- 1 10  · Bolus and maintenance IVF given  · Hold home lisinopril, Klor-Con and Lasix  · Avoid nephrotoxic's  · Repeat BMP for midnight, will trend  · Nephro consulted: Initiate bicarb drip    Septic shock (HCC)  Assessment & Plan  POA, evidence with tachycardia, leukocytosis, tachypnea, hypotension and hypothermia likely secondary to enterocolitis versus UTI    · Blood cultures-pending  · Lactic acid-WNL/Pro-Crow-pending  · Bolus and IVF maintenance fluid given in ED, will continue  · Vanco and cefepime in ED, continue Vanco  · We will trend    Metabolic acidosis, increased anion gap  Assessment & Plan  · AG-17  · Likely secondary to dehydration and diarrhea  · Status post bolus and IV fluids  · Bicarb drip in place  · Nephro following      Atrial fibrillation (HCC)  Assessment & Plan  Chronic, likely 2/2 to enterocolitis, dehydration and acute renal failure    Asymptomatic,    • ECG NSR, Tele-afib  • TSH,  fT4-pending/ serial Troponins-WNL   • [id number]  • Rate control:  continue home metoprolol 25 mg daily, as needed IV metoprolol as needed  • Anticoag: heparin subcu, held home Eliquis due to renal function  • Consult cardio, appreciate recs   • Echo-pending      Bacteriuria  Assessment & Plan  Asymptomatic,    · UA positive for leukocytes  · Given dose of cefepime and Vanco  · Follow urine cultures    Hyponatremia  Assessment & Plan  Asymptomatic,    ·   · Likely secondary to dehydration and diarrhea  · IV bolus and maintenance fluids given  · Trend CMP in a m  · Consult nephro if unresolved    Raynaud's disease without gangrene  Assessment & Plan  POA symptomatic,    · Worsened with cold weather, will use warm blankets or bear hugger for patient as needed  · No medication at home, no esophageal dysmotility    Tricuspid incompetence  Assessment & Plan  Historic diagnosis    · Cardiology consulted    Pure hypercholesterolemia  Assessment & Plan  Chronic,    · Continue home statin inpatient equivalent    Thoracic aortic aneurysm without rupture, unspecified part ([facility] )  Assessment & Plan  Historical diagnosis    · Followed by outpatient cardiology    Pulmonary hypertension, unspecified ([facility] )  Assessment & Plan  Historical diagnosis    · Repeat echo pending    Hypothyroidism  Assessment & Plan  Chronic,     · TSH/free T4-pending  · continue home levothyroxine 50 mcg       VTE Pharmacologic Prophylaxis:   Moderate Risk (Score 3-4) - Pharmacological DVT Prophylaxis Ordered: heparin  Code Status: Level 1 - Full Code FC   Discussion with family: Updated  (Granddaughter) at bedside  Anticipated Length of Stay: Patient will be admitted on an inpatient basis with an anticipated length of stay of greater than 2 midnights secondary to Clncal course      Total Time Spent on Date of Encounter in care of patient: 55 minutes This time was spent on one or more of the following: performing physical exam; counseling and coordination of care; obtaining or reviewing history; documenting in the medical record; reviewing/ordering tests, medications or procedures; communicating with other healthcare professionals and discussing with patient's family/caregivers  Chief Complaint: Diarrhea and abdominal pain    History of Present Illness:  Carmen Bach is a 78 y o  female with a PMH of hypertension, A-fib, hyperlipidemia, Raynaud's, hypothyroidism, thoracic aortic aneurysm, who presents with diarrhea for 1 week  Patient reported 1 week of watery brown diarrhea associated with abdominal pain and nausea; denied any sick contacts, denied any change in diet  Patient reported decreasing interval initiated with 8 x 1 week ago, followed by 4 times several days ago, followed by 3 times in the last 24 hours  Patient reported going to urgent care on Friday and getting IV fluids for dehydration  Patient reported history of colitis 50 years ago and same presentation  Patient's granddaughter at bedside reported patient has a history of Raynaud's disease and hands congruent with regular clinical presentation  Patient reported Raynaud's flareup with being cold  Patient reported history of A-fib and not taking her home medication today, denied any chest pain or palpitations  Poor urine output likely due to dehydration, Peña was placed  Patient reported being full code  Review of Systems:  Review of Systems   Constitutional: Positive for chills  Negative for fatigue and fever  HENT: Negative for ear pain and sore throat  Eyes: Negative for pain and visual disturbance  Respiratory: Negative for cough and shortness of breath  Cardiovascular: Negative for chest pain and palpitations  Gastrointestinal: Positive for abdominal pain, diarrhea and nausea  Negative for vomiting  Genitourinary: Positive for decreased urine volume  Negative for dysuria and hematuria  Musculoskeletal: Positive for neck pain  Negative for arthralgias and back pain  Skin: Negative for color change and rash  Neurological: Positive for weakness  Negative for seizures and syncope  Psychiatric/Behavioral: Negative      All other systems reviewed and are negative  Past Medical and Surgical History:   Past Medical History:   Diagnosis Date   • Anemia    • Arthritis    • Atherosclerosis of native coronary artery of native heart without angina pectoris 02/06/2019   • Cataract    • Chronic atrial fibrillation (HCC)    • Degeneration of cervical intervertebral disc    • Diarrhea     onset 07/10   • Disease of thyroid gland    • Occipital infarction [facility])    • Wrist fracture     right        Past Surgical History:   Procedure Laterality Date   • COLON SURGERY     • FL INJECTION LEFT HIP (NON ARTHROGRAM)  5/28/2021   • KNEE ARTHROSCOPY      with medial meniscus repair    • ND ARTHROCENTESIS ASPIR&/INJ MAJOR JT/BURSA W/O US Left 5/28/2021    Procedure: Hip corticosteroid injection ([id number]); Surgeon: Deena Rizo MD;  Location: [facility] MAIN OR;  Service: Pain Management        Meds/Allergies:  Prior to Admission medications    Medication Sig Start Date End Date Taking?  Authorizing Provider   aspirin 81 mg chewable tablet Chew 1 tablet daily   Yes Historical Provider, MD   ELIQUIS 2 5 MG 2 5 mg 2 (two) times a day   2/5/20  Yes Historical Provider, MD   folic acid (FOLVITE) 1 mg tablet  1/28/23  Yes Historical Provider, MD   furosemide (LASIX) 40 mg tablet Take 1 tablet by mouth Daily 4/1/20  Yes Historical Provider, MD   levothyroxine 50 mcg tablet TAKE 1 TABLET (50 MCG TOTAL) BY MOUTH DAILY IN THE EARLY MORNING 11/25/22  Yes Concha Heredia MD   allopurinol (ZYLOPRIM) 100 mg tablet Take 1 tablet (100 mg total) by mouth daily 9/27/22   Quenton Peabody, CRNP   benazepril (LOTENSIN) 20 mg tablet TAKE 1 TABLET (20 MG TOTAL) BY MOUTH DAILY 2/27/23   Concha Heredia MD   clindamycin (CLEOCIN) 300 MG capsule  11/16/22   Historical Provider, MD   colchicine (COLCRYS) 0 6 mg tablet TAKE 1 TABLET (0 6 MG TOTAL) BY MOUTH TWO TIMES A DAY 11/25/22   Quenton Peabody, CRNP   hydrochlorothiazide (MICROZIDE) 12 5 mg capsule Take 1 capsule by mouth daily 8/23/14 Historical Provider, MD   metoprolol succinate (TOPROL-XL) 25 mg 24 hr tablet Take 1 tablet (25 mg total) by mouth daily 3/28/22   JORDAN Bull   pantoprazole (PROTONIX) 20 mg tablet Take 2 tablets (40 mg total) by mouth 2 (two) times a day 22   Angelo Vaughan MD   pantoprazole (PROTONIX) 40 mg tablet  23   Historical Provider, MD   potassium chloride (K-DUR,KLOR-CON) 20 mEq tablet Take 1 tablet (20 mEq total) by mouth daily 2/15/23   Steffi Restrepo MD   simvastatin (ZOCOR) 20 mg tablet Take 1 tablet by mouth daily 10/16/14   Historical Provider, MD     I have reviewed home medications using recent Epic encounter  Allergies: Allergies   Allergen Reactions   • Lidocaine      Annotation - [id number]: Seizure with 20 cc during a surgical block   FS   • Penicillins    • Sulfa Antibiotics Edema       Social History:  Marital Status: Single   Occupation: N/a  Patient Pre-hospital Living Situation: Home  Patient Pre-hospital Level of Mobility: walks  Patient Pre-hospital Diet Restrictions:   Substance Use History:   Social History     Substance and Sexual Activity   Alcohol Use Yes   • Alcohol/week: 7 0 standard drinks   • Types: 7 Glasses of wine per week    Comment: occasionally, drinks wine , moderate      Social History     Tobacco Use   Smoking Status Former   • Packs/day: 1 00   • Years:    • Pack years:    • Types: Cigarettes   • Quit date: 26   • Years since quittin 3   Smokeless Tobacco Never     Social History     Substance and Sexual Activity   Drug Use No       Family History:  Family History   Problem Relation Age of Onset   • Heart attack Mother    • Valvular heart disease Mother    • Heart attack Father    • Hypertension Sister    • Mental illness Neg Hx      Is a due to his  Physical Exam:     Vitals:   Blood Pressure: 114/68 (23)  Pulse: 71 (23)  Temperature: (!) 97 2 °F (36 2 °C) (23)  Temp Source: Tympanic (23 1630)  Respirations: 16 (04/16/23 1910)  SpO2: 99 % (04/16/23 1910)    Physical Exam  Vitals and nursing note reviewed  Constitutional:       General: She is not in acute distress  Appearance: She is well-developed  HENT:      Head: Normocephalic and atraumatic  Eyes:      Conjunctiva/sclera: Conjunctivae normal    Cardiovascular:      Rate and Rhythm: Tachycardia present  Heart sounds: No murmur heard  No friction rub  No gallop  Pulmonary:      Effort: Pulmonary effort is normal  No respiratory distress  Breath sounds: Normal breath sounds  No stridor  No wheezing, rhonchi or rales  Abdominal:      Palpations: Abdomen is soft  Tenderness: There is no abdominal tenderness  There is no guarding or rebound  Musculoskeletal:         General: No swelling or tenderness  Cervical back: Neck supple  Right lower leg: No edema  Left lower leg: No edema  Skin:     General: Skin is warm and dry  Capillary Refill: Capillary refill takes less than 2 seconds  Findings: No bruising  Comments: Raynaud's bilateral hands erythema and blue fingers, blanching   Neurological:      Mental Status: She is alert and oriented to person, place, and time  Motor: No weakness     Psychiatric:         Mood and Affect: Mood normal          Behavior: Behavior normal           Additional Data:     Lab Results:  Results from last 7 days   Lab Units 04/16/23  1330   WBC Thousand/uL 5 77   HEMOGLOBIN g/dL 15 2   HEMATOCRIT % 47 4*   PLATELETS Thousands/uL 224   NEUTROS PCT % 66   LYMPHS PCT % 20   MONOS PCT % 9   EOS PCT % 2     Results from last 7 days   Lab Units 04/16/23  1330   SODIUM mmol/L 129*   POTASSIUM mmol/L 4 5   CHLORIDE mmol/L 103   CO2 mmol/L 9*   BUN mg/dL 100*   CREATININE mg/dL 4 59*   ANION GAP mmol/L 17*   CALCIUM mg/dL 9 3   ALBUMIN g/dL 5 0   TOTAL BILIRUBIN mg/dL 0 55   ALK PHOS U/L 102   ALT U/L 25   AST U/L 41*   GLUCOSE RANDOM mg/dL 82     Results from last 7 days   Lab Units 04/16/23  1330   INR  1 04             Results from last 7 days   Lab Units 04/16/23  1330   LACTIC ACID mmol/L 0 7   PROCALCITONIN ng/ml 0 17       Lines/Drains:  Invasive Devices     Peripheral Intravenous Line  Duration           Peripheral IV 04/16/23 Right Antecubital <1 day    Peripheral IV 04/16/23 Right;Upper;Ventral (anterior) Arm <1 day          Drain  Duration           Urethral Catheter Double-lumen <1 day              Urinary Catheter:  Goal for removal: Voiding trial when ambulation improves             Imaging: Reviewed radiology reports from this admission including: abdominal/pelvic CT  CT abdomen pelvis wo contrast   Final Result by Lena Washington MD (04/16 1857)   Exam is limited without IV and oral contrast particularly for soft tissue and bowel evaluation  1   Scattered air-fluid levels in the small and large bowel, nonspecific but can be seen with underlying enterocolitis  2  Small cyst in the region of the pancreatic head without significant change given the limitations of this exam  Follow up as per previous guidelines  Workstation performed: [id number]             EKG and Other Studies Reviewed on Admission:   · EKG: NSR  HR 64     ** Please Note: This note has been constructed using a voice recognition system   **

## 2023-04-16 NOTE — ASSESSMENT & PLAN NOTE
POA symptomatic,    · Worsened with cold weather, will use warm blankets or bear hugger for patient as needed  · No medication at home, no esophageal dysmotility

## 2023-04-16 NOTE — ASSESSMENT & PLAN NOTE
Asymptomatic,    ·   · Likely secondary to dehydration and diarrhea  · IV bolus and maintenance fluids given  · Trend CMP in a m    · Consult nephro if unresolved

## 2023-04-16 NOTE — PROGRESS NOTES
Tammy Chahal is a 78 y o  female who is currently ordered Vancomycin IV with management by the Pharmacy Consult service  Relevant clinical data and objective / subjective history reviewed  Vancomycin Assessment:  Indication and Goal AUC/Trough: Urinary tract infection (goal -600, trough >10), -600, trough >10  Clinical Status: stable  Micro:     Renal Function:  SCr: 4 59 mg/dL  CrCl: 7 8 mL/min  Renal replacement: Not on dialysis  Days of Therapy: 1  Current Dose: 1500 mg IV  Vancomycin Plan:  New Dosin mg IV when random level </= 15 mcg/mL    Next Level: 23 at 0600  Renal Function Monitoring: Daily BMP and [place] will continue to follow closely for s/sx of nephrotoxicity, infusion reactions and appropriateness of therapy  BMP and CBC will be ordered per protocol  We will continue to follow the patient’s culture results and clinical progress daily      Corrinne Broker, Pharmacist

## 2023-04-16 NOTE — ASSESSMENT & PLAN NOTE
· AG-17  · Likely secondary to dehydration and diarrhea  · Status post bolus and IV fluids  · Bicarb drip in place  · Nephro following

## 2023-04-16 NOTE — ASSESSMENT & PLAN NOTE
Chronic, likely 2/2 to enterocolitis, dehydration and acute renal failure    Asymptomatic,    • ECG NSR, Tele-afib  • TSH,  fT4-pending/ serial Troponins-WNL   • [id number]  • Rate control:  continue home metoprolol 25 mg daily, as needed IV metoprolol as needed  • Anticoag: heparin subcu, held home Eliquis due to renal function  • Consult cardio, appreciate recs   • Echo-pending

## 2023-04-17 ENCOUNTER — APPOINTMENT (INPATIENT)
Dept: NON INVASIVE DIAGNOSTICS | Facility: HOSPITAL | Age: 83
DRG: 872 | End: 2023-04-17
Payer: COMMERCIAL

## 2023-04-17 PROBLEM — E87.29 METABOLIC ACIDOSIS, INCREASED ANION GAP: Status: RESOLVED | Noted: 2023-04-16 | Resolved: 2023-04-17

## 2023-04-17 PROBLEM — E87.1 HYPONATREMIA: Status: RESOLVED | Noted: 2023-04-16 | Resolved: 2023-04-17

## 2023-04-17 LAB
ALBUMIN SERPL BCP-MCNC: 3.5 G/DL (ref 3.5–5)
ALP SERPL-CCNC: 68 U/L (ref 34–104)
ALT SERPL W P-5'-P-CCNC: 18 U/L (ref 7–52)
ANION GAP SERPL CALCULATED.3IONS-SCNC: 11 MMOL/L (ref 4–13)
ANION GAP SERPL CALCULATED.3IONS-SCNC: 12 MMOL/L (ref 4–13)
ANION GAP SERPL CALCULATED.3IONS-SCNC: 7 MMOL/L (ref 4–13)
AORTIC ROOT: 3.8 CM
APICAL FOUR CHAMBER EJECTION FRACTION: 63 %
AST SERPL W P-5'-P-CCNC: 31 U/L (ref 13–39)
ATRIAL RATE: 51 BPM
AV LVOT PEAK GRADIENT: 2 MMHG
AV PEAK GRADIENT: 2 MMHG
BASOPHILS # BLD AUTO: 0.05 THOUSANDS/ΜL (ref 0–0.1)
BASOPHILS NFR BLD AUTO: 1 % (ref 0–1)
BILIRUB SERPL-MCNC: 0.54 MG/DL (ref 0.2–1)
BUN SERPL-MCNC: 54 MG/DL (ref 5–25)
BUN SERPL-MCNC: 78 MG/DL (ref 5–25)
BUN SERPL-MCNC: 84 MG/DL (ref 5–25)
CALCIUM SERPL-MCNC: 7.5 MG/DL (ref 8.4–10.2)
CALCIUM SERPL-MCNC: 7.6 MG/DL (ref 8.4–10.2)
CALCIUM SERPL-MCNC: 7.8 MG/DL (ref 8.4–10.2)
CHLORIDE SERPL-SCNC: 106 MMOL/L (ref 96–108)
CHLORIDE SERPL-SCNC: 110 MMOL/L (ref 96–108)
CHLORIDE SERPL-SCNC: 110 MMOL/L (ref 96–108)
CO2 SERPL-SCNC: 11 MMOL/L (ref 21–32)
CO2 SERPL-SCNC: 15 MMOL/L (ref 21–32)
CO2 SERPL-SCNC: 21 MMOL/L (ref 21–32)
CREAT SERPL-MCNC: 1.53 MG/DL (ref 0.6–1.3)
CREAT SERPL-MCNC: 2.57 MG/DL (ref 0.6–1.3)
CREAT SERPL-MCNC: 3.11 MG/DL (ref 0.6–1.3)
DOP CALC LVOT AREA: 3.14 CM2
DOP CALC LVOT DIAMETER: 2 CM
EOSINOPHIL # BLD AUTO: 0.11 THOUSAND/ΜL (ref 0–0.61)
EOSINOPHIL NFR BLD AUTO: 3 % (ref 0–6)
ERYTHROCYTE [DISTWIDTH] IN BLOOD BY AUTOMATED COUNT: 15 % (ref 11.6–15.1)
FRACTIONAL SHORTENING: 21 % (ref 28–44)
GFR SERPL CREATININE-BSD FRML MDRD: 13 ML/MIN/1.73SQ M
GFR SERPL CREATININE-BSD FRML MDRD: 16 ML/MIN/1.73SQ M
GFR SERPL CREATININE-BSD FRML MDRD: 30 ML/MIN/1.73SQ M
GLUCOSE SERPL-MCNC: 102 MG/DL (ref 65–140)
GLUCOSE SERPL-MCNC: 105 MG/DL (ref 65–140)
GLUCOSE SERPL-MCNC: 88 MG/DL (ref 65–140)
HCT VFR BLD AUTO: 35.4 % (ref 34.8–46.1)
HGB BLD-MCNC: 11.9 G/DL (ref 11.5–15.4)
IMM GRANULOCYTES # BLD AUTO: 0.03 THOUSAND/UL (ref 0–0.2)
IMM GRANULOCYTES NFR BLD AUTO: 1 % (ref 0–2)
INTERVENTRICULAR SEPTUM IN DIASTOLE (PARASTERNAL SHORT AXIS VIEW): 0.8 CM
INTERVENTRICULAR SEPTUM: 0.8 CM (ref 0.6–1.1)
LAAS-AP2: 13.9 CM2
LAAS-AP4: 17.2 CM2
LEFT ATRIUM AREA SYSTOLE SINGLE PLANE A4C: 18.2 CM2
LEFT ATRIUM SIZE: 3.7 CM
LEFT INTERNAL DIMENSION IN SYSTOLE: 3.4 CM (ref 2.1–4)
LEFT VENTRICULAR INTERNAL DIMENSION IN DIASTOLE: 4.3 CM (ref 3.5–6)
LEFT VENTRICULAR POSTERIOR WALL IN END DIASTOLE: 1 CM
LEFT VENTRICULAR STROKE VOLUME: 35 ML
LVSV (TEICH): 35 ML
LYMPHOCYTES # BLD AUTO: 0.93 THOUSANDS/ΜL (ref 0.6–4.47)
LYMPHOCYTES NFR BLD AUTO: 23 % (ref 14–44)
MAGNESIUM SERPL-MCNC: 1.3 MG/DL (ref 1.9–2.7)
MCH RBC QN AUTO: 33.9 PG (ref 26.8–34.3)
MCHC RBC AUTO-ENTMCNC: 33.9 G/DL (ref 31.4–37.4)
MCV RBC AUTO: 100 FL (ref 82–98)
MONOCYTES # BLD AUTO: 0.52 THOUSAND/ΜL (ref 0.17–1.22)
MONOCYTES NFR BLD AUTO: 13 % (ref 4–12)
MV E'TISSUE VEL-LAT: 16 CM/S
MV E'TISSUE VEL-SEP: 11 CM/S
NEUTROPHILS # BLD AUTO: 2.4 THOUSANDS/ΜL (ref 1.85–7.62)
NEUTS SEG NFR BLD AUTO: 59 % (ref 43–75)
NRBC BLD AUTO-RTO: 0 /100 WBCS
PLATELET # BLD AUTO: 172 THOUSANDS/UL (ref 149–390)
PMV BLD AUTO: 12.8 FL (ref 8.9–12.7)
POTASSIUM SERPL-SCNC: 3.1 MMOL/L (ref 3.5–5.3)
POTASSIUM SERPL-SCNC: 3.1 MMOL/L (ref 3.5–5.3)
POTASSIUM SERPL-SCNC: 3.7 MMOL/L (ref 3.5–5.3)
PROT SERPL-MCNC: 5.6 G/DL (ref 6.4–8.4)
PV PEAK GRADIENT: 1 MMHG
QRS AXIS: 87 DEGREES
QRSD INTERVAL: 96 MS
QT INTERVAL: 446 MS
QTC INTERVAL: 460 MS
RBC # BLD AUTO: 3.54 MILLION/UL (ref 3.81–5.12)
RIGHT ATRIUM AREA SYSTOLE A4C: 28.1 CM2
RIGHT VENTRICLE ID DIMENSION: 4 CM
SL CV LEFT ATRIUM LENGTH A2C: 4.2 CM
SL CV PED ECHO LEFT VENTRICLE DIASTOLIC VOLUME (MOD BIPLANE) 2D: 83 ML
SL CV PED ECHO LEFT VENTRICLE SYSTOLIC VOLUME (MOD BIPLANE) 2D: 48 ML
SODIUM SERPL-SCNC: 133 MMOL/L (ref 135–147)
SODIUM SERPL-SCNC: 134 MMOL/L (ref 135–147)
SODIUM SERPL-SCNC: 136 MMOL/L (ref 135–147)
T WAVE AXIS: -69 DEGREES
TR MAX PG: 38 MMHG
TR PEAK VELOCITY: 3.1 M/S
TRICUSPID ANNULAR PLANE SYSTOLIC EXCURSION: 1.5 CM
TRICUSPID VALVE PEAK REGURGITATION VELOCITY: 3.07 M/S
TSH SERPL DL<=0.05 MIU/L-ACNC: 2.04 UIU/ML (ref 0.45–4.5)
VANCOMYCIN TROUGH SERPL-MCNC: 15.7 UG/ML (ref 10–20)
VENTRICULAR RATE: 64 BPM
WBC # BLD AUTO: 4.04 THOUSAND/UL (ref 4.31–10.16)

## 2023-04-17 PROCEDURE — 97167 OT EVAL HIGH COMPLEX 60 MIN: CPT

## 2023-04-17 PROCEDURE — 93010 ELECTROCARDIOGRAM REPORT: CPT | Performed by: INTERNAL MEDICINE

## 2023-04-17 PROCEDURE — 97163 PT EVAL HIGH COMPLEX 45 MIN: CPT

## 2023-04-17 PROCEDURE — 80053 COMPREHEN METABOLIC PANEL: CPT | Performed by: STUDENT IN AN ORGANIZED HEALTH CARE EDUCATION/TRAINING PROGRAM

## 2023-04-17 PROCEDURE — 83735 ASSAY OF MAGNESIUM: CPT | Performed by: STUDENT IN AN ORGANIZED HEALTH CARE EDUCATION/TRAINING PROGRAM

## 2023-04-17 PROCEDURE — 87493 C DIFF AMPLIFIED PROBE: CPT | Performed by: STUDENT IN AN ORGANIZED HEALTH CARE EDUCATION/TRAINING PROGRAM

## 2023-04-17 PROCEDURE — 99223 1ST HOSP IP/OBS HIGH 75: CPT | Performed by: INTERNAL MEDICINE

## 2023-04-17 PROCEDURE — 80202 ASSAY OF VANCOMYCIN: CPT | Performed by: STUDENT IN AN ORGANIZED HEALTH CARE EDUCATION/TRAINING PROGRAM

## 2023-04-17 PROCEDURE — 93306 TTE W/DOPPLER COMPLETE: CPT | Performed by: INTERNAL MEDICINE

## 2023-04-17 PROCEDURE — 99223 1ST HOSP IP/OBS HIGH 75: CPT | Performed by: STUDENT IN AN ORGANIZED HEALTH CARE EDUCATION/TRAINING PROGRAM

## 2023-04-17 PROCEDURE — 93306 TTE W/DOPPLER COMPLETE: CPT

## 2023-04-17 PROCEDURE — 85025 COMPLETE CBC W/AUTO DIFF WBC: CPT | Performed by: STUDENT IN AN ORGANIZED HEALTH CARE EDUCATION/TRAINING PROGRAM

## 2023-04-17 PROCEDURE — 80048 BASIC METABOLIC PNL TOTAL CA: CPT | Performed by: STUDENT IN AN ORGANIZED HEALTH CARE EDUCATION/TRAINING PROGRAM

## 2023-04-17 PROCEDURE — 97530 THERAPEUTIC ACTIVITIES: CPT

## 2023-04-17 PROCEDURE — 99232 SBSQ HOSP IP/OBS MODERATE 35: CPT | Performed by: STUDENT IN AN ORGANIZED HEALTH CARE EDUCATION/TRAINING PROGRAM

## 2023-04-17 RX ORDER — MAGNESIUM SULFATE 1 G/100ML
1 INJECTION INTRAVENOUS ONCE
Status: COMPLETED | OUTPATIENT
Start: 2023-04-17 | End: 2023-04-17

## 2023-04-17 RX ORDER — POTASSIUM CHLORIDE 20 MEQ/1
40 TABLET, EXTENDED RELEASE ORAL ONCE
Status: DISCONTINUED | OUTPATIENT
Start: 2023-04-17 | End: 2023-04-17

## 2023-04-17 RX ORDER — CEFTRIAXONE 1 G/50ML
1000 INJECTION, SOLUTION INTRAVENOUS EVERY 24 HOURS
Status: DISCONTINUED | OUTPATIENT
Start: 2023-04-18 | End: 2023-04-19

## 2023-04-17 RX ORDER — METRONIDAZOLE 500 MG/1
500 TABLET ORAL EVERY 8 HOURS
Status: DISCONTINUED | OUTPATIENT
Start: 2023-04-18 | End: 2023-04-20 | Stop reason: HOSPADM

## 2023-04-17 RX ORDER — POTASSIUM CHLORIDE 20 MEQ/1
40 TABLET, EXTENDED RELEASE ORAL ONCE
Status: COMPLETED | OUTPATIENT
Start: 2023-04-17 | End: 2023-04-17

## 2023-04-17 RX ADMIN — PRAVASTATIN SODIUM 40 MG: 40 TABLET ORAL at 16:17

## 2023-04-17 RX ADMIN — SODIUM BICARBONATE 100 ML/HR: 84 INJECTION, SOLUTION INTRAVENOUS at 08:36

## 2023-04-17 RX ADMIN — APIXABAN 2.5 MG: 2.5 TABLET, FILM COATED ORAL at 18:13

## 2023-04-17 RX ADMIN — PANTOPRAZOLE SODIUM 20 MG: 20 TABLET, DELAYED RELEASE ORAL at 16:15

## 2023-04-17 RX ADMIN — DICYCLOMINE HYDROCHLORIDE 20 MG: 10 CAPSULE ORAL at 09:48

## 2023-04-17 RX ADMIN — DICYCLOMINE HYDROCHLORIDE 20 MG: 10 CAPSULE ORAL at 20:12

## 2023-04-17 RX ADMIN — APIXABAN 2.5 MG: 2.5 TABLET, FILM COATED ORAL at 12:39

## 2023-04-17 RX ADMIN — POTASSIUM CHLORIDE 40 MEQ: 1500 TABLET, EXTENDED RELEASE ORAL at 08:37

## 2023-04-17 RX ADMIN — HEPARIN SODIUM 5000 UNITS: 5000 INJECTION INTRAVENOUS; SUBCUTANEOUS at 09:49

## 2023-04-17 RX ADMIN — MAGNESIUM SULFATE HEPTAHYDRATE 1 G: 1 INJECTION, SOLUTION INTRAVENOUS at 10:46

## 2023-04-17 RX ADMIN — CEFTRIAXONE 1000 MG: 1 INJECTION, SOLUTION INTRAVENOUS at 23:04

## 2023-04-17 RX ADMIN — DEXTROSE 500 ML: 5 SOLUTION INTRAVENOUS at 07:23

## 2023-04-17 RX ADMIN — SODIUM BICARBONATE 100 ML/HR: 84 INJECTION, SOLUTION INTRAVENOUS at 20:12

## 2023-04-17 RX ADMIN — LEVOTHYROXINE SODIUM 50 MCG: 50 TABLET ORAL at 05:02

## 2023-04-17 RX ADMIN — METRONIDAZOLE 500 MG: 500 TABLET ORAL at 23:04

## 2023-04-17 RX ADMIN — DICYCLOMINE HYDROCHLORIDE 20 MG: 10 CAPSULE ORAL at 16:15

## 2023-04-17 NOTE — PLAN OF CARE
Problem: Potential for Falls  Goal: Patient will remain free of falls  Description: INTERVENTIONS:  - Educate patient/family on patient safety including physical limitations  - Instruct patient to call for assistance with activity   - Consult OT/PT to assist with strengthening/mobility   - Keep Call bell within reach  - Keep bed low and locked with side rails adjusted as appropriate  - Keep care items and personal belongings within reach  - Initiate and maintain comfort rounds  - Make Fall Risk Sign visible to staff  - Offer Toileting every 2  Problem: Nutrition/Hydration-ADULT  Goal: Nutrient/Hydration intake appropriate for improving, restoring or maintaining nutritional needs  Description: Monitor and assess patient's nutrition/hydration status for malnutrition  Collaborate with interdisciplinary team and initiate plan and interventions as ordered  Monitor patient's weight and dietary intake as ordered or per policy  Utilize nutrition screening tool and intervene as necessary  Determine patient's food preferences and provide high-protein, high-caloric foods as appropriate       INTERVENTIONS:  - Monitor oral intake, urinary output, labs, and treatment plans  - Assess nutrition and hydration status and recommend course of action  - Evaluate amount of meals eaten  - Assist patient with eating if necessary   - Allow adequate time for meals  - Recommend/ encourage appropriate diets, oral nutritional supplements, and vitamin/mineral supplements  - Order, calculate, and assess calorie counts as needed  - Recommend, monitor, and adjust tube feedings and TPN/PPN based on assessed needs  - Assess need for intravenous fluids  - Provide specific nutrition/hydration education as appropriate  - Include patient/family/caregiver in decisions related to nutrition  Outcome: Progressing     Problem: MOBILITY - ADULT  Goal: Maintain or return to baseline ADL function  Description: INTERVENTIONS:  -  Assess patient's ability to carry out ADLs; assess patient's baseline for ADL function and identify physical deficits which impact ability to perform ADLs (bathing, care of mouth/teeth, toileting, grooming, dressing, etc )  - Assess/evaluate cause of self-care deficits   - Assess range of motion  - Assess patient's mobility; develop plan if impaired  - Assess patient's need for assistive devices and provide as appropriate  - Encourage maximum independence but intervene and supervise when necessary  - Involve family in performance of ADLs  - Assess for home care needs following discharge   - Consider OT consult to assist with ADL evaluation and planning for discharge  - Provide patient education as appropriate  Outcome: Progressing  Goal: Maintains/Returns to pre admission functional level  Description: INTERVENTIONS:  - Perform BMAT or MOVE assessment daily    - Set and communicate daily mobility goal to care team and patient/family/caregiver     - Collaborate with rehabilitation services on mobility goals if consulted  Problem: PAIN - ADULT  Goal: Verbalizes/displays adequate comfort level or baseline comfort level  Description: Interventions:  - Encourage patient to monitor pain and request assistance  - Assess pain using appropriate pain scale  - Administer analgesics based on type and severity of pain and evaluate response  - Implement non-pharmacological measures as appropriate and evaluate response  - Consider cultural and social influences on pain and pain management  - Notify physician/advanced practitioner if interventions unsuccessful or patient reports new pain  Outcome: Progressing     Problem: CARDIOVASCULAR - ADULT  Goal: Maintains optimal cardiac output and hemodynamic stability  Description: INTERVENTIONS:  - Monitor I/O, vital signs and rhythm  - Monitor for S/S and trends of decreased cardiac output  - Administer and titrate ordered vasoactive medications to optimize hemodynamic stability  - Assess quality of pulses, skin color and temperature  - Assess for signs of decreased coronary artery perfusion  - Instruct patient to report change in severity of symptoms  Outcome: Progressing  Goal: Absence of cardiac dysrhythmias or at baseline rhythm  Description: INTERVENTIONS:  - Continuous cardiac monitoring, vital signs, obtain 12 lead EKG if ordered  - Administer antiarrhythmic and heart rate control medications as ordered  - Monitor electrolytes and administer replacement therapy as ordered  Outcome: Progressing     Problem: GASTROINTESTINAL - ADULT  Goal: Maintains or returns to baseline bowel function  Description: INTERVENTIONS:  - Assess bowel function  - Encourage oral fluids to ensure adequate hydration  - Administer IV fluids if ordered to ensure adequate hydration  - Administer ordered medications as needed  - Encourage mobilization and activity  - Consider nutritional services referral to assist patient with adequate nutrition and appropriate food choices  Outcome: Progressing     Problem: METABOLIC, FLUID AND ELECTROLYTES - ADULT  Goal: Electrolytes maintained within normal limits  Description: INTERVENTIONS:  - Monitor labs and assess patient for signs and symptoms of electrolyte imbalances  - Administer electrolyte replacement as ordered  - Monitor response to electrolyte replacements, including repeat lab results as appropriate  - Instruct patient on fluid and nutrition as appropriate  Outcome: Progressing  Goal: Fluid balance maintained  Description: INTERVENTIONS:  - Monitor labs   - Monitor I/O and WT  - Instruct patient on fluid and nutrition as appropriate  - Assess for signs & symptoms of volume excess or deficit  Outcome: Progressing     - Out of bed to chair 2 times a day   - Out of bed for toileting  - Record patient progress and toleration of activity level   Outcome: Progressing   Hours, in advance of need  - Initiate/Maintain bed alarm  - Apply yellow socks and bracelet for high fall risk patients  - Consider moving patient to room near nurses station  Outcome: Progressing

## 2023-04-17 NOTE — ASSESSMENT & PLAN NOTE
Chronic, likely 2/2 to enterocolitis, dehydration and acute renal failure    Asymptomatic,    • ECG NSR, Tele-afib  • TSH,  fT4-pending/ serial Troponins-WNL   • [id number]  • Rate control:  continue home metoprolol 25 mg daily, as needed IV metoprolol as needed  • Anticoag: heparin subcu, held home Eliquis due to renal function  • Consult cardio: Continue home Eliquis and BP meds   • Echo-pending

## 2023-04-17 NOTE — ASSESSMENT & PLAN NOTE
Improving,    POA, likely secondary to diarrhea and severe dehydration    · Creatinine 4 59 > 2 57  · Baseline 0 79- 1 10  · Bolus and maintenance IVF given  · Hold home lisinopril, Klor-Con and Lasix  · Avoid nephrotoxic's  · Repeat BMP for midnight, will trend  · Nephro consulted: Initiate bicarb drip, no need for HD, avoid nephrotoxic's

## 2023-04-17 NOTE — ASSESSMENT & PLAN NOTE
POA, evidence with tachycardia, leukocytosis, tachypnea, hypotension and hypothermia likely secondary to enterocolitis versus UTI    · Blood cultures-pending  · Lactic acid-WNL/Pro-Crow- WNL  · Bolus and IVF maintenance fluid given in ED, will continue  · Vanco and cefepime in ED, GI recs metronidazole and Rocephin  · We will trend

## 2023-04-17 NOTE — UTILIZATION REVIEW
Initial Clinical Review    Admission: Date/Time/Statement:   Admission Orders (From admission, onward)     Ordered        04/16/23 1747  INPATIENT ADMISSION  Once                   Orders Placed This Encounter   Procedures   • INPATIENT ADMISSION     Standing Status:   Standing     Number of Occurrences:   1     Order Specific Question:   Level of Care     Answer:   Med Surg [16]     Order Specific Question:   Estimated length of stay     Answer:   More than 2 Midnights     Order Specific Question:   Certification     Answer:   I certify that inpatient services are medically necessary for this patient for a duration of greater than two midnights  See H&P and MD Progress Notes for additional information about the patient's course of treatment  ED Arrival Information     Expected   -    Arrival   4/16/2023 12:46    Acuity   Emergent            Means of arrival   Wheelchair    Escorted by   Family Member    Service   Hospitalist    Admission type   Emergency            Arrival complaint   diarrhea/n/v           Chief Complaint   Patient presents with   • Diarrhea     Diarrhea for about 10 days  Has not seen anyone for this  Nailbeds cyanotic  C/o pain to ;neck and ribs  No vomiting temp low in triage       Initial Presentation:   78 yof to ER from home c/o diarrhea x 10 days, nausea no vomiting, poor po intake, now lightheaded & increased fatigue with generalized lower abdominal pain most pronounced in the suprapubic area  Hx chronic atrial fibrillation anticoagulated on Eliquis, CVA  Presents drowsy, A&Ox4, hypothermic, tachycardic, tachypneic, hypotensive, generalized lower abd tenderness with voluntary guarding, hyperactive BS  Admission work-up showing enterocolitis & cyst on pancreatic head per imaging, multiple electrolyte abnormalities, metabolic acidosis, ARF, +u/a  Admitted to inpatient status for enterocolitis, ARF  Started on IVABT, IVF, cultures pending      Per renal: Acute kidney injury most likely due to prerenal azotemia in setting of severe volume depletion from ongoing poor oral intake and diarrhea +/- hemodynamic changes in setting of diuretics and ACE inhibitor use  Mixed anion gap and non-anion gap metabolic acidosis in setting of acute kidney injury and diarrheal losses  Mild hyponatremia in setting of acute kidney injury  Possible gastroenteritis  Plan: Discontinue normal saline  Hold furosemide  Hold potassium supplements  Hold lisinopril  Start D5W with 150 mEq of sodium bicarbonate at 100 cc/h    Date: 4/17/23   Day 2:   Enterocolitis & ARF, mgt per GI & renal  Afib, Eliquis & Metoprolol continued  Per GI: enterocolitis  Follow-up enteric panel  Continue serial abdominal examinations  & monitor for systemic signs of sepsis  Empirical antimicrobial coverage modified to include gram negative and anaerobic organisms; initiate ceftriaxone and flagyl from tomorrow  Continue PPI, antiemetics and antispasmodic agents  Advance diet as able  Per renal: Non-Oliguric [place] stage 3 (severe)  Acid-base disorder  hyponatremia  Current creatinine: 2 57 mg/dL, trending down  Decrease bicarb to 100 mEq in D5, continue 100 mils per hour to avoid sodium low in the settings of hyponatremia with rapid correction  Discontinue IV bicarb once bicarb is 19 or more  Goal is to maintain a serum sodium of 131 to 133 mK/L for today         ED Triage Vitals [04/16/23 1307]   Temperature Pulse Respirations Blood Pressure SpO2   (!) 96 °F (35 6 °C) (!) 107 (!) 24 (!) 60/42 92 %      Temp Source Heart Rate Source Patient Position - Orthostatic VS BP Location FiO2 (%)   Tympanic Monitor Sitting Left arm --      Pain Score       8          Wt Readings from Last 1 Encounters:   04/16/23 52 2 kg (115 lb)     Additional Vital Signs:   04/17/23 08:51:35 97 3 °F (36 3 °C) Abnormal  51 Abnormal  -- 92/56 68 99 % -- -- -- --   04/17/23 03:12:23 97 6 °F (36 4 °C) 71 16 90/53 62 Abnormal  99 % 28 2 L/min Nasal cannula Lying   04/16/23 22:11:48 97 4 °F (36 3 °C) Abnormal  60 18 104/51 69 98 % 28 2 L/min Nasal cannula Lying   04/16/23 19:10:19 97 2 °F (36 2 °C) Abnormal  71 16 114/68 83 99 % 28 2 L/min Nasal cannula Lying   04/16/23 1815 -- 84 15 112/72 86 99 % -- -- -- --   04/16/23 1800 -- 69 15 115/68 87 99 % -- -- -- --   04/16/23 1730 -- 76 19 104/74 85 100 % -- -- -- --   04/16/23 1715 -- 69 14 99/70 80 99 % -- -- -- --   04/16/23 1700 -- 71 22 99/58 76 96 % -- -- -- --   04/16/23 1630 96 5 °F (35 8 °C) Abnormal  72 22 118/57 79 99 % -- -- -- --   04/16/23 1615 -- 88 20 104/57 77 99 % -- -- -- --   04/16/23 1600 -- 71 22 106/61 76 97 % -- -- -- --   04/16/23 1545 -- 76 20 103/69 81 95 % -- -- -- --   04/16/23 1530 -- 72 22 91/56 69 99 % -- -- -- --   04/16/23 1515 -- 97 20 91/53 65 96 % -- -- -- --   04/16/23 1445 -- 69 20 93/61 73 98 % -- -- Nasal cannula --   04/16/23 1430 -- 73 20 89/59 Abnormal  70 98 % -- -- Nasal cannula --     Pertinent Labs/Diagnostic Test Results:   CT abdomen pelvis wo contrast   Final Result  (04/16 1549)   Exam is limited without IV and oral contrast particularly for soft tissue and bowel evaluation  1   Scattered air-fluid levels in the small and large bowel, nonspecific but can be seen with underlying enterocolitis  2  Small cyst in the region of the pancreatic head without significant change given the limitations of this exam  Follow up as per previous guidelines         Results from last 7 days   Lab Units 04/16/23  1330   SARS-COV-2  Negative     Results from last 7 days   Lab Units 04/17/23  0520 04/16/23  1330   WBC Thousand/uL 4 04* 5 77   HEMOGLOBIN g/dL 11 9 15 2   HEMATOCRIT % 35 4 47 4*   PLATELETS Thousands/uL 172 224   NEUTROS ABS Thousands/µL 2 40 3 90       Results from last 7 days   Lab Units 04/17/23  0520 04/16/23  2331 04/16/23  1330   SODIUM mmol/L 136 133* 129*   POTASSIUM mmol/L 3 1* 3 7 4 5   CHLORIDE mmol/L 110* 110* 103   CO2 mmol/L 15* 11* 9*   ANION GAP mmol/L 11 12 17*   BUN mg/dL 78* 84* 100*   CREATININE mg/dL 2 57* 3 11* 4 59*   EGFR ml/min/1 73sq m 16 13 8   CALCIUM mg/dL 7 6* 7 5* 9 3   MAGNESIUM mg/dL 1 3*  --   --      Results from last 7 days   Lab Units 04/17/23  0520 04/16/23  1330   AST U/L 31 41*   ALT U/L 18 25   ALK PHOS U/L 68 102   TOTAL PROTEIN g/dL 5 6* 8 0   ALBUMIN g/dL 3 5 5 0   TOTAL BILIRUBIN mg/dL 0 54 0 55       Results from last 7 days   Lab Units 04/17/23  0520 04/16/23  2331 04/16/23  1330   GLUCOSE RANDOM mg/dL 105 102 82       Results from last 7 days   Lab Units 04/16/23  2331   PH LUIS  7 203*   PCO2 LUIS mm Hg 32 9*   PO2 LUIS mm Hg 40 9   HCO3 LUIS mmol/L 12 7*   BASE EXC LUIS mmol/L -14 2   O2 CONTENT LUIS ml/dL 12 6   O2 HGB, VENOUS % 69 6       Results from last 7 days   Lab Units 04/16/23  1537 04/16/23  1330   HS TNI 0HR ng/L  --  27   HS TNI 2HR ng/L 19  --    HSTNI D2 ng/L -8  --        Results from last 7 days   Lab Units 04/16/23  1330   PROTIME seconds 13 8   INR  1 04   PTT seconds 29     Results from last 7 days   Lab Units 04/16/23  2331   TSH 3RD GENERATON uIU/mL 2 037     Results from last 7 days   Lab Units 04/16/23  1330   PROCALCITONIN ng/ml 0 17     Results from last 7 days   Lab Units 04/16/23  1330   LACTIC ACID mmol/L 0 7     Results from last 7 days   Lab Units 04/16/23  1551   CLARITY UA  Clear   COLOR UA  Yellow   SPEC GRAV UA  1 015   PH UA  5 5   GLUCOSE UA mg/dl Negative   KETONES UA mg/dl Trace*   BLOOD UA  Small*   PROTEIN UA mg/dl Negative   NITRITE UA  Negative   BILIRUBIN UA  Negative   UROBILINOGEN UA E U /dl 0 2   LEUKOCYTES UA  Trace*   WBC UA /hpf 0-1   RBC UA /hpf 0-1   BACTERIA UA /hpf Occasional   EPITHELIAL CELLS WET PREP /hpf Occasional     Results from last 7 days   Lab Units 04/16/23  1330   INFLUENZA A PCR  Negative   INFLUENZA B PCR  Negative   RSV PCR  Negative     Results from last 7 days   Lab Units 04/16/23  1334 04/16/23  1332   BLOOD CULTURE  Received in Microbiology Lab  Culture in Progress   Received in Microbiology Lab  Culture in Progress         ED Treatment:   Medication Administration from 04/16/2023 1245 to 04/16/2023 1859       Date/Time Order Dose Route Action     04/16/2023 1337 EDT sodium chloride 0 9 % bolus 1,000 mL 1,000 mL Intravenous New Bag     04/16/2023 1336 EDT sodium chloride 0 9 % bolus 1,000 mL 1,000 mL Intravenous New Bag     04/16/2023 1424 EDT vancomycin (VANCOCIN) 1500 mg in sodium chloride 0 9% 250 mL IVPB 1,500 mg Intravenous New Bag     04/16/2023 1343 EDT cefepime (MAXIPIME) IVPB (premix in dextrose) 2,000 mg 50 mL 2,000 mg Intravenous New Bag        Past Medical History:   Diagnosis Date   • Anemia    • Arthritis    • Atherosclerosis of native coronary artery of native heart without angina pectoris 02/06/2019   • Cataract    • Chronic atrial fibrillation (HCC)    • Degeneration of cervical intervertebral disc    • Diarrhea     onset 07/10   • Disease of thyroid gland    • Occipital infarction [facility])    • Wrist fracture     right      Present on Admission:  • Atrial fibrillation (HCC)  • Hypothyroidism  • Pulmonary hypertension, unspecified ([facility] [address] )  • Pure hypercholesterolemia  • Thoracic aortic aneurysm without rupture, unspecified part [facility] )  • Tricuspid incompetence      Admitting Diagnosis: Atrial fibrillation ([facility] ) [I48 91]  Diarrhea [R19 7]  Hyponatremia [E87 1]  Renal failure [N19]  Age/Sex: 78 y o  female  Admission Orders:  Pt eval & tx  OT cognitive evaluation  Scd/foot pumps  Telemetry  Consult cardio  Consult renal  Consult GI  Forced warm air blanket  Contact & hand hygiene isolation    Scheduled Medications:  cefepime, 2,000 mg, Intravenous, Q24H  dextrose, 500 mL, Intravenous, Once  dicyclomine, 20 mg, Oral, TID  heparin (porcine), 5,000 Units, Subcutaneous, Q12H [address]  levothyroxine, 50 mcg, Oral, Early Morning  metoprolol succinate, 25 mg, Oral, Daily  pantoprazole, 20 mg, Oral, BID AC  potassium chloride (K-DUR,KLOR-CON) CR tablet 40 mEq, Once 4/17  pravastatin, 40 mg, Oral, Daily With Dinner    Continuous IV Infusions:  sodium bicarbonate 100 mEq in dextrose 5 % 1,000 mL infusion  Rate: 100 mL/hr Dose: 100 mL/hr  Freq: Continuous Route: IV  Last Dose: 100 mL/hr (04/17/23 0836)  Start: 04/17/23 0800  sodium bicarbonate 150 mEq in dextrose 5 % 1,000 mL infusion  Rate: 100 mL/hr Dose: 100 mL/hr  Freq: Continuous Route: IV  Last Dose: 100 mL/hr (04/1938)  Start: 04/16/23 1915 End: 04/17/23 0710  sodium chloride 0 9 % infusion  Rate: 100 mL/hr Dose: 100 mL/hr  Freq: Continuous Route: IV  Start: 04/16/23 1830 End: 04/16/23 1903    PRN Meds:  metoprolol, 5 mg, Intravenous, Q6H PRN  ondansetron, 4 mg, Intravenous, Q6H PRN  simethicone, 80 mg, Oral, 4x Daily PRN  vancomycin, 15 mg/kg, Intravenous, Once PRN    Network Utilization Review Department  ATTENTION: Please call with any questions or concerns to [phone] and carefully listen to the prompts so that you are directed to the right person  All voicemails are confidential   Emery Oz all requests for admission clinical reviews, approved or denied determinations and any other requests to dedicated fax number below belonging to the campus where the patient is receiving treatment   List of dedicated fax numbers for the Facilities:  [address] DENIALS (Administrative/Medical Necessity) [phone]   [address] [place] [address] (Maternity/NICU/Pediatrics) [phone]   [address]ni [phone]   [facility] [phone]   [facility] [phone]   [facility] [address] [place] [id number] [place] [address] [address] [place]   [address] [place] [address] [phone]   [facility] [address] [place] [address] [phone]

## 2023-04-17 NOTE — CONSULTS
"Consultation - Gastroenterology   Shaila Jose 78 y o  female MRN: [id number]  Unit/Bed#: [address] [place] [address] Encounter: [id number]  04/17/23  9:46 AM    Assessment/ Plan:    Patient is an 78-year-old female history of colon cancer s/p right hemicolectomy, alcoholic liver disease, chronic atrial fibrillation anticoagulated on Eliquis, CVA, diarrhea presenting for evaluation of about 1 week of diarrhea  Patient states that she has been having about 8 episodes of loose nonbloody nonmelanotic stool per day  Patient has had nausea but has not vomited  Per patient's daughter and patient she has not been eating or drinking anything due to this nausea  Patient states lightheadedness and fatigue worsening today  Patient denies chest pain, shortness of breath, palpitations, diaphoresis, syncope or near syncope  Patient complains of 6 out of 10 severity generalized lower abdominal pain most pronounced in the suprapubic area  Patient denies dysuria, hematuria, flank pain, history of nephrolithiasis or pyelonephritis  Patient reported going to urgent care on Friday and getting IV fluids for dehydration  Patient reported history of colitis 50 years ago and same presentation    1- Enterocolitis: CT with IV contrast findings of partial air-fluid levels in the smaller and larger intestine  Follow-up enteric panel  Continue serial abdominal examinations  Continue to monitor for systemic signs of sepsis  Consider broadening antimicrobial coverage for gram negative + anaerobic coverage (noticed report of \"penicillin allergy\", will investigate  If cannot use unasyn, zosyn etc consider FQ + flagyl)  Continue PPI, antiemetics and antispasmodic agents  Advance diet as able  2- SIRS/sepsis: possibly secondary to (1)  3- IMPN: noticed on MRI/MRCP in 6/2022 for scattered pancreatic cyst measuring up to 2 0 cm probably involving the main pancreatic duct and probably side branch   Recommendation previously made for EUS which has " "not been done  Recommend follow-up  4- [place]-  Likely pre-renal given overall clinical picture  Nephrology services has been consulted  Receiving D5W + bicarbonate infusion and IV hydration  Further management per primary and nephrology  5- Hyponatremia- likely hypovolemic in nature given reduced PO intake and GI symptoms  Receiving IV hydration per primary and nephrology services  6- Mixed anion gap and non-elevated anion gap metabolic acidosis- currently resolved with IV fluids  Lactate was within normal limits  Possibly a com ination of ketonemia from relative starvation state and GI losses  Noted medical history of TAA, raynaud's syndrome, Afib on eliquis (held on admission and started on heaprin 2/2 renal function), colon cancer s/p right hemicolectomy and findings suggestive of EtOH liver disease and IPMN on previous work-up  History of Present Illness   Physician Requesting Consult: Nadine Sarkar MD    Reason for Consult / Principal Problem: Diarrhea  HPI: Shaila Jose is a 78y o  year old female who presents with diarrhea and diffuse abdominal pain for about 1 week duration  Patient endorses more than 8 loose brown bowel movements initially that have since improved  Nausea, with no associated  vomiting, hematochezia or melena  Patient has a medical history significant for colon cncer s/p right hemicolectomy and previously elevated LFTs for which she underwent MRI/MRCP and found for evidence of IPMN in head of pancreas and sequlae of chronic pancreatitis  Inpatient consult to gastroenterology  Consult performed by: Dylan Brewer MD  Consult ordered by: Nadine Sarkar MD        Prior Gastrointestinal Work-up    She had an US elastography which showed minimal fibrosis but did have an MRI/MRCP which showed likely IPMN in HOP with sequelae of chronic pancreatitis  Was to undergo EUS in 1/2023  EGD (5/2022):  \"The duodenum appeared normal  Performed random biopsy using biopsy " "forceps  Mild erythematous mucosa in the antrum; performed cold forceps biopsyThe cardia appeared normal   Moderate erythematous and nodular mucosa in the GE junction; performed cold forceps biopsy\"  Colonoscopy (5/2022): \"Healthy anastomosis in the ascending colonPerformed random biopsy using biopsy forceps  Otherwise normal colon  Internal hemorrhoids    MRI/MRCP (6/2022): \"Scattered pancreatic cysts measuring up to 2 0 cm, some with thin internal septations, without suspicious features, likely with connection to the pancreatic duct, probable side branch IPMNs  For simple cyst(s) 2 cm or greater recommend gastroenterology   and/or surgical oncology consult  EUS is likely now warranted\"  Review of Systems   Constitutional: Positive for appetite change  Negative for chills and fever  HENT: Negative for ear pain and sore throat  Eyes: Negative for pain and visual disturbance  Respiratory: Negative for cough and shortness of breath  Cardiovascular: Negative for chest pain and palpitations  Gastrointestinal: Positive for abdominal pain, diarrhea and nausea  Negative for vomiting  Genitourinary: Negative for dysuria and hematuria  Musculoskeletal: Negative for arthralgias and back pain  Skin: Negative for color change and rash  Neurological: Negative for seizures and syncope  Psychiatric/Behavioral: Negative for agitation and behavioral problems  All other systems reviewed and are negative        Historical Information   Past Medical History:   Diagnosis Date   • Anemia    • Arthritis    • Atherosclerosis of native coronary artery of native heart without angina pectoris 02/06/2019   • Cataract    • Chronic atrial fibrillation [facility])    • Degeneration of cervical intervertebral disc    • Diarrhea     onset 07/10   • Disease of thyroid gland    • Occipital infarction [facility])    • Wrist fracture     right      Past Surgical History:   Procedure Laterality Date   • COLON SURGERY     • FL " "INJECTION LEFT HIP (NON ARTHROGRAM)  2021   • KNEE ARTHROSCOPY      with medial meniscus repair    • MT ARTHROCENTESIS ASPIR&/INJ MAJOR JT/BURSA W/O US Left 2021    Procedure: Hip corticosteroid injection ([id number]); Surgeon: Bobbi Michele MD;  Location: [facility] MAIN OR;  Service: Pain Management      Social History     Substance and Sexual Activity   Alcohol Use Yes   • Alcohol/week: 7 0 standard drinks   • Types: 7 Glasses of wine per week    Comment: occasionally, drinks wine , moderate      Social History     Substance and Sexual Activity   Drug Use No     Social History     Tobacco Use   Smoking Status Former   • Packs/day: 1 00   • Years: 7 00   • Pack years: 7 00   • Types: Cigarettes   • Quit date: 26   • Years since quittin 3   Smokeless Tobacco Never       Family History:   Family History   Problem Relation Age of Onset   • Heart attack Mother    • Valvular heart disease Mother    • Heart attack Father    • Hypertension Sister    • Mental illness Neg Hx        Meds/Allergies   all current active meds have been reviewed  Allergies   Allergen Reactions   • Lidocaine      Annotation - [id number]: Seizure with 20 cc during a surgical block  FS   • Penicillins    • Sulfa Antibiotics Edema       Objective   Vitals: Blood pressure 92/56, pulse (!) 53, temperature (!) 97 3 °F (36 3 °C), temperature source Axillary, resp  rate 16, height 5' 6\" (1 676 m), weight 52 2 kg (115 lb), SpO2 99 %  , Body mass index is 18 56 kg/m² ,   Orthostatic Blood Pressures    Flowsheet Row Most Recent Value   Blood Pressure 92/56 filed at 2023 6986   Patient Position - Orthostatic VS Sitting filed at 2023 6290          Systolic (80HBQ), GLL:29 , Min:60 , HGW:359     Diastolic (84WMP), RPF:87, Min:42, Max:74        Intake/Output Summary (Last 24 hours) at 2023 0946  Last data filed at 2023 0529  Gross per 24 hour   Intake 2780 ml   Output 1300 ml   Net 1480 ml       Invasive Devices     " Peripheral Intravenous Line  Duration           Peripheral IV 04/16/23 Right Antecubital <1 day    Peripheral IV 04/16/23 Right;Upper;Ventral (anterior) Arm <1 day          Drain  Duration           Urethral Catheter Double-lumen <1 day                    Physical Exam:  GEN: Alert and oriented x 3, in no acute distress  Well appearing and well nourished  HEENT: Sclera anicteric, conjunctivae pink, mucous membranes moist  Oropharynx clear  NECK: Supple, no carotid bruits, no significant JVD  Trachea midline, no thyromegaly  HEART: Regular rhythm, normal S1 and S2, no murmurs, clicks, gallops or rubs  PMI nondisplaced, no thrills  LUNGS: Clear to auscultation bilaterally; no wheezes, rales, or rhonchi  No increased work of breathing or signs of respiratory distress  ABDOMEN: Soft, nontender, nondistended, normoactive bowel sounds  EXTREMITIES: Skin warm and well perfused, no clubbing, cyanosis, or edema  NEURO: No focal findings  Normal speech  Mood and affect normal    SKIN: Normal without suspicious lesions on exposed skin        Lab Results    Troponins:       CBC with diff:   Results from last 7 days   Lab Units 04/17/23  0520 04/16/23  1330   WBC Thousand/uL 4 04* 5 77   HEMOGLOBIN g/dL 11 9 15 2   HEMATOCRIT % 35 4 47 4*   MCV fL 100* 105*   PLATELETS Thousands/uL 172 224   MCH pg 33 9 33 6   MCHC g/dL 33 9 32 1   RDW % 15 0 15 4*   MPV fL 12 8* 12 1   NRBC AUTO /100 WBCs 0 0         CMP:   Results from last 7 days   Lab Units 04/17/23  0520 04/16/23  2331 04/16/23  1330   POTASSIUM mmol/L 3 1* 3 7 4 5   CHLORIDE mmol/L 110* 110* 103   CO2 mmol/L 15* 11* 9*   BUN mg/dL 78* 84* 100*   CREATININE mg/dL 2 57* 3 11* 4 59*   CALCIUM mg/dL 7 6* 7 5* 9 3   AST U/L 31  --  41*   ALT U/L 18  --  25   ALK PHOS U/L 68  --  102   EGFR ml/min/1 73sq m 16 13 8

## 2023-04-17 NOTE — ASSESSMENT & PLAN NOTE
Asymptomatic,    · UA positive for leukocytes  · Given dose of cefepime and [place], continue Rocephin  · Follow urine cultures

## 2023-04-17 NOTE — PROGRESS NOTES
[facility]  Progress Note  Name: Mariluz Oates  MRN: [id number]  Unit/Bed#: [address] [address] I Date of Admission: 4/16/2023   Date of Service: 4/17/2023 I Hospital Day: 1    Assessment/Plan   Enterocolitis  Assessment & Plan  Improving,    POA, unresolved for 1 week    · CT abdomen:Scattered air-fluid levels in the small and large bowel, nonspecific but can be seen with underlying enterocolitis    · Vanco and cefepime; (PCN allergy in childhood) given in the ED, continue Vanco  · Bolus and IVF maintenance given  · Stool enteric panel/O&P/C  difficile ordered in ED-pending  · GI recs: Continue Rocephin and metronidazole, PPI, antiemetic  · As needed medicine for pain    Acute renal failure (ARF) (HCC)  Assessment & Plan  Improving,    POA, likely secondary to diarrhea and severe dehydration    · Creatinine 4 59 > 2 57  · Baseline 0 79- 1 10  · Bolus and maintenance IVF given  · Hold home lisinopril, Klor-Con and Lasix  · Avoid nephrotoxic's  · Repeat BMP for midnight, will trend  · Nephro consulted: Initiate bicarb drip, no need for HD, avoid nephrotoxic's    Septic shock (HCC)  Assessment & Plan  POA, evidence with tachycardia, leukocytosis, tachypnea, hypotension and hypothermia likely secondary to enterocolitis versus UTI    · Blood cultures-pending  · Lactic acid-WNL/Pro-Crow- WNL  · Bolus and IVF maintenance fluid given in ED, will continue  · Vanco and cefepime in ED, GI recs metronidazole and Rocephin  · We will trend    Metabolic acidosis, increased anion gap-resolved as of 4/17/2023  Assessment & Plan  · AG-17  · Likely secondary to dehydration and diarrhea  · Status post bolus and IV fluids  · Bicarb drip in place  · Nephro following      Atrial fibrillation (HCC)  Assessment & Plan  Chronic, likely 2/2 to enterocolitis, dehydration and acute renal failure    Asymptomatic,    • ECG NSR, Tele-afib  • TSH,  fT4-pending/ serial Troponins-WNL   • [id number]  • Rate control: continue home metoprolol 25 mg daily, as needed IV metoprolol as needed  • Anticoag: heparin subcu, held home Eliquis due to renal function  • Consult cardio: Continue home Eliquis and BP meds   • Echo-pending      Bacteriuria  Assessment & Plan  Asymptomatic,    · UA positive for leukocytes  · Given dose of cefepime and Vanco, continue Rocephin  · Follow urine cultures    Raynaud's disease without gangrene  Assessment & Plan  POA symptomatic,    · Worsened with cold weather, will use warm blankets or bear hugger for patient as needed  · No medication at home, no esophageal dysmotility    Tricuspid incompetence  Assessment & Plan  Historic diagnosis    · Cardiology following    Pure hypercholesterolemia  Assessment & Plan  Chronic,    · Continue home statin inpatient equivalent    Thoracic aortic aneurysm without rupture, unspecified part ([facility] )  Assessment & Plan  Historical diagnosis    · Followed by outpatient cardiology    Pulmonary hypertension, unspecified ([facility] )  Assessment & Plan  Historical diagnosis    · Repeat echo pending    Hypothyroidism  Assessment & Plan  Chronic,     · TSH/free T4- WNL  · continue home levothyroxine 50 mcg    Hyponatremia-resolved as of 4/17/2023  Assessment & Plan  Asymptomatic,    ·  >136  · Likely secondary to dehydration and diarrhea  · IV bolus and maintenance fluids given  · Trend CMP in a m  · Consult nephro if unresolved           VTE Pharmacologic Prophylaxis:   Moderate Risk (Score 3-4) - Pharmacological DVT Prophylaxis Ordered: apixaban (Eliquis)  Patient Centered Rounds: I performed bedside rounds with nursing staff today  Discussions with Specialists or Other Care Team Provider: Cardiology, nephrology, GI    Education and Discussions with Family / Patient: Updated  (daughter and Granddaughter) at bedside      Total Time Spent on Date of Encounter in care of patient: 45 minutes This time was spent on one or more of the following: performing physical exam; counseling and coordination of care; obtaining or reviewing history; documenting in the medical record; reviewing/ordering tests, medications or procedures; communicating with other healthcare professionals and discussing with patient's family/caregivers  Current Length of Stay: 1 day(s)  Current Patient Status: Inpatient   Certification Statement: The patient will continue to require additional inpatient hospital stay due to Clinical course and specialist recommendations  Discharge Plan: Anticipate discharge in 24-48 hrs to discharge location to be determined pending rehab evaluations  Code Status: Level 1 - Full Code    Subjective:   Patient seen and examined at bedside, with family present, reported no more nausea or vomiting, 1 bowel movements and feeling a lot better  Family given updated regarding plan of care and renal function  Objective:     Vitals:   Temp (24hrs), Av 4 °F (36 3 °C), Min:97 2 °F (36 2 °C), Max:97 6 °F (36 4 °C)    Temp:  [97 2 °F (36 2 °C)-97 6 °F (36 4 °C)] 97 3 °F (36 3 °C)  HR:  [51-84] 59  Resp:  [15-18] 18  BP: ()/(46-72) 98/59  SpO2:  [98 %-99 %] 99 %  Body mass index is 18 56 kg/m²  Input and Output Summary (last 24 hours): Intake/Output Summary (Last 24 hours) at 2023 1744  Last data filed at 2023 1246  Gross per 24 hour   Intake 480 ml   Output 2050 ml   Net -1570 ml       Physical Exam:   Physical Exam  Vitals and nursing note reviewed  Constitutional:       General: She is not in acute distress  Appearance: She is well-developed  HENT:      Head: Normocephalic and atraumatic  Eyes:      Conjunctiva/sclera: Conjunctivae normal    Cardiovascular:      Rate and Rhythm: Normal rate and regular rhythm  Heart sounds: No murmur heard  No friction rub  No gallop  Pulmonary:      Effort: Pulmonary effort is normal  No respiratory distress  Breath sounds: Normal breath sounds  No stridor  No wheezing, rhonchi or rales  Abdominal:      Palpations: Abdomen is soft  Tenderness: There is no abdominal tenderness  There is no guarding or rebound  Musculoskeletal:         General: No swelling or tenderness  Cervical back: Neck supple  Right lower leg: No edema  Left lower leg: No edema  Skin:     General: Skin is warm and dry  Capillary Refill: Capillary refill takes less than 2 seconds  Coloration: Skin is not pale  Findings: No bruising, erythema or rash  Neurological:      Mental Status: She is alert and oriented to person, place, and time  Motor: No weakness     Psychiatric:         Mood and Affect: Mood normal          Behavior: Behavior normal           Additional Data:     Labs:  Results from last 7 days   Lab Units 04/17/23  0520   WBC Thousand/uL 4 04*   HEMOGLOBIN g/dL 11 9   HEMATOCRIT % 35 4   PLATELETS Thousands/uL 172   NEUTROS PCT % 59   LYMPHS PCT % 23   MONOS PCT % 13*   EOS PCT % 3     Results from last 7 days   Lab Units 04/17/23  0520   SODIUM mmol/L 136   POTASSIUM mmol/L 3 1*   CHLORIDE mmol/L 110*   CO2 mmol/L 15*   BUN mg/dL 78*   CREATININE mg/dL 2 57*   ANION GAP mmol/L 11   CALCIUM mg/dL 7 6*   ALBUMIN g/dL 3 5   TOTAL BILIRUBIN mg/dL 0 54   ALK PHOS U/L 68   ALT U/L 18   AST U/L 31   GLUCOSE RANDOM mg/dL 105     Results from last 7 days   Lab Units 04/16/23  1330   INR  1 04             Results from last 7 days   Lab Units 04/16/23  1330   LACTIC ACID mmol/L 0 7   PROCALCITONIN ng/ml 0 17       Lines/Drains:  Invasive Devices     Peripheral Intravenous Line  Duration           Peripheral IV 04/16/23 Right Antecubital 1 day    Peripheral IV 04/16/23 Right;Upper;Ventral (anterior) Arm 1 day          Drain  Duration           Urethral Catheter Double-lumen 1 day              Urinary Catheter:  Goal for removal: Voiding trial when ambulation improves           Telemetry:  Telemetry Orders (From admission, onward)             48 Hour Telemetry Monitoring  Continuous x 48 hours        References:    Telemetry Guidelines   Question:  Reason for 48 Hour Telemetry  Answer:  Arrhythmias Requiring Medical Therapy (eg  SVT, Vtach/fib, Bradycardia, Uncontrolled A-fib)                 Indication for Continued Telemetry Use: No indication for continued use  Will discontinue  Imaging: Reviewed radiology reports from this admission including: abdominal/pelvic CT    Recent Cultures (last 7 days):   Results from last 7 days   Lab Units 04/16/23  1334 04/16/23  1332   BLOOD CULTURE  No Growth at 24 hrs  No Growth at 24 hrs  Last 24 Hours Medication List:   Current Facility-Administered Medications   Medication Dose Route Frequency Provider Last Rate   • apixaban  2 5 mg Oral BID Alexei Stafford PA-C     • [START ON 4/18/2023] cefTRIAXone  1,000 mg Intravenous Q24H Winsome Vicente MD     • dicyclomine  20 mg Oral TID Nguyễn Ma MD     • levothyroxine  50 mcg Oral Early Morning Nguyễn Ma MD     • metoprolol  5 mg Intravenous Q6H PRN Nguyễn Ma MD     • metoprolol succinate  25 mg Oral Daily Nguyễn Ma MD     • [START ON 4/18/2023] metroNIDAZOLE  500 mg Oral Q8H Winsome Vicente MD     • ondansetron  4 mg Intravenous Q6H PRN Nguyễn Ma MD     • pantoprazole  20 mg Oral BID AC Nguyễn Ma MD     • pravastatin  40 mg Oral Daily With Kait Osullivan MD     • simethicone  80 mg Oral 4x Daily PRN Nguyễn Ma MD     • sodium bicarbonate infusion  100 mL/hr Intravenous Continuous Lynne Hopson  mL/hr (04/17/23 1441)        Today, Patient Was Seen By: Nguyễn Ma MD    **Please Note: This note may have been constructed using a voice recognition system  **

## 2023-04-17 NOTE — CONSULTS
Consultation - Nephrology   Gloria Ferrera 78 y o  female MRN: [id number]  Unit/Bed#: [address] [place] [address] Encounter: [id number]    ASSESSMENT AND PLAN:  a 78 y o  woman with PMH of hypertension A-fib, hyperlipidemia, Raynaud's, hypothyroidism, thoracic aortic aneurysm p/w several days of diarrhea complicated with elevated creatinine  PLAN    #Non-Oliguric KDIGO [place] stage 3 (severe)  • Etiology: Likely secondary to prerenal in the settings of severe diarrhea and dehydration  • Baseline creatinine 0 7 mg/dL  • Peak creatinine: 4 59 mg/dL  • Current creatinine: 2 57 mg/dL, trending down  • UA: No leukocyturia, no hematuria  • Renal imaging : No hydronephrosis  • Treatment:  • No indication of urgent dialysis at this time  • Maintain MAP:  Over 65 mmHg if possible/avoid hypoperfusion:  Hold parameters on blood pressure medications  • Avoid nephrotoxic agents such as NSAIDs, and IV contrast if possible   Avoid opioids   • Adjust medications to GFR      #Acid-base Disorder  • vbg: pH 7 2, pCO2 40 9, serum HCO3 9, on admission  • A  • Current bicarb 15 mmol/L  • Started on sodium bicarb 150 mEq in D5 100 mils per hour  • Decrease bicarb to 100 mEq in D5, continue 100 mils per hour to avoid sodium low in the settings of hyponatremia with rapid correction  • Discontinue IV bicarb once bicarb is 19 or more    #Volume status/hypertension:  • Volume: Hypovolemia  • Blood pressure: Hypotensive BP 92/56  • Recommend:  • Holding lisinopril and Lasix  • IV fluids as abov    #Hypokalemia  · Serum potassium 3 1 mg/L  · Replete with potassium chloride 40 mEq  · Continue aggressive repletion as needed    #Hypocalcemia  · 7 6 mg/dL  · Replete with IV calcium gluconate as needed    #Anemia:  • Current hemoglobin: 11 9 mg/dL  • Treatment:  • Transfuse for hemoglobin less than 7 0 per primary service      #Enterocolitis  · Diarrhea improving  · On antibiotics as per primary team  · IV fluids as above    #septic shock  · In the settings of diarrhea  · Status post IV fluids resuscitation    #Hyponatremia  · Serum sodium on admission 129  · Secondary to true hyponatremia with severe diarrhea and hypovolemia  · Current sodium 136 mEq/L after IV fluids  · Overcorrection  · D5 500 mL this morning  · Repeat BMP at 1 PM  · Goal is to maintain a serum sodium of 131 to 133 mK/L for today    HISTORY OF PRESENT ILLNESS:  Requesting Physician: Kasia Woodward MD  Reason for Consult: [place]    Florinda Sánchez is a 78 y o  female with PMH of hypertension A-fib, hyperlipidemia, Raynaud's, hypothyroidism, thoracic aortic aneurysm who was admitted to [facility] after presenting with several days of diarrhea  A renal consultation is requested today for assistance in the management of LAKE    PAST MEDICAL HISTORY:  Past Medical History:   Diagnosis Date   • Anemia    • Arthritis    • Atherosclerosis of native coronary artery of native heart without angina pectoris 02/06/2019   • Cataract    • Chronic atrial fibrillation (HCC)    • Degeneration of cervical intervertebral disc    • Diarrhea     onset 07/10   • Disease of thyroid gland    • Occipital infarction [facility])    • Wrist fracture     right        PAST SURGICAL HISTORY:  Past Surgical History:   Procedure Laterality Date   • COLON SURGERY     • FL INJECTION LEFT HIP (NON ARTHROGRAM)  5/28/2021   • KNEE ARTHROSCOPY      with medial meniscus repair    • WA ARTHROCENTESIS ASPIR&/INJ MAJOR JT/BURSA W/O US Left 5/28/2021    Procedure: Hip corticosteroid injection ([id number]); Surgeon: Beverly Anthony MD;  Location: [facility] MAIN OR;  Service: Pain Management        ALLERGIES:  Allergies   Allergen Reactions   • Lidocaine      Annotation - [id number]: Seizure with 20 cc during a surgical block   FS   • Penicillins    • Sulfa Antibiotics Edema       SOCIAL HISTORY:  Social History     Substance and Sexual Activity   Alcohol Use Yes   • Alcohol/week: 7 0 standard drinks   • Types: 7 Glasses of wine per week Comment: occasionally, drinks wine , moderate      Social History     Substance and Sexual Activity   Drug Use No     Social History     Tobacco Use   Smoking Status Former   • Packs/day: 1 00   • Years: 7 00   • Pack years: 7 00   • Types: Cigarettes   • Quit date: 26   • Years since quittin 3   Smokeless Tobacco Never       FAMILY HISTORY:  Family History   Problem Relation Age of Onset   • Heart attack Mother    • Valvular heart disease Mother    • Heart attack Father    • Hypertension Sister    • Mental illness Neg Hx        MEDICATIONS:    Current Facility-Administered Medications:   •  cefepime (MAXIPIME) IVPB (premix in dextrose) 2,000 mg 50 mL, 2,000 mg, Intravenous, Q24H, Rosie Conrad MD  •  dicyclomine (BENTYL) capsule 20 mg, 20 mg, Oral, TID, Rosie Conrad MD, 20 mg at 23 0948  •  heparin (porcine) subcutaneous injection 5,000 Units, 5,000 Units, Subcutaneous, Q12H [address], Rosie Conrad MD, 5,000 Units at 23 0686  •  levothyroxine tablet 50 mcg, 50 mcg, Oral, Early Morning, Rosie Conrad MD, 50 mcg at 23 0502  •  magnesium sulfate IVPB (premix) SOLN 1 g, 1 g, Intravenous, Once, Rosie Conrad MD  •  metoprolol (LOPRESSOR) injection 5 mg, 5 mg, Intravenous, Q6H PRN, Rosie Conrad MD  •  metoprolol succinate (TOPROL-XL) 24 hr tablet 25 mg, 25 mg, Oral, Daily, Rosie Conrad MD  •  ondansetron [facility]) injection 4 mg, 4 mg, Intravenous, Q6H PRN, Rosie Conrad MD  •  pantoprazole (PROTONIX) EC tablet 20 mg, 20 mg, Oral, BID AC, Rosie Conrad MD  •  pravastatin (PRAVACHOL) tablet 40 mg, 40 mg, Oral, Daily With Vira Eden MD, 40 mg at 23 1939  •  simethicone (MYLICON) chewable tablet 80 mg, 80 mg, Oral, 4x Daily PRN, Rosie Conrad MD  •  sodium bicarbonate 100 mEq in dextrose 5 % 1,000 mL infusion, 100 mL/hr, Intravenous, Continuous, Zuleyma Falcon MD, Last Rate: 100 mL/hr at 23, 100 mL/hr at 23  •  vancomycin (VANCOCIN) IVPB (premix in dextrose) 750 mg 150 mL, 15 mg/kg, Intravenous, Once PRN, Anibal Day MD    REVIEW OF SYSTEMS:  Complete 10 point review of systems were obtained and discussed in length with the patient  Complete review of systems were negative / unremarkable except mentioned in the HPI section       Review of Systems - Psychological ROS: negative  Ophthalmic ROS: negative  ENT ROS: negative  Hematological and Lymphatic ROS: positive for - fatigue  Endocrine ROS: negative  Respiratory ROS: no cough, shortness of breath, or wheezing  Cardiovascular ROS: no chest pain or dyspnea on exertion  Gastrointestinal ROS: positive for - diarrhea  Genito-Urinary ROS: no dysuria, trouble voiding, or hematuria  Musculoskeletal ROS: negative  Neurological ROS: no TIA or stroke symptoms  Dermatological ROS: negative     PHYSICAL EXAM:  Current Weight: Weight - Scale: 52 2 kg (115 lb)  First Weight: Weight - Scale: 52 2 kg (115 lb)  Vitals:    04/17/23 0852   BP: 92/56   Pulse: (!) 53   Resp: 16   Temp: (!) 97 3 °F (36 3 °C)   SpO2: 99%       Intake/Output Summary (Last 24 hours) at 4/17/2023 1033  Last data filed at 4/17/2023 0529  Gross per 24 hour   Intake 2780 ml   Output 1300 ml   Net 1480 ml     Wt Readings from Last 3 Encounters:   04/16/23 52 2 kg (115 lb)   03/14/23 57 6 kg (127 lb)   02/22/23 58 5 kg (129 lb)     Temp Readings from Last 3 Encounters:   04/17/23 (!) 97 3 °F (36 3 °C) (Axillary)   03/14/23 97 5 °F (36 4 °C) (Temporal)   02/22/23 (!) 97 1 °F (36 2 °C) (Temporal)     BP Readings from Last 3 Encounters:   04/17/23 92/56   03/14/23 118/74   02/22/23 120/60     Pulse Readings from Last 3 Encounters:   04/17/23 (!) 53   03/14/23 60   02/22/23 80        General:  , no acute distress at this time  Skin:  No acute rash  Eyes:  No scleral icterus and noninjected  ENT:  mucous membranes moist  Neck:  no carotid bruits  Chest:  Clear to auscultation percussion, good respiratory effort, no use of accessory respiratory "muscles  CVS:  Regular rate and rhythm without rub  Abdomen:  soft and nontender   Extremities:   no significant lower extremity edema  Neuro:  No gross focality  Psych:  Alert , cooperative       Invasive Devices:   Urethral Catheter Double-lumen (Active)   Amt returned on insertion(mL) 100 mL 04/16/23 1556   Reasons to continue Urinary Catheter  Accurate I&O assessment in critically ill patients (48 hr  max) 04/16/23 1949   Goal for Removal Will consult urology 04/16/23 1949   Site Assessment Clean;Skin intact 04/16/23 1949   Peña Care Done 04/16/23 2100   Collection Container Standard drainage bag 04/16/23 1949   Securement Method Securing device (Describe) 04/16/23 1949   Output (mL) 650 mL 04/17/23 0529     Lab Results:   Results from last 7 days   Lab Units 04/17/23  0520 04/16/23  2331 04/16/23  1330   WBC Thousand/uL 4 04*  --  5 77   HEMOGLOBIN g/dL 11 9  --  15 2   HEMATOCRIT % 35 4  --  47 4*   PLATELETS Thousands/uL 172  --  224   POTASSIUM mmol/L 3 1* 3 7 4 5   CHLORIDE mmol/L 110* 110* 103   CO2 mmol/L 15* 11* 9*   BUN mg/dL 78* 84* 100*   CREATININE mg/dL 2 57* 3 11* 4 59*   CALCIUM mg/dL 7 6* 7 5* 9 3   MAGNESIUM mg/dL 1 3*  --   --        Other Studies:   CT abdomen pelvis wo contrast   Final Result by Jj Martin MD (04/16 1549)   Exam is limited without IV and oral contrast particularly for soft tissue and bowel evaluation  1   Scattered air-fluid levels in the small and large bowel, nonspecific but can be seen with underlying enterocolitis  2  Small cyst in the region of the pancreatic head without significant change given the limitations of this exam  Follow up as per previous guidelines  Workstation performed: [id number]             Portions of the record may have been created with voice recognition software  Occasional wrong word or \"sound a like\" substitutions may have occurred due to the inherent limitations of voice recognition software   Read the chart carefully " and recognize, using context, where substitutions have occurred

## 2023-04-17 NOTE — PHYSICAL THERAPY NOTE
"       PHYSICAL THERAPY EVALUATION/TREATMENT       04/17/23 1210   PT Last Visit   PT Visit Date 04/17/23   Note Type   Note type Evaluation   Pain Assessment   Pain Assessment Tool 0-10   Pain Score No Pain   Patient's Stated Pain Goal No pain   Multiple Pain Sites No   Restrictions/Precautions   Weight Bearing Precautions Per Order No   Other Precautions Bed Alarm; Chair Alarm; Fall Risk;Pain   Home Living   Type of [address] Two level;Stairs to enter with rails  (4 DARYA ; stair glide to second level)   Bathroom Equipment Commode;Tub transfer bench;Grab bars in shower;Grab bars around toilet   Home Equipment Walker;Cane;Grab bars   Prior Function   Level of [place] Independent with ADLs; Independent with functional mobility; Independent with IADLS   Lives With [facility] Help From Family   IADLs Independent with driving; Independent with medication management; Independent with meal prep   Falls in the last 6 months 1 to 4  (Fall Jan 2023 leading to hip fracture)   Vocational Retired   General   Additional Pertinent History Pt is an 78year-old female who was admitted to the hospital on 4/16/23 due to diarrhea x 10 days at home   Family/Caregiver Present Yes  (family at bedside)   Cognition   Overall Cognitive Status WFL   Orientation Level Oriented X4   Following Commands Follows multistep commands with increased time or repetition   Subjective   Subjective \"I'm doing okay\"   RLE Assessment   RLE Assessment WFL   LLE Assessment   LLE Assessment WFL   Bed Mobility   Rolling R 5  Supervision   Additional items Verbal cues   Rolling L 5  Supervision   Additional items Verbal cues   Supine to Sit 5  Supervision   Additional items Verbal cues; Bedrails; Increased time required   Sit to Supine 5  Supervision   Additional items Verbal cues; Bedrails; Increased time required   Transfers   Sit to Stand 4  Minimal assistance   Additional items Assist x 1;Verbal cues   Stand to Sit 4  Minimal assistance " Additional items Assist x 1;Verbal cues   Ambulation/Elevation   Gait pattern Step through pattern; Short stride; Foward flexed   Gait Assistance 4  Minimal assist   Additional items Assist x 1;Verbal cues   Assistive Device Rolling walker   Distance 6 feet within room   Stair Management Assistance Not tested   Ambulation/Elevation Additional Comments Limited by lightheadedness in standing  (BP sitting 90/53 ; standing + after ambulating short distance within room 85/46 - initial symptoms brought on after sitting on EOB - not worse with transition to standing or ambulating  Mild improvement once returned to supine)   Balance   Static Sitting Fair +   Dynamic Sitting Fair   Static Standing Fair   Dynamic Standing [address],F[address] 3,4, & 5 -   Activity Tolerance   Activity Tolerance Patient tolerated treatment well;Treatment limited secondary to medical complications (Comment)  (limited by dizziness/lightheadedness)   Medical Staff Made Aware yes RN Nasra Mas present and aware of symptoms + low BP   Assessment   Prognosis Good   Problem List Decreased endurance; Impaired balance;Decreased mobility; Impaired judgement;Decreased safety awareness;Pain;Decreased skin integrity   Assessment Patient seen for Physical Therapy evaluation  Patient admitted with diarrhea  Comorbidities affecting patient's physical performance include: Afib, anemia, arthritis, cardiac disease and hypothyroid  Personal factors affecting patient at time of initial evaluation include: lives in 2 story house, stairs to enter home, inability to navigate community distances, limited home support, positive fall history, inability to perform ADLS, inability to perform IADLS  and inability to live alone  Prior to admission, patient was independent with functional mobility without assistive device, independent with ADLS, requiring assist for IADLS, living with son in a 2 level home with 4 steps to enter and ambulating household distance    Please find objective findings from Physical Therapy assessment regarding body systems outlined above with impairments and limitations including weakness, impaired balance, decreased endurance, gait deviations, pain, decreased activity tolerance, decreased functional mobility tolerance and fall risk  The Barthel Index was used as a functional outcome tool presenting with a score of Barthel Index Score: 55 today indicating marked limitations of functional mobility and ADLS  Patient's clinical presentation is currently unstable/unpredictable as seen in patient's presentation of vital sign response, increased fall risk, new onset of impairment of functional mobility and decreased endurance  Pt would benefit from continued Physical Therapy treatment to address deficits as defined above and maximize level of functional mobility  As demonstrated by objective findings, the assigned level of complexity for this evaluation is high  The patient's AM-[facility] Basic Mobility Inpatient Short Form Raw Score is 16  A Raw score of less than or equal to 16 suggests the patient may benefit from discharge to post-acute rehabilitation services  Please also refer to the recommendation of the Physical Therapist for safe discharge planning  Goals   Patient Goals to get better   STG Expiration Date 04/24/23   Short Term Goal #1 Pt will perform bed mobility with supervision ; Pt will perform bed <> chair transfer with supervision + least restrictive device ; Standing balance will improve to Fair+ to decrease risk of falls   Short Term Goal #2 Pt will ambulate x 150 feet with supervision + RW ; Pt will negotiate x 4 steps with Min A ; AMPAC score will improve >18/24 to demonstrate improved functional independence   LTG Expiration Date 05/01/23   Long Term Goal #1 Pt will perform bed mobility IND ;  Pt will perform bed <> chair Mod I with least restrictive device ; Standing balance will improve to Good to decrease risk of falls   Long Term Goal #2 Pt will ambulate x "250 feet with supervision + least restrictive device ; Pt will negotiate x 4 steps with supervision ; AMPAC score will improve >20/24 to demonstrate improved functional independence   Plan   Treatment/Interventions Functional transfer training;LE strengthening/ROM; Therapeutic exercise; Endurance training;Gait training;Spoke to nursing;Bed mobility;Spoke to case management   PT Frequency Other (Comment)  (5x/week)   Recommendation   PT Discharge Recommendation Post acute rehabilitation services   Additional Comments Will continue to assess pending clinical course, hopeful as pt begins to improve she will be able to return home with family support   AM-PAC Basic Mobility Inpatient   Turning in Flat Bed Without Bedrails 3   Lying on Back to Sitting on Edge of Flat Bed Without Bedrails 3   Moving Bed to Chair 3   Standing Up From Chair Using Arms 3   Walk in Room 3   Climb 3-5 Stairs With Railing 1   Basic Mobility Inpatient Raw Score 16   Basic Mobility Standardized Score 38 32   Highest Level Of Mobility   -HL Goal 5: Stand one or more mins   -HLM Achieved 6: Walk 10 steps or more   Barthel Index   Feeding 10   Bathing 0   Grooming Score 5   Dressing Score 5   Bladder Score 10   Bowels Score 10   Toilet Use Score 5   Transfers (Bed/Chair) Score 10   Mobility (Level Surface) Score 0   Stairs Score 0   Barthel Index Score 55   Additional Treatment Session   Start Time 1200   End Time 1210   Treatment Assessment S: \"I'm still lightheaded  \" O/A: Pt agreeable to PT session this AM  Able to perform STS from EOB x 2-3 attempts + maintain static standing at bedside with supervision using RW  BP assessed in sitting/standind as mentioned above with no change in symptoms once pt sitting at edge of bed - once returned to supine pt with mild improvement reported  Repositioned for comfort + discussed POC with family   P: pt will benefit from skilled PT to address deficits to maximize IND for safe d/c   Equipment Use walker   End " of Consult   Patient Position at End of Consult Supine;Bed/Chair alarm activated; All needs within reach   [employer] Insurance Number  Bhavani Ussanna [id number]

## 2023-04-17 NOTE — CONSULTS
Vancomycin IV Pharmacy-to-Dose Consultation     Vancomycin has been discontinued  Pharmacy will sign off  Please contact or re-consult with questions      [address] , [address], Pharmacist

## 2023-04-17 NOTE — OCCUPATIONAL THERAPY NOTE
"    Occupational Therapy Evaluation     Patient Name: Jenny Campos  Today's Date: 4/17/2023  Problem List  Active Problems:    Atrial fibrillation ([facility] )    Hypothyroidism    P[facility] hypertension, unspecified [facility] )    Thoracic aortic aneurysm without rupture, unspecified part ([facility] )    Pure hypercholesterolemia    Tricuspid incompetence    Raynaud's disease without gangrene    Acute renal failure (ARF) ([facility])    Hyponatremia    Enterocolitis    Metabolic acidosis, increased anion gap    Septic shock ([facility] )    Bacteriuria    Past Medical History  Past Medical History:   Diagnosis Date    Anemia     Arthritis     Atherosclerosis of native coronary artery of native heart without angina pectoris 02/06/2019    Cataract     Chronic atrial fibrillation (HCC)     Degeneration of cervical intervertebral disc     Diarrhea     onset 07/10    Disease of thyroid gland     Occipital infarction [facility])     Wrist fracture     right      Past Surgical History  Past Surgical History:   Procedure Laterality Date    COLON SURGERY      FL INJECTION LEFT HIP (NON ARTHROGRAM)  5/28/2021    KNEE ARTHROSCOPY      with medial meniscus repair     CT ARTHROCENTESIS ASPIR&/INJ MAJOR JT/BURSA W/O US Left 5/28/2021    Procedure: Hip corticosteroid injection (05830 [id number]); Surgeon: Ramiro Benson MD;  Location: [facility] MAIN OR;  Service: Pain Management         04/17/23 1539   OT Last Visit   OT Visit Date 04/17/23  (Monday)   Note Type   Note type Evaluation   Pain Assessment   Pain Assessment Tool FLACC   Pain Location/Orientation Location: Abdomen   Pain Onset/Description   (w/ forward flexion trunk to participate in LBD and if \"press omn abdomen\")   Effect of Pain on Daily Activities limits activity tolerance and I w/ ADLs   Patient's Stated Pain Goal No pain   Hospital Pain Intervention(s) Repositioned; Ambulation/increased activity; Emotional support   Pain Rating: FLACC (Rest) - Face 0   Pain Rating: FLACC (Rest) - Legs 0   Pain Rating: " FLACC (Rest) - Activity 0   Pain Rating: FLACC (Rest) - Cry 0   Pain Rating: FLACC (Rest) - Consolability 0   Score: FLACC (Rest) 0   Pain Rating: FLACC (Activity) - Face 0   Pain Rating: FLACC (Activity) - Legs 0   Pain Rating: FLACC (Activity) - Activity 0   Pain Rating: FLACC (Activity) - Cry 1   Pain Rating: FLACC (Activity) - Consolability 1   Score: FLACC (Activity) 2   Restrictions/Precautions   Weight Bearing Precautions Per Order No   Other Precautions Contact/isolation; Bed Alarm;Multiple lines; Fall Risk;Pain  (rule out C diff; ayala catheter)   Home Living   Type of [address] [place] [address] Two level; Laundry in basement;Bed/bath upstairs;Stairs to enter with rails  (4 DARYA, stair glide to access 2nd floor bedroom, bathroom)   Bathroom Shower/Tub Tub/shower unit   Bathroom Toilet Standard   Bathroom Equipment Commode;Tub transfer bench;Grab bars in shower;Grab bars around toilet   [address] Walker;Cane;Reacher;Sock aid;Long-handled shoehorn;Grab bars  (pt reports no longer using LHAE)   Additional Comments Pt reports that her son live w/ her in a 2 SH  Laundry in basement  Pt reports going up /down 3-4 days a week to wash her laundry   Prior Function   Level of [place] Independent with ADLs; Independent with functional mobility; Independent with IADLS   Lives With Son  (son works during the day)   [address] Help From Family   IADLs Independent with driving; Independent with meal prep; Independent with medication management   Falls in the last 6 months 1 to 4  (1 in Jan 2023 leading to hip fracture and 1 following DC home from rehab)   Vocational Retired   Comments Pt reports I w/ ADLs w/ out use of AD  Lifestyle   Autonomy Pt reports I w/ ADLs w/ out use of AD at baseline   Reciprocal Relationships Pt reports that her son lives w/ her   Supportive family and 5 grandchildren   Service to Others 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   Intrinsic "Gratification Pt reports enjoying julio cesar curry   General   Family/Caregiver Present Yes  (grand daughter present)   Subjective   Subjective \"I am feeling better\"   ADL   Where Assessed Chair   Eating Assistance 6  Modified independent   Eating Deficit Setup  (clear liquid diet)   Grooming Assistance 4  Minimal Assistance   Grooming Deficit Setup; Increased time to complete  (due to multiple lines)   UB Bathing Assistance Unable to assess   LB Bathing Assistance Unable to assess   UB Dressing Assistance 5  Supervision/Setup   UB Dressing Deficit Setup; Increased time to complete;Supervision/safety   LB Dressing Assistance 4  Minimal Assistance   LB Dressing Deficit Setup;Don/doff R sock; Don/doff L sock; Verbal cueing;Supervision/safety; Increased time to complete   Toileting Assistance  Unable to assess  (denied need to void  Pt reports no diarrhea since 0300)   Bed Mobility   Supine to Sit Unable to assess   Sit to Supine Unable to assess   Additional Comments Pt seated OOB in highback chair upon arrival and post eval w/ needs met, call bell in reach and chair alarm activated   Transfers   Sit to Stand 5  Supervision   Additional items Assist x 1; Armrests; Increased time required;Verbal cues   Stand to Sit 5  Supervision   Additional items Assist x 1; Increased time required;Verbal cues;Armrests   Functional Mobility   Functional Mobility 4  Minimal assistance  (CG/ steadying)   Additional Comments min A (CG/steadying A) using RW w/ in room   Additional items Rolling walker   Balance   Static Sitting Fair +   Dynamic Sitting Fair -   Static Standing Fair -   Ambulatory Fair -   Activity Tolerance   Activity Tolerance Patient limited by fatigue; Other (Comment)  (light headed / dizziness)   Medical Staff Made Aware spoke w/ PTCristofer   Nurse Made Aware spoke w/ RNKarli Strength   RUE Overall Strength Within Functional Limits - able to perform ADL tasks with strength  (observed during ADLs)   LUE Strength   LUE " Overall Strength Within Functional Limits - able to perform ADL tasks with strength  (observed during ADLs)   Hand Function   Gross Motor Coordination Functional   Fine Motor Coordination Functional   Cognition   Overall Cognitive Status WFL   Arousal/Participation Alert; Cooperative   Attention Attends with cues to redirect   Orientation Level Oriented X4   Memory Within functional limits   Following Commands Follows multistep commands without difficulty   Comments Identified pt by full name and birthdate  Engaged in [place] w/ score of 0 indicating normal cognition  Assessment   Limitation Decreased ADL status; Decreased endurance;Decreased self-care trans;Decreased high-level ADLs   Assessment Pt is an 79yo female admitted to [address] [place] [address] on 4/16/23 w/ diarrhea and abdominal pain/ nausea  Diagnosed w/ enterocolitis, septic shock, and is rule out C diff  Significant PMH impacting her occupational performance includes HTN, a-fib, hx R wrist fracture, L hip fracture  Pt reports I w/ ADL w/ out AD from home in [address] [place] [address] w/ stair glide to access 2nd floor  Upon eval, pt alert and oriented  Engaged in [place] [address] w/ score of 0  Pt required min A to complete grooming, S UBD, min A LBD, S sit <> stand and min A using RW for functional mobility  Pt completing ADLs below baseline level of I due to decreased endurance, light headed / dizziness and decreased standing tolerance  Recommend DC home w/ family support and HHOT using RW at this time vs rehab pend improved activity tolerance, assist/ support at PLOF, functional mobility household distances w/ S, and medical optimization  Will continue to follow   Goals   Patient Goals Pt stated that she would like to return home to baseline level of I   Plan   Treatment Interventions ADL retraining;Functional transfer training; Endurance training;Patient/family training;Equipment evaluation/education;Continued evaluation; Energy conservation; Activityengagement   Goal Expiration Date 04/27/23   OT Frequency 2-3x/wk   Recommendation   OT Discharge Recommendation Home with home health rehabilitation  (vs rehab pend improved act poly; fxal transfers / mobility household distances w/ S using LRAD;support / assist w/ IADLs)   Equipment Recommended Bedside commode; Shower/Tub chair with back ($)  (pt reports having DME and LHAE)   Commode Type Standard   AM-PAC Daily Activity Inpatient   Lower Body Dressing 3   Bathing 3   Toileting 2   Upper Body Dressing 4   Grooming 3   Eating 4   Daily Activity Raw Score 19   Daily Activity Standardized Score (Calc for Raw Score >=11) 40 22   AM-PAC Applied Cognition Inpatient   Following a Speech/Presentation 4   Understanding Ordinary Conversation 4   Taking Medications 4   Remembering Where Things Are Placed or Put Away 4   Remembering List of 4-5 Errands 4   Taking Care of Complicated Tasks 4   Applied Cognition Raw Score 24   Applied Cognition Standardized Score 62 21   Barthel Index   Feeding 5   Bathing 0   Grooming Score 5   Dressing Score 5   Bladder Score 0   Bowels Score 5   Toilet Use Score 5   Transfers (Bed/Chair) Score 10   Mobility (Level Surface) Score 0   Stairs Score 0   Barthel Index Score 35   Modified Jenise Scale   Modified [place] Scale 4        The patient's raw score on the AM-PAC Daily Activity Inpatient Short Form is 19  A raw score of greater than or equal to 19 suggests the patient may benefit from discharge to home  Please refer to the recommendation of the Occupational Therapist for safe discharge planning         Pt goals to be met by 4/27/23 to max I w/ ADLs and improve engagement to return home includes:    -Pt will complete functional transfers to bed, chair, and toilet using LRAD, DME as needed w/ mod I to max I w/ ADLs and return home    -Pt will consistently engage in functional mobility using LRAD to / from bathroom to max I w/ toileting and bathing to return home    -Pt will complete LBD w/ S using LHAE as needed    -Pt will complete UBD w/ mod I     -Pt will complete bed mobility supine <> sit w/ mod I to max I w/ ADLs and return home    -Pt will demonstrate improved activity and sitting tolerance OOB in chair for all meals    -Pt will demonstrate good attention and understanding EC tech to max I w/ ADLs and improve engagement to return home and prepare meals, manage her aston Wright, OTR/L  [id number]

## 2023-04-17 NOTE — ASSESSMENT & PLAN NOTE
Asymptomatic,    ·  >136  · Likely secondary to dehydration and diarrhea  · IV bolus and maintenance fluids given  · Trend CMP in a m    · Consult nephro if unresolved

## 2023-04-17 NOTE — QUICK NOTE
Empirical antimicrobial coverage modified to include gram negative and anaerobic organisms; initiate ceftriaxone and flagyl from tomorrow

## 2023-04-17 NOTE — PLAN OF CARE
Problem: OCCUPATIONAL THERAPY ADULT  Goal: Performs self-care activities at highest level of function for planned discharge setting  See evaluation for individualized goals  Description: Treatment Interventions: ADL retraining, Functional transfer training, Endurance training, Patient/family training, Equipment evaluation/education, Continued evaluation, Energy conservation, Activityengagement  Equipment Recommended: Bedside commode, Shower/Tub chair with back ($) (pt reports having DME and Nikhil Maldonado)       See flowsheet documentation for full assessment, interventions and recommendations  Note: Limitation: Decreased ADL status, Decreased endurance, Decreased self-care trans, Decreased high-level ADLs     Assessment: Pt is an 79yo female admitted to [facility] on 4/16/23 w/ diarrhea and abdominal pain/ nausea  Diagnosed w/ enterocolitis, septic shock, and is rule out C diff  Significant PMH impacting her occupational performance includes HTN, a-fib, hx R wrist fracture, L hip fracture  Pt reports I w/ ADL w/ out AD from home in [address] [facility] w/ stair glide to access 2nd floor  Upon eval, pt alert and oriented  Engaged in [place] [address] w/ score of 0  Pt required min A to complete grooming, S UBD, min A LBD, S sit <> stand and min A using RW for functional mobility  Pt completing ADLs below baseline level of I due to decreased endurance, light headed / dizziness and decreased standing tolerance  Recommend DC home w/ family support and HHOT using RW at this time vs rehab pend improved activity tolerance, assist/ support at PLOF, functional mobility household distances w/ S, and medical optimization   Will continue to follow     OT Discharge Recommendation: Home with home health rehabilitation (vs rehab pend improved act poly; fxal transfers / mobility household distances w/ S using LRAD;support / assist w/ IADLs)

## 2023-04-17 NOTE — PLAN OF CARE
Problem: PHYSICAL THERAPY ADULT  Goal: Performs mobility at highest level of function for planned discharge setting  See evaluation for individualized goals  Description: Treatment/Interventions: Functional transfer training, LE strengthening/ROM, Therapeutic exercise, Endurance training, Gait training, Spoke to nursing, Bed mobility, Spoke to case management          See flowsheet documentation for full assessment, interventions and recommendations  Note: Prognosis: Good  Problem List: Decreased endurance, Impaired balance, Decreased mobility, Impaired judgement, Decreased safety awareness, Pain, Decreased skin integrity  Assessment: Patient seen for Physical Therapy evaluation  Patient admitted with diarrhea  Comorbidities affecting patient's physical performance include: Afib, anemia, arthritis, cardiac disease and hypothyroid  Personal factors affecting patient at time of initial evaluation include: lives in 2 story house, stairs to enter home, inability to navigate community distances, limited home support, positive fall history, inability to perform ADLS, inability to perform IADLS  and inability to live alone  Prior to admission, patient was independent with functional mobility without assistive device, independent with ADLS, requiring assist for IADLS, living with son in a 2 level home with 4 steps to enter and ambulating household distance  Please find objective findings from Physical Therapy assessment regarding body systems outlined above with impairments and limitations including weakness, impaired balance, decreased endurance, gait deviations, pain, decreased activity tolerance, decreased functional mobility tolerance and fall risk  The Barthel Index was used as a functional outcome tool presenting with a score of Barthel Index Score: 55 today indicating marked limitations of functional mobility and ADLS    Patient's clinical presentation is currently unstable/unpredictable as seen in patient's presentation of vital sign response, increased fall risk, new onset of impairment of functional mobility and decreased endurance  Pt would benefit from continued Physical Therapy treatment to address deficits as defined above and maximize level of functional mobility  As demonstrated by objective findings, the assigned level of complexity for this evaluation is high  The patient's AM-[facility] Basic Mobility Inpatient Short Form Raw Score is 16  A Raw score of less than or equal to 16 suggests the patient may benefit from discharge to post-acute rehabilitation services  Please also refer to the recommendation of the Physical Therapist for safe discharge planning  PT Discharge Recommendation: Post acute rehabilitation services    See flowsheet documentation for full assessment

## 2023-04-17 NOTE — CONSULTS
Consultation - Cardiology   [facility]     Marcela Camacho 78 y o  female MRN: [id number]  : 1940  Unit/Bed#: [address] [place] [address] Encounter: [id number]      Assessment & Plan   1  Atrial fibrillation      - Patient with known history of chronic atrial fibrillation  [facility] with [facility]  - Telemetry reviewed: currently atrial fibrillation, slow ventricular response, rate 50s  - 23 EKG: Atrial fibrillation, ventricular rate 60s  Low voltage QRS  Septal infarct , age undetermined  ST & T wave abnormality, consider inferior ischemia  - [id number] stroke risk score: 6 points  - Will restart Eliquis 2 5 mg twice daily  Held on presentation due to LAKE, creatinine now improving  2  Enterocolitis  - Presented with one week of diarrhea    - 23 CT abd/pelvis w/o contrast noted scattered air-fluid levels in the small and large bowel, nonspecific but can be seen with underlying enterocolitis  - Care per primary team      3  LAKE, Metabolic acidosis  - Baseline creatinine 0 79-1  10    - On admission (23), creatinine 4 59    - 23 creatinine: 2 57   - 23 A    - Nephrology following  4  Hypertension      - Hypotensive on presentation  BP improved but SBP 90s  - Continue Toprol XL 25 mg with holding SBP less than 110s  5  Hyperlipidemia      - Continue pravastatin 40 mg daily  6  Hypothyroidism      - Currently on levothyroxine 50 mcg daily  7  Pulmonary hypertension      - No TTE in chart  - Follow up 23 TTE  Summary of Recommendations: Thank you for your consultation  Physician Requesting Consult: Dianna Tony MD    Reason for Consult / Principal Problem: atrial fibrillation  Inpatient consult to Cardiology  Consult performed by: Jovita Whitfield PA-C  Consult ordered by:  Dianna Tony MD          HPI: Marcela Camacho is a 78y o  year old female with PMHx of chronic atrial fibrillation (on Eliquis), HTN, HLD, hypothyroidism, pulmonary HTN, thoracic aortic aneurysm who presents for one week of diarrhea  4/16/23 CT abd/pelvis w/o contrast noted scattered air-fluid levels in the small and large bowel, nonspecific but can be seen with underlying enterocolitis  On presentation, noted LAKE (creatinine 4 59) with significant electorlyte imbalances  Cardiology consulted for history of atrial fibrillation  Noted on eliquis 2 5 mg twice daily at home  Held on presentation due to LAKE  Review of Systems   Constitutional: Positive for chills and fatigue  Negative for unexpected weight change  Respiratory: Negative for cough, chest tightness, shortness of breath and wheezing  Cardiovascular: Negative for chest pain, palpitations and leg swelling  Gastrointestinal: Positive for abdominal pain, diarrhea and nausea  Skin: Negative  Neurological: Positive for weakness  Negative for dizziness, light-headedness and numbness  Historical Information   Past Medical History:   Diagnosis Date   • Anemia    • Arthritis    • Atherosclerosis of native coronary artery of native heart without angina pectoris 02/06/2019   • Cataract    • Chronic atrial fibrillation (HCC)    • Degeneration of cervical intervertebral disc    • Diarrhea     onset 07/10   • Disease of thyroid gland    • Occipital infarction [facility])    • Wrist fracture     right      Past Surgical History:   Procedure Laterality Date   • COLON SURGERY     • FL INJECTION LEFT HIP (NON ARTHROGRAM)  5/28/2021   • KNEE ARTHROSCOPY      with medial meniscus repair    • [place] ARTHROCENTESIS ASPIR&/INJ MAJOR JT/BURSA W/O US Left 5/28/2021    Procedure: Hip corticosteroid injection (91092 [id number]);   Surgeon: Bryce Sparrow MD;  Location: [facility] MAIN OR;  Service: Pain Management      Social History     Substance and Sexual Activity   Alcohol Use Yes   • Alcohol/week: 7 0 standard drinks   • Types: 7 Glasses of wine per week    Comment: occasionally, drinks wine , moderate      Social History     Substance and Sexual Activity   Drug Use No     Social History     Tobacco Use   Smoking Status Former   • Packs/day: 1 00   • Years: 7 00   • Pack years:  00   • Types: Cigarettes   • Quit date: 26   • Years since quittin 3   Smokeless Tobacco Never     Family History:   Family History   Problem Relation Age of Onset   • Heart attack Mother    • Valvular heart disease Mother    • Heart attack Father    • Hypertension Sister    • Mental illness Neg Hx        Meds/Allergies    PTA meds:    Medications Prior to Admission   Medication   • allopurinol (ZYLOPRIM) 100 mg tablet   • aspirin 81 mg chewable tablet   • ELIQUIS 2 5 MG   • folic acid (FOLVITE) 1 mg tablet   • furosemide (LASIX) 40 mg tablet   • levothyroxine 50 mcg tablet   • simvastatin (ZOCOR) 20 mg tablet   • benazepril (LOTENSIN) 20 mg tablet   • clindamycin (CLEOCIN) 300 MG capsule   • colchicine (COLCRYS) 0 6 mg tablet   • hydrochlorothiazide (MICROZIDE) 12 5 mg capsule   • metoprolol succinate (TOPROL-XL) 25 mg 24 hr tablet   • pantoprazole (PROTONIX) 20 mg tablet   • pantoprazole (PROTONIX) 40 mg tablet   • potassium chloride (K-DUR,KLOR-CON) 20 mEq tablet      Allergies   Allergen Reactions   • Lidocaine      Annotation - [id number]: Seizure with 20 cc during a surgical block   FS   • Penicillins    • Sulfa Antibiotics Edema       Current Facility-Administered Medications:   •  cefepime (MAXIPIME) IVPB (premix in dextrose) 2,000 mg 50 mL, 2,000 mg, Intravenous, Q24H, Misty Fritz MD  •  dicyclomine (BENTYL) capsule 20 mg, 20 mg, Oral, TID, Misty Fritz MD, 20 mg at 23 0948  •  heparin (porcine) subcutaneous injection 5,000 Units, 5,000 Units, Subcutaneous, Q12H [facility], Misty Fritz MD, 5,000 Units at 23 3263  •  levothyroxine tablet 50 mcg, 50 mcg, Oral, Early Morning, Misty Fritz MD, 50 mcg at 23 0502  •  magnesium sulfate IVPB (premix) SOLN 1 g, 1 g, Intravenous, Once, "Letty York MD, 1 g at 04/17/23 1046  •  metoprolol (LOPRESSOR) injection 5 mg, 5 mg, Intravenous, Q6H PRN, Letty York MD  •  metoprolol succinate (TOPROL-XL) 24 hr tablet 25 mg, 25 mg, Oral, Daily, Letty York MD  •  ondansetron [facility]) injection 4 mg, 4 mg, Intravenous, Q6H PRN, Letty York MD  •  pantoprazole (PROTONIX) EC tablet 20 mg, 20 mg, Oral, BID AC, Letty York MD  •  pravastatin (PRAVACHOL) tablet 40 mg, 40 mg, Oral, Daily With Mara Stafford MD, 40 mg at 04/16/23 1939  •  simethicone (MYLICON) chewable tablet 80 mg, 80 mg, Oral, 4x Daily PRN, Letty York MD  •  sodium bicarbonate 100 mEq in dextrose 5 % 1,000 mL infusion, 100 mL/hr, Intravenous, Continuous, Yasmani Cobos MD, Last Rate: 100 mL/hr at 04/17/23 0836, 100 mL/hr at 04/17/23 0836  •  vancomycin (VANCOCIN) IVPB (premix in dextrose) 750 mg 150 mL, 15 mg/kg, Intravenous, Once PRN, Letty York MD    VTE Pharmacologic Prophylaxis:   Heparin    Objective:   Vitals: Blood pressure 90/53, pulse 59, temperature (!) 97 3 °F (36 3 °C), temperature source Axillary, resp  rate 16, height 5' 6\" (1 676 m), weight 52 2 kg (115 lb), SpO2 99 %  Body mass index is 18 56 kg/m²    Wt Readings from Last 3 Encounters:   04/17/23 52 2 kg (115 lb)   03/14/23 57 6 kg (127 lb)   02/22/23 58 5 kg (129 lb)     BP Readings from Last 3 Encounters:   04/17/23 90/53   03/14/23 118/74   02/22/23 120/60     Orthostatic Blood Pressures    Flowsheet Row Most Recent Value   Blood Pressure 90/53 filed at 04/17/2023 1034   Patient Position - Orthostatic VS Sitting filed at 04/17/2023 0852          Intake/Output Summary (Last 24 hours) at 4/17/2023 1145  Last data filed at 4/17/2023 0529  Gross per 24 hour   Intake 2780 ml   Output 1300 ml   Net 1480 ml       Invasive Devices     Peripheral Intravenous Line  Duration           Peripheral IV 04/16/23 Right Antecubital <1 day    Peripheral IV 04/16/23 Right;Upper;Ventral (anterior) Arm " <1 day          Drain  Duration           Urethral Catheter Double-lumen <1 day                  Physical Exam:   Physical Exam  Vitals reviewed  Constitutional:       General: She is not in acute distress  Cardiovascular:      Rate and Rhythm: Normal rate  Rhythm irregular  Pulses: Normal pulses  Heart sounds: Murmur heard  Pulmonary:      Effort: Pulmonary effort is normal  No respiratory distress  Breath sounds: Decreased breath sounds present  Musculoskeletal:      Right lower leg: No edema  Left lower leg: No edema  Skin:     General: Skin is warm and dry  Neurological:      Mental Status: She is alert and oriented to person, place, and time           Labs:   Troponins:  Results from last 7 days   Lab Units 04/16/23  1537   HSTNI D2 ng/L -8       CBC with diff:   Results from last 7 days   Lab Units 04/17/23  0520 04/16/23  1330   WBC Thousand/uL 4 04* 5 77   HEMOGLOBIN g/dL 11 9 15 2   HEMATOCRIT % 35 4 47 4*   MCV fL 100* 105*   PLATELETS Thousands/uL 172 224   MCH pg 33 9 33 6   MCHC g/dL 33 9 32 1   RDW % 15 0 15 4*   MPV fL 12 8* 12 1   NRBC AUTO /100 WBCs 0 0       CMP:   Results from last 7 days   Lab Units 04/17/23  0520 04/16/23  2331 04/16/23  1330   SODIUM mmol/L 136 133* 129*   POTASSIUM mmol/L 3 1* 3 7 4 5   CHLORIDE mmol/L 110* 110* 103   CO2 mmol/L 15* 11* 9*   ANION GAP mmol/L 11 12 17*   BUN mg/dL 78* 84* 100*   CREATININE mg/dL 2 57* 3 11* 4 59*   CALCIUM mg/dL 7 6* 7 5* 9 3   AST U/L 31  --  41*   ALT U/L 18  --  25   ALK PHOS U/L 68  --  102   TOTAL PROTEIN g/dL 5 6*  --  8 0   ALBUMIN g/dL 3 5  --  5 0   TOTAL BILIRUBIN mg/dL 0 54  --  0 55   EGFR ml/min/1 73sq m 16 13 8   GLUCOSE RANDOM mg/dL 105 102 82       Magnesium:   Results from last 7 days   Lab Units 04/17/23  0520   MAGNESIUM mg/dL 1 3*     Coags:   Results from last 7 days   Lab Units 04/16/23  1330   PTT seconds 29   INR  1 04     TSH:    Results from last 7 days   Lab Units 04/16/23  2331   TSH 3RD GENERATON uIU/mL 2 037     No components found for: TSH3  Lipid Profile:     Lipid Profile:   Lab Results   Component Value Date    CHOLESTEROL 185 08/11/2022     04/13/2022    LDLCALC 76 04/13/2022    TRIG 72 08/11/2022     Hgb A1c:     NT-proBNP: No results for input(s): NTBNP in the last 72 hours  Imaging & Testing     Cardiac testing:   No results found for this or any previous visit  Imaging: I have personally reviewed pertinent reports  CT abdomen pelvis wo contrast    Result Date: 4/16/2023  Narrative: CT ABDOMEN AND PELVIS WITHOUT IV CONTRAST INDICATION:   Diarrhea pelvic pain, diarrhea  COMPARISON:  CT abdomen pelvis 4/6/2022  TECHNIQUE:  CT examination of the abdomen and pelvis was performed without intravenous contrast  Multiplanar 2D reformatted images were created from the source data  Radiation dose length product (DLP) for this visit:  430 88 mGy-cm   This examination, like all CT scans performed in the [facility], was performed utilizing techniques to minimize radiation dose exposure, including the use of iterative  reconstruction and automated exposure control  Enteric contrast was not administered  FINDINGS: ABDOMEN LOWER CHEST:  Scattered linear atelectasis versus scarring at the bilateral lung bases  LIVER/BILIARY TREE:  Unremarkable  GALLBLADDER:  Not visualized may be collapsed  SPLEEN:  Unremarkable  PANCREAS:  Clustered calcifications in the region of the pancreatic head again seen  The prior exam notes a small cyst in the region of the pancreatic head, now on the order of 1 3 cm image 3 series 2, stable, previously 1 1 cm  ADRENAL GLANDS:  Unremarkable  KIDNEYS/URETERS:  Unremarkable  No hydronephrosis  STOMACH AND BOWEL:  Bowel staple line at the proximal colon  Scattered air-fluid levels in the small and large bowel  APPENDIX:  There are expected postoperative changes of appendectomy  ABDOMINOPELVIC CAVITY:  No ascites  No pneumoperitoneum    No lymphadenopathy  VESSELS:  Atherosclerotic changes are present  No evidence of aneurysm  PELVIS REPRODUCTIVE ORGANS:  Unremarkable for patient's age  URINARY BLADDER:  Unremarkable  ABDOMINAL WALL/INGUINAL REGIONS:  Small fat-containing right inguinal hernia  Tiny fat-containing umbilical hernia  OSSEOUS STRUCTURES:  No acute fracture or destructive osseous lesion  Prior left hip replacement  Impression: Exam is limited without IV and oral contrast particularly for soft tissue and bowel evaluation  1   Scattered air-fluid levels in the small and large bowel, nonspecific but can be seen with underlying enterocolitis  2  Small cyst in the region of the pancreatic head without significant change given the limitations of this exam  Follow up as per previous guidelines  Workstation performed: [id number]       EKG/ Monitor: Personally reviewed  Telemetry reviewed: currently atrial fibrillation, slow ventricular response, rate 50s        Code Status: Level 1 - Full Code  Advance Directive and Living Will:      POLST:        Marlee Poon PA-C

## 2023-04-17 NOTE — CASE MANAGEMENT
Case Management Assessment & Discharge Planning Note    Patient name Rene David  Location [place] [address] [phone]-* MRN [id number]  : 1940 Date 2023       Current Admission Date: 2023  Current Admission Diagnosis:Atrial fibrillation [facility])   Patient Active Problem List    Diagnosis Date Noted   • Raynaud's disease without gangrene 2023   • Acute renal failure (ARF) ([facility] ) 2023   • Hyponatremia 2023   • Enterocolitis    • Metabolic acidosis, increased anion gap 2023   • Septic shock ([facility] ) 2023   • Bacteriuria    • Alcoholic liver disease ([place] [address] ) 2023   • Abnormal liver enzymes 2022   • Stage 3 chronic kidney disease, unspecified whether stage 3a or 3b CKD ([place] [address] ) 2022   • TIA (transient ischemic attack) 2022   • Pancreatic cyst 2022   • Abdominal distention 2022   • Esophageal dysphagia 2022   • Pulmonary hypertension, unspecified ([place] [address] ) 2021   • Thoracic aortic aneurysm without rupture, unspecified part ([place] [address] )    • Age-related osteoporosis without current pathological fracture 2020   • Gout, arthropathy 2019   • Tricuspid incompetence 2019   • Atherosclerosis of native coronary artery of native heart without angina pectoris 2019   • History of cerebrovascular accident 2019   • Long term current use of anticoagulant 2019   • Long term current use of aspirin 2019   • Pure hypercholesterolemia 2019   • Knee osteoarthritis 2015   • Anemia 10/31/2014   • Atrial fibrillation ([facility] ) 10/31/2014   • Hyperlipidemia 10/16/2014   • Hypertension 10/13/2014   • Hypothyroidism 10/13/2014      LOS (days): 1  Geometric Mean LOS (GMLOS) (days):   Days to GMLOS:     OBJECTIVE:    Risk of Unplanned Readmission Score: 18 49         Current admission status: Inpatient  Referral Reason: Other (Discharge disposition)    Preferred Pharmacy:   [address] [place] [address] [address] [facility] [address]  Phone: [phone] Fax: [phone]    Primary Care Provider: JORDAN Etienne    Primary Insurance: [facility]  Secondary Insurance:     ASSESSMENT:  Gaudencio Worley Proxies    There are no active Health Care Proxies on file  Readmission Root Cause  30 Day Readmission: No    Patient Information  Admitted from[de-identified] Home  Mental Status: Alert  During Assessment patient was accompanied by: Daughter  Assessment information provided by[de-identified] Patient, Daughter  Primary Caregiver: Family  Caregiver's Name[de-identified] Tanmay Serrano (daughter)  Caregiver's Telephone Number[de-identified] [phone]  Support Systems: Family members  [place] of Residence: [address] [place] [address] do you live in?: [employer] entry access options  Select all that apply : Stairs  Number of steps to enter home : 3  Do the steps have railings?: Yes  Type of Current Residence: 2 story home  Upon entering residence, is there a bedroom on the main floor (no further steps)?: No  A bedroom is located on the following floor levels of residence (select all that apply):: 2nd Floor  Upon entering residence, is there a bathroom on the main floor (no further steps)?: No  Indicate which floors of current residence have a bathroom (select all the apply):: 2nd Floor  Number of steps to 2nd floor from main floor: One Flight  In the last 12 months, was there a time when you were not able to pay the mortgage or rent on time?: No  In the last 12 months, how many places have you lived?: 1  In the last 12 months, was there a time when you did not have a steady place to sleep or slept in a shelter (including now)?: No  Homeless/housing insecurity resource given?: N/A  Living Arrangements: Lives w/ Son    Activities of Daily Living Prior to Admission  Functional Status: Independent  Completes ADLs independently?: Yes  Ambulates independently?: Yes  Does patient use assisted devices?: Yes  Assisted Devices (DME) used:  Shower Chair, Stair Chair/[place], Walker, Other (Comment) (raised toilet seat)  Does patient currently own DME?: Yes  What DME does the patient currently own?: Stair Chair/Glide, Shower Chair, Walker  Does patient have a history of Outpatient Therapy (PT/OT)?: Yes  Does the patient have a history of Short-Term Rehab?: Yes ([facility])  Does patient have a history of HHC?: Yes  Does patient currently have [address]?: No     Patient Information Continued  Income Source: Pension/long-term  Does patient have prescription coverage?: Yes  Within the past 12 months, you worried that your food would run out before you got the money to buy more : Never true  Within the past 12 months, the food you bought just didn't last and you didn't have money to get more : Never true  Food insecurity resource given?: N/A  Does patient receive dialysis treatments?: No     Means of Transportation  Means of Transport to Appts[de-identified] Family transport  In the past 12 months, has lack of transportation kept you from medical appointments or from getting medications?: No  In the past 12 months, has lack of transportation kept you from meetings, work, or from getting things needed for daily living?: No  Was application for public transport provided?: N/A    DISCHARGE DETAILS:    Discharge planning discussed with[de-identified] Patient and both daughters at bedside  [place] of Choice: Yes  Comments - Freedom of Choice: STR & HHC preferences  CM contacted family/caregiver?: Yes  Were Treatment Team discharge recommendations reviewed with patient/caregiver?: Yes        Contacts  Patient Contacts: Leane Flow  Relationship to Patient[de-identified] Family  Contact Method:  In Person  Reason/Outcome: Referral, Emergency Contact, Discharge [address]         Is the patient interested in [address] at discharge?: Yes  [address] requested[de-identified] [address] Name[de-identified] Other  [address] Nathaniel Boyce Provider[de-identified] PCP  Home Health Services Needed[de-identified] Evaluate Functional Status and Safety, Gait/ADL Training, Strengthening/Theraputic Exercises to Improve Function  Homebound Criteria Met[de-identified] Requires the Assistance of Another Person for Safe Ambulation or to Leave the Home, Uses an Assist Device (i e  cane, walker, etc)  Supporting Clincal Findings[de-identified] Limited Endurance, Fatigues Easliy in [employer]    DME Referral Provided  Referral made for DME?: No    Other Referral/Resources/Interventions Provided:  Interventions: HHC, Short Term Rehab  Referral Comments: CM met with patient and both daughters at bedside, introduced self, role, complete assessment and discharge planning  Per daughter Clare Nielson patient lives with her son who is at work so patient is mostly alone during the time and pretty much independent with adls  She lives in a 2 level home with her BA/BR on the second level and uses a chair/stair glide  She uses walker for ambulation  Clare Nielson stated she takes patient to her medical appts  Does have hx of STR at [facility], not very pleased as patient is vegetarian and had a difficult time getting vegetarian meals even after the  personally coming and discussing her diet, and Clare Nielson had to get food for her from home  Clare Nielson mentioned that patient was not able to particpate much in therapy today due to her bp dropping  Both daughetrs and patient agreeable for CM to place blanket referrals and aware that CM will reach out to them with availability  CM sent blanket referral for [facility] and STR, awaiting response & pending clinical progress

## 2023-04-17 NOTE — ASSESSMENT & PLAN NOTE
Improving,    POA, unresolved for 1 week    · CT abdomen:Scattered air-fluid levels in the small and large bowel, nonspecific but can be seen with underlying enterocolitis    · Vanco and cefepime; (PCN allergy in childhood) given in the ED, continue Vanco  · Bolus and IVF maintenance given  · Stool enteric panel/O&P/C  difficile ordered in ED-pending  · GI recs: Continue Rocephin and metronidazole, PPI, antiemetic  · As needed medicine for pain

## 2023-04-18 PROBLEM — E43 SEVERE PROTEIN-CALORIE MALNUTRITION (HCC): Status: ACTIVE | Noted: 2023-04-18

## 2023-04-18 LAB
ALBUMIN SERPL BCP-MCNC: 3.1 G/DL (ref 3.5–5)
ALP SERPL-CCNC: 54 U/L (ref 34–104)
ALT SERPL W P-5'-P-CCNC: 17 U/L (ref 7–52)
ANION GAP SERPL CALCULATED.3IONS-SCNC: 8 MMOL/L (ref 4–13)
ANION GAP SERPL CALCULATED.3IONS-SCNC: 8 MMOL/L (ref 4–13)
AST SERPL W P-5'-P-CCNC: 33 U/L (ref 13–39)
BASOPHILS # BLD AUTO: 0.06 THOUSANDS/ΜL (ref 0–0.1)
BASOPHILS NFR BLD AUTO: 2 % (ref 0–1)
BILIRUB SERPL-MCNC: 0.48 MG/DL (ref 0.2–1)
BUN SERPL-MCNC: 28 MG/DL (ref 5–25)
BUN SERPL-MCNC: 41 MG/DL (ref 5–25)
C DIFF TOX GENS STL QL NAA+PROBE: NEGATIVE
CALCIUM ALBUM COR SERPL-MCNC: 8.3 MG/DL (ref 8.3–10.1)
CALCIUM SERPL-MCNC: 7.6 MG/DL (ref 8.4–10.2)
CALCIUM SERPL-MCNC: 8.5 MG/DL (ref 8.4–10.2)
CHLORIDE SERPL-SCNC: 105 MMOL/L (ref 96–108)
CHLORIDE SERPL-SCNC: 107 MMOL/L (ref 96–108)
CO2 SERPL-SCNC: 23 MMOL/L (ref 21–32)
CO2 SERPL-SCNC: 23 MMOL/L (ref 21–32)
CREAT SERPL-MCNC: 1.02 MG/DL (ref 0.6–1.3)
CREAT SERPL-MCNC: 1.2 MG/DL (ref 0.6–1.3)
EOSINOPHIL # BLD AUTO: 0.08 THOUSAND/ΜL (ref 0–0.61)
EOSINOPHIL NFR BLD AUTO: 2 % (ref 0–6)
ERYTHROCYTE [DISTWIDTH] IN BLOOD BY AUTOMATED COUNT: 14.6 % (ref 11.6–15.1)
GFR SERPL CREATININE-BSD FRML MDRD: 41 ML/MIN/1.73SQ M
GFR SERPL CREATININE-BSD FRML MDRD: 50 ML/MIN/1.73SQ M
GLUCOSE SERPL-MCNC: 102 MG/DL (ref 65–140)
GLUCOSE SERPL-MCNC: 108 MG/DL (ref 65–140)
HCT VFR BLD AUTO: 31.1 % (ref 34.8–46.1)
HGB BLD-MCNC: 10.9 G/DL (ref 11.5–15.4)
IMM GRANULOCYTES # BLD AUTO: 0 THOUSAND/UL (ref 0–0.2)
IMM GRANULOCYTES NFR BLD AUTO: 0 % (ref 0–2)
LYMPHOCYTES # BLD AUTO: 1.19 THOUSANDS/ΜL (ref 0.6–4.47)
LYMPHOCYTES NFR BLD AUTO: 33 % (ref 14–44)
MAGNESIUM SERPL-MCNC: 1.4 MG/DL (ref 1.9–2.7)
MCH RBC QN AUTO: 34.5 PG (ref 26.8–34.3)
MCHC RBC AUTO-ENTMCNC: 35 G/DL (ref 31.4–37.4)
MCV RBC AUTO: 98 FL (ref 82–98)
MONOCYTES # BLD AUTO: 0.61 THOUSAND/ΜL (ref 0.17–1.22)
MONOCYTES NFR BLD AUTO: 17 % (ref 4–12)
NEUTROPHILS # BLD AUTO: 1.69 THOUSANDS/ΜL (ref 1.85–7.62)
NEUTS SEG NFR BLD AUTO: 46 % (ref 43–75)
NRBC BLD AUTO-RTO: 0 /100 WBCS
PHOSPHATE SERPL-MCNC: 1 MG/DL (ref 2.3–4.1)
PLATELET # BLD AUTO: 147 THOUSANDS/UL (ref 149–390)
PMV BLD AUTO: 13.7 FL (ref 8.9–12.7)
POTASSIUM SERPL-SCNC: 3 MMOL/L (ref 3.5–5.3)
POTASSIUM SERPL-SCNC: 3.8 MMOL/L (ref 3.5–5.3)
PROT SERPL-MCNC: 5 G/DL (ref 6.4–8.4)
RBC # BLD AUTO: 3.16 MILLION/UL (ref 3.81–5.12)
SODIUM SERPL-SCNC: 136 MMOL/L (ref 135–147)
SODIUM SERPL-SCNC: 138 MMOL/L (ref 135–147)
WBC # BLD AUTO: 3.63 THOUSAND/UL (ref 4.31–10.16)

## 2023-04-18 PROCEDURE — 97530 THERAPEUTIC ACTIVITIES: CPT

## 2023-04-18 PROCEDURE — 85025 COMPLETE CBC W/AUTO DIFF WBC: CPT | Performed by: STUDENT IN AN ORGANIZED HEALTH CARE EDUCATION/TRAINING PROGRAM

## 2023-04-18 PROCEDURE — 99232 SBSQ HOSP IP/OBS MODERATE 35: CPT | Performed by: INTERNAL MEDICINE

## 2023-04-18 PROCEDURE — 83735 ASSAY OF MAGNESIUM: CPT

## 2023-04-18 PROCEDURE — 99232 SBSQ HOSP IP/OBS MODERATE 35: CPT | Performed by: STUDENT IN AN ORGANIZED HEALTH CARE EDUCATION/TRAINING PROGRAM

## 2023-04-18 PROCEDURE — 80048 BASIC METABOLIC PNL TOTAL CA: CPT | Performed by: STUDENT IN AN ORGANIZED HEALTH CARE EDUCATION/TRAINING PROGRAM

## 2023-04-18 PROCEDURE — 80053 COMPREHEN METABOLIC PANEL: CPT | Performed by: STUDENT IN AN ORGANIZED HEALTH CARE EDUCATION/TRAINING PROGRAM

## 2023-04-18 PROCEDURE — 84100 ASSAY OF PHOSPHORUS: CPT

## 2023-04-18 RX ORDER — POTASSIUM CHLORIDE 20 MEQ/1
20 TABLET, EXTENDED RELEASE ORAL ONCE
Status: COMPLETED | OUTPATIENT
Start: 2023-04-18 | End: 2023-04-18

## 2023-04-18 RX ORDER — MAGNESIUM SULFATE HEPTAHYDRATE 40 MG/ML
2 INJECTION, SOLUTION INTRAVENOUS ONCE
Status: COMPLETED | OUTPATIENT
Start: 2023-04-18 | End: 2023-04-18

## 2023-04-18 RX ORDER — SODIUM CHLORIDE, SODIUM GLUCONATE, SODIUM ACETATE, POTASSIUM CHLORIDE, MAGNESIUM CHLORIDE, SODIUM PHOSPHATE, DIBASIC, AND POTASSIUM PHOSPHATE .53; .5; .37; .037; .03; .012; .00082 G/100ML; G/100ML; G/100ML; G/100ML; G/100ML; G/100ML; G/100ML
75 INJECTION, SOLUTION INTRAVENOUS CONTINUOUS
Status: DISCONTINUED | OUTPATIENT
Start: 2023-04-18 | End: 2023-04-19

## 2023-04-18 RX ORDER — POTASSIUM CHLORIDE 20 MEQ/1
40 TABLET, EXTENDED RELEASE ORAL ONCE
Status: COMPLETED | OUTPATIENT
Start: 2023-04-18 | End: 2023-04-18

## 2023-04-18 RX ADMIN — DICYCLOMINE HYDROCHLORIDE 20 MG: 10 CAPSULE ORAL at 08:09

## 2023-04-18 RX ADMIN — POTASSIUM CHLORIDE 40 MEQ: 1500 TABLET, EXTENDED RELEASE ORAL at 08:09

## 2023-04-18 RX ADMIN — PRAVASTATIN SODIUM 40 MG: 40 TABLET ORAL at 15:36

## 2023-04-18 RX ADMIN — POTASSIUM PHOSPHATE, MONOBASIC AND POTASSIUM PHOSPHATE, DIBASIC 21 MMOL: 224; 236 INJECTION, SOLUTION, CONCENTRATE INTRAVENOUS at 20:43

## 2023-04-18 RX ADMIN — SODIUM CHLORIDE, SODIUM GLUCONATE, SODIUM ACETATE, POTASSIUM CHLORIDE, MAGNESIUM CHLORIDE, SODIUM PHOSPHATE, DIBASIC, AND POTASSIUM PHOSPHATE 75 ML/HR: .53; .5; .37; .037; .03; .012; .00082 INJECTION, SOLUTION INTRAVENOUS at 20:45

## 2023-04-18 RX ADMIN — SODIUM CHLORIDE, SODIUM GLUCONATE, SODIUM ACETATE, POTASSIUM CHLORIDE, MAGNESIUM CHLORIDE, SODIUM PHOSPHATE, DIBASIC, AND POTASSIUM PHOSPHATE 75 ML/HR: .53; .5; .37; .037; .03; .012; .00082 INJECTION, SOLUTION INTRAVENOUS at 08:09

## 2023-04-18 RX ADMIN — METRONIDAZOLE 500 MG: 500 TABLET ORAL at 23:00

## 2023-04-18 RX ADMIN — APIXABAN 2.5 MG: 2.5 TABLET, FILM COATED ORAL at 08:09

## 2023-04-18 RX ADMIN — CEFTRIAXONE 1000 MG: 1 INJECTION, SOLUTION INTRAVENOUS at 23:00

## 2023-04-18 RX ADMIN — METRONIDAZOLE 500 MG: 500 TABLET ORAL at 08:09

## 2023-04-18 RX ADMIN — METRONIDAZOLE 500 MG: 500 TABLET ORAL at 15:36

## 2023-04-18 RX ADMIN — APIXABAN 2.5 MG: 2.5 TABLET, FILM COATED ORAL at 17:26

## 2023-04-18 RX ADMIN — PANTOPRAZOLE SODIUM 20 MG: 20 TABLET, DELAYED RELEASE ORAL at 15:36

## 2023-04-18 RX ADMIN — MAGNESIUM SULFATE HEPTAHYDRATE 2 G: 40 INJECTION, SOLUTION INTRAVENOUS at 20:45

## 2023-04-18 RX ADMIN — DICYCLOMINE HYDROCHLORIDE 20 MG: 10 CAPSULE ORAL at 20:50

## 2023-04-18 RX ADMIN — DICYCLOMINE HYDROCHLORIDE 20 MG: 10 CAPSULE ORAL at 15:36

## 2023-04-18 RX ADMIN — LEVOTHYROXINE SODIUM 50 MCG: 50 TABLET ORAL at 05:01

## 2023-04-18 RX ADMIN — PANTOPRAZOLE SODIUM 20 MG: 20 TABLET, DELAYED RELEASE ORAL at 06:01

## 2023-04-18 RX ADMIN — POTASSIUM CHLORIDE 20 MEQ: 1500 TABLET, EXTENDED RELEASE ORAL at 12:03

## 2023-04-18 NOTE — PLAN OF CARE
Problem: Potential for Falls  Goal: Patient will remain free of falls  Description: INTERVENTIONS:  - Educate patient/family on patient safety including physical limitations  - Instruct patient to call for assistance with activity   - Consult OT/PT to assist with strengthening/mobility   - Keep Call bell within reach  - Keep bed low and locked with side rails adjusted as appropriate  - Keep care items and personal belongings within reach  - Initiate and maintain comfort rounds  - Make Fall Risk Sign visible to staff  - Offer Toileting every 2 Hours, in advance of need  - Initiate/Maintain bed alarm  - Obtain necessary fall risk management equipment: yellow socks  - Apply yellow socks and bracelet for high fall risk patients  - Consider moving patient to room near nurses station  Outcome: Progressing     Problem: Nutrition/Hydration-ADULT  Goal: Nutrient/Hydration intake appropriate for improving, restoring or maintaining nutritional needs  Description: Monitor and assess patient's nutrition/hydration status for malnutrition  Collaborate with interdisciplinary team and initiate plan and interventions as ordered  Monitor patient's weight and dietary intake as ordered or per policy  Utilize nutrition screening tool and intervene as necessary  Determine patient's food preferences and provide high-protein, high-caloric foods as appropriate       INTERVENTIONS:  - Monitor oral intake, urinary output, labs, and treatment plans  - Assess nutrition and hydration status and recommend course of action  - Evaluate amount of meals eaten  - Assist patient with eating if necessary   - Allow adequate time for meals  - Recommend/ encourage appropriate diets, oral nutritional supplements, and vitamin/mineral supplements  - Order, calculate, and assess calorie counts as needed  - Recommend, monitor, and adjust tube feedings and TPN/PPN based on assessed needs  - Assess need for intravenous fluids  - Provide specific nutrition/hydration education as appropriate  - Include patient/family/caregiver in decisions related to nutrition  Outcome: Progressing     Problem: MOBILITY - ADULT  Goal: Maintain or return to baseline ADL function  Description: INTERVENTIONS:  -  Assess patient's ability to carry out ADLs; assess patient's baseline for ADL function and identify physical deficits which impact ability to perform ADLs (bathing, care of mouth/teeth, toileting, grooming, dressing, etc )  - Assess/evaluate cause of self-care deficits   - Assess range of motion  - Assess patient's mobility; develop plan if impaired  - Assess patient's need for assistive devices and provide as appropriate  - Encourage maximum independence but intervene and supervise when necessary  - Involve family in performance of ADLs  - Assess for home care needs following discharge   - Consider OT consult to assist with ADL evaluation and planning for discharge  - Provide patient education as appropriate  Outcome: Progressing  Goal: Maintains/Returns to pre admission functional level  Description: INTERVENTIONS:  - Perform BMAT or MOVE assessment daily    - Set and communicate daily mobility goal to care team and patient/family/caregiver  - Collaborate with rehabilitation services on mobility goals if consulted  - Perform Range of Motion 4 times a day  - Reposition patient every 2 hours    - Dangle patient 3 times a day  - Stand patient 3 times a day  - Ambulate patient 3 times a day  - Out of bed to chair 3 times a day   - Out of bed for meals 3 times a day  - Out of bed for toileting  - Record patient progress and toleration of activity level   Outcome: Progressing     Problem: PAIN - ADULT  Goal: Verbalizes/displays adequate comfort level or baseline comfort level  Description: Interventions:  - Encourage patient to monitor pain and request assistance  - Assess pain using appropriate pain scale  - Administer analgesics based on type and severity of pain and evaluate response  - Implement non-pharmacological measures as appropriate and evaluate response  - Consider cultural and social influences on pain and pain management  - Notify physician/advanced practitioner if interventions unsuccessful or patient reports new pain  Outcome: Progressing     Problem: CARDIOVASCULAR - ADULT  Goal: Maintains optimal cardiac output and hemodynamic stability  Description: INTERVENTIONS:  - Monitor I/O, vital signs and rhythm  - Monitor for S/S and trends of decreased cardiac output  - Administer and titrate ordered vasoactive medications to optimize hemodynamic stability  - Assess quality of pulses, skin color and temperature  - Assess for signs of decreased coronary artery perfusion  - Instruct patient to report change in severity of symptoms  Outcome: Progressing  Goal: Absence of cardiac dysrhythmias or at baseline rhythm  Description: INTERVENTIONS:  - Continuous cardiac monitoring, vital signs, obtain 12 lead EKG if ordered  - Administer antiarrhythmic and heart rate control medications as ordered  - Monitor electrolytes and administer replacement therapy as ordered  Outcome: Progressing     Problem: GASTROINTESTINAL - ADULT  Goal: Maintains or returns to baseline bowel function  Description: INTERVENTIONS:  - Assess bowel function  - Encourage oral fluids to ensure adequate hydration  - Administer IV fluids if ordered to ensure adequate hydration  - Administer ordered medications as needed  - Encourage mobilization and activity  - Consider nutritional services referral to assist patient with adequate nutrition and appropriate food choices  Outcome: Progressing     Problem: GENITOURINARY - ADULT  Goal: Maintains or returns to baseline urinary function  Description: INTERVENTIONS:  - Assess urinary function  - Encourage oral fluids to ensure adequate hydration if ordered  - Administer IV fluids as ordered to ensure adequate hydration  - Administer ordered medications as needed  - Offer frequent toileting  - Follow urinary retention protocol if ordered  Outcome: Progressing  Goal: Absence of urinary retention  Description: INTERVENTIONS:  - Assess patient’s ability to void and empty bladder  - Monitor I/O  - Bladder scan as needed  - Discuss with physician/AP medications to alleviate retention as needed  - Discuss catheterization for long term situations as appropriate  Outcome: Progressing  Goal: Urinary catheter remains patent  Description: INTERVENTIONS:  - Assess patency of urinary catheter  - If patient has a chronic ayala, consider changing catheter if non-functioning  - Follow guidelines for intermittent irrigation of non-functioning urinary catheter  Outcome: Progressing     Problem: METABOLIC, FLUID AND ELECTROLYTES - ADULT  Goal: Electrolytes maintained within normal limits  Description: INTERVENTIONS:  - Monitor labs and assess patient for signs and symptoms of electrolyte imbalances  - Administer electrolyte replacement as ordered  - Monitor response to electrolyte replacements, including repeat lab results as appropriate  - Instruct patient on fluid and nutrition as appropriate  Outcome: Progressing  Goal: Fluid balance maintained  Description: INTERVENTIONS:  - Monitor labs   - Monitor I/O and WT  - Instruct patient on fluid and nutrition as appropriate  - Assess for signs & symptoms of volume excess or deficit  Outcome: Progressing     Problem: Prexisting or High Potential for Compromised Skin Integrity  Goal: Skin integrity is maintained or improved  Description: INTERVENTIONS:  - Identify patients at risk for skin breakdown  - Assess and monitor skin integrity  - Assess and monitor nutrition and hydration status  - Monitor labs   - Assess for incontinence   - Turn and reposition patient  - Assist with mobility/ambulation  - Relieve pressure over bony prominences  - Avoid friction and shearing  - Provide appropriate hygiene as needed including keeping skin clean and dry  - Evaluate need for skin moisturizer/barrier cream  - Collaborate with interdisciplinary team   - Patient/family teaching  - Consider wound care consult   Outcome: Progressing

## 2023-04-18 NOTE — CASE MANAGEMENT
Case Management Progress Note    Patient name Danielle Falcon  Location [facility] [phone] MRN [id number]  : 1940 Date 2023       LOS (days): 2  Geometric Mean LOS (GMLOS) (days):   Days to [address]:        OBJECTIVE:        Current admission status: Inpatient  Preferred Pharmacy:   [facility], [address] [place] [address] Road [address]  Phone: [phone] Fax: [phone]    Primary Care Provider: JORDAN Lee    Primary Insurance: [facility]  Secondary Insurance:     PROGRESS NOTE:    CM met with granddaughter at bedside and provided printed list of available STR and HHC  Discussed about [facility] having a delay of 2 5 weeks for PT, OT and that she will have a copay of $20 per visit per discipline  Granddaughter asked about HHA services in home, discussed HHA agencies and CM will provide with a list of non skilled home health agencies  Granddaughter stated she will talk to patient's dtr Stevan Berry about the STR preferences and will reach out to CM with choice

## 2023-04-18 NOTE — ASSESSMENT & PLAN NOTE
Chronic, likely 2/2 to enterocolitis, dehydration and acute renal failure  Asymptomatic,  • [id number]  • ECG NSR, Tele-afib rates currently adequately rate  • TSH,-normal   Serial Troponins-WNL   •  continue home metoprolol 25 mg daily, monitor telemetry for bradycardia  • Continue Eliquis  • Cardiology following, input appreciated  • Echo: EF 55% No significant changes noted compared to the prior study

## 2023-04-18 NOTE — PROGRESS NOTES
[address] Internal Medicine Progress Note  Patient: Oriana Clarke 78 y o  female   MRN: [id number]  PCP: JORDAN Leon  Unit/Bed#: [address] [place] [address] Encounter: [id number]  Date Of Visit: 04/18/23    Problem List:    Principal Problem:    Acute renal failure (ARF) ([facility] )  Active Problems:    Enterocolitis    Atrial fibrillation ([place] [address] )    Hypertension    Pulmonary hypertension, unspecified ([place] [address] )    Raynaud's disease without gangrene    Bacteriuria    Hypothyroidism    Thoracic aortic aneurysm without rupture, unspecified part ([place] [address] )    Pure hypercholesterolemia    Tricuspid incompetence    Pancreatic cyst    Severe protein-calorie malnutrition ([place] [address] )      Assessment & Plan:    Enterocolitis  Assessment & Plan  Presented with 1 week history of watery brown diarrhea with nausea and abdominal pain  CT abdomen:Scattered air-fluid levels in the small and large bowel, nonspecific but can be seen with underlying enterocolitis  Continues to improve  Denies further diarrhea  No abdominal pain  Abdominal exam is benign  Tolerating advancement of the diet    · Stool C  difficile negative  · Stool bacterial PCR was not able to be obtained due to improvement in diarrhea  · GI following, input appreciated  · For ceftriaxone and metronidazole, will transition to cefdinir plus metronidazole to complete 5 days  · Monitor advancement of the diet  · Monitor GI symptoms and abdominal exam  · GI follow-up after discharge    * Acute renal failure (ARF) (HCC)  Assessment & Plan  POA, likely secondary to diarrhea and severe dehydration  Improved to baseline  · Creatinine 4 59 > 2 57 > 1 2 >0 9  · Baseline 0 79- 1 10  · Status post bolus and maintenance IVF given  · Hold home lisinopril, Klor-Con and Lasix  · Avoid nephrotoxic's  · Monitor renal function  · Nephrology following, input appreciated  · Monitor off IV fluids as p o  intake is improving  · Continue to hold diuretics    Septic shock (HCC)-resolved as of 4/20/2023  Assessment & Plan  Improving,    POA, evidence with tachycardia, leukocytosis, tachypnea, hypotension and hypothermia likely secondary to enterocolitis versus UTI    · Blood cultures-neg   · Lactic acid-WNL/Pro-Crow- WNL  · Bolus and IVF maintenance fluid given in ED, will continue  · Vanco and cefepime in ED  · GI recs start metronidazole and Rocephin  · Monitor WBCs, fever curve, hemodynamics    Bacteriuria  Assessment & Plan  Asymptomatic,  · UA positive for leukocytes  · No additional antimicrobial treatment indicated    Raynaud's disease without gangrene  Assessment & Plan  POA symptomatic,    · Worsened with cold weather, will use warm blankets or bear hugger for patient as needed  · No medication at home, no esophageal dysmotility    Pulmonary hypertension, unspecified ([facility] )  Assessment & Plan  Historical diagnosis    · Repeat echo, results as stated above    Hypertension  Assessment & Plan  On benazepril, metoprolol, furosemide, hydrochlorothiazide  Initially hypotensive in the setting of hypovolemia with acute kidney injury    Diuretics and benazepril on hold  Currently blood pressure stable  · Continue metoprolol  · Monitor blood pressure trend  · Discussed with nephrology and cardiology, may resume benazepril as outpatient in 1 week based on blood pressure trend  · BMP in 1 week  · Discontinue hydrochlorothiazide  · Hold Lasix for now, consider using as needed as outpatient for weight gain and leg edema  · Advised follow-up with primary cardiology after discharge    Atrial fibrillation [facility])  Assessment & Plan  Chronic, likely 2/2 to enterocolitis, dehydration and acute renal failure  Asymptomatic,  • [id number]  • ECG NSR, Tele-afib rates currently adequately rate  • TSH,-normal   Serial Troponins-WNL   •  continue home metoprolol 25 mg daily, monitor telemetry for bradycardia  • Continue Eliquis  • Cardiology following, input appreciated  • Echo: EF 55% No significant changes noted compared to the prior study  Pancreatic cyst  Assessment & Plan  CT scan abdomen revealed Small cyst in the region of the pancreatic head without significant change given the limitations of this exam   Follow-up with GI as outpatient for further surveillance/EUS    Tricuspid incompetence  Assessment & Plan  Historic diagnosis    · Cardiology following    Pure hypercholesterolemia  Assessment & Plan  Chronic,    · Continue home statin inpatient equivalent    Thoracic aortic aneurysm without rupture, unspecified part [facility])  Assessment & Plan  Historical diagnosis    · Followed by outpatient cardiology    Hypothyroidism  Assessment & Plan  Chronic,     · TSH WNL  · continue home levothyroxine 50 mcg    Hyponatremia-resolved as of 4/17/2023  Assessment & Plan  Asymptomatic,    ·  >136  · Likely secondary to dehydration and diarrhea  · IV bolus and maintenance fluids given  · Trend CMP in a m  · Consult nephro if unresolved    Severe protein-calorie malnutrition ([facility] )  Assessment & Plan  Malnutrition Findings:     Severe protein-calorie malnutrition, in the setting of prolonged enterocolitis, as evidenced by12 lb/9 45% wt loss in 1monthandinadequate energy intake d/t 10 days of nausea and diarrhea, requiring diet counseling, monitoring of po intake, and assistance with meals as needed  BMI Findings: Body mass index is 18 56 kg/m²  Metabolic acidosis, increased anion gap-resolved as of 4/17/2023  Assessment & Plan  · AG-17  · Likely secondary to dehydration and diarrhea  · Status post bolus and IV fluids  · Bicarb drip  · Nephro following            VTE Pharmacologic Prophylaxis:   Moderate Risk (Score 3-4) - Pharmacological DVT Prophylaxis Ordered: apixaban (Eliquis)  Patient Centered Rounds: I performed bedside rounds with nursing staff today    Discussions with Specialists or Other Care Team Provider: Cardiology, nephrology, GI    Education and Discussions with Family / Patient: Updated  (Family) at bedside  Time Spent for Care: 45 minutes  More than 50% of total time spent on counseling and coordination of care as described above  Current Length of Stay: 2 day(s)  Current Patient Status: Inpatient   Certification Statement: The patient will continue to require additional inpatient hospital stay due to Colitis  Discharge Plan: Anticipate discharge in 24-48 hrs to home with home services  Code Status: Level 1 - Full Code    Subjective:   Sitting up in chair, tolerating surgical soft diet  Denies nausea vomiting abdominal pain  No further diarrhea r, reports 2 regular bowel movements today    Reports that her strength is improving  Denies any chest pain shortness of breath  Reported having mild dizziness with activity yesterday    Denies history of CHF, reports that she takes diuretics for intermittent leg swelling    Objective:     Vitals:   Temp (24hrs), Av 5 °F (36 4 °C), Min:97 3 °F (36 3 °C), Max:97 9 °F (36 6 °C)    Temp:  [97 3 °F (36 3 °C)-97 9 °F (36 6 °C)] 97 9 °F (36 6 °C)  HR:  [51-74] 74  Resp:  [16-18] 18  BP: ()/(46-69) 108/68  SpO2:  [97 %-99 %] 97 %  Body mass index is 18 56 kg/m²  Input and Output Summary (last 24 hours): Intake/Output Summary (Last 24 hours) at 2023 0150  Last data filed at 2023  Gross per 24 hour   Intake 480 ml   Output 2300 ml   Net -1820 ml       Physical Exam:   Physical Exam  Constitutional:       General: She is not in acute distress  Comments: Elderly, pleasant   HENT:      Head: Normocephalic and atraumatic  Cardiovascular:      Rate and Rhythm: Normal rate  Rhythm irregular  Pulmonary:      Effort: Pulmonary effort is normal  No respiratory distress  Breath sounds: Normal breath sounds  No wheezing or rales  Abdominal:      General: Bowel sounds are normal  There is no distension  Palpations: Abdomen is soft  Tenderness: There is no abdominal tenderness  There is no guarding or rebound  Musculoskeletal:      Right lower leg: No edema  Left lower leg: No edema  Skin:     General: Skin is warm and dry  Findings: No rash  Neurological:      Mental Status: She is alert  Mental status is at baseline  Additional Data:     Labs:  Results from last 7 days   Lab Units 04/17/23  0520   WBC Thousand/uL 4 04*   HEMOGLOBIN g/dL 11 9   HEMATOCRIT % 35 4   PLATELETS Thousands/uL 172   NEUTROS PCT % 59   LYMPHS PCT % 23   MONOS PCT % 13*   EOS PCT % 3     Results from last 7 days   Lab Units 04/17/23  1923 04/17/23  0520   SODIUM mmol/L 134* 136   POTASSIUM mmol/L 3 1* 3 1*   CHLORIDE mmol/L 106 110*   CO2 mmol/L 21 15*   BUN mg/dL 54* 78*   CREATININE mg/dL 1 53* 2 57*   ANION GAP mmol/L 7 11   CALCIUM mg/dL 7 8* 7 6*   ALBUMIN g/dL  --  3 5   TOTAL BILIRUBIN mg/dL  --  0 54   ALK PHOS U/L  --  68   ALT U/L  --  18   AST U/L  --  31   GLUCOSE RANDOM mg/dL 88 105     Results from last 7 days   Lab Units 04/16/23  1330   INR  1 04             Results from last 7 days   Lab Units 04/16/23  1330   LACTIC ACID mmol/L 0 7   PROCALCITONIN ng/ml 0 17       Lines/Drains:  Invasive Devices     Peripheral Intravenous Line  Duration           Peripheral IV 04/16/23 Right Antecubital 1 day    Peripheral IV 04/16/23 Right;Upper;Ventral (anterior) Arm 1 day          Drain  Duration           Urethral Catheter Double-lumen 1 day                Telemetry:  Telemetry Orders (From admission, onward)             48 Hour Telemetry Monitoring  Continuous x 48 hours        References:    Telemetry Guidelines   Question:  Reason for 48 Hour Telemetry  Answer:  Arrhythmias Requiring Medical Therapy (eg  SVT, Vtach/fib, Bradycardia, Uncontrolled A-fib)                 Telemetry Reviewed: Atrial fibrillation   HR averaging 40 to 50 bpm  Indication for Continued Telemetry Use: Arrthymias requiring medical therapy             Imaging: Reviewed radiology reports from this admission "including: abdominal/pelvic CT    Recent Cultures (last 7 days):   Results from last 7 days   Lab Units 04/16/23  1334 04/16/23  1332   BLOOD CULTURE  No Growth at 24 hrs  No Growth at 24 hrs  Last 24 Hours Medication List:   Current Facility-Administered Medications   Medication Dose Route Frequency Provider Last Rate   • apixaban  2 5 mg Oral BID Brian lCay PA-C     • cefTRIAXone  1,000 mg Intravenous Q24H Abdifatah Giron MD 1,000 mg (04/17/23 2304)   • dicyclomine  20 mg Oral TID Elza Ventura MD     • levothyroxine  50 mcg Oral Early Morning Elza Ventura MD     • metoprolol  5 mg Intravenous Q6H PRN Elza Ventura MD     • metoprolol succinate  25 mg Oral Daily Elza Ventura MD     • metroNIDAZOLE  500 mg Oral Q8H Abdifatah Giron MD     • ondansetron  4 mg Intravenous Q6H PRN Elza Ventura MD     • pantoprazole  20 mg Oral BID AC Elaz Ventura MD     • pravastatin  40 mg Oral Daily With Carmelina Frazier MD     • simethicone  80 mg Oral 4x Daily PRN Elza Ventura MD     • sodium bicarbonate infusion  100 mL/hr Intravenous Continuous Claudia Santa  mL/hr (04/17/23 2012)        Today, Patient Was Seen By: Helga Aguilar MD    ** Please Note: \"This note has been constructed using a voice recognition system  Therefore there may be syntax, spelling, and/or grammatical errors  Please call if you have any questions   \"**  "

## 2023-04-18 NOTE — CASE MANAGEMENT
Case Management Progress Note    Patient name Daljit Pang  Location [facility] [phone]-* MRN [id number]  : 1940 Date 2023       LOS (days): 2  Geometric Mean LOS (GMLOS) (days): 5 00  Days to GMLOS:3 1        OBJECTIVE:        Current admission status: Inpatient  Preferred Pharmacy:   [facility], [address] [place] [id number]  Phone: [phone] Fax: [phone]    Primary Care Provider: JORDAN Mccarthy    Primary Insurance: [facility]  Secondary Insurance:     PROGRESS NOTE:    CM spoke with dtr Samira Gutierrez to discuss STR preferences and if she had an opportunity to review them and if a decision was made  Samira Gutierrez stated that she was driving at the moment and would like to reach out to the facilities to see if she could tour the place before making any decision due to having a bad experience at [facility]  CM provided Samira Gutierrez with the contact numbers for all 3 facilities  CM to follow up

## 2023-04-18 NOTE — ASSESSMENT & PLAN NOTE
Chronic, likely 2/2 to enterocolitis, dehydration and acute renal failure    Asymptomatic,    • [id number]  • ECG NSR, Tele-afib  • TSH,  fT4-pending/ serial Troponins-WNL   • Rate control:  continue home metoprolol 25 mg daily, IV metoprolol as needed  • Anticoag: Restart Eliquis which was initially held due to renal function  • Consult cardio: Continue home Eliquis and BP meds  • Echo: EF 55% No significant changes noted compared to the prior study

## 2023-04-18 NOTE — ASSESSMENT & PLAN NOTE
Presented with 1 week history of watery brown diarrhea with nausea and abdominal pain  CT abdomen:Scattered air-fluid levels in the small and large bowel, nonspecific but can be seen with underlying enterocolitis  Continues to improve  Denies further diarrhea  No abdominal pain  Abdominal exam is benign  Tolerating advancement of the diet    · Stool C  difficile negative  · Stool bacterial PCR was not able to be obtained due to improvement in diarrhea  · GI following, input appreciated  · For ceftriaxone and metronidazole, will transition to cefdinir plus metronidazole to complete 5 days  · Monitor advancement of the diet  · Monitor GI symptoms and abdominal exam  · GI follow-up after discharge

## 2023-04-18 NOTE — ASSESSMENT & PLAN NOTE
Improving,    POA, evidence with tachycardia, leukocytosis, tachypnea, hypotension and hypothermia likely secondary to enterocolitis versus UTI    · Blood cultures-neg   · Lactic acid-WNL/Pro-Crow- WNL  · Bolus and IVF maintenance fluid given in ED, will continue  · Vanco and cefepime in ED  · GI recs start metronidazole and Rocephin  · Monitor WBCs, fever curve, hemodynamics

## 2023-04-18 NOTE — ASSESSMENT & PLAN NOTE
POA, likely secondary to diarrhea and severe dehydration  Improved to baseline  · Creatinine 4 59 > 2 57 > 1 2 >0 9  · Baseline 0 79- 1 10  · Status post bolus and maintenance IVF given  · Hold home lisinopril, Klor-Con and Lasix  · Avoid nephrotoxic's  · Monitor renal function  · Nephrology following, input appreciated  · Monitor off IV fluids as p o  intake is improving  · Continue to hold diuretics

## 2023-04-18 NOTE — ASSESSMENT & PLAN NOTE
Improving,    POA, likely secondary to diarrhea and severe dehydration    · Creatinine 4 59 > 2 57 > 1 2  · Baseline 0 79- 1 10  · Bolus and maintenance IVF given  · Hold home lisinopril, Klor-Con and Lasix  · Avoid nephrotoxic's  · Monitor renal function  · Bicarb drip DC  · Start Isolyte at 75mL/hr per nephro recs  · Nephro consulted: no need for HD, avoid nephrotoxic's

## 2023-04-18 NOTE — PROGRESS NOTES
NEPHROLOGY PROGRESS NOTE   Rosalie Acevedo 78 y o  female MRN: [id number]  Unit/Bed#: [address] [place] [address]- Encounter: [id number]  Reason for Consult: LAKE    ASSESSMENT AND PLAN:  78 y o  woman with PMH of hypertension A-fib, hyperlipidemia, Raynaud's, hypothyroidism, thoracic aortic aneurysm p/w several days of diarrhea complicated with elevated creatinine        PLAN     #Non-Oliguric KDIGO LAKE stage 3 (severe) with evidence of kidney recovery  • Etiology: Likely secondary to prerenal in the settings of severe diarrhea and dehydration  • Baseline creatinine 0 7 mg/dL  • Peak creatinine: 4 59 mg/dL  • Current creatinine: 1 20 mg/dL continue to improve  • UA: No leukocyturia, no hematuria  • Renal imaging : No hydronephrosis  • Treatment:  • Kidney function improving, no indication of dialysis at this time  • Maintain MAP:  Over 65 mmHg if possible/avoid hypoperfusion:  Hold parameters on blood pressure medications  • Avoid nephrotoxic agents such as NSAIDs, and IV contrast if possible   Avoid opioids   • Adjust medications to GFR        #Acid-base Disorder  • vbg: pH 7 2, pCO2 40 9, serum HCO3 9, on admission  • Current serum bicarbonate 23 mmol/L  • Discontinue sodium bicarb  • Bolick acidosis resolved     #Volume status/hypertension:  • Volume:  Appears euvolemic  • Blood pressure:  Tendency to hypotension /75mmHg   • Recommend:  • Continue holding lisinopril and diuretics   • Discontinue sodium bicarb as above   • Start Plasma-Lyte 75 mils per hour for 24 hours to continue hydration   • Enforce fluid intake      #Hypokalemia  • Serum potassium 3 mg/L  • Secondary to GI loss and poor intake  • Potassium chloride 40 mEq x 2 today  • BMP at 6 PM  • Continue aggressive repletion as needed       #Anemia:  • Current hemoglobin:  10 9 mg/dL  • Treatment:  • Transfuse for hemoglobin less than 7 0 per primary service       #Enterocolitis  • Diarrhea improving  • On antibiotics as per primary team  • IV fluids as above     #septic shock  • In the settings of diarrhea  • Resolving   • status post IV fluids resuscitation     #Hyponatremia (resolved)  • Serum sodium on admission 129  • Secondary to true hyponatremia with severe diarrhea and hypovolemia  • Current sodium 138 mEq/L   • Initially with mild overcorrection status post D5W    SUBJECTIVE:  Patient seen and examined at bedside  No chest pain, shortness of breath, nausea, vomiting, abdominal   Diarrhea improving       OBJECTIVE:  Current Weight: Weight - Scale: 52 2 kg (115 lb)  Vitals:    04/18/23 0809   BP: 106/75   Pulse:    Resp:    Temp: 97 8 °F (36 6 °C)   SpO2:        Intake/Output Summary (Last 24 hours) at 4/18/2023 0936  Last data filed at 4/18/2023 0442  Gross per 24 hour   Intake --   Output 2550 ml   Net -2550 ml     Wt Readings from Last 3 Encounters:   04/17/23 52 2 kg (115 lb)   03/14/23 57 6 kg (127 lb)   02/22/23 58 5 kg (129 lb)     Temp Readings from Last 3 Encounters:   04/18/23 97 8 °F (36 6 °C)   03/14/23 97 5 °F (36 4 °C) (Temporal)   02/22/23 (!) 97 1 °F (36 2 °C) (Temporal)     BP Readings from Last 3 Encounters:   04/18/23 106/75   03/14/23 118/74   02/22/23 120/60     Pulse Readings from Last 3 Encounters:   04/17/23 74   03/14/23 60   02/22/23 80        General:  no acute distress at this time  Skin:  No acute rash  Eyes:  No scleral icterus and noninjected  ENT:  mucous membranes moist  Neck:  no carotid bruits  Chest:  Clear to auscultation percussion, good respiratory effort, no use of accessory respiratory muscles  CVS:  Regular rate and rhythm without rub   Abdomen:  soft and nontender   Extremities:  , no significant lower extremity edema  Neuro:  No gross focality  Psych:  Alert , cooperative     Medications:    Current Facility-Administered Medications:   •  apixaban (ELIQUIS) tablet 2 5 mg, 2 5 mg, Oral, BID, Jovita Whitfield PA-C, 2 5 mg at 04/18/23 0809  •  cefTRIAXone (ROCEPHIN) IVPB (premix in dextrose) 1,000 mg 50 mL, 1,000 mg, Intravenous, Q24H, Shabbir Witt MD, Stopped at 04/17/23 2334  •  dicyclomine (BENTYL) capsule 20 mg, 20 mg, Oral, TID, Arcelia Lowery MD, 20 mg at 04/18/23 4815  •  levothyroxine tablet 50 mcg, 50 mcg, Oral, Early Morning, Arcelia Lowery MD, 50 mcg at 04/18/23 0501  •  metoprolol (LOPRESSOR) injection 5 mg, 5 mg, Intravenous, Q6H PRN, Arcelia Lowery MD  •  metoprolol succinate (TOPROL-XL) 24 hr tablet 25 mg, 25 mg, Oral, Daily, Arcelia Lowery MD  •  metroNIDAZOLE (FLAGYL) tablet 500 mg, 500 mg, Oral, Q8H, Shabbir Witt MD, 500 mg at 04/18/23 0809  •  multi-electrolyte (PLASMALYTE-A/ISOLYTE-S PH 7 4) IV solution, 75 mL/hr, Intravenous, Continuous, Venus Gonzalez MD, Last Rate: 75 mL/hr at 04/18/23 0809, 75 mL/hr at 04/18/23 0809  •  ondansetron (ZOFRAN) injection 4 mg, 4 mg, Intravenous, Q6H PRN, Arcelia Lowery MD  •  pantoprazole (PROTONIX) EC tablet 20 mg, 20 mg, Oral, BID AC, Arcelia Lowery MD, 20 mg at 04/18/23 0601  •  potassium chloride (K-DUR,KLOR-CON) CR tablet 20 mEq, 20 mEq, Oral, Once, Venus Gonzalez MD  •  pravastatin (PRAVACHOL) tablet 40 mg, 40 mg, Oral, Daily With Lavell Hodges MD, 40 mg at 04/17/23 1617  •  simethicone (MYLICON) chewable tablet 80 mg, 80 mg, Oral, 4x Daily PRN, Arcelia Lowery MD    Laboratory Results:  Results from last 7 days   Lab Units 04/18/23  0445 04/17/23  1923 04/17/23  0520 04/16/23  2331 04/16/23  1330   WBC Thousand/uL 3 63*  --  4 04*  --  5 77   HEMOGLOBIN g/dL 10 9*  --  11 9  --  15 2   HEMATOCRIT % 31 1*  --  35 4  --  47 4*   PLATELETS Thousands/uL 147*  --  172  --  224   SODIUM mmol/L 138 134* 136 133* 129*   POTASSIUM mmol/L 3 0* 3 1* 3 1* 3 7 4 5   CHLORIDE mmol/L 107 106 110* 110* 103   CO2 mmol/L 23 21 15* 11* 9*   BUN mg/dL 41* 54* 78* 84* 100*   CREATININE mg/dL 1 20 1 53* 2 57* 3 11* 4 59*   CALCIUM mg/dL 7 6* 7 8* 7 6* 7 5* 9 3   MAGNESIUM mg/dL  --   --  1 3*  --   --        CT abdomen pelvis wo contrast   Final "Result by Sharla Ritter MD (04/16 6438)   Exam is limited without IV and oral contrast particularly for soft tissue and bowel evaluation  1   Scattered air-fluid levels in the small and large bowel, nonspecific but can be seen with underlying enterocolitis  2  Small cyst in the region of the pancreatic head without significant change given the limitations of this exam  Follow up as per previous guidelines  Workstation performed: [id number]             Portions of the record may have been created with voice recognition software  Occasional wrong word or \"sound a like\" substitutions may have occurred due to the inherent limitations of voice recognition software  Read the chart carefully and recognize, using context, where substitutions have occurred      "

## 2023-04-18 NOTE — PLAN OF CARE
Problem: Potential for Falls  Goal: Patient will remain free of falls  Description: INTERVENTIONS:  - Educate patient/family on patient safety including physical limitations  - Instruct patient to call for assistance with activity   - Consult OT/PT to assist with strengthening/mobility   - Keep Call bell within reach  - Keep bed low and locked with side rails adjusted as appropriate  - Keep care items and personal belongings within reach  - Initiate and maintain comfort rounds  - Make Fall Risk Sign visible to staff  - Apply yellow socks and bracelet for high fall risk patients  - Consider moving patient to room near nurses station  Outcome: Progressing     Problem: Nutrition/Hydration-ADULT  Goal: Nutrient/Hydration intake appropriate for improving, restoring or maintaining nutritional needs  Description: Monitor and assess patient's nutrition/hydration status for malnutrition  Collaborate with interdisciplinary team and initiate plan and interventions as ordered  Monitor patient's weight and dietary intake as ordered or per policy  Utilize nutrition screening tool and intervene as necessary  Determine patient's food preferences and provide high-protein, high-caloric foods as appropriate       INTERVENTIONS:  - Monitor oral intake, urinary output, labs, and treatment plans  - Assess nutrition and hydration status and recommend course of action  - Evaluate amount of meals eaten  - Assist patient with eating if necessary   - Allow adequate time for meals  - Recommend/ encourage appropriate diets, oral nutritional supplements, and vitamin/mineral supplements  - Order, calculate, and assess calorie counts as needed  - Recommend, monitor, and adjust tube feedings and TPN/PPN based on assessed needs  - Assess need for intravenous fluids  - Provide specific nutrition/hydration education as appropriate  - Include patient/family/caregiver in decisions related to nutrition  Outcome: Progressing     Problem: MOBILITY - ADULT  Goal: Maintain or return to baseline ADL function  Description: INTERVENTIONS:  -  Assess patient's ability to carry out ADLs; assess patient's baseline for ADL function and identify physical deficits which impact ability to perform ADLs (bathing, care of mouth/teeth, toileting, grooming, dressing, etc )  - Assess/evaluate cause of self-care deficits   - Assess range of motion  - Assess patient's mobility; develop plan if impaired  - Assess patient's need for assistive devices and provide as appropriate  - Encourage maximum independence but intervene and supervise when necessary  - Involve family in performance of ADLs  - Assess for home care needs following discharge   - Consider OT consult to assist with ADL evaluation and planning for discharge  - Provide patient education as appropriate  Outcome: Progressing  Goal: Maintains/Returns to pre admission functional level  Description: INTERVENTIONS:  - Perform BMAT or MOVE assessment daily    - Set and communicate daily mobility goal to care team and patient/family/caregiver     - Collaborate with rehabilitation services on mobility goals if consulted  - Out of bed for toileting  - Record patient progress and toleration of activity level   Outcome: Progressing     Problem: PAIN - ADULT  Goal: Verbalizes/displays adequate comfort level or baseline comfort level  Description: Interventions:  - Encourage patient to monitor pain and request assistance  - Assess pain using appropriate pain scale  - Administer analgesics based on type and severity of pain and evaluate response  - Implement non-pharmacological measures as appropriate and evaluate response  - Consider cultural and social influences on pain and pain management  - Notify physician/advanced practitioner if interventions unsuccessful or patient reports new pain  Outcome: Progressing     Problem: CARDIOVASCULAR - ADULT  Goal: Maintains optimal cardiac output and hemodynamic stability  Description: INTERVENTIONS:  - Monitor I/O, vital signs and rhythm  - Monitor for S/S and trends of decreased cardiac output  - Administer and titrate ordered vasoactive medications to optimize hemodynamic stability  - Assess quality of pulses, skin color and temperature  - Assess for signs of decreased coronary artery perfusion  - Instruct patient to report change in severity of symptoms  Outcome: Progressing  Goal: Absence of cardiac dysrhythmias or at baseline rhythm  Description: INTERVENTIONS:  - Continuous cardiac monitoring, vital signs, obtain 12 lead EKG if ordered  - Administer antiarrhythmic and heart rate control medications as ordered  - Monitor electrolytes and administer replacement therapy as ordered  Outcome: Progressing     Problem: GASTROINTESTINAL - ADULT  Goal: Maintains or returns to baseline bowel function  Description: INTERVENTIONS:  - Assess bowel function  - Encourage oral fluids to ensure adequate hydration  - Administer IV fluids if ordered to ensure adequate hydration  - Administer ordered medications as needed  - Encourage mobilization and activity  - Consider nutritional services referral to assist patient with adequate nutrition and appropriate food choices  Outcome: Progressing     Problem: GENITOURINARY - ADULT  Goal: Maintains or returns to baseline urinary function  Description: INTERVENTIONS:  - Assess urinary function  - Encourage oral fluids to ensure adequate hydration if ordered  - Administer IV fluids as ordered to ensure adequate hydration  - Administer ordered medications as needed  - Offer frequent toileting  - Follow urinary retention protocol if ordered  Outcome: Progressing  Goal: Absence of urinary retention  Description: INTERVENTIONS:  - Assess patient’s ability to void and empty bladder  - Monitor I/O  - Bladder scan as needed  - Discuss with physician/AP medications to alleviate retention as needed  - Discuss catheterization for long term situations as appropriate  Outcome: Progressing  Goal: Urinary catheter remains patent  Description: INTERVENTIONS:  - Assess patency of urinary catheter  - If patient has a chronic ayala, consider changing catheter if non-functioning  - Follow guidelines for intermittent irrigation of non-functioning urinary catheter  Outcome: Progressing     Problem: METABOLIC, FLUID AND ELECTROLYTES - ADULT  Goal: Electrolytes maintained within normal limits  Description: INTERVENTIONS:  - Monitor labs and assess patient for signs and symptoms of electrolyte imbalances  - Administer electrolyte replacement as ordered  - Monitor response to electrolyte replacements, including repeat lab results as appropriate  - Instruct patient on fluid and nutrition as appropriate  Outcome: Progressing  Goal: Fluid balance maintained  Description: INTERVENTIONS:  - Monitor labs   - Monitor I/O and WT  - Instruct patient on fluid and nutrition as appropriate  - Assess for signs & symptoms of volume excess or deficit  Outcome: Progressing     Problem: Prexisting or High Potential for Compromised Skin Integrity  Goal: Skin integrity is maintained or improved  Description: INTERVENTIONS:  - Identify patients at risk for skin breakdown  - Assess and monitor skin integrity  - Assess and monitor nutrition and hydration status  - Monitor labs   - Assess for incontinence   - Turn and reposition patient  - Assist with mobility/ambulation  - Relieve pressure over bony prominences  - Avoid friction and shearing  - Provide appropriate hygiene as needed including keeping skin clean and dry  - Evaluate need for skin moisturizer/barrier cream  - Collaborate with interdisciplinary team   - Patient/family teaching  - Consider wound care consult   Outcome: Progressing

## 2023-04-18 NOTE — UTILIZATION REVIEW
Continued Stay Review    Date: 4/18/23                          Current Patient Class: inpatient  Current Level of Care: med surg  HPI:83 y o  female initially admitted on 4/16/23     Assessment/Plan:   Kidney functioning improving, Bicarb gtt d/c'd  Started on plasmalyte IVF for intermittent hypotension per renal  Monitor labs  IVABT per GI for enterocolitis, bacteruria  Monitor WBCs, fever curve, hemodynamics  Advance diet as tolerates; trial surgical soft/lite       Vital Signs:   04/18/23 08:09:03 97 8 °F (36 6 °C) -- -- 106/75 85 -- -- -- -- --   04/18/23 04:47:17 -- -- -- 110/69 83 -- -- -- -- --   04/17/23 23:04:30 97 9 °F (36 6 °C) 74 18 108/68 81 97 % -- -- None (Room air) --   04/17/23 1900 -- -- -- 109/69 82 98 % -- -- None (Room air) --   04/17/23 18:46:58 97 8 °F (36 6 °C) -- 18 109/69 82 -- -- -- -- --   04/17/23 15:15:47 97 3 °F (36 3 °C) Abnormal  -- 18 98/59 72 -- -- -- -- --   04/17/23 12:10:56 -- -- -- 85/46 Abnormal  59 Abnormal  -- -- -- -- --   04/17/23 1034 -- 59 -- 90/53 -- -- -- -- -- --     Pertinent Labs/Diagnostic Results:   Results from last 7 days   Lab Units 04/16/23  1330   SARS-COV-2  Negative     Results from last 7 days   Lab Units 04/18/23 0445 04/17/23  0520 04/16/23  1330   WBC Thousand/uL 3 63* 4 04* 5 77   HEMOGLOBIN g/dL 10 9* 11 9 15 2   HEMATOCRIT % 31 1* 35 4 47 4*   PLATELETS Thousands/uL 147* 172 224   NEUTROS ABS Thousands/µL 1 69* 2 40 3 90     Results from last 7 days   Lab Units 04/18/23 0445 04/17/23  1923 04/17/23  0520 04/16/23  2331 04/16/23  1330   SODIUM mmol/L 138 134* 136 133* 129*   POTASSIUM mmol/L 3 0* 3 1* 3 1* 3 7 4 5   CHLORIDE mmol/L 107 106 110* 110* 103   CO2 mmol/L 23 21 15* 11* 9*   ANION GAP mmol/L 8 7 11 12 17*   BUN mg/dL 41* 54* 78* 84* 100*   CREATININE mg/dL 1 20 1 53* 2 57* 3 11* 4 59*   EGFR ml/min/1 73sq m 41 30 16 13 8   CALCIUM mg/dL 7 6* 7 8* 7 6* 7 5* 9 3   MAGNESIUM mg/dL  --   --  1 3*  --   --      Results from last 7 days   Lab Units 04/18/23  0445 04/17/23  0520 04/16/23  1330   AST U/L 33 31 41*   ALT U/L 17 18 25   ALK PHOS U/L 54 68 102   TOTAL PROTEIN g/dL 5 0* 5 6* 8 0   ALBUMIN g/dL 3 1* 3 5 5 0   TOTAL BILIRUBIN mg/dL 0 48 0 54 0 55       Results from last 7 days   Lab Units 04/18/23  0445 04/17/23  1923 04/17/23  0520 04/16/23  2331 04/16/23  1330   GLUCOSE RANDOM mg/dL 102 88 105 102 82     Results from last 7 days   Lab Units 04/16/23  2331   PH LUIS  7 203*   PCO2 LUIS mm Hg 32 9*   PO2 LUIS mm Hg 40 9   HCO3 LUIS mmol/L 12 7*   BASE EXC LUIS mmol/L -14 2   O2 CONTENT LUIS ml/dL 12 6   O2 HGB, VENOUS % 69 6     Results from last 7 days   Lab Units 04/16/23  1537 04/16/23  1330   HS TNI 0HR ng/L  --  27   HS TNI 2HR ng/L 19  --    HSTNI D2 ng/L -8  --      Results from last 7 days   Lab Units 04/16/23  1330   PROTIME seconds 13 8   INR  1 04   PTT seconds 29     Results from last 7 days   Lab Units 04/16/23  2331   TSH 3RD GENERATON uIU/mL 2 037     Results from last 7 days   Lab Units 04/16/23  1330   PROCALCITONIN ng/ml 0 17     Results from last 7 days   Lab Units 04/16/23  1330   LACTIC ACID mmol/L 0 7     Results from last 7 days   Lab Units 04/16/23  1551   CLARITY UA  Clear   COLOR UA  Yellow   SPEC GRAV UA  1 015   PH UA  5 5   GLUCOSE UA mg/dl Negative   KETONES UA mg/dl Trace*   BLOOD UA  Small*   PROTEIN UA mg/dl Negative   NITRITE UA  Negative   BILIRUBIN UA  Negative   UROBILINOGEN UA E U /dl 0 2   LEUKOCYTES UA  Trace*   WBC UA /hpf 0-1   RBC UA /hpf 0-1   BACTERIA UA /hpf Occasional   EPITHELIAL CELLS WET PREP /hpf Occasional     Results from last 7 days   Lab Units 04/16/23  1330   INFLUENZA A PCR  Negative   INFLUENZA B PCR  Negative   RSV PCR  Negative     Results from last 7 days   Lab Units 04/17/23  0525   C DIFF TOXIN B BY PCR  Negative     Results from last 7 days   Lab Units 04/16/23  1334 04/16/23  1332   BLOOD CULTURE  No Growth at 24 hrs  No Growth at 24 hrs         Medications:   Scheduled Medications:  apixaban, 2 5 mg, Oral, BID  cefTRIAXone, 1,000 mg, Intravenous, Q24H  dicyclomine, 20 mg, Oral, TID  levothyroxine, 50 mcg, Oral, Early Morning  metoprolol succinate, 25 mg, Oral, Daily  metroNIDAZOLE, 500 mg, Oral, Q8H  pantoprazole, 20 mg, Oral, BID AC  potassium chloride (K-DUR,KLOR-CON) CR tablet 20 mEq, Once   potassium chloride (K-DUR,KLOR-CON) CR tablet 40 mEq, Once  pravastatin, 40 mg, Oral, Daily With Dinner    Continuous IV Infusions:  multi-electrolyte, 75 mL/hr, Intravenous, Continuous    PRN Meds:  metoprolol, 5 mg, Intravenous, Q6H PRN  ondansetron, 4 mg, Intravenous, Q6H PRN  simethicone, 80 mg, Oral, 4x Daily PRN    Discharge Plan: tbd    Network Utilization Review Department  ATTENTION: Please call with any questions or concerns to [phone] and carefully listen to the prompts so that you are directed to the right person  All voicemails are confidential   Zion Arroyo all requests for admission clinical reviews, approved or denied determinations and any other requests to dedicated fax number below belonging to the campus where the patient is receiving treatment   List of dedicated fax numbers for the Facilities:  [address] DENIALS (Administrative/Medical Necessity) [phone]   [address] (Maternity/NICU/Pediatrics) [phone]   [address] Aimee Sahni [phone]   [facility] 77 [phone]   [id number] [place] [address] 28 [phone] [address] [place] [address] [facility] 134 815 [facility] [phone]

## 2023-04-18 NOTE — PLAN OF CARE
Problem: PHYSICAL THERAPY ADULT  Goal: Performs mobility at highest level of function for planned discharge setting  See evaluation for individualized goals  Description: Treatment/Interventions: Functional transfer training, LE strengthening/ROM, Therapeutic exercise, Endurance training, Gait training, Spoke to nursing          See flowsheet documentation for full assessment, interventions and recommendations  Outcome: Progressing  Note: Prognosis: Good  Problem List: Decreased endurance, Impaired balance, Decreased mobility, Pain, Decreased safety awareness  Assessment: Pt agreeable to PT session this afternoon  Able to perform bed mobility with supervision  Able to progress ambulation x increaased distance + performed with supervision using RW - requires intermittent verbal cues for proper hand placement  Pt with reports of dizziness - returned to room and BP assessed as mentioned above  After seated rest break able to negotiate x 2 steps on step stool with supervision, no LOB noted  Repositioned in bedside chair with all needs within reach  PT Discharge Recommendation: Home with home health rehabilitation (+ family assist ; discussed with pt + case management)    See flowsheet documentation for full assessment

## 2023-04-18 NOTE — PROGRESS NOTES
[facility]  Progress Note  Name: Magdalena Pizarro  MRN: [id number]  Unit/Bed#: [address] [address] I Date of Admission: 4/16/2023   Date of Service: 4/18/2023 I Hospital Day: 2    Assessment/Plan   * Acute renal failure (ARF) (HCC)  Assessment & Plan  Improving,    POA, likely secondary to diarrhea and severe dehydration    · Creatinine 4 59 > 2 57 > 1 2  · Baseline 0 79- 1 10  · Bolus and maintenance IVF given  · Hold home lisinopril, Klor-Con and Lasix  · Avoid nephrotoxic's  · Monitor renal function  · Bicarb drip DC  · Start Isolyte at 75mL/hr per nephro recs  · Nephro consulted: no need for HD, avoid nephrotoxic's    Bacteriuria  Assessment & Plan  Asymptomatic,    · UA positive for leukocytes  · Given dose of cefepime and Vanco, continue Rocephin    Septic shock (HCC)  Assessment & Plan  Improving,    POA, evidence with tachycardia, leukocytosis, tachypnea, hypotension and hypothermia likely secondary to enterocolitis versus UTI    · Blood cultures-neg at 24hrs  · Lactic acid-WNL/Pro-Crow- WNL  · Bolus and IVF maintenance fluid given in ED, will continue  · Vanco and cefepime in ED  · GI recs start metronidazole and Rocephin  · Monitor WBCs, fever curve, hemodynamics    Enterocolitis  Assessment & Plan  Improving,    POA, unresolved for 1 week    · CT abdomen:Scattered air-fluid levels in the small and large bowel, nonspecific but can be seen with underlying enterocolitis  · Vanco and cefepime; (PCN allergy in childhood) given in the ED, continue Vanco  · Bolus given, IVF at maintenance  · Stool enteric panel/O&P/C  difficile ordered in ED-pending  · GI recs: Continue Rocephin and metronidazole, PPI, antiemetics and antispasmodics   F/U stool studies  · As needed medicine for pain    Raynaud's disease without gangrene  Assessment & Plan  POA symptomatic,    · Worsened with cold weather, will use warm blankets or bear hugger for patient as needed  · No medication at home, no esophageal dysmotility    Tricuspid incompetence  Assessment & Plan  Historic diagnosis    · Cardiology following    Pure hypercholesterolemia  Assessment & Plan  Chronic,    · Continue home statin inpatient equivalent    Thoracic aortic aneurysm without rupture, unspecified part ([facility] )  Assessment & Plan  Historical diagnosis    · Followed by outpatient cardiology    Pulmonary hypertension, unspecified ([facility] )  Assessment & Plan  Historical diagnosis    · Repeat echo, results as stated above    Hypothyroidism  Assessment & Plan  Chronic,     · TSH WNL  · continue home levothyroxine 50 mcg    Atrial fibrillation (HCC)  Assessment & Plan  Chronic, likely 2/2 to enterocolitis, dehydration and acute renal failure    Asymptomatic,    • [id number]  • ECG NSR, Tele-afib  • TSH,  fT4-pending/ serial Troponins-WNL   • Rate control:  continue home metoprolol 25 mg daily, IV metoprolol as needed  • Anticoag: Restart Eliquis which was initially held due to renal function  • Consult cardio: Continue home Eliquis and BP meds  • Echo: EF 55% No significant changes noted compared to the prior study  Metabolic acidosis, increased anion gap-resolved as of 4/17/2023  Assessment & Plan  · AG-17  · Likely secondary to dehydration and diarrhea  · Status post bolus and IV fluids  · Bicarb drip  · Nephro following      Hyponatremia-resolved as of 4/17/2023  Assessment & Plan  Asymptomatic,    ·  >136  · Likely secondary to dehydration and diarrhea  · IV bolus and maintenance fluids given  · Trend CMP in a m  · Consult nephro if unresolved           VTE Pharmacologic Prophylaxis:   Moderate Risk (Score 3-4) - Pharmacological DVT Prophylaxis Ordered: apixaban (Eliquis)  Patient Centered Rounds: I performed bedside rounds with nursing staff today  Discussions with Specialists or Other Care Team Provider: Cardiology, nephrology, GI  Education and Discussions with Family / Patient: Updated  (rosa) at bedside      Total Time Spent on Date of Encounter in care of patient: 45 minutes This time was spent on one or more of the following: performing physical exam; counseling and coordination of care; obtaining or reviewing history; documenting in the medical record; reviewing/ordering tests, medications or procedures; communicating with other healthcare professionals and discussing with patient's family/caregivers  Current Length of Stay: 2 day(s)  Current Patient Status: Inpatient   Certification Statement: The patient will continue to require additional inpatient hospital stay due to Specialist recommendations  Patient also advancing diet todat  Discharge Plan: Anticipate discharge in 24-48 hrs to discharge location to be determined pending rehab evaluations  Code Status: Level 1 - Full Code    Subjective:   Patient evaluated at bedside and was calm, NAD, family was in the room  She reports feeling much better, denies N/V/D and abd pain  Had BM last night that was formed  States she has more appetite and would like to try solids  Objective:     Vitals:   Temp (24hrs), Av 7 °F (36 5 °C), Min:97 3 °F (36 3 °C), Max:97 9 °F (36 6 °C)    Temp:  [97 3 °F (36 3 °C)-97 9 °F (36 6 °C)] 97 8 °F (36 6 °C)  HR:  [74] 74  Resp:  [18] 18  BP: ()/(46-75) 106/75  SpO2:  [97 %-98 %] 97 %  Body mass index is 18 56 kg/m²  Input and Output Summary (last 24 hours): Intake/Output Summary (Last 24 hours) at 2023 1112  Last data filed at 2023 0442  Gross per 24 hour   Intake --   Output 2550 ml   Net -2550 ml       Physical Exam:   Physical Exam  Vitals reviewed  Constitutional:       General: She is not in acute distress  Appearance: Normal appearance  She is well-developed  HENT:      Head: Normocephalic and atraumatic  Cardiovascular:      Rate and Rhythm: Normal rate and regular rhythm  Heart sounds: No murmur heard  Pulmonary:      Effort: Pulmonary effort is normal  No respiratory distress  Breath sounds: Normal breath sounds  Abdominal:      General: Bowel sounds are normal       Palpations: Abdomen is soft  Tenderness: There is no abdominal tenderness  There is no guarding or rebound  Genitourinary:     Comments: Peña in place  Musculoskeletal:      Right lower leg: No edema  Left lower leg: No edema  Skin:     General: Skin is warm and dry  Capillary Refill: Capillary refill takes less than 2 seconds  Findings: No rash  Neurological:      Mental Status: She is alert and oriented to person, place, and time     Psychiatric:         Mood and Affect: Mood normal          Additional Data:     Labs:  Results from last 7 days   Lab Units 04/18/23  0445   WBC Thousand/uL 3 63*   HEMOGLOBIN g/dL 10 9*   HEMATOCRIT % 31 1*   PLATELETS Thousands/uL 147*   NEUTROS PCT % 46   LYMPHS PCT % 33   MONOS PCT % 17*   EOS PCT % 2     Results from last 7 days   Lab Units 04/18/23  0445   SODIUM mmol/L 138   POTASSIUM mmol/L 3 0*   CHLORIDE mmol/L 107   CO2 mmol/L 23   BUN mg/dL 41*   CREATININE mg/dL 1 20   ANION GAP mmol/L 8   CALCIUM mg/dL 7 6*   ALBUMIN g/dL 3 1*   TOTAL BILIRUBIN mg/dL 0 48   ALK PHOS U/L 54   ALT U/L 17   AST U/L 33   GLUCOSE RANDOM mg/dL 102     Results from last 7 days   Lab Units 04/16/23  1330   INR  1 04             Results from last 7 days   Lab Units 04/16/23  1330   LACTIC ACID mmol/L 0 7   PROCALCITONIN ng/ml 0 17       Lines/Drains:  Invasive Devices     Peripheral Intravenous Line  Duration           Peripheral IV 04/16/23 Right Antecubital 1 day    Peripheral IV 04/16/23 Right;Upper;Ventral (anterior) Arm 1 day          Drain  Duration           Urethral Catheter Double-lumen 1 day              Urinary Catheter:  Goal for removal: Void trial planned for today           Telemetry:  Telemetry Orders (From admission, onward)             48 Hour Telemetry Monitoring  Continuous x 48 hours        Expiring   References:    Telemetry Guidelines   Question:  Reason for 48 Hour Telemetry  Answer:  Arrhythmias Requiring Medical Therapy (eg  SVT, Vtach/fib, Bradycardia, Uncontrolled A-fib)                 Telemetry Reviewed: No events  Indication for Continued Telemetry Use: Arrthymias requiring medical therapy             Imaging: Reviewed radiology reports from this admission including: abdominal/pelvic CT     CT abdomen pelvis wo contrast   Final Result   Exam is limited without IV and oral contrast particularly for soft tissue and bowel evaluation  1   Scattered air-fluid levels in the small and large bowel, nonspecific but can be seen with underlying enterocolitis  2  Small cyst in the region of the pancreatic head without significant change given the limitations of this exam  Follow up as per previous guidelines  Workstation performed: [id number]             Recent Cultures (last 7 days):   Results from last 7 days   Lab Units 04/16/23  1334 04/16/23  1332   BLOOD CULTURE  No Growth at 24 hrs  No Growth at 24 hrs         Last 24 Hours Medication List:   Current Facility-Administered Medications   Medication Dose Route Frequency Provider Last Rate   • apixaban  2 5 mg Oral BID Emil Reed PA-C     • cefTRIAXone  1,000 mg Intravenous Q24H Lizz Schneider MD Stopped (04/17/23 6004)   • dicyclomine  20 mg Oral TID Mady Michael MD     • levothyroxine  50 mcg Oral Early Morning Mady Michael MD     • metoprolol  5 mg Intravenous Q6H PRN Mady Michael MD     • metoprolol succinate  25 mg Oral Daily Mady Michael MD     • metroNIDAZOLE  500 mg Oral Q8H Lizz Schneider MD     • multi-electrolyte  75 mL/hr Intravenous Continuous Dipika Garcia MD 75 mL/hr (04/18/23 0809)   • ondansetron  4 mg Intravenous Q6H PRN Mady Michael MD     • pantoprazole  20 mg Oral BID AC Mady Michael MD     • potassium chloride  20 mEq Oral Once Dipika Garcia MD     • pravastatin  40 mg Oral Daily With Jeison Hart MD     • simethicone  80 mg Oral 4x Daily PRN Stella Myers MD          Today, Patient Was Seen By: Johnny Robb MD    **Please Note: This note may have been constructed using a voice recognition system  **

## 2023-04-18 NOTE — PHYSICAL THERAPY NOTE
"   PT TREATMENT       04/18/23 1635   PT Last Visit   PT Visit Date 04/18/23   Note Type   Note Type Treatment   Pain Assessment   Pain Assessment Tool 0-10   Pain Score No Pain   Patient's Stated Pain Goal No pain   Hospital Pain Intervention(s) Repositioned; Ambulation/increased activity   Multiple Pain Sites No   Restrictions/Precautions   Weight Bearing Precautions Per Order No   Other Precautions Bed Alarm; Chair Alarm; Fall Risk;Pain   General   Chart Reviewed Yes   Family/Caregiver Present No   Cognition   Overall Cognitive Status WFL   Arousal/Participation Cooperative   Orientation Level Oriented X4   Following Commands Follows all commands and directions without difficulty   Subjective   Subjective \"I am feeling so much better today\"   Bed Mobility   Rolling R 7  Independent   Rolling L 7  Independent   Supine to Sit 5  Supervision   Additional items Verbal cues; Bedrails   Additional Comments transferred to bedside chair   Transfers   Sit to Stand 5  Supervision   Additional items Verbal cues   Stand to Sit 5  Supervision   Additional items Verbal cues   Stand pivot 5  Supervision   Additional items Verbal cues   Ambulation/Elevation   Gait pattern Step through pattern; Short stride   Gait Assistance 5  Supervision   Additional items Verbal cues   Assistive Device Rolling walker   Distance 75 feet with change of direction   Stair Management Assistance 5  Supervision   Additional items Verbal cues   Stair Management Technique One rail L;Step to pattern   Number of Stairs 2  (step stool)   Ambulation/Elevation Additional Comments BP assessed in supine at beginning of session 129/72   After ambulating 75 feet pt reports dizziness - assessed in standing 124/72 with no change in symptoms - once returned to sitting symptoms subsided   Balance   Static Sitting Good   Dynamic Sitting Fair +   Static Standing Fair   Dynamic Standing Fair -   Ambulatory Fair -   Activity Tolerance   Activity Tolerance Patient tolerated " treatment well;Treatment limited secondary to medical complications (Comment)  (limited by dizziness)   Nurse Made Aware yes RN Cris   Assessment   Prognosis Good   Problem List Decreased endurance; Impaired balance;Decreased mobility;Pain;Decreased safety awareness   Assessment Pt agreeable to PT session this afternoon  Able to perform bed mobility with supervision  Able to progress ambulation x increaased distance + performed with supervision using RW - requires intermittent verbal cues for proper hand placement  Pt with reports of dizziness - returned to room and BP assessed as mentioned above  After seated rest break able to negotiate x 2 steps on step stool with supervision, no LOB noted  Repositioned in bedside chair with all needs within reach  The patient's AM-PAC Basic Mobility Inpatient Short Form Raw Score is 19  A Raw score of greater than 16 suggests the patient may benefit from discharge to home  Please also refer to the recommendation of the Physical Therapist for safe discharge planning  Goals   Patient Goals to go home   Plan   Treatment/Interventions Functional transfer training;LE strengthening/ROM; Therapeutic exercise; Endurance training;Gait training;Spoke to nursing   Progress Progressing toward goals   PT Frequency Other (Comment)  (5x/week)   Recommendation   PT Discharge Recommendation Home with home health rehabilitation  (+ family assist ; discussed with pt + case management)   AM-PAC Basic Mobility Inpatient   Turning in Flat Bed Without Bedrails 4   Lying on Back to Sitting on Edge of Flat Bed Without Bedrails 3   Moving Bed to Chair 3   Standing Up From Chair Using Arms 3   Walk in Room 3   Climb 3-5 Stairs With Railing 3   Basic Mobility Inpatient Raw Score 19   Basic Mobility Standardized Score 42 48   Highest Level Of Mobility   JH-HLM Goal 6: Walk 10 steps or more   JH-HLM Achieved 7: Walk 25 feet or more   Education   Education Provided Mobility training;Assistive device Patient Demonstrates verbal understanding   End of Consult   Patient Position at End of Consult Bedside chair; All needs within reach   [facility] Insurance Number  [facility] [id number]

## 2023-04-18 NOTE — ASSESSMENT & PLAN NOTE
Improving,    POA, evidence with tachycardia, leukocytosis, tachypnea, hypotension and hypothermia likely secondary to enterocolitis versus UTI    · Blood cultures-neg at 24hrs  · Lactic acid-WNL/Pro-Crow- WNL  · Bolus and IVF maintenance fluid given in ED, will continue  · Vanco and cefepime in ED  · GI recs start metronidazole and Rocephin  · Monitor WBCs, fever curve, hemodynamics

## 2023-04-18 NOTE — ASSESSMENT & PLAN NOTE
Improving,    POA, unresolved for 1 week    · CT abdomen:Scattered air-fluid levels in the small and large bowel, nonspecific but can be seen with underlying enterocolitis  · Vanco and cefepime; (PCN allergy in childhood) given in the ED, continue Vanco  · Bolus given, IVF at maintenance  · Stool enteric panel/O&P/C  difficile ordered in ED-pending  · GI recs: Continue Rocephin and metronidazole, PPI, antiemetics and antispasmodics   F/U stool studies  · As needed medicine for pain

## 2023-04-18 NOTE — PROGRESS NOTES
Assessment & Plan  Patient is an 78-year-old female history of colon cancer s/p right hemicolectomy, alcoholic liver disease, chronic atrial fibrillation anticoagulated on Eliquis, CVA, diarrhea presenting for evaluation of about 1 week of diarrhea   Patient states that she has been having about 8 episodes of loose nonbloody nonmelanotic stool per day   Patient has had nausea but has not vomited   Per patient's daughter and patient she has not been eating or drinking anything due to this nausea   Patient states lightheadedness and fatigue worsening today   Patient denies chest pain, shortness of breath, palpitations, diaphoresis, syncope or near syncope   Patient complains of 6 out of 10 severity generalized lower abdominal pain most pronounced in the suprapubic area   Patient denies dysuria, hematuria, flank pain, history of nephrolithiasis or pyelonephritis   Patient reported going to urgent care on Friday and getting IV fluids for dehydration   Patient reported history of colitis 50 years ago and same presentation     1- Enterocolitis: CT with IV contrast findings of partial air-fluid levels in the smaller and larger intestine  Abdominal examination benign  No systemic signs of sepsis at this time  Started on ceftriaxone and metronidazole from today  Follow-up enteric panel, stool for ova and parasites and clostridium difficile toxin PCR  Continue po PPI, antiemetics and antispasmodic agents  Monitor and replete electrolytes  Tolerating dietary advancements; continue to advance diet as able      2- SIRS/sepsis: possibly secondary to (1)     3- IMPN: noticed on MRI/MRCP in 6/2022 for scattered pancreatic cyst measuring up to 2 0 cm probably involving the main pancreatic duct and probably side branch  Recommendation previously made for EUS which has not been done  Recommend follow-up      4- LA[place]-  Likely pre-renal given overall clinical picture  Nephrology services has been consulted  Receiving plasmalyte   Further management per primary and nephrology  Improving      5- Hyponatremia- likely hypovolemic in nature given reduced PO intake and GI symptoms  Receiving IV hydration per primary and nephrology services  Improving      6- Mixed anion gap and non-elevated anion gap metabolic acidosis- currently resolved with IV fluids  Lactate was within normal limits  Possibly a combination of ketonemia from relative starvation state and GI losses       Noted medical history of TAA, raynaud's syndrome, Afib on eliquis (held on admission and started on heaprin 2/2 renal function), colon cancer s/p right hemicolectomy and findings suggestive of EtOH liver disease and IPMN on previous work-up          Code Status: Level 1 - Full Code      Subjective:   Patient interviewed today at the bedside accompanied by daughter  Patient reports that she is much improved  Denies any abdominal pain and has been having improvements in bowel movements  Tolerating diet  Denies any fever, chills, chest pain, palpitations or shortness of breath  Objective:     Vitals:   Temp (24hrs), Av 7 °F (36 5 °C), Min:97 3 °F (36 3 °C), Max:97 9 °F (36 6 °C)    Temp:  [97 3 °F (36 3 °C)-97 9 °F (36 6 °C)] 97 8 °F (36 6 °C)  HR:  [59-74] 74  Resp:  [18] 18  BP: ()/(46-75) 106/75  SpO2:  [97 %-98 %] 97 %  Body mass index is 18 56 kg/m²  Input and Output Summary (last 24 hours): Intake/Output Summary (Last 24 hours) at 2023 1025  Last data filed at 2023 0442  Gross per 24 hour   Intake --   Output 2550 ml   Net -2550 ml       Physical Exam:     Physical Exam  Vitals reviewed  Constitutional:       Appearance: Normal appearance  HENT:      Mouth/Throat:      Mouth: Mucous membranes are moist    Eyes:      General:         Right eye: No discharge  Left eye: No discharge  Conjunctiva/sclera: Conjunctivae normal    Cardiovascular:      Rate and Rhythm: Normal rate and regular rhythm        Heart sounds: S1 normal and S2 normal  Pulmonary:      Effort: Pulmonary effort is normal       Breath sounds: Normal breath sounds  Abdominal:      General: There is no distension  Palpations: Abdomen is soft  Tenderness: There is no abdominal tenderness  There is no guarding or rebound  Musculoskeletal:         General: No swelling or tenderness  Cervical back: Neck supple  Skin:     General: Skin is warm and dry  Neurological:      General: No focal deficit present  Mental Status: She is alert  Psychiatric:         Mood and Affect: Mood normal          Behavior: Behavior normal          Additional Data:     Labs:    Results from last 7 days   Lab Units 04/18/23  0445   WBC Thousand/uL 3 63*   HEMOGLOBIN g/dL 10 9*   HEMATOCRIT % 31 1*   PLATELETS Thousands/uL 147*   NEUTROS PCT % 46   LYMPHS PCT % 33   MONOS PCT % 17*   EOS PCT % 2     Results from last 7 days   Lab Units 04/18/23  0445   POTASSIUM mmol/L 3 0*   CHLORIDE mmol/L 107   CO2 mmol/L 23   BUN mg/dL 41*   CREATININE mg/dL 1 20   CALCIUM mg/dL 7 6*   ALK PHOS U/L 54   ALT U/L 17   AST U/L 33     Results from last 7 days   Lab Units 04/16/23  1330   INR  1 04       * I Have Reviewed All Lab Data Listed Above  * Additional Pertinent Lab Tests Reviewed: All Labs Within Last 24 Hours Reviewed    Imaging:    Imaging Reports Reviewed Today Include: none  Imaging Personally Reviewed by Myself Includes:  None  Recent Cultures (last 7 days):     Results from last 7 days   Lab Units 04/16/23  1334 04/16/23  1332   BLOOD CULTURE  No Growth at 24 hrs  No Growth at 24 hrs         Last 24 Hours Medication List:   Current Facility-Administered Medications   Medication Dose Route Frequency Provider Last Rate   • apixaban  2 5 mg Oral BID Tayo Cain PA-C     • cefTRIAXone  1,000 mg Intravenous Q24H Reagan Sousa MD Stopped (04/17/23 5214)   • dicyclomine  20 mg Oral TID Yonathan Monroy MD     • levothyroxine  50 mcg Oral Early Morning Yonathan Monroy MD     • metoprolol  5 mg Intravenous Q6H PRN Stella Myers MD     • metoprolol succinate  25 mg Oral Daily Stella Myers MD     • metroNIDAZOLE  500 mg Oral Q8H Jerica Win MD     • multi-electrolyte  75 mL/hr Intravenous Continuous Shanda Archer MD 75 mL/hr (04/18/23 0809)   • ondansetron  4 mg Intravenous Q6H PRN Stella Myers MD     • pantoprazole  20 mg Oral BID AC Stella Myers MD     • potassium chloride  20 mEq Oral Once Shanda Archer MD     • pravastatin  40 mg Oral Daily With Loc Bland MD     • simethicone  80 mg Oral 4x Daily PRN Stella Myers MD          Today, Patient Was Seen By: Jerica Win MD    ** Please Note: Dragon 360 Dictation voice to text software may have been used in the creation of this document   **

## 2023-04-18 NOTE — PROGRESS NOTES
Progress Note - Cardiology   [facility]     Caio Damon 78 y o  female MRN: [id number]  : 1940  Unit/Bed#: [address] [place] [address] Encounter: [id number]    Assessment and Plan:   1  Atrial fibrillation       - Patient with known history of chronic atrial fibrillation  [facility] with [facility]  Telemetry reviewed: atrial fibrillation, ventricular rate 60s    - 23 EKG: Atrial fibrillation, ventricular rate 60s  Low voltage QRS  Septal infarct , age undetermined  ST & T wave abnormality, consider inferior ischemia  - [id number] stroke risk score: 6 points  - Continue Eliquis 2 5 mg twice daily       2  Enterocolitis       - Presented with one week of diarrhea, on presentation concern for septic shock with tachycardia, leukocytosis, tachypnea, hypotension and hypothermia    - 23 CT abd/pelvis w/o contrast noted scattered air-fluid levels in the small and large bowel, nonspecific but can be seen with underlying enterocolitis  - Care per primary team       3  [facility], Metabolic acidosis     - Baseline creatinine 0 79-1  10    - On admission (23), creatinine 4 59    - 23 creatinine: 1 20    - 23 A    - Nephrology following       4  Hypertension       - BP stable  - Continue Toprol XL 25 mg daily       5  Hyperlipidemia       - Continue pravastatin 40 mg daily       6  Hypothyroidism       - Currently on levothyroxine 50 mcg daily        7  Pulmonary hypertension      - 23 TTE: LVEF 55%  Although no diagnostic regional wall motion abnormality was identified, this possibility cannot be completely excluded on the basis of this study  Prior TTE study available for comparison  Prior study date: 2023  No significant changes noted compared to the prior study  Subjective / Objective:          Patient reports feeling significantly better since yesterday   She currently denies chest pain, palpitations, shortness of breath, lightheadedness, "dizziness, nausea or vomiting  Vitals: Blood pressure 106/75, pulse 74, temperature 97 8 °F (36 6 °C), resp  rate 18, height 5' 6\" (1 676 m), weight 52 2 kg (115 lb), SpO2 97 %  Vitals:    04/16/23 2211 04/17/23 1034   Weight: 52 2 kg (115 lb) 52 2 kg (115 lb)     Body mass index is 18 56 kg/m²  BP Readings from Last 3 Encounters:   04/18/23 106/75   03/14/23 118/74   02/22/23 120/60     Orthostatic Blood Pressures    Flowsheet Row Most Recent Value   Blood Pressure 106/75 filed at 04/18/2023 0809   Patient Position - Orthostatic VS Sitting filed at 04/17/2023 0852        I/O       04/16 0701  04/17 0700 04/17 0701  04/18 0700 04/18 0701  04/19 0700    P  O  480      IV Piggyback 2300      Total Intake(mL/kg) 2780 (53 3)      Urine (mL/kg/hr) 1300 2550 (2)     Stool 0      Total Output 1300 2550     Net +1480 -2550            Unmeasured Stool Occurrence 1 x          Invasive Devices     Peripheral Intravenous Line  Duration           Peripheral IV 04/16/23 Right Antecubital 1 day    Peripheral IV 04/16/23 Right;Upper;Ventral (anterior) Arm 1 day          Drain  Duration           Urethral Catheter Double-lumen 1 day                  Intake/Output Summary (Last 24 hours) at 4/18/2023 1135  Last data filed at 4/18/2023 0442  Gross per 24 hour   Intake --   Output 2550 ml   Net -2550 ml         Physical Exam:   Physical Exam  Vitals reviewed  Constitutional:       General: She is not in acute distress  Cardiovascular:      Rate and Rhythm: Normal rate  Rhythm irregular  Pulses: Normal pulses  Heart sounds: Murmur heard  Pulmonary:      Effort: Pulmonary effort is normal  No respiratory distress  Breath sounds: Decreased breath sounds present  Abdominal:      General: Abdomen is flat  There is no distension  Palpations: Abdomen is soft  Tenderness: There is no abdominal tenderness  Musculoskeletal:      Right lower leg: No edema  Left lower leg: No edema     Skin:     General: " Skin is warm and dry  Neurological:      Mental Status: She is alert and oriented to person, place, and time  Medications/ Allergies:     Current Facility-Administered Medications   Medication Dose Route Frequency Provider Last Rate   • apixaban  2 5 mg Oral BID Nghia Riley PA-C     • cefTRIAXone  1,000 mg Intravenous Q24H Franci Bennett MD Stopped (04/17/23 3092)   • dicyclomine  20 mg Oral TID Karen Christina MD     • levothyroxine  50 mcg Oral Early Morning Karen Christina MD     • metoprolol  5 mg Intravenous Q6H PRN Karen Christina MD     • metoprolol succinate  25 mg Oral Daily Karen Christina MD     • metroNIDAZOLE  500 mg Oral Q8H Franci Bennett MD     • multi-electrolyte  75 mL/hr Intravenous Continuous Morris Antunez MD 75 mL/hr (04/18/23 0809)   • ondansetron  4 mg Intravenous Q6H PRN Karen Christina MD     • pantoprazole  20 mg Oral BID AC Karen Christina MD     • potassium chloride  20 mEq Oral Once Morris Antunez MD     • pravastatin  40 mg Oral Daily With Beny Isidro MD     • simethicone  80 mg Oral 4x Daily PRN Karen Chritsina MD       metoprolol, 5 mg, Q6H PRN  ondansetron, 4 mg, Q6H PRN  simethicone, 80 mg, 4x Daily PRN      Allergies   Allergen Reactions   • Lidocaine      Annotation - [id number]: Seizure with 20 cc during a surgical block   FS   • Penicillins    • Sulfa Antibiotics Edema       VTE Pharmacologic Prophylaxis:   Eliquis    Labs:   Troponins:  Results from last 7 days   Lab Units 04/16/23  1537   HSTNI D2 ng/L -8         CBC with diff:  Results from last 7 days   Lab Units 04/18/23  0445 04/17/23  0520 04/16/23  1330   WBC Thousand/uL 3 63* 4 04* 5 77   HEMOGLOBIN g/dL 10 9* 11 9 15 2   HEMATOCRIT % 31 1* 35 4 47 4*   MCV fL 98 100* 105*   PLATELETS Thousands/uL 147* 172 224   MCH pg 34 5* 33 9 33 6   MCHC g/dL 35 0 33 9 32 1   RDW % 14 6 15 0 15 4*   MPV fL 13 7* 12 8* 12 1   NRBC AUTO /100 WBCs 0 0 0       CMP:  Results from last 7 days Lab Units 04/18/23  0445 04/17/23  1923 04/17/23  0520 04/16/23  2331 04/16/23  1330   SODIUM mmol/L 138 134* 136 133* 129*   POTASSIUM mmol/L 3 0* 3 1* 3 1* 3 7 4 5   CHLORIDE mmol/L 107 106 110* 110* 103   CO2 mmol/L 23 21 15* 11* 9*   ANION GAP mmol/L 8 7 11 12 17*   BUN mg/dL 41* 54* 78* 84* 100*   CREATININE mg/dL 1 20 1 53* 2 57* 3 11* 4 59*   CALCIUM mg/dL 7 6* 7 8* 7 6* 7 5* 9 3   AST U/L 33  --  31  --  41*   ALT U/L 17  --  18  --  25   ALK PHOS U/L 54  --  68  --  102   TOTAL PROTEIN g/dL 5 0*  --  5 6*  --  8 0   ALBUMIN g/dL 3 1*  --  3 5  --  5 0   TOTAL BILIRUBIN mg/dL 0 48  --  0 54  --  0 55   EGFR ml/min/1 73sq m 41 30 16 13 8       Magnesium:  Results from last 7 days   Lab Units 04/17/23  0520   MAGNESIUM mg/dL 1 3*     Coags:  Results from last 7 days   Lab Units 04/16/23  1330   PTT seconds 29   INR  1 04     TSH:  Results from last 7 days   Lab Units 04/16/23  2331   TSH 3RD GENERATON uIU/mL 2 037     No components found for: TSH3  Lipid Profile:    Hgb A1c:    NT-proBNP: No results for input(s): NTBNP in the last 72 hours  Imaging & Testing   I have personally reviewed pertinent reports  CT abdomen pelvis wo contrast    Result Date: 4/16/2023  Narrative: CT ABDOMEN AND PELVIS WITHOUT IV CONTRAST INDICATION:   Diarrhea pelvic pain, diarrhea  COMPARISON:  CT abdomen pelvis 4/6/2022  TECHNIQUE:  CT examination of the abdomen and pelvis was performed without intravenous contrast  Multiplanar 2D reformatted images were created from the source data  Radiation dose length product (DLP) for this visit:  430 88 mGy-cm   This examination, like all CT scans performed in the [facility], was performed utilizing techniques to minimize radiation dose exposure, including the use of iterative  reconstruction and automated exposure control  Enteric contrast was not administered  FINDINGS: ABDOMEN LOWER CHEST:  Scattered linear atelectasis versus scarring at the bilateral lung bases  LIVER/BILIARY TREE:  Unremarkable  GALLBLADDER:  Not visualized may be collapsed  SPLEEN:  Unremarkable  PANCREAS:  Clustered calcifications in the region of the pancreatic head again seen  The prior exam notes a small cyst in the region of the pancreatic head, now on the order of 1 3 cm image 3 series 2, stable, previously 1 1 cm  ADRENAL GLANDS:  Unremarkable  KIDNEYS/URETERS:  Unremarkable  No hydronephrosis  STOMACH AND BOWEL:  Bowel staple line at the proximal colon  Scattered air-fluid levels in the small and large bowel  APPENDIX:  There are expected postoperative changes of appendectomy  ABDOMINOPELVIC CAVITY:  No ascites  No pneumoperitoneum  No lymphadenopathy  VESSELS:  Atherosclerotic changes are present  No evidence of aneurysm  PELVIS REPRODUCTIVE ORGANS:  Unremarkable for patient's age  URINARY BLADDER:  Unremarkable  ABDOMINAL WALL/INGUINAL REGIONS:  Small fat-containing right inguinal hernia  Tiny fat-containing umbilical hernia  OSSEOUS STRUCTURES:  No acute fracture or destructive osseous lesion  Prior left hip replacement  Impression: Exam is limited without IV and oral contrast particularly for soft tissue and bowel evaluation  1   Scattered air-fluid levels in the small and large bowel, nonspecific but can be seen with underlying enterocolitis  2  Small cyst in the region of the pancreatic head without significant change given the limitations of this exam  Follow up as per previous guidelines  Workstation performed: [id number]       EKG / Monitor: Personally reviewed  Telemetry reviewed: atrial fibrillation, ventricular rate 60s  Cardiac testing:     Echo complete w/ contrast if indicated    Result Date: 4/17/2023  Narrative: •  Left Ventricle: Left ventricular cavity size is normal  Wall thickness is normal  Systolic function is normal   Estimated ejection fraction is 55%   Although no diagnostic regional wall motion abnormality was identified, this possibility cannot be completely excluded on the basis of this study  •  IVS: There is sigmoid appearance of the septum  •  Right Ventricle: Systolic function is mildly reduced  •  Left Atrium: The atrium is moderately dilated  •  Right Atrium: The atrium is severely dilated  •  Mitral Valve: There is mild regurgitation  •  Tricuspid Valve: There is mild to moderate regurgitation  The right ventricular systolic pressure is mildly elevated at 46 mmHg  (Using an RA pressure is 8 mmHg) •  Prior TTE study available for comparison  Prior study date: 1/8/2023  No significant changes noted compared to the prior study             Wilber Rocha PA-C

## 2023-04-18 NOTE — ASSESSMENT & PLAN NOTE
· AG-17  · Likely secondary to dehydration and diarrhea  · Status post bolus and IV fluids  · Bicarb drip  · Nephro following

## 2023-04-19 LAB
ALBUMIN SERPL BCP-MCNC: 3.2 G/DL (ref 3.5–5)
ALP SERPL-CCNC: 54 U/L (ref 34–104)
ALT SERPL W P-5'-P-CCNC: 20 U/L (ref 7–52)
ANION GAP SERPL CALCULATED.3IONS-SCNC: 7 MMOL/L (ref 4–13)
AST SERPL W P-5'-P-CCNC: 38 U/L (ref 13–39)
BASOPHILS # BLD AUTO: 0.07 THOUSANDS/ΜL (ref 0–0.1)
BASOPHILS NFR BLD AUTO: 2 % (ref 0–1)
BILIRUB SERPL-MCNC: 0.34 MG/DL (ref 0.2–1)
BUN SERPL-MCNC: 20 MG/DL (ref 5–25)
CALCIUM ALBUM COR SERPL-MCNC: 8.5 MG/DL (ref 8.3–10.1)
CALCIUM SERPL-MCNC: 7.9 MG/DL (ref 8.4–10.2)
CHLORIDE SERPL-SCNC: 106 MMOL/L (ref 96–108)
CO2 SERPL-SCNC: 25 MMOL/L (ref 21–32)
CREAT SERPL-MCNC: 0.94 MG/DL (ref 0.6–1.3)
EOSINOPHIL # BLD AUTO: 0.08 THOUSAND/ΜL (ref 0–0.61)
EOSINOPHIL NFR BLD AUTO: 2 % (ref 0–6)
ERYTHROCYTE [DISTWIDTH] IN BLOOD BY AUTOMATED COUNT: 15.1 % (ref 11.6–15.1)
GFR SERPL CREATININE-BSD FRML MDRD: 55 ML/MIN/1.73SQ M
GLUCOSE SERPL-MCNC: 91 MG/DL (ref 65–140)
HCT VFR BLD AUTO: 30 % (ref 34.8–46.1)
HGB BLD-MCNC: 10.1 G/DL (ref 11.5–15.4)
IMM GRANULOCYTES # BLD AUTO: 0.02 THOUSAND/UL (ref 0–0.2)
IMM GRANULOCYTES NFR BLD AUTO: 1 % (ref 0–2)
LYMPHOCYTES # BLD AUTO: 1.36 THOUSANDS/ΜL (ref 0.6–4.47)
LYMPHOCYTES NFR BLD AUTO: 33 % (ref 14–44)
MAGNESIUM SERPL-MCNC: 2 MG/DL (ref 1.9–2.7)
MCH RBC QN AUTO: 33.8 PG (ref 26.8–34.3)
MCHC RBC AUTO-ENTMCNC: 33.7 G/DL (ref 31.4–37.4)
MCV RBC AUTO: 100 FL (ref 82–98)
MONOCYTES # BLD AUTO: 0.79 THOUSAND/ΜL (ref 0.17–1.22)
MONOCYTES NFR BLD AUTO: 19 % (ref 4–12)
NEUTROPHILS # BLD AUTO: 1.75 THOUSANDS/ΜL (ref 1.85–7.62)
NEUTS SEG NFR BLD AUTO: 43 % (ref 43–75)
NRBC BLD AUTO-RTO: 0 /100 WBCS
PHOSPHATE SERPL-MCNC: 2.1 MG/DL (ref 2.3–4.1)
PLATELET # BLD AUTO: 141 THOUSANDS/UL (ref 149–390)
PMV BLD AUTO: 13 FL (ref 8.9–12.7)
POTASSIUM SERPL-SCNC: 4 MMOL/L (ref 3.5–5.3)
PROT SERPL-MCNC: 5.4 G/DL (ref 6.4–8.4)
RBC # BLD AUTO: 2.99 MILLION/UL (ref 3.81–5.12)
SODIUM SERPL-SCNC: 138 MMOL/L (ref 135–147)
WBC # BLD AUTO: 4.07 THOUSAND/UL (ref 4.31–10.16)

## 2023-04-19 PROCEDURE — 97110 THERAPEUTIC EXERCISES: CPT

## 2023-04-19 PROCEDURE — 99232 SBSQ HOSP IP/OBS MODERATE 35: CPT | Performed by: INTERNAL MEDICINE

## 2023-04-19 PROCEDURE — 83735 ASSAY OF MAGNESIUM: CPT | Performed by: INTERNAL MEDICINE

## 2023-04-19 PROCEDURE — 99232 SBSQ HOSP IP/OBS MODERATE 35: CPT | Performed by: STUDENT IN AN ORGANIZED HEALTH CARE EDUCATION/TRAINING PROGRAM

## 2023-04-19 PROCEDURE — 84100 ASSAY OF PHOSPHORUS: CPT | Performed by: INTERNAL MEDICINE

## 2023-04-19 PROCEDURE — 85025 COMPLETE CBC W/AUTO DIFF WBC: CPT | Performed by: STUDENT IN AN ORGANIZED HEALTH CARE EDUCATION/TRAINING PROGRAM

## 2023-04-19 PROCEDURE — 80053 COMPREHEN METABOLIC PANEL: CPT | Performed by: STUDENT IN AN ORGANIZED HEALTH CARE EDUCATION/TRAINING PROGRAM

## 2023-04-19 PROCEDURE — 82607 VITAMIN B-12: CPT | Performed by: INTERNAL MEDICINE

## 2023-04-19 PROCEDURE — 97530 THERAPEUTIC ACTIVITIES: CPT

## 2023-04-19 PROCEDURE — 82746 ASSAY OF FOLIC ACID SERUM: CPT | Performed by: INTERNAL MEDICINE

## 2023-04-19 RX ORDER — DICYCLOMINE HYDROCHLORIDE 10 MG/1
20 CAPSULE ORAL 3 TIMES DAILY PRN
Status: DISCONTINUED | OUTPATIENT
Start: 2023-04-19 | End: 2023-04-20 | Stop reason: HOSPADM

## 2023-04-19 RX ORDER — CEFDINIR 300 MG/1
300 CAPSULE ORAL EVERY 12 HOURS SCHEDULED
Status: DISCONTINUED | OUTPATIENT
Start: 2023-04-19 | End: 2023-04-20 | Stop reason: HOSPADM

## 2023-04-19 RX ORDER — CEFUROXIME AXETIL 250 MG/1
500 TABLET ORAL EVERY 12 HOURS SCHEDULED
Status: CANCELLED | OUTPATIENT
Start: 2023-04-19 | End: 2023-04-21

## 2023-04-19 RX ORDER — ACETAMINOPHEN 325 MG/1
650 TABLET ORAL EVERY 6 HOURS PRN
Status: DISCONTINUED | OUTPATIENT
Start: 2023-04-19 | End: 2023-04-20 | Stop reason: HOSPADM

## 2023-04-19 RX ADMIN — CEFDINIR 300 MG: 300 CAPSULE ORAL at 19:08

## 2023-04-19 RX ADMIN — PANTOPRAZOLE SODIUM 20 MG: 20 TABLET, DELAYED RELEASE ORAL at 16:33

## 2023-04-19 RX ADMIN — APIXABAN 2.5 MG: 2.5 TABLET, FILM COATED ORAL at 17:15

## 2023-04-19 RX ADMIN — METRONIDAZOLE 500 MG: 500 TABLET ORAL at 08:18

## 2023-04-19 RX ADMIN — APIXABAN 2.5 MG: 2.5 TABLET, FILM COATED ORAL at 08:18

## 2023-04-19 RX ADMIN — LEVOTHYROXINE SODIUM 50 MCG: 50 TABLET ORAL at 05:06

## 2023-04-19 RX ADMIN — METRONIDAZOLE 500 MG: 500 TABLET ORAL at 16:33

## 2023-04-19 RX ADMIN — PANTOPRAZOLE SODIUM 20 MG: 20 TABLET, DELAYED RELEASE ORAL at 06:12

## 2023-04-19 RX ADMIN — DICYCLOMINE HYDROCHLORIDE 20 MG: 10 CAPSULE ORAL at 08:18

## 2023-04-19 RX ADMIN — METRONIDAZOLE 500 MG: 500 TABLET ORAL at 23:07

## 2023-04-19 RX ADMIN — METOPROLOL SUCCINATE 25 MG: 25 TABLET, EXTENDED RELEASE ORAL at 08:18

## 2023-04-19 RX ADMIN — PRAVASTATIN SODIUM 40 MG: 40 TABLET ORAL at 16:33

## 2023-04-19 NOTE — PLAN OF CARE
Problem: PHYSICAL THERAPY ADULT  Goal: Performs mobility at highest level of function for planned discharge setting  See evaluation for individualized goals  Description: Treatment/Interventions: Functional transfer training, LE strengthening/ROM, Therapeutic exercise, Endurance training, Gait training, Spoke to nursing          See flowsheet documentation for full assessment, interventions and recommendations  Outcome: Progressing  Note: Prognosis: Good  Problem List: Decreased endurance, Impaired balance, Decreased mobility, Pain  Assessment: Pt agreeable to PT session this afternoon  Received sitting in bedside chair with family at bedside  BP assessed 119/70 with no reports of dizziness  Able to progress ambulation x 150 feet with supervision + RW + progressed to negotiate x 4 steps with supervision x 2 rails  Pt reports mild lightheaded feeling while ambulating + returned to chair - BP assessed 125/77 with no change in symptoms - reports only resolved in supine  RN Cris notified  Able to perform exercise as mentioned above with intermittent demonstration to improve form/ROM  PT Discharge Recommendation: Home with home health rehabilitation (VS  OP PT - discussed with family who are able to drive patient)    See flowsheet documentation for full assessment

## 2023-04-19 NOTE — CASE MANAGEMENT
Case Management Discharge Planning Note    Patient name Milena Smith  Location [facility] [phone] MRN [id number]  : 1940 Date 2023       Current Admission Date: 2023  Current Admission Diagnosis:Acute renal failure (ARF) [facility])   Patient Active Problem List    Diagnosis Date Noted   • Severe protein[facility]calorie malnutrition [facility] ) 2023   • Raynaud's disease without gangrene 2023   • Acute renal failure (ARF) ([facility] ) 2023   • Enterocolitis 2023   • Septic shock ([facility] 75 ) 2023   • Bacteriuria 5054   • Alcoholic liver disease ([place] [address] ) 2023   • Abnormal liver enzymes 2022   • Stage 3 chronic kidney disease, unspecified whether stage 3a or 3b CKD ([place] [address] ) 2022   • TIA (transient ischemic attack) 2022   • Pancreatic cyst 2022   • Abdominal distention 2022   • Esophageal dysphagia 2022   • Pulmonary hypertension, unspecified ([place] [address] ) 2021   • Thoracic aortic aneurysm without rupture, unspecified part ([place] [address] )    • Age-related osteoporosis without current pathological fracture 2020   • Gout, arthropathy 2019   • Tricuspid incompetence 2019   • Atherosclerosis of native coronary artery of native heart without angina pectoris 2019   • History of cerebrovascular accident 2019   • Long term current use of anticoagulant 2019   • Long term current use of aspirin 2019   • Pure hypercholesterolemia 2019   • Knee osteoarthritis 2015   • Anemia 10/31/2014   • Atrial fibrillation ([facility] ) 10/31/2014   • Hyperlipidemia 10/16/2014   • Hypertension 10/13/2014   • Hypothyroidism 10/13/2014      LOS (days): 3  Geometric Mean LOS (GMLOS) (days): 5 00  Days to GMLOS:2 1     OBJECTIVE:  Risk of Unplanned Readmission Score: 18 77         Current admission status: Inpatient   Preferred Pharmacy:   [address] [place] [address] Road [address]  Phone: [phone] Fax: [phone]    Primary Care Provider: JORDAN Morales    Primary Insurance: [facility]  Secondary Insurance:     DISCHARGE DETAILS:    Other Referral/Resources/Interventions Provided:  Referral Comments: CM made aware that [facility] does not provide services in patient's area  CM met with patient's dtr Lore Hernández and dtr in [facility] to discuss options and also provided list of non skilled HHA agencies for home health aider services  Family aware that [facility] (OUTPATIENT CAMP[facility]) has a delay in initiating services  Lore Hernández then mentioned that patient was going to [facility] in [facility] for outpatient therapy for her hip, prior to this hospitalization and will plan to resume therapy there  PT Greta Wood and Dr Jenn Fatima made aware of the same        Transport at Discharge : Family

## 2023-04-19 NOTE — CASE MANAGEMENT
Case Management Discharge Planning Note    Patient name Basilia Wiggins  Location [facility] [phone] MRN [id number]  : 1940 Date 2023       Current Admission Date: 2023  Current Admission Diagnosis:Acute renal failure (ARF) [facility])   Patient Active Problem List    Diagnosis Date Noted   • Severe protein-calorie malnutrition ([facility] ) 2023   • Raynaud's disease without gangrene 2023   • Acute renal failure (ARF) ([facility] 75 ) 2023   • Enterocolitis 2023   • Septic shock ([place] [address] ) 2023   • Bacteriuria    • Alcoholic liver disease ([place] [address] ) 2023   • Abnormal liver enzymes 2022   • Stage 3 chronic kidney disease, unspecified whether stage 3a or 3b CKD ([place] [address] ) 2022   • TIA (transient ischemic attack) 2022   • Pancreatic cyst 2022   • Abdominal distention 2022   • Esophageal dysphagia 2022   • Pulmonary hypertension, unspecified ([place] [address] ) 2021   • Thoracic aortic aneurysm without rupture, unspecified part ([place] [address] )    • Age-related osteoporosis without current pathological fracture 2020   • Gout, arthropathy 2019   • Tricuspid incompetence 2019   • Atherosclerosis of native coronary artery of native heart without angina pectoris 2019   • History of cerebrovascular accident 2019   • Long term current use of anticoagulant 2019   • Long term current use of aspirin 2019   • Pure hypercholesterolemia 2019   • Knee osteoarthritis 2015   • Anemia 10/31/2014   • Atrial fibrillation ([facility] ) 10/31/2014   • Hyperlipidemia 10/16/2014   • Hypertension 10/13/2014   • Hypothyroidism 10/13/2014      LOS (days): 3  Geometric Mean LOS (GMLOS) (days): 5 00  Days to GMLOS:2 4     OBJECTIVE:  Risk of Unplanned Readmission Score: 18 92         Current admission status: Inpatient   Preferred Pharmacy:   [address] [place] [address] [address] [address]  Phone: [phone] Fax: [phone]    Primary Care Provider: JORDAN Carranza    Primary Insurance: [facility]  Secondary Insurance:     DISCHARGE DETAILS:    [address]         Is the patient interested in [facility] at discharge?: Yes  [address] requested[de-identified] Physical Therapy, Occupational [address] Name[de-identified] Other  [facility] Provider[de-identified] PCP  Homebound Criteria Met[de-identified] Uses an Assist Device (i e  cane, walker, etc), Requires the Assistance of Another Person for Safe Ambulation or to Leave the Home  Supporting Clincal Findings[de-identified] Fatigues Easliy in [employer], Limited Endurance    DME Referral Provided  Referral made for DME?: No    Other Referral/Resources/Interventions Provided:  Referral Comments: ARASH made aware by [facility] that patient is doing much better and can benefit from having therapy at home  CM send referral to [address] [place] [address] [facility] [address] due to delay in services by [facility] rehab, awaiting response  CM also reached out to daughter iNcky Farfan to update her       Treatment Team Recommendation: Home with [address]  Discharge Destination Plan[de-identified] Home with [place] at Discharge : Family

## 2023-04-19 NOTE — ASSESSMENT & PLAN NOTE
Malnutrition Findings:     Severe protein-calorie malnutrition, in the setting of prolonged enterocolitis, as evidenced by12 lb/9 45% wt loss in 1monthandinadequate energy intake d/t 10 days of nausea and diarrhea, requiring diet counseling, monitoring of po intake, and assistance with meals as needed  BMI Findings: Body mass index is 18 56 kg/m²

## 2023-04-19 NOTE — PROGRESS NOTES
Progress Note - Cardiology   [facility]     Rosalie Acevedo 78 y o  female MRN: [id number]  : 1940  Unit/Bed#: [address] [place] [address] Encounter: [id number]    Assessment and Plan:   1  Atrial fibrillation       - Patient with known history of chronic atrial fibrillation  [facility]  Telemetry reviewed: atrial fibrillation, ventricular rate 60s    - 23 EKG: Atrial fibrillation, ventricular rate 60s  Low voltage QRS   Septal infarct , age undetermined  ST & T wave abnormality, consider inferior ischemia  - [id number] stroke risk score: 6 points  - Continue Eliquis 2 5 mg twice daily    - No further Cardiology workup at this time    - Recommend patient follow up with primary outpatient Cardiologist at [address] [place] [address] upon discharge  - Please call Cardiology with any question or concerns        2  Enterocolitis       - Presented with one week of diarrhea, on presentation concern for septic shock with tachycardia, leukocytosis, tachypnea, hypotension and hypothermia    - 23 CT abd/pelvis w/o contrast noted scattered air-fluid levels in the small and large bowel, nonspecific but can be seen with underlying enterocolitis  - Care per primary team       3  LAKE, Metabolic acidosis  - Resolved       - Baseline creatinine 0 79-1  10    - On admission (23), creatinine 4 59    - 23 creatinine: 0 94    - 23 A    - Nephrology following       4  Hypertension       - BP stable  - Continue Toprol XL 25 mg daily       5  Hyperlipidemia       - Continue pravastatin 40 mg daily       6  Hypothyroidism       - Currently on levothyroxine 50 mcg daily        7  Pulmonary hypertension       - 23 TTE: LVEF 55%  Although no diagnostic regional wall motion abnormality was identified, this possibility cannot be completely excluded on the basis of this study  Prior TTE study available for comparison  Prior study date: 2023   No "significant changes noted compared to the prior study  Subjective / Objective:         Patient reports feeling well, tolerating current diet  She currently denies chest pain, palpitations, shortness of breath, lightheadedness, dizziness, nausea or vomiting  Vitals: Blood pressure 123/80, pulse 67, temperature (!) 97 3 °F (36 3 °C), resp  rate 18, height 5' 6\" (1 676 m), weight 52 2 kg (115 lb), SpO2 98 %  Vitals:    04/16/23 2211 04/17/23 1034   Weight: 52 2 kg (115 lb) 52 2 kg (115 lb)     Body mass index is 18 56 kg/m²  BP Readings from Last 3 Encounters:   04/19/23 123/80   03/14/23 118/74   02/22/23 120/60     Orthostatic Blood Pressures    Flowsheet Row Most Recent Value   Blood Pressure 123/80 filed at 04/19/2023 0720   Patient Position - Orthostatic VS Sitting filed at 04/17/2023 0852        I/O       04/17 0701  04/18 0700 04/18 0701  04/19 0700 04/19 0701  04/20 0700    P  O        IV Piggyback       Total Intake(mL/kg)       Urine (mL/kg/hr) 2550 (2) 500 (0 4)     Stool       Total Output 2550 500     Net -2550 -500                Invasive Devices     Peripheral Intravenous Line  Duration           Peripheral IV 04/19/23 Right Antecubital <1 day                  Intake/Output Summary (Last 24 hours) at 4/19/2023 1107  Last data filed at 4/18/2023 2319  Gross per 24 hour   Intake --   Output 500 ml   Net -500 ml         Physical Exam:   Physical Exam  Vitals reviewed  Constitutional:       General: She is not in acute distress  Cardiovascular:      Rate and Rhythm: Bradycardia present  Rhythm irregular  Pulses: Normal pulses  Heart sounds: Murmur heard  Pulmonary:      Effort: Pulmonary effort is normal  No respiratory distress  Abdominal:      General: Abdomen is flat  There is no distension  Palpations: Abdomen is soft  Tenderness: There is no abdominal tenderness  Musculoskeletal:      Right lower leg: No edema  Left lower leg: No edema     Skin:     General: Skin " is warm and dry  Neurological:      Mental Status: She is alert and oriented to person, place, and time  Medications/ Allergies:     Current Facility-Administered Medications   Medication Dose Route Frequency Provider Last Rate   • apixaban  2 5 mg Oral BID Alexei Stafford PA-C     • cefTRIAXone  1,000 mg Intravenous Q24H Winsome Vicente MD 1,000 mg (04/18/23 2300)   • dicyclomine  20 mg Oral TID Nguyễn Ma MD     • levothyroxine  50 mcg Oral Early Morning Nguyễn Ma MD     • metoprolol  5 mg Intravenous Q6H PRN Nguyễn Ma MD     • metoprolol succinate  25 mg Oral Daily Nguyễn Ma MD     • metroNIDAZOLE  500 mg Oral Q8H Winsome Vicente MD     • ondansetron  4 mg Intravenous Q6H PRN Nguyễn Ma MD     • pantoprazole  20 mg Oral BID AC Nguyễn Ma MD     • pravastatin  40 mg Oral Daily With Kait Osullivan MD     • simethicone  80 mg Oral 4x Daily PRN Nguyễn Ma MD       metoprolol, 5 mg, Q6H PRN  ondansetron, 4 mg, Q6H PRN  simethicone, 80 mg, 4x Daily PRN      Allergies   Allergen Reactions   • Lidocaine      Annotation - [id number]: Seizure with 20 cc during a surgical block   FS   • Penicillins    • Sulfa Antibiotics Edema       VTE Pharmacologic Prophylaxis:   Eliquis    Labs:   Troponins:  Results from last 7 days   Lab Units 04/16/23  1537   HSTNI D2 ng/L -8         CBC with diff:  Results from last 7 days   Lab Units 04/19/23  0506 04/18/23  0445 04/17/23  0520 04/16/23  1330   WBC Thousand/uL 4 07* 3 63* 4 04* 5 77   HEMOGLOBIN g/dL 10 1* 10 9* 11 9 15 2   HEMATOCRIT % 30 0* 31 1* 35 4 47 4*   MCV fL 100* 98 100* 105*   PLATELETS Thousands/uL 141* 147* 172 224   MCH pg 33 8 34 5* 33 9 33 6   MCHC g/dL 33 7 35 0 33 9 32 1   RDW % 15 1 14 6 15 0 15 4*   MPV fL 13 0* 13 7* 12 8* 12 1   NRBC AUTO /100 WBCs 0 0 0 0       CMP:  Results from last 7 days   Lab Units 04/19/23  0506 04/18/23  1804 04/18/23  0445 04/17/23  1923 04/17/23  0520 04/16/23  2331 04/16/23  1330 SODIUM mmol/L 138 136 138 134* 136 133* 129*   POTASSIUM mmol/L 4 0 3 8 3 0* 3 1* 3 1* 3 7 4 5   CHLORIDE mmol/L 106 105 107 106 110* 110* 103   CO2 mmol/L 25 23 23 21 15* 11* 9*   ANION GAP mmol/L 7 8 8 7 11 12 17*   BUN mg/dL 20 28* 41* 54* 78* 84* 100*   CREATININE mg/dL 0 94 1 02 1 20 1 53* 2 57* 3 11* 4 59*   CALCIUM mg/dL 7 9* 8 5 7 6* 7 8* 7 6* 7 5* 9 3   AST U/L 38  --  33  --  31  --  41*   ALT U/L 20  --  17  --  18  --  25   ALK PHOS U/L 54  --  54  --  68  --  102   TOTAL PROTEIN g/dL 5 4*  --  5 0*  --  5 6*  --  8 0   ALBUMIN g/dL 3 2*  --  3 1*  --  3 5  --  5 0   TOTAL BILIRUBIN mg/dL 0 34  --  0 48  --  0 54  --  0 55   EGFR ml/min/1 73sq m 55 50 41 30 16 13 8       Magnesium:  Results from last 7 days   Lab Units 04/19/23  0506 04/18/23  1804 04/17/23  0520   MAGNESIUM mg/dL 2 0 1 4* 1 3*     Coags:  Results from last 7 days   Lab Units 04/16/23  1330   PTT seconds 29   INR  1 04     TSH:  Results from last 7 days   Lab Units 04/16/23  2331   TSH 3RD GENERATON uIU/mL 2 037     No components found for: TSH3  Lipid Profile:    Hgb A1c:    NT-proBNP: No results for input(s): NTBNP in the last 72 hours  Imaging & Testing   I have personally reviewed pertinent reports  CT abdomen pelvis wo contrast    Result Date: 4/16/2023  Narrative: CT ABDOMEN AND PELVIS WITHOUT IV CONTRAST INDICATION:   Diarrhea pelvic pain, diarrhea  COMPARISON:  CT abdomen pelvis 4/6/2022  TECHNIQUE:  CT examination of the abdomen and pelvis was performed without intravenous contrast  Multiplanar 2D reformatted images were created from the source data  Radiation dose length product (DLP) for this visit:  430 88 mGy-cm   This examination, like all CT scans performed in the [facility], was performed utilizing techniques to minimize radiation dose exposure, including the use of iterative  reconstruction and automated exposure control  Enteric contrast was not administered   FINDINGS: ABDOMEN LOWER CHEST: Scattered linear atelectasis versus scarring at the bilateral lung bases  LIVER/BILIARY TREE:  Unremarkable  GALLBLADDER:  Not visualized may be collapsed  SPLEEN:  Unremarkable  PANCREAS:  Clustered calcifications in the region of the pancreatic head again seen  The prior exam notes a small cyst in the region of the pancreatic head, now on the order of 1 3 cm image 3 series 2, stable, previously 1 1 cm  ADRENAL GLANDS:  Unremarkable  KIDNEYS/URETERS:  Unremarkable  No hydronephrosis  STOMACH AND BOWEL:  Bowel staple line at the proximal colon  Scattered air-fluid levels in the small and large bowel  APPENDIX:  There are expected postoperative changes of appendectomy  ABDOMINOPELVIC CAVITY:  No ascites  No pneumoperitoneum  No lymphadenopathy  VESSELS:  Atherosclerotic changes are present  No evidence of aneurysm  PELVIS REPRODUCTIVE ORGANS:  Unremarkable for patient's age  URINARY BLADDER:  Unremarkable  ABDOMINAL WALL/INGUINAL REGIONS:  Small fat-containing right inguinal hernia  Tiny fat-containing umbilical hernia  OSSEOUS STRUCTURES:  No acute fracture or destructive osseous lesion  Prior left hip replacement  Impression: Exam is limited without IV and oral contrast particularly for soft tissue and bowel evaluation  1   Scattered air-fluid levels in the small and large bowel, nonspecific but can be seen with underlying enterocolitis  2  Small cyst in the region of the pancreatic head without significant change given the limitations of this exam  Follow up as per previous guidelines  Workstation performed: [id number]         EKG / Monitor: Personally reviewed  Telemetry reviewed: currently atrial fibrillation, slow ventricular response, rate 50s       Cardiac testing:     Echo complete w/ contrast if indicated    Result Date: 4/17/2023  Narrative: •  Left Ventricle: Left ventricular cavity size is normal  Wall thickness is normal  Systolic function is normal   Estimated ejection fraction is 55%  Although no diagnostic regional wall motion abnormality was identified, this possibility cannot be completely excluded on the basis of this study  •  IVS: There is sigmoid appearance of the septum  •  Right Ventricle: Systolic function is mildly reduced  •  Left Atrium: The atrium is moderately dilated  •  Right Atrium: The atrium is severely dilated  •  Mitral Valve: There is mild regurgitation  •  Tricuspid Valve: There is mild to moderate regurgitation  The right ventricular systolic pressure is mildly elevated at 46 mmHg  (Using an RA pressure is 8 mmHg) •  Prior TTE study available for comparison  Prior study date: 1/8/2023  No significant changes noted compared to the prior study         Berlin Potts PA-C

## 2023-04-19 NOTE — PROGRESS NOTES
NEPHROLOGY PROGRESS NOTE   Yoni Shelley 78 y o  female MRN: [id number]  Unit/Bed#: [address] [place] [address] Encounter: [id number]  Reason for Consult: LA[facility]    ASSESSMENT AND PLAN:  78 y  o  woman with PMH of hypertension A-fib, hyperlipidemia, Raynaud's, hypothyroidism, thoracic aortic aneurysm p/w several days of diarrhea complicated with elevated creatinine        PLAN     #Non-Oliguric KDIGO LAKE stage 3 (severe) with evidence of kidney recovery  • Etiology: Most likely secondary to prerenal in the settings of severe dehydration, diarrhea   • baseline creatinine 0 7 mg/dL  • Peak creatinine: 4 59 mg/dL  • Current creatinine:  0 9 mg/dL, back to baseline  • UA: No leukocyturia, no hematuria  • Renal imaging : No hydronephrosis  • Treatment:  • No indication of dialysis  • Maintain MAP:  Over 65 mmHg if possible/avoid hypoperfusion:  Hold parameters on blood pressure medications  • Avoid nephrotoxic agents such as NSAIDs, and IV contrast if possible   Avoid opioids   • Adjust medications to GFR  • Patient can continue follow-up with PCP  • And restart lisinopril 1 week after discharge        #Acid-base Disorder  • vbg: pH 7 2, pCO2 40 9, serum HCO3 9, on admission  • Current serum bicarbonate 25 mmol/L  • At goal     #Volume status/hypertension:  • Volume:   Euvolemic   • blood pressure:   Normotensive /80mmHg   • Recommend:  • Can restart lisinopril within 1 week of discharge and diuretics as needed   • Enforce fluid intake      #Hypokalemia  • Serum potassium 4 mg/L  • Secondary to GI loss and poor intake  • Resolved        #Anemia:  • Current hemoglobin:   10 1mg/dL  • Treatment:  • Transfuse for hemoglobin less than 7 0 per primary service       #Enterocolitis  • Diarrhea resolved     #septic shock  • In the settings of diarrhea  • Resolved     #Hyponatremia (resolved)  • Serum sodium on admission 129  • Secondary to true hyponatremia with severe diarrhea and hypovolemia  • Current sodium 138 mEq/L • Stable    Discussed with primary team LAKE resolved  We agreed that patient can continue follow-up with PCP after discharge       SUBJECTIVE:  Patient seen and examined at bedside  No chest pain, shortness of breath, nausea, vomiting, abdominal pain or diarrhea       OBJECTIVE:  Current Weight: Weight - Scale: 52 2 kg (115 lb)  Vitals:    04/19/23 0720   BP: 123/80   Pulse:    Resp:    Temp: (!) 97 3 °F (36 3 °C)   SpO2:        Intake/Output Summary (Last 24 hours) at 4/19/2023 1001  Last data filed at 4/18/2023 2319  Gross per 24 hour   Intake --   Output 500 ml   Net -500 ml     Wt Readings from Last 3 Encounters:   04/17/23 52 2 kg (115 lb)   03/14/23 57 6 kg (127 lb)   02/22/23 58 5 kg (129 lb)     Temp Readings from Last 3 Encounters:   04/19/23 (!) 97 3 °F (36 3 °C)   03/14/23 97 5 °F (36 4 °C) (Temporal)   02/22/23 (!) 97 1 °F (36 2 °C) (Temporal)     BP Readings from Last 3 Encounters:   04/19/23 123/80   03/14/23 118/74   02/22/23 120/60     Pulse Readings from Last 3 Encounters:   04/18/23 67   03/14/23 60   02/22/23 80        General:  no acute distress at this time  Skin:  No acute rash  Eyes:  No scleral icterus and noninjected  ENT:  mucous membranes moist  Neck:  no carotid bruits  Chest:  Clear to auscultation percussion, good respiratory effort, no use of accessory respiratory muscles  CVS:  Regular rate and rhythm without rub   Abdomen:  soft and nontender   Extremities:  no significant lower extremity edema  Neuro:  No gross focality  Psych:  Alert , cooperative     Medications:    Current Facility-Administered Medications:   •  apixaban (ELIQUIS) tablet 2 5 mg, 2 5 mg, Oral, BID, Medina Chowdhury PA-C, 2 5 mg at 04/19/23 0818  •  cefTRIAXone (ROCEPHIN) IVPB (premix in dextrose) 1,000 mg 50 mL, 1,000 mg, Intravenous, Q24H, Mac Garcia MD, Last Rate: 100 mL/hr at 04/18/23 2300, 1,000 mg at 04/18/23 2300  •  dicyclomine (BENTYL) capsule 20 mg, 20 mg, Oral, TID, Anibal Day MD, 20 mg at 04/19/23 0818  •  levothyroxine tablet 50 mcg, 50 mcg, Oral, Early Morning, Armen Franks MD, 50 mcg at 04/19/23 7612  •  metoprolol (LOPRESSOR) injection 5 mg, 5 mg, Intravenous, Q6H PRN, Armen Franks MD  •  metoprolol succinate (TOPROL-XL) 24 hr tablet 25 mg, 25 mg, Oral, Daily, Armen Franks MD, 25 mg at 04/19/23 0818  •  metroNIDAZOLE (FLAGYL) tablet 500 mg, 500 mg, Oral, Q8H, Raquel Severance, MD, 500 mg at 04/19/23 0818  •  ondansetron (ZOFRAN) injection 4 mg, 4 mg, Intravenous, Q6H PRN, Armen Franks MD  •  pantoprazole (PROTONIX) EC tablet 20 mg, 20 mg, Oral, BID AC, Armen Franks MD, 20 mg at 04/19/23 2276  •  pravastatin (PRAVACHOL) tablet 40 mg, 40 mg, Oral, Daily With Selin Arango MD, 40 mg at 04/18/23 1536  •  simethicone (MYLICON) chewable tablet 80 mg, 80 mg, Oral, 4x Daily PRN, Armen Franks MD    Laboratory Results:  Results from last 7 days   Lab Units 04/19/23  0506 04/18/23  1804 04/18/23  0445 04/17/23  1923 04/17/23  0520 04/16/23  2331 04/16/23  1330   WBC Thousand/uL 4 07*  --  3 63*  --  4 04*  --  5 77   HEMOGLOBIN g/dL 10 1*  --  10 9*  --  11 9  --  15 2   HEMATOCRIT % 30 0*  --  31 1*  --  35 4  --  47 4*   PLATELETS Thousands/uL 141*  --  147*  --  172  --  224   SODIUM mmol/L 138 136 138 134* 136 133* 129*   POTASSIUM mmol/L 4 0 3 8 3 0* 3 1* 3 1* 3 7 4 5   CHLORIDE mmol/L 106 105 107 106 110* 110* 103   CO2 mmol/L 25 23 23 21 15* 11* 9*   BUN mg/dL 20 28* 41* 54* 78* 84* 100*   CREATININE mg/dL 0 94 1 02 1 20 1 53* 2 57* 3 11* 4 59*   CALCIUM mg/dL 7 9* 8 5 7 6* 7 8* 7 6* 7 5* 9 3   MAGNESIUM mg/dL 2 0 1 4*  --   --  1 3*  --   --    PHOSPHORUS mg/dL 2 1* 1 0*  --   --   --   --   --        CT abdomen pelvis wo contrast   Final Result by Bety Wilkinson MD (04/16 5531)   Exam is limited without IV and oral contrast particularly for soft tissue and bowel evaluation      1   Scattered air-fluid levels in the small and large bowel, nonspecific but can be seen with "underlying enterocolitis  2  Small cyst in the region of the pancreatic head without significant change given the limitations of this exam  Follow up as per previous guidelines  Workstation performed: [id number]             Portions of the record may have been created with voice recognition software  Occasional wrong word or \"sound a like\" substitutions may have occurred due to the inherent limitations of voice recognition software  Read the chart carefully and recognize, using context, where substitutions have occurred      "

## 2023-04-19 NOTE — PROGRESS NOTES
Assessment & Plan       1- Enterocolitis: tolerating dietary advancements  Remain without systemic signs of infection  C  Diff negative but it appears enteric panel was not collected  Bowel movements improved  Will provide a 3-day course of ceftin to complete a 5-day course of antimicrobial coverage for enteritis     2- SIRS/sepsis: possibly secondary to (1)  Resolved      3- IMPN: noticed on MRI/MRCP in 2022 for scattered pancreatic cyst measuring up to 2 0 cm probably involving the main pancreatic duct and probably side branch  Recommendation previously made for EUS which has not been done  Recommend follow-up      Gastroenterology services will sign off at this time  Patient should follow-up with gastroenterology within 2 weeks of discharge  Please do not hesitate to contact this service if further questions or concerns may arise  Code Status: Level 1 - Full Code      Subjective:   Patient interviewed at the bedside  Has any fever abdominal pain at this time  Is tolerating a diet approximates her baseline  Denies any constipation or diarrhea  Objective:     Vitals:   Temp (24hrs), Av 6 °F (36 4 °C), Min:97 1 °F (36 2 °C), Max:98 4 °F (36 9 °C)    Temp:  [97 1 °F (36 2 °C)-98 4 °F (36 9 °C)] 97 3 °F (36 3 °C)  HR:  [64-67] 67  Resp:  [18] 18  BP: (120-123)/(69-80) 123/80  SpO2:  [98 %] 98 %  Body mass index is 18 56 kg/m²  Input and Output Summary (last 24 hours): Intake/Output Summary (Last 24 hours) at 2023 0909  Last data filed at 2023 2319  Gross per 24 hour   Intake --   Output 500 ml   Net -500 ml       Physical Exam:     Physical Exam  Vitals reviewed  Constitutional:       Appearance: Normal appearance  HENT:      Head: Atraumatic  Mouth/Throat:      Mouth: Mucous membranes are moist    Eyes:      General:         Right eye: No discharge  Left eye: No discharge        Conjunctiva/sclera: Conjunctivae normal    Cardiovascular:      Rate and Rhythm: Normal rate and regular rhythm  Heart sounds: S1 normal and S2 normal    Pulmonary:      Effort: Pulmonary effort is normal       Breath sounds: Normal breath sounds  Abdominal:      General: There is no distension  Palpations: Abdomen is soft  Tenderness: There is no abdominal tenderness  There is no guarding or rebound  Musculoskeletal:         General: No swelling or tenderness  Cervical back: Neck supple  Skin:     General: Skin is warm and dry  Neurological:      General: No focal deficit present  Mental Status: She is alert  Psychiatric:         Mood and Affect: Mood normal          Behavior: Behavior normal          Additional Data:     Labs:    Results from last 7 days   Lab Units 04/19/23  0506   WBC Thousand/uL 4 07*   HEMOGLOBIN g/dL 10 1*   HEMATOCRIT % 30 0*   PLATELETS Thousands/uL 141*   NEUTROS PCT % 43   LYMPHS PCT % 33   MONOS PCT % 19*   EOS PCT % 2     Results from last 7 days   Lab Units 04/19/23  0506   POTASSIUM mmol/L 4 0   CHLORIDE mmol/L 106   CO2 mmol/L 25   BUN mg/dL 20   CREATININE mg/dL 0 94   CALCIUM mg/dL 7 9*   ALK PHOS U/L 54   ALT U/L 20   AST U/L 38     Results from last 7 days   Lab Units 04/16/23  1330   INR  1 04       * I Have Reviewed All Lab Data Listed Above  * Additional Pertinent Lab Tests Reviewed: All Labs Within Last 24 Hours Reviewed    Imaging:    Imaging Reports Reviewed Today Include: none  Imaging Personally Reviewed by Myself Includes:  None  Recent Cultures (last 7 days):     Results from last 7 days   Lab Units 04/17/23  0525 04/16/23  1334 04/16/23  1332   BLOOD CULTURE   --  No Growth at 48 hrs  No Growth at 48 hrs     C DIFF TOXIN B BY PCR  Negative  --   --        Last 24 Hours Medication List:   Current Facility-Administered Medications   Medication Dose Route Frequency Provider Last Rate   • apixaban  2 5 mg Oral BID Medina Chowdhury PA-C     • cefTRIAXone  1,000 mg Intravenous Q24H Mac Garcia MD 1,000 mg (04/18/23 2300)   • dicyclomine  20 mg Oral TID Rosie Conrad MD     • levothyroxine  50 mcg Oral Early Morning Rosie Conrad MD     • metoprolol  5 mg Intravenous Q6H PRN Rosie Conrad MD     • metoprolol succinate  25 mg Oral Daily Rosie Conrad MD     • metroNIDAZOLE  500 mg Oral Q8H Awais Tay MD     • ondansetron  4 mg Intravenous Q6H PRN Rosie Conrad MD     • pantoprazole  20 mg Oral BID AC Rosie Conrad MD     • pravastatin  40 mg Oral Daily With Cintia Meier MD     • simethicone  80 mg Oral 4x Daily PRN Rosie Conrad MD          Today, Patient Was Seen By: Awais Tay MD    ** Please Note: Dragon 360 Dictation voice to text software may have been used in the creation of this document   **

## 2023-04-19 NOTE — PHYSICAL THERAPY NOTE
"   PT TREATMENT       04/19/23 4520   PT Last Visit   PT Visit Date 04/19/23   Note Type   Note Type Treatment   Pain Assessment   Pain Assessment Tool 0-10   Pain Score No Pain   Patient's Stated Pain Goal No pain   Hospital Pain Intervention(s) Repositioned; Ambulation/increased activity   Multiple Pain Sites No   Restrictions/Precautions   Weight Bearing Precautions Per Order No   Other Precautions Fall Risk;Pain   General   Chart Reviewed Yes   Family/Caregiver Present Yes  (family at bedside)   Cognition   Overall Cognitive Status WFL   Arousal/Participation Cooperative   Orientation Level Oriented X4   Following Commands Follows all commands and directions without difficulty   Subjective   Subjective \"I'm feeling good today\"   Bed Mobility   Additional Comments received sitting in bedside chair   Transfers   Sit to Stand 5  Supervision   Additional items Verbal cues   Stand to Sit 5  Supervision   Additional items Verbal cues   Additional items Verbal cues   Toilet transfer 5  Supervision   Additional items Verbal cues; Bedrails;Standard toilet   Ambulation/Elevation   Gait pattern Step through pattern   Gait Assistance 5  Supervision   Additional items Verbal cues   Assistive Device Rolling walker   Distance 150 feet   Stair Management Assistance 5  Supervision   Additional items Verbal cues   Stair Management Technique Two rails; Step to pattern   Number of Stairs 4   Balance   Static Sitting Good   Dynamic Sitting Fair +   Static Standing Fair   Dynamic Standing Fair -   Ambulatory Fair -   Activity Tolerance   Activity Tolerance Patient tolerated treatment well   Nurse Made Aware yes   Exercises   Neuro re-ed Pt able to perform seated exercise including ankle pumps, LAQ, seated hip marches, glute squeezes, hip add squeezes x 10 reps bilat   Assessment   Prognosis Good   Problem List Decreased endurance; Impaired balance;Decreased mobility;Pain   Assessment Pt agreeable to PT session this afternoon   Received " sitting in bedside chair with family at bedside  BP assessed 119/70 with no reports of dizziness  Able to progress ambulation x 150 feet with supervision + RW + progressed to negotiate x 4 steps with supervision x 2 rails  Pt reports mild lightheaded feeling while ambulating + returned to chair - BP assessed 125/77 with no change in symptoms - reports only resolved in supine  RN Cris notified  Able to perform exercise as mentioned above with intermittent demonstration to improve form/ROM  The patient's AM-PAC Basic Mobility Inpatient Short Form Raw Score is 19  A Raw score of greater than 16 suggests the patient may benefit from discharge to home  Please also refer to the recommendation of the Physical Therapist for safe discharge planning  Goals   Patient Goals to go home, get better   Plan   Treatment/Interventions Functional transfer training;LE strengthening/ROM; Therapeutic exercise; Endurance training;Gait training;Spoke to nursing   Progress Progressing toward goals   PT Frequency Other (Comment)  (5x/week)   Recommendation   PT Discharge Recommendation Home with home health rehabilitation  (VS  OP PT - discussed with family who are able to drive patient)   AM-PAC Basic Mobility Inpatient   Turning in Flat Bed Without Bedrails 4   Lying on Back to Sitting on Edge of Flat Bed Without Bedrails 3   Moving Bed to Chair 3   Standing Up From Chair Using Arms 3   Walk in Room 3   Climb 3-5 Stairs With Railing 3   Basic Mobility Inpatient Raw Score 19   Basic Mobility Standardized Score 42 48   Highest Level Of Mobility   JH-HLM Goal 6: Walk 10 steps or more   JH-HLM Achieved 7: Walk 25 feet or more   Education   Education Provided Mobility training;Home exercise program   Patient Explanation/teachback used;Demonstrates verbal understanding   End of Consult   Patient Position at End of Consult Bedside chair; All needs within reach   [facility] Insurance Number  Bhavani Mullins [id number]

## 2023-04-20 VITALS
SYSTOLIC BLOOD PRESSURE: 118 MMHG | HEIGHT: 66 IN | HEART RATE: 76 BPM | RESPIRATION RATE: 18 BRPM | DIASTOLIC BLOOD PRESSURE: 76 MMHG | WEIGHT: 115 LBS | OXYGEN SATURATION: 92 % | BODY MASS INDEX: 18.48 KG/M2 | TEMPERATURE: 97.7 F

## 2023-04-20 PROBLEM — R65.21 SEPTIC SHOCK (HCC): Status: RESOLVED | Noted: 2023-04-16 | Resolved: 2023-04-20

## 2023-04-20 PROBLEM — A41.9 SEPTIC SHOCK (HCC): Status: RESOLVED | Noted: 2023-04-16 | Resolved: 2023-04-20

## 2023-04-20 LAB
ALBUMIN SERPL BCP-MCNC: 3.6 G/DL (ref 3.5–5)
ALP SERPL-CCNC: 61 U/L (ref 34–104)
ALT SERPL W P-5'-P-CCNC: 26 U/L (ref 7–52)
ANION GAP SERPL CALCULATED.3IONS-SCNC: 7 MMOL/L (ref 4–13)
AST SERPL W P-5'-P-CCNC: 46 U/L (ref 13–39)
BILIRUB SERPL-MCNC: 0.47 MG/DL (ref 0.2–1)
BUN SERPL-MCNC: 13 MG/DL (ref 5–25)
CALCIUM SERPL-MCNC: 8.9 MG/DL (ref 8.4–10.2)
CHLORIDE SERPL-SCNC: 105 MMOL/L (ref 96–108)
CO2 SERPL-SCNC: 26 MMOL/L (ref 21–32)
CREAT SERPL-MCNC: 0.91 MG/DL (ref 0.6–1.3)
ERYTHROCYTE [DISTWIDTH] IN BLOOD BY AUTOMATED COUNT: 15.3 % (ref 11.6–15.1)
FOLATE SERPL-MCNC: 8.5 NG/ML (ref 3.1–17.5)
GFR SERPL CREATININE-BSD FRML MDRD: 57 ML/MIN/1.73SQ M
GLUCOSE SERPL-MCNC: 98 MG/DL (ref 65–140)
HCT VFR BLD AUTO: 32.6 % (ref 34.8–46.1)
HGB BLD-MCNC: 11.1 G/DL (ref 11.5–15.4)
MAGNESIUM SERPL-MCNC: 1.7 MG/DL (ref 1.9–2.7)
MCH RBC QN AUTO: 34.6 PG (ref 26.8–34.3)
MCHC RBC AUTO-ENTMCNC: 34 G/DL (ref 31.4–37.4)
MCV RBC AUTO: 102 FL (ref 82–98)
PHOSPHATE SERPL-MCNC: 2.5 MG/DL (ref 2.3–4.1)
PLATELET # BLD AUTO: 131 THOUSANDS/UL (ref 149–390)
PMV BLD AUTO: 12.2 FL (ref 8.9–12.7)
POTASSIUM SERPL-SCNC: 4.5 MMOL/L (ref 3.5–5.3)
PROT SERPL-MCNC: 6.1 G/DL (ref 6.4–8.4)
RBC # BLD AUTO: 3.21 MILLION/UL (ref 3.81–5.12)
SODIUM SERPL-SCNC: 138 MMOL/L (ref 135–147)
VIT B12 SERPL-MCNC: 569 PG/ML (ref 100–900)
WBC # BLD AUTO: 4.64 THOUSAND/UL (ref 4.31–10.16)

## 2023-04-20 PROCEDURE — 85027 COMPLETE CBC AUTOMATED: CPT | Performed by: INTERNAL MEDICINE

## 2023-04-20 PROCEDURE — 80053 COMPREHEN METABOLIC PANEL: CPT | Performed by: INTERNAL MEDICINE

## 2023-04-20 PROCEDURE — 99239 HOSP IP/OBS DSCHRG MGMT >30: CPT | Performed by: INTERNAL MEDICINE

## 2023-04-20 PROCEDURE — 97530 THERAPEUTIC ACTIVITIES: CPT

## 2023-04-20 PROCEDURE — 84100 ASSAY OF PHOSPHORUS: CPT | Performed by: INTERNAL MEDICINE

## 2023-04-20 PROCEDURE — 83735 ASSAY OF MAGNESIUM: CPT | Performed by: INTERNAL MEDICINE

## 2023-04-20 PROCEDURE — 97110 THERAPEUTIC EXERCISES: CPT

## 2023-04-20 RX ORDER — COLCHICINE 0.6 MG/1
0.6 TABLET ORAL DAILY
Qty: 90 TABLET | Refills: 0
Start: 2023-04-20 | End: 2023-05-31

## 2023-04-20 RX ORDER — METRONIDAZOLE 500 MG/1
500 TABLET ORAL EVERY 8 HOURS
Qty: 6 TABLET | Refills: 0 | Status: SHIPPED | OUTPATIENT
Start: 2023-04-20 | End: 2023-04-22

## 2023-04-20 RX ORDER — METOPROLOL SUCCINATE 25 MG/1
25 TABLET, EXTENDED RELEASE ORAL DAILY
Status: DISCONTINUED | OUTPATIENT
Start: 2023-04-20 | End: 2023-04-20 | Stop reason: HOSPADM

## 2023-04-20 RX ORDER — CEFDINIR 300 MG/1
300 CAPSULE ORAL EVERY 12 HOURS SCHEDULED
Qty: 4 CAPSULE | Refills: 0 | Status: SHIPPED | OUTPATIENT
Start: 2023-04-20 | End: 2023-04-22

## 2023-04-20 RX ORDER — LANOLIN ALCOHOL/MO/W.PET/CERES
400 CREAM (GRAM) TOPICAL 2 TIMES DAILY
Status: DISCONTINUED | OUTPATIENT
Start: 2023-04-20 | End: 2023-04-20 | Stop reason: HOSPADM

## 2023-04-20 RX ADMIN — ACETAMINOPHEN 650 MG: 325 TABLET, FILM COATED ORAL at 04:08

## 2023-04-20 RX ADMIN — MAGNESIUM OXIDE TAB 400 MG (241.3 MG ELEMENTAL MG) 400 MG: 400 (241.3 MG) TAB at 10:39

## 2023-04-20 RX ADMIN — APIXABAN 2.5 MG: 2.5 TABLET, FILM COATED ORAL at 08:22

## 2023-04-20 RX ADMIN — PANTOPRAZOLE SODIUM 20 MG: 20 TABLET, DELAYED RELEASE ORAL at 06:30

## 2023-04-20 RX ADMIN — METRONIDAZOLE 500 MG: 500 TABLET ORAL at 08:22

## 2023-04-20 RX ADMIN — CEFDINIR 300 MG: 300 CAPSULE ORAL at 08:22

## 2023-04-20 RX ADMIN — METOPROLOL SUCCINATE 25 MG: 25 TABLET, EXTENDED RELEASE ORAL at 10:39

## 2023-04-20 RX ADMIN — LEVOTHYROXINE SODIUM 50 MCG: 50 TABLET ORAL at 05:21

## 2023-04-20 RX ADMIN — ACETAMINOPHEN 650 MG: 325 TABLET, FILM COATED ORAL at 10:39

## 2023-04-20 NOTE — ASSESSMENT & PLAN NOTE
On benazepril, metoprolol, furosemide, hydrochlorothiazide  Initially hypotensive in the setting of hypovolemia with acute kidney injury    Diuretics and benazepril on hold  Currently blood pressure stable  · Continue metoprolol  · Monitor blood pressure trend  · Discussed with nephrology and cardiology, may resume benazepril as outpatient in 1 week based on blood pressure trend  · BMP in 1 week  · Discontinue hydrochlorothiazide  · Hold Lasix for now, consider using as needed as outpatient for weight gain and leg edema  · Advised follow-up with primary cardiology after discharge

## 2023-04-20 NOTE — INCIDENTAL FINDINGS
The following findings require follow up:  Radiographic finding   Finding: CT scan again revealed Small cyst in the region of the pancreatic head without significant change   Follow up required: yes   Follow up should be done within 1 month(s)    Please notify the following clinician to assist with the follow up:   Gastroenterology

## 2023-04-20 NOTE — DISCHARGE SUMMARY
Discharge Summary - [facility]    Patient Information: Marcela Camacho 78 y o  female MRN: [id number]  Unit/Bed#: [address] [address] Encounter: [id number]    Discharging Physician / Practitioner: Elisa Chapman MD  PCP: Agatha Harvey  Admission Date: 4/16/2023  Discharge Date: 04/20/23    Reason for Admission: Diarrhea (Diarrhea for about 10 days  Has not seen anyone for this  Nailbeds cyanotic  C/o pain to ;neck and ribs  No vomiting temp low in triage)      Discharge Diagnoses:     Principal Problem:    Acute renal failure (ARF) ([facility] )  Active Problems:    Enterocolitis    Atrial fibrillation (HCC)    Hypertension    Pulmonary hypertension, unspecified ([facility] )    Raynaud's disease without gangrene    Bacteriuria    Hypothyroidism    Thoracic aortic aneurysm without rupture, unspecified part ([facility] )    Pure hypercholesterolemia    Tricuspid incompetence    Pancreatic cyst    Severe protein-calorie malnutrition ([facility] )  Resolved Problems:    Septic shock (HCC)    Hyponatremia    Metabolic acidosis, increased anion gap        Enterocolitis  Assessment & Plan  Presented with 1 week history of watery brown diarrhea with nausea and abdominal pain  CT abdomen:Scattered air-fluid levels in the small and large bowel, nonspecific but can be seen with underlying enterocolitis  Continues to improve  Denies further diarrhea  No abdominal pain  Abdominal exam is benign  Tolerating advancement of the diet    · Stool C  difficile negative  · Stool bacterial PCR was not able to be obtained due to improvement in diarrhea  · GI following, input appreciated  · For ceftriaxone and metronidazole, will transition to cefdinir plus metronidazole to complete 5 days  · Monitor advancement of the diet  · Monitor GI symptoms and abdominal exam  · GI follow-up after discharge    * Acute renal failure (ARF) ([facility] )  Assessment & Plan  POA, likely secondary to diarrhea and severe dehydration  Improved to baseline  · Creatinine 4 59 > 2 57 > 1 2 >0 9  · Baseline 0 79- 1 10  · Status post bolus and maintenance IVF given  · Hold home lisinopril, Klor-Con and Lasix  · Avoid nephrotoxic's  · Monitor renal function  · Nephrology following, input appreciated  · Monitor off IV fluids as p o  intake is improving  · Continue to hold diuretics    Septic shock (HCC)-resolved as of 4/20/2023  Assessment & Plan  Improving,    POA, evidence with tachycardia, leukocytosis, tachypnea, hypotension and hypothermia likely secondary to enterocolitis versus UTI    · Blood cultures-neg   · Lactic acid-WNL/Pro-Crow- WNL  · Bolus and IVF maintenance fluid given in ED, will continue  · Vanco and cefepime in ED  · GI recs start metronidazole and Rocephin  · Monitor WBCs, fever curve, hemodynamics    Bacteriuria  Assessment & Plan  Asymptomatic,  · UA positive for leukocytes  · No additional antimicrobial treatment indicated    Raynaud's disease without gangrene  Assessment & Plan  POA symptomatic,    · Worsened with cold weather, will use warm blankets or bear hugger for patient as needed  · No medication at home, no esophageal dysmotility    Pulmonary hypertension, unspecified ([facility] )  Assessment & Plan  Historical diagnosis    · Repeat echo, results as stated above    Hypertension  Assessment & Plan  On benazepril, metoprolol, furosemide, hydrochlorothiazide  Initially hypotensive in the setting of hypovolemia with acute kidney injury    Diuretics and benazepril on hold  Currently blood pressure stable  · Continue metoprolol  · Monitor blood pressure trend  · Discussed with nephrology and cardiology, may resume benazepril as outpatient in 1 week based on blood pressure trend  · BMP in 1 week  · Discontinue hydrochlorothiazide  · Hold Lasix for now, consider using as needed as outpatient for weight gain and leg edema  · Advised follow-up with primary cardiology after discharge    Atrial fibrillation [facility])  Assessment & Plan  Chronic, likely 2/2 to enterocolitis, dehydration and acute renal failure  Asymptomatic,  • [id number]  • ECG NSR, Tele-afib rates currently adequately rate  • TSH,-normal   Serial Troponins-WNL   •  continue home metoprolol 25 mg daily, monitor telemetry for bradycardia  • Continue Eliquis  • Cardiology following, input appreciated  • Echo: EF 55% No significant changes noted compared to the prior study  Pancreatic cyst  Assessment & Plan  CT scan abdomen revealed Small cyst in the region of the pancreatic head without significant change given the limitations of this exam   Follow-up with GI as outpatient for further surveillance/EUS    Tricuspid incompetence  Assessment & Plan  Historic diagnosis    · Cardiology following    Pure hypercholesterolemia  Assessment & Plan  Chronic,    · Continue home statin inpatient equivalent    Thoracic aortic aneurysm without rupture, unspecified part [facility])  Assessment & Plan  Historical diagnosis    · Followed by outpatient cardiology    Hypothyroidism  Assessment & Plan  Chronic,     · TSH WNL  · continue home levothyroxine 50 mcg    Hyponatremia-resolved as of 4/17/2023  Assessment & Plan  Asymptomatic,    ·  >136  · Likely secondary to dehydration and diarrhea  · IV bolus and maintenance fluids given  · Trend CMP in a m  · Consult nephro if unresolved    Severe protein-calorie malnutrition ([facility] )  Assessment & Plan  Malnutrition Findings:     Severe protein-calorie malnutrition, in the setting of prolonged enterocolitis, as evidenced by12 lb/9 45% wt loss in 1monthandinadequate energy intake d/t 10 days of nausea and diarrhea, requiring diet counseling, monitoring of po intake, and assistance with meals as needed  BMI Findings: Body mass index is 18 56 kg/m²         Metabolic acidosis, increased anion gap-resolved as of 4/17/2023  Assessment & Plan  · AG-17  · Likely secondary to dehydration and diarrhea  · Status post bolus and IV fluids  · Bicarb drip  · Nephro following          Consultations During Hospital Stay:  [address] [facility]  IP CONSULT TO CARDIOLOGY  IP CONSULT TO NEPHROLOGY  IP CONSULT TO PHARMACY  IP CONSULT TO CASE MANAGEMENT  IP CONSULT TO NUTRITION SERVICES    Procedures Performed:     · ***    Significant Findings:     · Refer to hospital course and above listed diagnosis related plan for details    Imaging while in hospital:    CT abdomen pelvis wo contrast    Result Date: 4/16/2023  Narrative: CT ABDOMEN AND PELVIS WITHOUT IV CONTRAST INDICATION:   Diarrhea pelvic pain, diarrhea  COMPARISON:  CT abdomen pelvis 4/6/2022  TECHNIQUE:  CT examination of the abdomen and pelvis was performed without intravenous contrast  Multiplanar 2D reformatted images were created from the source data  Radiation dose length product (DLP) for this visit:  430 88 mGy-cm   This examination, like all CT scans performed in the [facility], was performed utilizing techniques to minimize radiation dose exposure, including the use of iterative  reconstruction and automated exposure control  Enteric contrast was not administered  FINDINGS: ABDOMEN LOWER CHEST:  Scattered linear atelectasis versus scarring at the bilateral lung bases  LIVER/BILIARY TREE:  Unremarkable  GALLBLADDER:  Not visualized may be collapsed  SPLEEN:  Unremarkable  PANCREAS:  Clustered calcifications in the region of the pancreatic head again seen  The prior exam notes a small cyst in the region of the pancreatic head, now on the order of 1 3 cm image 3 series 2, stable, previously 1 1 cm  ADRENAL GLANDS:  Unremarkable  KIDNEYS/URETERS:  Unremarkable  No hydronephrosis  STOMACH AND BOWEL:  Bowel staple line at the proximal colon  Scattered air-fluid levels in the small and large bowel  APPENDIX:  There are expected postoperative changes of appendectomy  ABDOMINOPELVIC CAVITY:  No ascites  No pneumoperitoneum  No lymphadenopathy   VESSELS: Atherosclerotic changes are present  No evidence of aneurysm  PELVIS REPRODUCTIVE ORGANS:  Unremarkable for patient's age  URINARY BLADDER:  Unremarkable  ABDOMINAL WALL/INGUINAL REGIONS:  Small fat-containing right inguinal hernia  Tiny fat-containing umbilical hernia  OSSEOUS STRUCTURES:  No acute fracture or destructive osseous lesion  Prior left hip replacement  Impression: Exam is limited without IV and oral contrast particularly for soft tissue and bowel evaluation  1   Scattered air-fluid levels in the small and large bowel, nonspecific but can be seen with underlying enterocolitis  2  Small cyst in the region of the pancreatic head without significant change given the limitations of this exam  Follow up as per previous guidelines  Workstation performed: [id number]     Echo complete w/ contrast if indicated    Result Date: 4/17/2023  Narrative: •  Left Ventricle: Left ventricular cavity size is normal  Wall thickness is normal  Systolic function is normal   Estimated ejection fraction is 55%  Although no diagnostic regional wall motion abnormality was identified, this possibility cannot be completely excluded on the basis of this study  •  IVS: There is sigmoid appearance of the septum  •  Right Ventricle: Systolic function is mildly reduced  •  Left Atrium: The atrium is moderately dilated  •  Right Atrium: The atrium is severely dilated  •  Mitral Valve: There is mild regurgitation  •  Tricuspid Valve: There is mild to moderate regurgitation  The right ventricular systolic pressure is mildly elevated at 46 mmHg  (Using an RA pressure is 8 mmHg) •  Prior TTE study available for comparison  Prior study date: 1/8/2023  No significant changes noted compared to the prior study         Incidental Findings:   · ***     Test Results Pending at Discharge (will require follow up):   · As per After Visit Summary     Outpatient Tests Requested:  · ***    Complications:  Refer to hospital course and above "listed diagnosis related plan, if any    Hospital Course:     Saúl Vazquez is a 78 y o  female patient who originally presented to the hospital on 4/16/2023 due to ***        Please see above list of diagnoses and related plan for additional information  Condition at Discharge: stable     Discharge Day Visit / Exam:     Subjective:    Feels much better, tolerating diet  No further diarrhea  Denies any nausea vomiting or abdominal pain    Reported that she felt little dizzy when she stood up but subsequently she has been ambulating in the hallway without any limiting symptoms  Orthostasis was negative, blood pressure remained stable on only 1 antihypertensive  Oxygenation and heart rate remain normal with ambulation    Vitals: Blood Pressure: 118/76 (04/20/23 1342)  Pulse: 76 (04/20/23 0400)  Temperature: 97 7 °F (36 5 °C) (04/20/23 0722)  Temp Source: Oral (04/19/23 2247)  Respirations: 18 (04/19/23 2247)  Height: 5' 6\" (167 6 cm) (04/17/23 1034)  Weight - Scale: 52 2 kg (115 lb) (04/17/23 1034)  SpO2: 92 % (04/20/23 1342)  Exam:   Physical Exam  Constitutional:       General: She is not in acute distress  HENT:      Head: Normocephalic and atraumatic  Cardiovascular:      Rate and Rhythm: Normal rate  Rhythm irregular  Pulmonary:      Effort: Pulmonary effort is normal  No respiratory distress  Breath sounds: Normal breath sounds  No wheezing or rales  Abdominal:      General: Bowel sounds are normal  There is no distension  Palpations: Abdomen is soft  Tenderness: There is no abdominal tenderness  There is no guarding or rebound  Musculoskeletal:      Cervical back: Neck supple  Comments: Trace lower extremity edema   Skin:     General: Skin is warm and dry  Findings: No rash  Neurological:      Mental Status: She is alert  Mental status is at baseline     Psychiatric:         Mood and Affect: Mood normal          Discharge instructions/Information to patient and " "family:(Discharge Medications and Follow up):   See after visit summary for information provided to patient and family  Provisions for Follow-Up Care:  See after visit summary for information related to follow-up care and any pertinent home health orders  Disposition: Home    Planned Readmission:  No     Discharge Statement:  I spent 45 minutes discharging the patient  This time was spent on the day of discharge  I had direct contact with the patient on the day of discharge  Greater than 50% of the total time was spent examining patient, answering all patient questions, arranging and discussing plan of care with patient as well as directly providing post-discharge instructions  Additional time then spent on discharge activities  With cardiology and nephrology regarding discharge recommendations  Discussed with family at bedside    Discharge Medications:  See after visit summary for reconciled discharge medications provided to patient and family  ** Please Note: \"This note has been constructed using a voice recognition system  Therefore there may be syntax, spelling, and/or grammatical errors  Please call if you have any questions   \"**      " "This time was spent on the day of discharge  I had direct contact with the patient on the day of discharge  Greater than 50% of the total time was spent examining patient, answering all patient questions, arranging and discussing plan of care with patient as well as directly providing post-discharge instructions  Additional time then spent on discharge activities  With cardiology and nephrology regarding discharge recommendations  Discussed with family at bedside    Discharge Medications:  See after visit summary for reconciled discharge medications provided to patient and family  ** Please Note: \"This note has been constructed using a voice recognition system  Therefore there may be syntax, spelling, and/or grammatical errors  Please call if you have any questions   \"**      "

## 2023-04-20 NOTE — PHYSICAL THERAPY NOTE
"   PT TREATMENT       04/20/23 0955   PT Last Visit   PT Visit Date 04/20/23   Note Type   Note Type Treatment   Pain Assessment   Pain Assessment Tool 0-10   Pain Score 3   Pain Location/Orientation Location: Head  (L frontal headache)   Effect of Pain on Daily Activities limits mobility   Patient's Stated Pain Goal No pain   Hospital Pain Intervention(s) Repositioned; Ambulation/increased activity   Multiple Pain Sites No   Restrictions/Precautions   Weight Bearing Precautions Per Order No   Other Precautions Fall Risk;Pain   General   Chart Reviewed Yes   Family/Caregiver Present No   Cognition   Overall Cognitive Status WFL   Arousal/Participation Cooperative   Orientation Level Oriented X4   Following Commands Follows all commands and directions without difficulty   Subjective   Subjective \"I've been getting around in here without the walker and I feel good\"   Bed Mobility   Additional Comments received sitting in bedside chair   Transfers   Sit to Stand 7  Independent   Stand to Sit 7  Independent   Ambulation/Elevation   Gait pattern Step through pattern   Gait Assistance 5  Supervision   Additional items Verbal cues   Assistive Device None   Distance 150 feet   Stair Management Assistance 5  Supervision   Additional items Verbal cues   Stair Management Technique Two rails; Reciprocal   Number of Stairs 4   Balance   Static Sitting Good   Dynamic Sitting Fair +   Static Standing Fair +   Dynamic Standing Fair   Ambulatory Fair   Activity Tolerance   Activity Tolerance Patient tolerated treatment well   Nurse Made Aware yes   Exercises   Neuro re-ed Pt able to perform seated exercise including ankle pumps/circles, LAQ, seated hip marches x 10 reps bilat   Assessment   Prognosis Good   Problem List Decreased endurance; Impaired balance;Decreased mobility;Pain   Assessment Pt agreeable to PT session this AM  Able to perform exercise as mentioned above with intermittent demostration to ensure proper form   Able to " progress ambulation x 150 feet without device with good response - nil LOB noted with supervision + step through pattern - mild lightheadedness reported with BP assessed in sitting at /88 - RN Cris notified  The patient's AM-PAC Basic Mobility Inpatient Short Form Raw Score is 21  A Raw score of greater than 16 suggests the patient may benefit from discharge to home  Please also refer to the recommendation of the Physical Therapist for safe discharge planning  Goals   Patient Goals to go home   Plan   Treatment/Interventions Functional transfer training;LE strengthening/ROM; Therapeutic exercise; Endurance training;Bed mobility;Gait training;Spoke to nursing   Progress Progressing toward goals   PT Frequency Other (Comment)  (5x/week)   Recommendation   PT Discharge Recommendation Home with outpatient rehabilitation   AM-PAC Basic Mobility Inpatient   Turning in Flat Bed Without Bedrails 4   Lying on Back to Sitting on Edge of Flat Bed Without Bedrails 4   Moving Bed to Chair 3   Standing Up From Chair Using Arms 4   Walk in Room 3   Climb 3-5 Stairs With Railing 3   Basic Mobility Inpatient Raw Score 21   Basic Mobility Standardized Score 45 55   Highest Level Of Mobility   JH-HLM Goal 6: Walk 10 steps or more   JH-HLM Achieved 7: Walk 25 feet or more   Education   Education Provided Mobility training;Assistive device   Patient Explanation/teachback used;Demonstrates verbal understanding   End of Consult   Patient Position at End of Consult Bedside chair; All needs within reach   [facility] Insurance Number  Bhavani [place] [id number]

## 2023-04-20 NOTE — DISCHARGE INSTR - AVS FIRST PAGE
Follow-up in 1 week with PCP for blood pressure monitoring      BMP to monitor kidney function and electrolytes in 1 week      Continue metoprolol  Hydrochlorothiazide is discontinued at present  Follow-up next week with PCP/cardiology for blood pressure monitoring and consider to restart benazepril depending on blood pressure trend and kidney function    Holding Lasix at present  Follow-up with cardiology

## 2023-04-20 NOTE — ASSESSMENT & PLAN NOTE
CT scan abdomen revealed Small cyst in the region of the pancreatic head without significant change given the limitations of this exam   Follow-up with GI as outpatient for further surveillance/EUS

## 2023-04-21 LAB
BACTERIA BLD CULT: NORMAL
BACTERIA BLD CULT: NORMAL

## 2023-04-24 NOTE — UTILIZATION REVIEW
NOTIFICATION OF ADMISSION DISCHARGE   This is a Notification of Discharge from [facility]  Please be advised that this patient has been discharge from our facility  Below you will find the admission and discharge date and time including the patient’s disposition  UTILIZATION REVIEW CONTACT:  Mandeep Woods  Utilization   Network Utilization Review Department  Phone: [phone] x carefully listen to the prompts  All voicemails are confidential   Email: [email or web address]     ADMISSION INFORMATION  PRESENTATION DATE: 4/16/2023  1:06 PM  OBERVATION ADMISSION DATE:   INPATIENT ADMISSION DATE: 4/16/23  5:48 PM   DISCHARGE DATE: 4/20/2023  3:52 PM   DISPOSITION:Home/Self Care    IMPORTANT INFORMATION:  Send all requests for admission clinical reviews, approved or denied determinations and any other requests to dedicated fax number below belonging to the campus where the patient is receiving treatment   List of dedicated fax numbers:  [address] DENIALS (Administrative/Medical Necessity) [phone]   [address] (Maternity/NICU/Pediatrics) [phone]   [facility] [phone]   [facility] 87 [phone]   [address] [phone]   220 [facility] [phone] 90 [facility] [phone]   [address] [place], [place] [address] [address] [phone]   [facility]  [phone]   405 [facility] [phone]   412 [facility] [address] [facility] [phone]

## 2023-04-25 ENCOUNTER — APPOINTMENT (OUTPATIENT)
Dept: LAB | Facility: CLINIC | Age: 83
End: 2023-04-25

## 2023-04-25 DIAGNOSIS — D64.9 ANEMIA, UNSPECIFIED TYPE: Primary | ICD-10-CM

## 2023-04-25 DIAGNOSIS — I73.00 RAYNAUD'S DISEASE WITHOUT GANGRENE: ICD-10-CM

## 2023-04-25 DIAGNOSIS — N17.9 ACUTE RENAL FAILURE, UNSPECIFIED ACUTE RENAL FAILURE TYPE (HCC): ICD-10-CM

## 2023-04-25 DIAGNOSIS — I10 PRIMARY HYPERTENSION: ICD-10-CM

## 2023-04-25 LAB
ALBUMIN SERPL BCP-MCNC: 3.9 G/DL (ref 3.5–5)
ALP SERPL-CCNC: 71 U/L (ref 46–116)
ALT SERPL W P-5'-P-CCNC: 43 U/L (ref 12–78)
ANION GAP SERPL CALCULATED.3IONS-SCNC: 6 MMOL/L (ref 4–13)
AST SERPL W P-5'-P-CCNC: 58 U/L (ref 5–45)
BASOPHILS # BLD AUTO: 0.06 THOUSANDS/ΜL (ref 0–0.1)
BASOPHILS NFR BLD AUTO: 2 % (ref 0–1)
BILIRUB SERPL-MCNC: 0.37 MG/DL (ref 0.2–1)
BUN SERPL-MCNC: 13 MG/DL (ref 5–25)
CALCIUM SERPL-MCNC: 9.7 MG/DL (ref 8.3–10.1)
CHLORIDE SERPL-SCNC: 107 MMOL/L (ref 96–108)
CO2 SERPL-SCNC: 22 MMOL/L (ref 21–32)
CREAT SERPL-MCNC: 0.92 MG/DL (ref 0.6–1.3)
EOSINOPHIL # BLD AUTO: 0.09 THOUSAND/ΜL (ref 0–0.61)
EOSINOPHIL NFR BLD AUTO: 2 % (ref 0–6)
ERYTHROCYTE [DISTWIDTH] IN BLOOD BY AUTOMATED COUNT: 15.9 % (ref 11.6–15.1)
FERRITIN SERPL-MCNC: 107 NG/ML (ref 8–388)
GFR SERPL CREATININE-BSD FRML MDRD: 56 ML/MIN/1.73SQ M
GLUCOSE SERPL-MCNC: 93 MG/DL (ref 65–140)
HCT VFR BLD AUTO: 35.7 % (ref 34.8–46.1)
HGB BLD-MCNC: 11.3 G/DL (ref 11.5–15.4)
IMM GRANULOCYTES # BLD AUTO: 0.02 THOUSAND/UL (ref 0–0.2)
IMM GRANULOCYTES NFR BLD AUTO: 1 % (ref 0–2)
IRON SERPL-MCNC: 74 UG/DL (ref 50–170)
LYMPHOCYTES # BLD AUTO: 1.2 THOUSANDS/ΜL (ref 0.6–4.47)
LYMPHOCYTES NFR BLD AUTO: 30 % (ref 14–44)
MAGNESIUM SERPL-MCNC: 1.7 MG/DL (ref 1.6–2.6)
MCH RBC QN AUTO: 33.1 PG (ref 26.8–34.3)
MCHC RBC AUTO-ENTMCNC: 31.7 G/DL (ref 31.4–37.4)
MCV RBC AUTO: 105 FL (ref 82–98)
MONOCYTES # BLD AUTO: 0.47 THOUSAND/ΜL (ref 0.17–1.22)
MONOCYTES NFR BLD AUTO: 12 % (ref 4–12)
NEUTROPHILS # BLD AUTO: 2.16 THOUSANDS/ΜL (ref 1.85–7.62)
NEUTS SEG NFR BLD AUTO: 53 % (ref 43–75)
NRBC BLD AUTO-RTO: 0 /100 WBCS
PLATELET # BLD AUTO: 202 THOUSANDS/UL (ref 149–390)
PMV BLD AUTO: 12.9 FL (ref 8.9–12.7)
POTASSIUM SERPL-SCNC: 5.2 MMOL/L (ref 3.5–5.3)
PROT SERPL-MCNC: 7 G/DL (ref 6.4–8.4)
RBC # BLD AUTO: 3.41 MILLION/UL (ref 3.81–5.12)
SODIUM SERPL-SCNC: 135 MMOL/L (ref 135–147)
WBC # BLD AUTO: 4 THOUSAND/UL (ref 4.31–10.16)

## 2023-04-26 ENCOUNTER — TELEPHONE (OUTPATIENT)
Dept: ADMINISTRATIVE | Facility: OTHER | Age: 83
End: 2023-04-26

## 2023-04-26 ENCOUNTER — OFFICE VISIT (OUTPATIENT)
Dept: FAMILY MEDICINE CLINIC | Facility: CLINIC | Age: 83
End: 2023-04-26

## 2023-04-26 VITALS
HEIGHT: 66 IN | RESPIRATION RATE: 12 BRPM | WEIGHT: 133 LBS | BODY MASS INDEX: 21.38 KG/M2 | TEMPERATURE: 96.6 F | DIASTOLIC BLOOD PRESSURE: 90 MMHG | HEART RATE: 60 BPM | SYSTOLIC BLOOD PRESSURE: 140 MMHG

## 2023-04-26 DIAGNOSIS — I48.91 ATRIAL FIBRILLATION, UNSPECIFIED TYPE (HCC): ICD-10-CM

## 2023-04-26 DIAGNOSIS — Z76.89 ENCOUNTER FOR SUPPORT AND COORDINATION OF TRANSITION OF CARE: Primary | ICD-10-CM

## 2023-04-26 DIAGNOSIS — R60.0 BILATERAL LOWER EXTREMITY EDEMA: ICD-10-CM

## 2023-04-26 DIAGNOSIS — N18.30 STAGE 3 CHRONIC KIDNEY DISEASE, UNSPECIFIED WHETHER STAGE 3A OR 3B CKD (HCC): ICD-10-CM

## 2023-04-26 DIAGNOSIS — I71.20 THORACIC AORTIC ANEURYSM WITHOUT RUPTURE, UNSPECIFIED PART (HCC): ICD-10-CM

## 2023-04-26 PROBLEM — R60.1 GENERALIZED EDEMA: Status: ACTIVE | Noted: 2023-04-26

## 2023-04-26 PROBLEM — N17.9 ACUTE RENAL FAILURE (ARF) (HCC): Status: RESOLVED | Noted: 2023-04-16 | Resolved: 2023-04-26

## 2023-04-26 PROBLEM — R82.71 BACTERIURIA: Status: RESOLVED | Noted: 2023-04-16 | Resolved: 2023-04-26

## 2023-04-26 RX ORDER — FUROSEMIDE 20 MG/1
20 TABLET ORAL DAILY
Qty: 90 TABLET | Refills: 0 | Status: SHIPPED | OUTPATIENT
Start: 2023-04-26

## 2023-04-26 NOTE — LETTER
Vaccination Request Form: [id number] aka SARS-CoV-2 ([employer] or [employer] or [employer])      Date Requested: 23  Patient: Gi Christianson  Patient : 1940   Referring Provider: JORDAN Tillman       The above patient has informed us that they have had their   most recent COVID-19 aka SARS-CoV-2 ([employer] Ol[employer] or [employer] or [employer]) administered at your facility  Please   complete this form and attach all corresponding documentation  Date of Vaccine(s) Given  ______________________________    Lot Number(s) _______________________________________    Manufacture(s) ______________________________________    Dose Amount (s) _____________________________________    Expiration Date(s) ____________________________________    Comments __________________________________________________________  ____________________________________________________________________  ____________________________________________________________________  ____________________________________________________________________    Administering Facility  ________________________________________________    Vaccine Administered By (print name) ___________________________________      Form Completed By (print name) _______________________________________      Signature ___________________________________________________________      These reports are needed for  compliance  Please fax this completed form and a copy of the Vaccine Document(s) to our office located at [place] [address] as soon as possible to Fax [phone] letty Tavera Locke: Phone [phone]    We thank you for your assistance in treating our mutual patient

## 2023-04-26 NOTE — ASSESSMENT & PLAN NOTE
Lab Results   Component Value Date    EGFR 56 04/25/2023    EGFR 57 04/20/2023    EGFR 55 04/19/2023    CREATININE 0 92 04/25/2023    CREATININE 0 91 04/20/2023    CREATININE 0 94 04/19/2023     Pt recently hospitalized for dehydration and LAKE  Advise increased fluids  Salt restriction at this time  Will recheck BMP Friday due to restart of lasix for edema

## 2023-04-26 NOTE — LETTER
Vaccination Request Form: [id number] aka SARS-CoV-2 (Lorita Contreras or [employer] or [employer])      Date Requested: 23  Patient: Maryellen Pantoja  Patient : 1940   Referring Provider: JORDAN Santoyo       The above patient has informed us that they have had their   most recent COVID-19 aka SARS-CoV-2 ([employer] or [employer] or [employer]) administered at your facility  Please   complete this form and attach all corresponding documentation  Date of Vaccine(s) Given  ___22_____________________    Lot Number(s) _______[id number]__________________________    Manufacture(s) ______Pfizer____________________________    Dose Amount (s) _______0 3___________________________    Expiration Date(s) ____________________________________    Comments __________________________________________________________  ____________________________________________________________________  ____________________________________________________________________  ____________________________________________________________________    Administering Facility  ________________________________________________    Vaccine Administered By (print name) ___________________________________      Form Completed By (print name) _______________________________________      Signature ___________________________________________________________      These reports are needed for  compliance  Please fax this completed form and a copy of the Vaccine Document(s) to our office located at [place] [address] as soon as possible to Fax [phone] letty Josue Eye: Phone [phone]    We thank you for your assistance in treating our mutual patient

## 2023-04-26 NOTE — PROGRESS NOTES
Assessment/Plan:    1  Encounter for support and coordination of transition of care  Comments:  Age appropriate screeenings and recommendations discussed  Fasting labs reviewed  2  Thoracic aortic aneurysm without rupture, unspecified part [facility])  Assessment & Plan:  Following up with cardiology, Dr Wendy Garcia  3  Bilateral lower extremity edema  Assessment & Plan:  Advise sodium restriction  Will restart lasix - creat and K normalized prior to discharge  Recheck BMP on Friday  Maintain hydration throughout the day  Orders:  -     furosemide (LASIX) 20 mg tablet; Take 1 tablet (20 mg total) by mouth daily    4  Atrial fibrillation, unspecified type [facility])  Assessment & Plan:  Stable, no changes at this time  Pt taking eliquis without bleeding issues  5  Stage 3 chronic kidney disease, unspecified whether stage 3a or 3b CKD [facility])  Assessment & Plan:  Lab Results   Component Value Date    EGFR 56 04/25/2023    EGFR 57 04/20/2023    EGFR 55 04/19/2023    CREATININE 0 92 04/25/2023    CREATININE 0 91 04/20/2023    CREATININE 0 94 04/19/2023     Pt recently hospitalized for dehydration and LAKE  Advise increased fluids  Salt restriction at this time  Will recheck BMP Friday due to restart of lasix for edema  Falls Plan of Care: balance, strength, and gait training instructions were provided and referral to physical therapy  Recommended assistive device to help with gait and balance  Medications that increase falls were reviewed  Assessed feet and footwear  Home safety evaluation by OT recommended  Home safety education provided  Return for Friday at 9 am to recheck swelling and draw BMP/need result by the PM, before the weekend   Subjective:      Patient ID: Bonnie Turner is a 78 y o  female  Chief Complaint   Patient presents with   • Transition of Care Management     Pt states she is feeling good  Pt did have a fall with injuries five days ago         Norris Moya is a 78year old female with hypertension, hypothyroidism, afib, CKD and CAD, who presents to the office, accompanied by her daughter, for transition of care  Pt was admitted for acute renal injury and was notified that she is extremely dehydrated  Reports that since her discharge, she has restarted physical therapy but has experienced a fall about 5 days ago  Pt reports that she is OK ambulating but then when she goes to turn, she gets off balance  Pt reports that she did have some cuts on her face that continued to bleed  States that areas are healed at this time  Denies shortness of breath, chest pain  Reports lower extremity swelling that has been worsening since her discharge  The following portions of the patient's history were reviewed and updated as appropriate: allergies, current medications, past family history, past medical history, past social history, past surgical history and problem list     Review of Systems   Constitutional: Negative for chills, fatigue and fever  Respiratory: Negative for cough, chest tightness and shortness of breath  Cardiovascular: Positive for leg swelling  Negative for chest pain and palpitations  Neurological: Negative for weakness  Current Outpatient Medications   Medication Sig Dispense Refill   • allopurinol (ZYLOPRIM) 100 mg tablet Take 1 tablet (100 mg total) by mouth daily 90 tablet 2   • aspirin 81 mg chewable tablet Chew 1 tablet daily     • colchicine (COLCRYS) 0 6 mg tablet Take 1 tablet (0 6 mg total) by mouth daily Estimated Creatinine Clearance: 37 2 mL/min (by C-G formula based on SCr of 0 91 mg/dL)   90 tablet 0   • ELIQUIS 2 5 MG 2 5 mg 2 (two) times a day       • folic acid (FOLVITE) 1 mg tablet      • furosemide (LASIX) 20 mg tablet Take 1 tablet (20 mg total) by mouth daily 90 tablet 0   • levothyroxine 50 mcg tablet TAKE 1 TABLET (50 MCG TOTAL) BY MOUTH DAILY IN THE EARLY MORNING 90 tablet 0   • metoprolol succinate (TOPROL-XL) 25 mg 24 hr tablet Take 1 "tablet (25 mg total) by mouth daily 90 tablet 0   • pantoprazole (PROTONIX) 20 mg tablet Take 2 tablets (40 mg total) by mouth 2 (two) times a day 60 tablet 3   • pantoprazole (PROTONIX) 40 mg tablet      • simvastatin (ZOCOR) 20 mg tablet Take 1 tablet by mouth daily       No current facility-administered medications for this visit  Objective:    /90 (BP Location: Left arm, Patient Position: Sitting, Cuff Size: Adult)   Pulse 60   Temp (!) 96 6 °F (35 9 °C) (Temporal)   Resp 12   Ht 5' 6\" (1 676 m)   Wt 60 3 kg (133 lb)   BMI 21 47 kg/m²        Physical Exam  Vitals reviewed  Constitutional:       Appearance: Normal appearance  HENT:      Head: Normocephalic and atraumatic  Cardiovascular:      Rate and Rhythm: Normal rate and regular rhythm  Pulses: Normal pulses  Heart sounds: Normal heart sounds, S1 normal and S2 normal    Pulmonary:      Effort: Pulmonary effort is normal       Breath sounds: Examination of the right-lower field reveals decreased breath sounds  Examination of the left-lower field reveals decreased breath sounds  Decreased breath sounds present  Musculoskeletal:      Right lower le+ Pitting Edema present  Left lower le+ Pitting Edema present  Neurological:      Mental Status: She is alert and oriented to person, place, and time     Psychiatric:         Mood and Affect: Mood normal                 JORDAN Conti  "

## 2023-04-26 NOTE — TELEPHONE ENCOUNTER
----- Message from Jevon Lorenzana sent at 4/26/2023 10:09 AM EDT -----  Regarding: care gap request - Covid booster  04/26/23 10:09 AM    Hello, our patient attached above has had Immunization(s) completed/performed  Please assist in updating the patient chart by making an External outreach to [facility] located in [place], [place]  The date of service is 2022      Thank you,  Jevon Lorenzana  [address]

## 2023-04-26 NOTE — ASSESSMENT & PLAN NOTE
Advise sodium restriction  Will restart lasix - creat and K normalized prior to discharge  Recheck BMP on Friday  Maintain hydration throughout the day

## 2023-04-27 PROBLEM — K52.9 ENTEROCOLITIS: Status: RESOLVED | Noted: 2023-04-16 | Resolved: 2023-04-27

## 2023-04-27 NOTE — TELEPHONE ENCOUNTER
Upon review of the In Basket request and the patient's chart, initial outreach has been made via fax to facility  Please see Contacts section for details       Thank you  Kerry Bridges

## 2023-04-28 ENCOUNTER — OFFICE VISIT (OUTPATIENT)
Dept: FAMILY MEDICINE CLINIC | Facility: CLINIC | Age: 83
End: 2023-04-28

## 2023-04-28 VITALS
BODY MASS INDEX: 20.73 KG/M2 | DIASTOLIC BLOOD PRESSURE: 60 MMHG | WEIGHT: 129 LBS | HEART RATE: 60 BPM | RESPIRATION RATE: 12 BRPM | HEIGHT: 66 IN | TEMPERATURE: 96 F | SYSTOLIC BLOOD PRESSURE: 110 MMHG

## 2023-04-28 DIAGNOSIS — R29.6 FREQUENT FALLS: ICD-10-CM

## 2023-04-28 DIAGNOSIS — I71.21 ANEURYSM OF ASCENDING AORTA WITHOUT RUPTURE (HCC): ICD-10-CM

## 2023-04-28 DIAGNOSIS — E86.0 DEHYDRATION: ICD-10-CM

## 2023-04-28 DIAGNOSIS — N18.30 STAGE 3 CHRONIC KIDNEY DISEASE, UNSPECIFIED WHETHER STAGE 3A OR 3B CKD (HCC): Primary | ICD-10-CM

## 2023-04-28 LAB
ANION GAP SERPL CALCULATED.3IONS-SCNC: 2 MMOL/L (ref 4–13)
BUN SERPL-MCNC: 11 MG/DL (ref 5–25)
CALCIUM SERPL-MCNC: 8.6 MG/DL (ref 8.3–10.1)
CHLORIDE SERPL-SCNC: 106 MMOL/L (ref 96–108)
CO2 SERPL-SCNC: 28 MMOL/L (ref 21–32)
CREAT SERPL-MCNC: 0.94 MG/DL (ref 0.6–1.3)
GFR SERPL CREATININE-BSD FRML MDRD: 55 ML/MIN/1.73SQ M
GLUCOSE SERPL-MCNC: 88 MG/DL (ref 65–140)
POTASSIUM SERPL-SCNC: 4.4 MMOL/L (ref 3.5–5.3)
SODIUM SERPL-SCNC: 136 MMOL/L (ref 135–147)

## 2023-04-28 NOTE — PROGRESS NOTES
Assessment/Plan:    1  Stage 3 chronic kidney disease, unspecified whether stage 3a or 3b CKD ([facility] [address] )  -     Basic metabolic panel    2  Dehydration    3  Frequent falls    4  Aneurysm of ascending aorta without rupture [facility])  Assessment & Plan:  Stable, no issues  Return in about 3 months (around 7/28/2023) for Recheck  Subjective:      Patient ID: Adolfo Mcgovern is a 78 y o  female  Chief Complaint   Patient presents with   • Follow-up     Return for Friday at 9 am to recheck swelling       Estrada Addison is an 78year old female who returns to the office for a recheck of BL LE swelling  Pt has been tolerating lasix without any issues and has noted an improvement in BL overall  Denies chest pain, shortness of breath, n/v/d  The following portions of the patient's history were reviewed and updated as appropriate: allergies, current medications, past family history, past medical history, past social history, past surgical history and problem list     Review of Systems   Constitutional: Negative for chills, fatigue and fever  Respiratory: Negative for cough, chest tightness and shortness of breath  Cardiovascular: Negative for chest pain, palpitations and leg swelling  Current Outpatient Medications   Medication Sig Dispense Refill   • allopurinol (ZYLOPRIM) 100 mg tablet Take 1 tablet (100 mg total) by mouth daily 90 tablet 2   • aspirin 81 mg chewable tablet Chew 1 tablet daily     • colchicine (COLCRYS) 0 6 mg tablet Take 1 tablet (0 6 mg total) by mouth daily Estimated Creatinine Clearance: 37 2 mL/min (by C-G formula based on SCr of 0 91 mg/dL)   90 tablet 0   • ELIQUIS 2 5 MG 2 5 mg 2 (two) times a day       • folic acid (FOLVITE) 1 mg tablet      • furosemide (LASIX) 20 mg tablet Take 1 tablet (20 mg total) by mouth daily 90 tablet 0   • levothyroxine 50 mcg tablet TAKE 1 TABLET (50 MCG TOTAL) BY MOUTH DAILY IN THE EARLY MORNING 90 tablet 0   • metoprolol succinate (TOPROL-XL) 25 mg "24 hr tablet Take 1 tablet (25 mg total) by mouth daily 90 tablet 0   • pantoprazole (PROTONIX) 20 mg tablet Take 2 tablets (40 mg total) by mouth 2 (two) times a day 60 tablet 3   • pantoprazole (PROTONIX) 40 mg tablet      • simvastatin (ZOCOR) 20 mg tablet Take 1 tablet by mouth daily       No current facility-administered medications for this visit  Objective:    /60 (BP Location: Left arm, Patient Position: Sitting, Cuff Size: Large)   Pulse 60   Temp (!) 96 °F (35 6 °C) (Temporal)   Resp 12   Ht 5' 6\" (1 676 m)   Wt 58 5 kg (129 lb)   BMI 20 82 kg/m²        Physical Exam  Vitals reviewed  Constitutional:       Appearance: Normal appearance  HENT:      Head: Normocephalic and atraumatic  Cardiovascular:      Rate and Rhythm: Normal rate and regular rhythm  Heart sounds: Normal heart sounds  Pulmonary:      Effort: Pulmonary effort is normal       Breath sounds: Normal breath sounds  Neurological:      Mental Status: She is alert and oriented to person, place, and time     Psychiatric:         Mood and Affect: Mood normal                 JORDAN Ascencio  "

## 2023-04-30 DIAGNOSIS — E03.9 HYPOTHYROIDISM, UNSPECIFIED TYPE: ICD-10-CM

## 2023-05-01 RX ORDER — LEVOTHYROXINE SODIUM 0.05 MG/1
50 TABLET ORAL
Qty: 30 TABLET | Refills: 0 | Status: SHIPPED | OUTPATIENT
Start: 2023-05-01

## 2023-05-03 ENCOUNTER — OFFICE VISIT (OUTPATIENT)
Dept: FAMILY MEDICINE CLINIC | Facility: CLINIC | Age: 83
End: 2023-05-03

## 2023-05-03 VITALS
RESPIRATION RATE: 16 BRPM | BODY MASS INDEX: 20.73 KG/M2 | HEART RATE: 58 BPM | TEMPERATURE: 96.5 F | WEIGHT: 129 LBS | DIASTOLIC BLOOD PRESSURE: 68 MMHG | SYSTOLIC BLOOD PRESSURE: 108 MMHG | HEIGHT: 66 IN

## 2023-05-03 DIAGNOSIS — L29.9 PRURITUS: Primary | ICD-10-CM

## 2023-05-03 RX ORDER — PREDNISONE 10 MG/1
10 TABLET ORAL 2 TIMES DAILY WITH MEALS
Qty: 8 TABLET | Refills: 0 | Status: SHIPPED | OUTPATIENT
Start: 2023-05-03 | End: 2023-05-05

## 2023-05-03 RX ORDER — DEXAMETHASONE SODIUM PHOSPHATE 4 MG/ML
4 INJECTION, SOLUTION INTRA-ARTICULAR; INTRALESIONAL; INTRAMUSCULAR; INTRAVENOUS; SOFT TISSUE ONCE
Status: COMPLETED | OUTPATIENT
Start: 2023-05-03 | End: 2023-05-03

## 2023-05-03 RX ADMIN — DEXAMETHASONE SODIUM PHOSPHATE 4 MG: 4 INJECTION, SOLUTION INTRA-ARTICULAR; INTRALESIONAL; INTRAMUSCULAR; INTRAVENOUS; SOFT TISSUE at 10:08

## 2023-05-03 NOTE — PROGRESS NOTES
Assessment/Plan:    1  Pruritus  Comments:  Monitor for trigger allergen  Keep symptom diary  Call on Friday with update in symptoms  Orders:  -     dexamethasone (DECADRON) injection 4 mg  -     predniSONE 10 mg tablet; Take 1 tablet (10 mg total) by mouth 2 (two) times a day with meals for 4 days            Return if symptoms worsen or fail to improve  Subjective:      Patient ID: Minal Hopper is a 78 y o  female  Chief Complaint   Patient presents with    Itching     Pt here for whole body itching for approximately 3 days       Baldo Reyez is an 78year old female with hyperlipidemia, hypertension, CKD, history of frequent falls, alcoholic liver disease, hypothyroidism, who presents to the office for evaluation and management of itching  Reports the itching began 2 days ago and is most noticeable in her vaginal area  Denies any changes in soaps, detergents or foods  The following portions of the patient's history were reviewed and updated as appropriate: allergies, current medications, past family history, past medical history, past social history, past surgical history and problem list     Review of Systems   Constitutional: Negative for chills, fatigue and fever  Skin: Negative for rash  pruritis          Current Outpatient Medications   Medication Sig Dispense Refill    allopurinol (ZYLOPRIM) 100 mg tablet Take 1 tablet (100 mg total) by mouth daily 90 tablet 2    aspirin 81 mg chewable tablet Chew 1 tablet daily      colchicine (COLCRYS) 0 6 mg tablet Take 1 tablet (0 6 mg total) by mouth daily Estimated Creatinine Clearance: 37 2 mL/min (by C-G formula based on SCr of 0 91 mg/dL)   90 tablet 0    ELIQUIS 2 5 MG 2 5 mg 2 (two) times a day        folic acid (FOLVITE) 1 mg tablet       furosemide (LASIX) 20 mg tablet Take 1 tablet (20 mg total) by mouth daily 90 tablet 0    levothyroxine 50 mcg tablet TAKE 1 TABLET (50 MCG TOTAL) BY MOUTH DAILY IN THE EARLY MORNING 30 tablet 0    "metoprolol succinate (TOPROL-XL) 25 mg 24 hr tablet Take 1 tablet (25 mg total) by mouth daily 90 tablet 0    pantoprazole (PROTONIX) 20 mg tablet Take 2 tablets (40 mg total) by mouth 2 (two) times a day 60 tablet 3    pantoprazole (PROTONIX) 40 mg tablet       predniSONE 10 mg tablet Take 1 tablet (10 mg total) by mouth 2 (two) times a day with meals for 4 days 8 tablet 0    simvastatin (ZOCOR) 20 mg tablet Take 1 tablet by mouth daily       No current facility-administered medications for this visit  Objective:    /68 (BP Location: Left arm, Patient Position: Sitting, Cuff Size: Large)   Pulse 58   Temp (!) 96 5 °F (35 8 °C) (Temporal)   Resp 16   Ht 5' 6\" (1 676 m)   Wt 58 5 kg (129 lb)   BMI 20 82 kg/m²        Physical Exam  Vitals reviewed  Constitutional:       Appearance: Normal appearance  HENT:      Head: Normocephalic and atraumatic  Genitourinary:     Pubic Area: No rash  Labia:         Right: No rash  Left: No rash  Neurological:      Mental Status: She is alert and oriented to person, place, and time     Psychiatric:         Mood and Affect: Mood normal                 JORDAN Tillman  "

## 2023-05-03 NOTE — TELEPHONE ENCOUNTER
Upon review of the In Basket request we were able to locate, review, and update the patient chart as requested for Immunization(s) covid booster  Any additional questions or concerns should be emailed to the Practice Liaisons via the appropriate education email address, please do not reply via In Basket      Thank you  Nga Sheehan

## 2023-05-03 NOTE — TELEPHONE ENCOUNTER
As a follow-up, a second attempt has been made for outreach via telephone call to facility  Please see Contacts section for details      Thank you  Sury Newman

## 2023-05-05 ENCOUNTER — TELEPHONE (OUTPATIENT)
Dept: FAMILY MEDICINE CLINIC | Facility: CLINIC | Age: 83
End: 2023-05-05

## 2023-05-05 DIAGNOSIS — L29.9 PRURITUS: ICD-10-CM

## 2023-05-05 DIAGNOSIS — K70.9 ALCOHOLIC LIVER DISEASE (HCC): Primary | ICD-10-CM

## 2023-05-05 RX ORDER — PREDNISONE 20 MG/1
TABLET ORAL
Qty: 11 TABLET | Refills: 0 | Status: SHIPPED | OUTPATIENT
Start: 2023-05-05

## 2023-05-05 NOTE — TELEPHONE ENCOUNTER
Case Campo left a message on the Clinical Voicemail line:  I was there on Wednesday and she gave me a shot for my itch problem and then the pills  I took one yesterday morning with breakfast and then last night about 5:30 with my supper  I woke up 5:00 o'clock this morning itching all over, especially the outside of my vagina, my ears, my face  And so I got up and I went down  I took another pill and within an hour it subsided  So I don't think either the medicine I have is not strong enough or I have to take more instead of two a day  If you someone call me that could tell me what I should do or what you guys are Allan Miller do, [phone]  Thank you

## 2023-05-05 NOTE — TELEPHONE ENCOUNTER
I called in a higher dose prednisone that she should start tonight  I also ordered stat liver testing to make sure the itching is not caused by liver dysfunction      Di Jaeger

## 2023-05-08 ENCOUNTER — APPOINTMENT (OUTPATIENT)
Dept: LAB | Facility: CLINIC | Age: 83
End: 2023-05-08

## 2023-05-08 DIAGNOSIS — L29.9 PRURITUS: ICD-10-CM

## 2023-05-08 DIAGNOSIS — K70.9 ALCOHOLIC LIVER DISEASE (HCC): ICD-10-CM

## 2023-05-08 LAB
ALBUMIN SERPL BCP-MCNC: 3.6 G/DL (ref 3.5–5)
ALP SERPL-CCNC: 71 U/L (ref 46–116)
ALT SERPL W P-5'-P-CCNC: 27 U/L (ref 12–78)
AST SERPL W P-5'-P-CCNC: 24 U/L (ref 5–45)
BILIRUB DIRECT SERPL-MCNC: 0.13 MG/DL (ref 0–0.2)
BILIRUB SERPL-MCNC: 0.4 MG/DL (ref 0.2–1)
PROT SERPL-MCNC: 6.9 G/DL (ref 6.4–8.4)

## 2023-05-10 ENCOUNTER — TELEPHONE (OUTPATIENT)
Dept: FAMILY MEDICINE CLINIC | Facility: CLINIC | Age: 83
End: 2023-05-10

## 2023-05-10 DIAGNOSIS — L29.9 PRURITUS: Primary | ICD-10-CM

## 2023-05-10 RX ORDER — PERMETHRIN 50 MG/G
CREAM TOPICAL ONCE
Qty: 60 G | Refills: 0 | Status: SHIPPED | OUTPATIENT
Start: 2023-05-10 | End: 2023-05-10

## 2023-05-10 RX ORDER — HYDROXYZINE HYDROCHLORIDE 25 MG/1
25 TABLET, FILM COATED ORAL EVERY 6 HOURS PRN
Qty: 30 TABLET | Refills: 0 | Status: SHIPPED | OUTPATIENT
Start: 2023-05-10 | End: 2023-05-18 | Stop reason: SDUPTHER

## 2023-05-10 NOTE — TELEPHONE ENCOUNTER
Sari Mckinley states she finished the prednisone yesterday and her itching came back on hands, ears, back, and outside vagina  This morning she showered and rinsed with baking soda which helped temporality    Please call another rx into [facility]

## 2023-05-10 NOTE — TELEPHONE ENCOUNTER
I think the prednisone is only masking her symptoms and is not treating the actual problem  I called in a lotion that I want her to use  One application tonight  I also called her in an anti-itch medication  Call on Friday with update

## 2023-05-12 ENCOUNTER — APPOINTMENT (OUTPATIENT)
Dept: LAB | Facility: CLINIC | Age: 83
End: 2023-05-12

## 2023-05-12 DIAGNOSIS — L29.9 PRURITUS: ICD-10-CM

## 2023-05-12 RX ORDER — PREDNISONE 20 MG/1
TABLET ORAL
Qty: 17 TABLET | Refills: 0 | Status: SHIPPED | OUTPATIENT
Start: 2023-05-12

## 2023-05-12 NOTE — TELEPHONE ENCOUNTER
Tommy Mclaughlin called and stated that she is still itching all over, there is no rash, no red bumps  The pill gives a little relief  The cream that she is using is burning and very much in her private area  She googled the medication and it says that that medication/cream is treatment for scabies, is that what she has  She does not sleep with anyone or even have a boyfriend

## 2023-05-12 NOTE — TELEPHONE ENCOUNTER
She should stop the cream immediately due to burning  I am going to call in a high dose of prednisone that she will slowly wean down from  Instructions on the bottle  I ordered allergy blood test and an order to see the allergist  Please provide Dr Issa Antis office information to the patient and assist in scheduling her due to intense itching       Milton Simeon

## 2023-05-12 NOTE — TELEPHONE ENCOUNTER
Spoke to Haleigh and relayed EDY's message to her  Just need some clarification - Is Alex in [facility]?   The Dr loera that I have in my book is an Otolaryngology

## 2023-05-12 NOTE — TELEPHONE ENCOUNTER
Spoke with Agustina in regards to allergist in Dr Sullivan Cutting office  No further action at this time

## 2023-05-12 NOTE — TELEPHONE ENCOUNTER
I called Dr Ish Solares office and could only get an appt for 6/5 with Dr Linda Traylor  They do not have a waiting list   They stated to have Charyl Homans call everyday to see if they have any cancellations  Spoke to Charyl Homans and gave her this information   No further action required    Charyl Homans is going to go the the [address] [place] [address] Ave lab in [facility]

## 2023-05-12 NOTE — TELEPHONE ENCOUNTER
The medication hydrOXYzine HCL (ATARAX) 25 mg tablet that you prescribed the other day  Is she to continue to take that?

## 2023-05-15 LAB

## 2023-05-18 ENCOUNTER — TELEPHONE (OUTPATIENT)
Dept: FAMILY MEDICINE CLINIC | Facility: CLINIC | Age: 83
End: 2023-05-18

## 2023-05-18 DIAGNOSIS — L29.9 PRURITUS: ICD-10-CM

## 2023-05-18 RX ORDER — HYDROXYZINE HYDROCHLORIDE 25 MG/1
25 TABLET, FILM COATED ORAL EVERY 6 HOURS PRN
Qty: 90 TABLET | Refills: 0 | Status: SHIPPED | OUTPATIENT
Start: 2023-05-18

## 2023-05-18 RX ORDER — PERMETHRIN 50 MG/G
CREAM TOPICAL
COMMUNITY
Start: 2023-05-10

## 2023-05-18 NOTE — TELEPHONE ENCOUNTER
Left message on machine for Patricia cream and pills were called in by Patricia Parker to [facility]    No further action required

## 2023-05-18 NOTE — TELEPHONE ENCOUNTER
Reginaldo Munguia states she woke up at 2am itchy  She got in the shower which helped  She used the cream which no longer burns so that helped a little  Also took 2 pills of hydroxyzine which she forgot to take last night at 6pm   She thinks she was itchy because she missed last nights dose  Can you call in more hydroxyzine because her allergy appt is 6/5    Pito

## 2023-05-30 DIAGNOSIS — M10.9 GOUT, ARTHROPATHY: ICD-10-CM

## 2023-05-30 DIAGNOSIS — E03.9 HYPOTHYROIDISM, UNSPECIFIED TYPE: ICD-10-CM

## 2023-05-31 ENCOUNTER — OFFICE VISIT (OUTPATIENT)
Dept: FAMILY MEDICINE CLINIC | Facility: CLINIC | Age: 83
End: 2023-05-31

## 2023-05-31 VITALS
HEIGHT: 64 IN | HEART RATE: 88 BPM | OXYGEN SATURATION: 99 % | TEMPERATURE: 96.6 F | RESPIRATION RATE: 16 BRPM | SYSTOLIC BLOOD PRESSURE: 120 MMHG | WEIGHT: 117 LBS | BODY MASS INDEX: 19.97 KG/M2 | DIASTOLIC BLOOD PRESSURE: 68 MMHG

## 2023-05-31 DIAGNOSIS — I48.91 ATRIAL FIBRILLATION, UNSPECIFIED TYPE (HCC): ICD-10-CM

## 2023-05-31 DIAGNOSIS — Z00.00 MEDICARE ANNUAL WELLNESS VISIT, SUBSEQUENT: Primary | ICD-10-CM

## 2023-05-31 DIAGNOSIS — N39.43 URINARY INCONTINENCE, POST-VOID DRIBBLING: ICD-10-CM

## 2023-05-31 DIAGNOSIS — R60.9 EDEMA, UNSPECIFIED TYPE: ICD-10-CM

## 2023-05-31 DIAGNOSIS — N18.30 STAGE 3 CHRONIC KIDNEY DISEASE, UNSPECIFIED WHETHER STAGE 3A OR 3B CKD (HCC): ICD-10-CM

## 2023-05-31 DIAGNOSIS — I10 PRIMARY HYPERTENSION: ICD-10-CM

## 2023-05-31 DIAGNOSIS — L29.9 PRURITUS: ICD-10-CM

## 2023-05-31 DIAGNOSIS — R41.89 BRAIN FOG: ICD-10-CM

## 2023-05-31 DIAGNOSIS — Z12.31 ENCOUNTER FOR SCREENING MAMMOGRAM FOR BREAST CANCER: ICD-10-CM

## 2023-05-31 LAB
ANION GAP SERPL CALCULATED.3IONS-SCNC: 5 MMOL/L (ref 4–13)
BASOPHILS # BLD AUTO: 0.08 THOUSANDS/ÂΜL (ref 0–0.1)
BASOPHILS NFR BLD AUTO: 2 % (ref 0–1)
BUN SERPL-MCNC: 17 MG/DL (ref 5–25)
CALCIUM SERPL-MCNC: 9 MG/DL (ref 8.3–10.1)
CHLORIDE SERPL-SCNC: 104 MMOL/L (ref 96–108)
CO2 SERPL-SCNC: 26 MMOL/L (ref 21–32)
CREAT SERPL-MCNC: 0.91 MG/DL (ref 0.6–1.3)
EOSINOPHIL # BLD AUTO: 0.11 THOUSAND/ÂΜL (ref 0–0.61)
EOSINOPHIL NFR BLD AUTO: 2 % (ref 0–6)
ERYTHROCYTE [DISTWIDTH] IN BLOOD BY AUTOMATED COUNT: 14.7 % (ref 11.6–15.1)
GFR SERPL CREATININE-BSD FRML MDRD: 57 ML/MIN/1.73SQ M
GLUCOSE SERPL-MCNC: 70 MG/DL (ref 65–140)
HCT VFR BLD AUTO: 36.4 % (ref 34.8–46.1)
HGB BLD-MCNC: 11.8 G/DL (ref 11.5–15.4)
IMM GRANULOCYTES # BLD AUTO: 0.02 THOUSAND/UL (ref 0–0.2)
IMM GRANULOCYTES NFR BLD AUTO: 0 % (ref 0–2)
LYMPHOCYTES # BLD AUTO: 2.39 THOUSANDS/ÂΜL (ref 0.6–4.47)
LYMPHOCYTES NFR BLD AUTO: 50 % (ref 14–44)
MCH RBC QN AUTO: 33.2 PG (ref 26.8–34.3)
MCHC RBC AUTO-ENTMCNC: 32.4 G/DL (ref 31.4–37.4)
MCV RBC AUTO: 103 FL (ref 82–98)
MONOCYTES # BLD AUTO: 0.53 THOUSAND/ÂΜL (ref 0.17–1.22)
MONOCYTES NFR BLD AUTO: 11 % (ref 4–12)
NEUTROPHILS # BLD AUTO: 1.69 THOUSANDS/ÂΜL (ref 1.85–7.62)
NEUTS SEG NFR BLD AUTO: 35 % (ref 43–75)
NRBC BLD AUTO-RTO: 0 /100 WBCS
PLATELET # BLD AUTO: 236 THOUSANDS/UL (ref 149–390)
PMV BLD AUTO: 11.5 FL (ref 8.9–12.7)
POTASSIUM SERPL-SCNC: 4.3 MMOL/L (ref 3.5–5.3)
RBC # BLD AUTO: 3.55 MILLION/UL (ref 3.81–5.12)
SODIUM SERPL-SCNC: 135 MMOL/L (ref 135–147)
WBC # BLD AUTO: 4.82 THOUSAND/UL (ref 4.31–10.16)

## 2023-05-31 PROCEDURE — 80048 BASIC METABOLIC PNL TOTAL CA: CPT | Performed by: NURSE PRACTITIONER

## 2023-05-31 PROCEDURE — 85025 COMPLETE CBC W/AUTO DIFF WBC: CPT | Performed by: NURSE PRACTITIONER

## 2023-05-31 RX ORDER — LEVOTHYROXINE SODIUM 0.05 MG/1
50 TABLET ORAL
Qty: 90 TABLET | Refills: 0 | Status: SHIPPED | OUTPATIENT
Start: 2023-05-31

## 2023-05-31 RX ORDER — COLCHICINE 0.6 MG/1
TABLET ORAL
Qty: 30 TABLET | Refills: 0 | Status: SHIPPED | OUTPATIENT
Start: 2023-05-31

## 2023-05-31 NOTE — PATIENT INSTRUCTIONS
Pt is due for an AWV  Medicare Preventive Visit Patient Instructions  Thank you for completing your Welcome to Medicare Visit or Medicare Annual Wellness Visit today  Your next wellness visit will be due in one year (5/31/2024)  The screening/preventive services that you may require over the next 5-10 years are detailed below  Some tests may not apply to you based off risk factors and/or age  Screening tests ordered at today's visit but not completed yet may show as past due  Also, please note that scanned in results may not display below  Preventive Screenings:  Service Recommendations Previous Testing/Comments   Colorectal Cancer Screening  * Colonoscopy    * Fecal Occult Blood Test (FOBT)/Fecal Immunochemical Test (FIT)  * Fecal DNA/Cologuard Test  * Flexible Sigmoidoscopy Age: 39-70 years old   Colonoscopy: every 10 years (may be performed more frequently if at higher risk)  OR  FOBT/FIT: every 1 year  OR  Cologuard: every 3 years  OR  Sigmoidoscopy: every 5 years  Screening may be recommended earlier than age 39 if at higher risk for colorectal cancer  Also, an individualized decision between you and your healthcare provider will decide whether screening between the ages of 74-80 would be appropriate  Colonoscopy: 07/26/2022  FOBT/FIT: Not on file  Cologuard: Not on file  Sigmoidoscopy: Not on file          Breast Cancer Screening Age: 36 years old  Frequency: every 1-2 years  Not required if history of left and right mastectomy Mammogram: 09/10/2020        Cervical Cancer Screening Between the ages of 21-29, pap smear recommended once every 3 years  Between the ages of 33-67, can perform pap smear with HPV co-testing every 5 years     Recommendations may differ for women with a history of total hysterectomy, cervical cancer, or abnormal pap smears in past  Pap Smear: Not on file        Hepatitis C Screening Once for adults born between [facility]  More frequently in patients at high risk for Hepatitis C Hep C Antibody: Not on file        Diabetes Screening 1-2 times per year if you're at risk for diabetes or have pre-diabetes Fasting glucose: 79 mg/dL (8/11/2022)  A1C: No results in last 5 years (No results in last 5 years)      Cholesterol Screening Once every 5 years if you don't have a lipid disorder  May order more often based on risk factors  Lipid panel: 04/13/2022          Other Preventive Screenings Covered by Medicare:  1  Abdominal Aortic Aneurysm (AAA) Screening: covered once if your at risk  You're considered to be at risk if you have a family history of AAA  2  Lung Cancer Screening: covers low dose CT scan once per year if you meet all of the following conditions: (1) Age 50-69; (2) No signs or symptoms of lung cancer; (3) Current smoker or have quit smoking within the last 15 years; (4) You have a tobacco smoking history of at least 20 pack years (packs per day multiplied by number of years you smoked); (5) You get a written order from a healthcare provider  3  Glaucoma Screening: covered annually if you're considered high risk: (1) You have diabetes OR (2) Family history of glaucoma OR (3)  aged 48 and older OR (3)  American aged 72 and older  3  Osteoporosis Screening: covered every 2 years if you meet one of the following conditions: (1) You're estrogen deficient and at risk for osteoporosis based off medical history and other findings; (2) Have a vertebral abnormality; (3) On glucocorticoid therapy for more than 3 months; (4) Have primary hyperparathyroidism; (5) On osteoporosis medications and need to assess response to drug therapy  · Last bone density test (DXA Scan): Not on file  5  HIV Screening: covered annually if you're between the age of 12-76  Also covered annually if you are younger than 13 and older than 72 with risk factors for HIV infection  For pregnant patients, it is covered up to 3 times per pregnancy      Immunizations:  Immunization Recommendations   Influenza Vaccine Annual influenza vaccination during flu season is recommended for all persons aged >= 6 months who do not have contraindications   Pneumococcal Vaccine   * Pneumococcal conjugate vaccine = PCV13 (Prevnar 13), PCV15 (Vaxneuvance), PCV20 (Prevnar 20)  * Pneumococcal polysaccharide vaccine = PPSV23 (Pneumovax) Adults 25-60 years old: 1-3 doses may be recommended based on certain risk factors  Adults 72 years old: 1-2 doses may be recommended based off what pneumonia vaccine you previously received   Hepatitis B Vaccine 3 dose series if at intermediate or high risk (ex: diabetes, end stage renal disease, liver disease)   Tetanus (Td) Vaccine - COST NOT COVERED BY MEDICARE PART B Following completion of primary series, a booster dose should be given every 10 years to maintain immunity against tetanus  Td may also be given as tetanus wound prophylaxis  Tdap Vaccine - COST NOT COVERED BY MEDICARE PART B Recommended at least once for all adults  For pregnant patients, recommended with each pregnancy  Shingles Vaccine (Shingrix) - COST NOT COVERED BY MEDICARE PART B  2 shot series recommended in those aged 48 and above     Health Maintenance Due:      Topic Date Due   • Breast Cancer Screening: Mammogram  09/10/2022     Immunizations Due:      Topic Date Due   • COVID-19 Vaccine (5 - Pfizer series) 06/22/2022     Advance Directives   What are advance directives? Advance directives are legal documents that state your wishes and plans for medical care  These plans are made ahead of time in case you lose your ability to make decisions for yourself  Advance directives can apply to any medical decision, such as the treatments you want, and if you want to donate organs  What are the types of advance directives? There are many types of advance directives, and each state has rules about how to use them  You may choose a combination of any of the following:  · Living will:   This is a written record of the treatment you want  You can also choose which treatments you do not want, which to limit, and which to stop at a certain time  This includes surgery, medicine, IV fluid, and tube feedings  · Durable power of  for healthcare [facility]): This is a written record that states who you want to make healthcare choices for you when you are unable to make them for yourself  This person, called a proxy, is usually a family member or a friend  You may choose more than 1 proxy  · Do not resuscitate (DNR) order:  A DNR order is used in case your heart stops beating or you stop breathing  It is a request not to have certain forms of treatment, such as CPR  A DNR order may be included in other types of advance directives  · Medical directive: This covers the care that you want if you are in a coma, near death, or unable to make decisions for yourself  You can list the treatments you want for each condition  Treatment may include pain medicine, surgery, blood transfusions, dialysis, IV or tube feedings, and a ventilator (breathing machine)  · Values history: This document has questions about your views, beliefs, and how you feel and think about life  This information can help others choose the care that you would choose  Why are advance directives important? An advance directive helps you control your care  Although spoken wishes may be used, it is better to have your wishes written down  Spoken wishes can be misunderstood, or not followed  Treatments may be given even if you do not want them  An advance directive may make it easier for your family to make difficult choices about your care  © Copyright [employer] 2018 Information is for End User's use only and may not be sold, redistributed or otherwise used for commercial purposes   All illustrations and images included in CareNotes® are the copyrighted property of [employer]  or [facility] [employer]

## 2023-05-31 NOTE — TELEPHONE ENCOUNTER
Requested medication(s) are due for refill today: Yes  Patient has already received a courtesy refill: No  Other reason request has been forwarded to provider: eGFR is 60 or above and within 360 days

## 2023-05-31 NOTE — PROGRESS NOTES
Assessment/Plan:    {There are no diagnoses linked to this encounter  (Refresh or delete this SmartLink)}        Patient Instructions   Pt is due for an AWV  No follow-ups on file  Subjective:      Patient ID: Margarito Molina is a 78 y o  female  Chief Complaint   Patient presents with   • Follow-up     Pt here for a five week f/u  HPI    The following portions of the patient's history were reviewed and updated as appropriate: allergies, current medications, past family history, past medical history, past social history, past surgical history and problem list     Review of Systems      Current Outpatient Medications   Medication Sig Dispense Refill   • allopurinol (ZYLOPRIM) 100 mg tablet Take 1 tablet (100 mg total) by mouth daily 90 tablet 2   • aspirin 81 mg chewable tablet Chew 1 tablet daily     • colchicine (COLCRYS) 0 6 mg tablet TAKE ONE BY MOUTH TABLET DAILY 30 tablet 0   • ELIQUIS 2 5 MG 2 5 mg 2 (two) times a day       • folic acid (FOLVITE) 1 mg tablet      • furosemide (LASIX) 20 mg tablet Take 1 tablet (20 mg total) by mouth daily 90 tablet 0   • hydrOXYzine HCL (ATARAX) 25 mg tablet Take 1 tablet (25 mg total) by mouth every 6 (six) hours as needed for itching 90 tablet 0   • levothyroxine 50 mcg tablet TAKE 1 TABLET (50 MCG TOTAL) BY MOUTH DAILY IN THE EARLY MORNING 90 tablet 0   • metoprolol succinate (TOPROL-XL) 25 mg 24 hr tablet Take 1 tablet (25 mg total) by mouth daily 90 tablet 0   • pantoprazole (PROTONIX) 20 mg tablet Take 2 tablets (40 mg total) by mouth 2 (two) times a day 60 tablet 3   • permethrin (ELIMITE) 5 % cream APPLY TOPICALLY ONCE FOR 1 DOSE     • simvastatin (ZOCOR) 20 mg tablet Take 1 tablet by mouth daily     • pantoprazole (PROTONIX) 40 mg tablet  (Patient not taking: Reported on 5/31/2023)       No current facility-administered medications for this visit         Objective:    /68 (BP Location: Left arm, Patient Position: Sitting, Cuff Size: Adult) "Pulse 88   Temp (!) 96 6 °F (35 9 °C) (Temporal)   Resp 16   Ht 5' 4\" (1 626 m)   Wt 53 1 kg (117 lb)   SpO2 99%   BMI 20 08 kg/m²        Physical Exam           JORDAN Baron  "

## 2023-05-31 NOTE — ASSESSMENT & PLAN NOTE
Initial work up wnl  Pt is scheduled for follow up with allergist    Symptoms currently controlled with prednisone taper

## 2023-05-31 NOTE — PROGRESS NOTES
Assessment and Plan:     Problem List Items Addressed This Visit        Cardiovascular and Mediastinum    Atrial fibrillation (HCC)     Stable, no issues at this time  Hypertension     BP stable in office today  Genitourinary    Stage 3 chronic kidney disease, unspecified whether stage 3a or 3b CKD (HCC)    Relevant Orders    CBC and differential    Basic metabolic panel       Other    Edema     Symptoms greatly improved with lasix  Advise sodium reduction, elevate legs when resting  Urinary incontinence, post-void dribbling     Symptoms are chronic, intermittent, per pt  No issues at this time  Pruritus     Initial work up wnl  Pt is scheduled for follow up with allergist    Symptoms currently controlled with prednisone taper  Other Visit Diagnoses     Medicare annual wellness visit, subsequent    -  Primary    Age appropriate screenings and recommendations discussed  Brain fog        Relevant Orders    CBC and differential    Basic metabolic panel    Encounter for screening mammogram for breast cancer        Relevant Orders    Mammo screening bilateral w 3d & cad          Depression Screening and Follow-up Plan: Patient was screened for depression during today's encounter  They screened negative with a PHQ-2 score of 0  Urinary Incontinence Plan of Care: counseling topics discussed: use restroom every 2 hours and limiting fluid intake 3-4 hours before bed  Preventive health issues were discussed with patient, and age appropriate screening tests were ordered as noted in patient's After Visit Summary  Personalized health advice and appropriate referrals for health education or preventive services given if needed, as noted in patient's After Visit Summary       History of Present Illness:     Patient presents for a Medicare Wellness Visit    Connor Curling is an 78year old female with hypothyroidism, afib, hypertension, history of TIA, hyperlipidemia, who presents to the office for recheck and AWV  Pt was experiencing itching all over her body and most concentrated in her vaginal area  Reports that prednisone is effective for management, but when she stops the medication, her symptoms return  Reports that her lower extremity swelling has improved with use of lasix  Denies chest pain, shortness of breath  Patient Care Team:  Karissa Dick as PCP - General (Family Medicine)  Jasile Ascencio MD as PCP - [address] [place], [place] [address] [facility]  MD Amelia Coon MD (Cardiology)     Review of Systems:     Review of Systems   Constitutional: Positive for fatigue (brain fog)  Negative for diaphoresis and fever  HENT: Negative for ear pain and hearing loss  Eyes: Negative for pain and visual disturbance  Respiratory: Negative for chest tightness and shortness of breath  Cardiovascular: Negative for chest pain, palpitations and leg swelling  Gastrointestinal: Negative for abdominal pain, constipation and diarrhea  Genitourinary: Negative for difficulty urinating  Musculoskeletal: Negative for arthralgias and myalgias  Skin: Negative for rash  Neurological: Negative for dizziness, numbness and headaches  Psychiatric/Behavioral: Negative for sleep disturbance          Problem List:     Patient Active Problem List   Diagnosis   • Anemia   • Atrial fibrillation ([facility] [address] )   • Hyperlipidemia   • Hypertension   • Hypothyroidism   • Knee osteoarthritis   • Gout, arthropathy   • Age-related osteoporosis without current pathological fracture   • Pulmonary hypertension, unspecified ([address] )   • Thoracic aortic aneurysm without rupture, unspecified part ([facility] [address] )   • Atherosclerosis of native coronary artery of native heart without angina pectoris   • History of cerebrovascular accident   • Long term current use of anticoagulant   • Long term current use of aspirin   • Pure hypercholesterolemia   • Tricuspid incompetence   • Abdominal distention   • Esophageal dysphagia   • Pancreatic cyst   • TIA (transient ischemic attack)   • Abnormal liver enzymes   • Stage 3 chronic kidney disease, unspecified whether stage 3a or 3b CKD (HCC)   • Alcoholic liver disease (HCC)   • Raynaud's disease without gangrene   • Severe protein-calorie malnutrition (HCC)   • Edema   • Aneurysm of ascending aorta without rupture (HCC)   • Urinary incontinence, post-void dribbling   • Pruritus      Past Medical and Surgical History:     Past Medical History:   Diagnosis Date   • Anemia    • Arthritis    • Atherosclerosis of native coronary artery of native heart without angina pectoris 2019   • Cataract    • Chronic atrial fibrillation (HCC)    • Degeneration of cervical intervertebral disc    • Diarrhea     onset 07/10   • Disease of thyroid gland    • Occipital infarction [facility])    • Wrist fracture     right      Past Surgical History:   Procedure Laterality Date   • COLON SURGERY     • FL INJECTION LEFT HIP (NON ARTHROGRAM)  2021   • KNEE ARTHROSCOPY      with medial meniscus repair    • ND ARTHROCENTESIS ASPIR&/INJ MAJOR JT/BURSA W/O US Left 2021    Procedure: Hip corticosteroid injection (85924 [id number]);   Surgeon: Enzo Barragan MD;  Location: [facility] MAIN OR;  Service: Pain Management       Family History:     Family History   Problem Relation Age of Onset   • Heart attack Mother    • Valvular heart disease Mother    • Heart attack Father    • Hypertension Sister    • Mental illness Neg Hx       Social History:     Social History     Socioeconomic History   • Marital status: Single     Spouse name: None   • Number of children: None   • Years of education: None   • Highest education level: None   Occupational History   • None   Tobacco Use   • Smoking status: Former     Packs/day: 1 00     Years: 7 00     Total pack years: 7 00     Types: Cigarettes     Quit date:      Years since quittin 4   • Smokeless tobacco: Never   Vaping Use   • Vaping Use: Never used Substance and Sexual Activity   • Alcohol use: Yes     Alcohol/week: 7 0 standard drinks of alcohol     Types: 7 Glasses of wine per week     Comment: occasionally, drinks wine , moderate    • Drug use: No   • Sexual activity: None   Other Topics Concern   • None   Social History Narrative    Dental care reg    No caffeine use     Social Determinants of Health     Financial Resource Strain: Not on file   Food Insecurity: No Food Insecurity (4/17/2023)    Hunger Vital Sign    • Worried About Running Out of Food in the Last Year: Never true    • Ran Out of Food in the Last Year: Never true   Transportation Needs: No Transportation Needs (4/17/2023)    PRAPARE - Transportation    • Lack of Transportation (Medical): No    • Lack of Transportation (Non-Medical):  No   Physical Activity: Not on file   Stress: Not on file   Social Connections: Not on file   Intimate Partner Violence: Not on file   Housing Stability: Low Risk  (4/17/2023)    Housing Stability Vital Sign    • Unable to Pay for Housing in the Last Year: No    • Number of Places Lived in the Last Year: 1    • Unstable Housing in the Last Year: No      Medications and Allergies:     Current Outpatient Medications   Medication Sig Dispense Refill   • allopurinol (ZYLOPRIM) 100 mg tablet Take 1 tablet (100 mg total) by mouth daily 90 tablet 2   • aspirin 81 mg chewable tablet Chew 1 tablet daily     • colchicine (COLCRYS) 0 6 mg tablet TAKE ONE BY MOUTH TABLET DAILY 30 tablet 0   • ELIQUIS 2 5 MG 2 5 mg 2 (two) times a day       • folic acid (FOLVITE) 1 mg tablet      • furosemide (LASIX) 20 mg tablet Take 1 tablet (20 mg total) by mouth daily 90 tablet 0   • hydrOXYzine HCL (ATARAX) 25 mg tablet Take 1 tablet (25 mg total) by mouth every 6 (six) hours as needed for itching 90 tablet 0   • levothyroxine 50 mcg tablet TAKE 1 TABLET (50 MCG TOTAL) BY MOUTH DAILY IN THE EARLY MORNING 90 tablet 0   • metoprolol succinate (TOPROL-XL) 25 mg 24 hr tablet Take 1 tablet (25 mg total) by mouth daily 90 tablet 0   • pantoprazole (PROTONIX) 20 mg tablet Take 2 tablets (40 mg total) by mouth 2 (two) times a day 60 tablet 3   • permethrin (ELIMITE) 5 % cream APPLY TOPICALLY ONCE FOR 1 DOSE     • simvastatin (ZOCOR) 20 mg tablet Take 1 tablet by mouth daily       No current facility-administered medications for this visit  Allergies   Allergen Reactions   • Lidocaine      Annotation - [id number]: Seizure with 20 cc during a surgical block  FS   • Penicillins Other (See Comments)     unknown   • Sulfa Antibiotics Edema      Immunizations:     Immunization History   Administered Date(s) Administered   • COVID-19 PFIZER VACCINE 0 3 ML IM 02/09/2021, 03/02/2021, 09/28/2021, 04/27/2022   • Influenza Quadrivalent 6 mos and older IM 11/23/2021   • Influenza Split High Dose Preservative Free IM 08/26/2015, 08/31/2016, 08/11/2017, 09/24/2018   • Influenza Whole 11/02/2000, 10/18/2001   • Influenza, high dose seasonal 0 7 mL 09/12/2019, 10/22/2020   • Influenza, seasonal, injectable 10/11/2002, 10/06/2003   • Pneumococcal Conjugate 13-Valent 08/23/2017   • Pneumococcal Polysaccharide PPV23 03/01/2014      Health Maintenance:         Topic Date Due   • Breast Cancer Screening: Mammogram  09/10/2022         Topic Date Due   • COVID-19 Vaccine (5 - Pfizer series) 06/22/2022      Medicare Screening Tests and Risk Assessments:     Dean Johnson is here for her Subsequent Wellness visit  Last Medicare Wellness visit information reviewed, patient interviewed and updates made to the record today  Health Risk Assessment:   Patient rates overall health as good  Patient feels that their physical health rating is slightly worse  Patient is very satisfied with their life  Eyesight was rated as same  Hearing was rated as same  Patient feels that their emotional and mental health rating is same  Patients states they are never, rarely angry  Patient states they are sometimes unusually tired/fatigued   Pain experienced in the last 7 days has been none  Patient states that she has experienced no weight loss or gain in last 6 months  Depression Screening:   PHQ-2 Score: 0      Fall Risk Screening: In the past year, patient has experienced: history of falling in past year    Number of falls: 1  Injured during fall?: Yes    Feels unsteady when standing or walking?: No    Worried about falling?: No      Urinary Incontinence Screening:   Patient has leaked urine accidently in the last six months  Home Safety:  Patient does not have trouble with stairs inside or outside of their home  Patient has working smoke alarms and has working carbon monoxide detector  Home safety hazards include: none  Medications:   Patient is currently taking over-the-counter supplements  OTC medications include: see medication list  Patient is able to manage medications  Activities of Daily Living (ADLs)/Instrumental Activities of Daily Living (IADLs):   Walk and transfer into and out of bed and chair?: Yes  Dress and groom yourself?: Yes    Bathe or shower yourself?: Yes    Feed yourself? Yes  Do your laundry/housekeeping?: Yes  Manage your money, pay your bills and track your expenses?: Yes  Make your own meals?: Yes    Do your own shopping?: Yes    Advance Care Planning:   Living will: Yes    Durable POA for healthcare:  Yes    Advanced directive: Yes    Advanced directive counseling given: Yes    Five wishes given: No    Provider agrees with end of life decisions: Yes      Cognitive Screening:   Provider or family/friend/caregiver concerned regarding cognition?: No    PREVENTIVE SCREENINGS      Cardiovascular Screening:    General: Screening Not Indicated and History Lipid Disorder      Diabetes Screening:     General: Screening Current      Colorectal Cancer Screening:     General: Screening Not Indicated      Cervical Cancer Screening:    General: Screening Not Indicated      Osteoporosis Screening:    General: Screening Not "Indicated and History Osteoporosis      Lung Cancer Screening:     General: Screening Not Indicated    Other Counseling Topics:   Car/seat belt/driving safety, skin self-exam, sunscreen and calcium and vitamin D intake and regular weightbearing exercise  Physical Exam:     /68 (BP Location: Left arm, Patient Position: Sitting, Cuff Size: Adult)   Pulse 88   Temp (!) 96 6 °F (35 9 °C) (Temporal)   Resp 16   Ht 5' 4\" (1 626 m)   Wt 53 1 kg (117 lb)   SpO2 99%   BMI 20 08 kg/m²     Physical Exam  Vitals reviewed  Constitutional:       Appearance: Normal appearance  HENT:      Head: Normocephalic and atraumatic  Cardiovascular:      Rate and Rhythm: Normal rate and regular rhythm  Heart sounds: Normal heart sounds  Pulmonary:      Breath sounds: Normal breath sounds  Musculoskeletal:      Cervical back: Normal range of motion and neck supple  Neurological:      Mental Status: She is alert and oriented to person, place, and time     Psychiatric:         Mood and Affect: Mood normal           JORDAN Mack  "

## 2023-06-23 ENCOUNTER — TELEPHONE (OUTPATIENT)
Dept: FAMILY MEDICINE CLINIC | Facility: CLINIC | Age: 83
End: 2023-06-23

## 2023-06-23 NOTE — TELEPHONE ENCOUNTER
Khris called from [address]- home based medical care from [facility]  She states she saw Mukesh Salinas today  She states she is stable & has no concerns at this time    No further action required

## 2023-07-05 ENCOUNTER — OFFICE VISIT (OUTPATIENT)
Dept: OBGYN CLINIC | Facility: CLINIC | Age: 83
End: 2023-07-05
Payer: COMMERCIAL

## 2023-07-05 VITALS — WEIGHT: 125 LBS | SYSTOLIC BLOOD PRESSURE: 130 MMHG | DIASTOLIC BLOOD PRESSURE: 70 MMHG | BODY MASS INDEX: 21.46 KG/M2

## 2023-07-05 DIAGNOSIS — T49.95XA DERMATITIS DUE TO TOPICAL DRUG: Primary | ICD-10-CM

## 2023-07-05 DIAGNOSIS — L25.1 DERMATITIS DUE TO TOPICAL DRUG: Primary | ICD-10-CM

## 2023-07-05 PROCEDURE — 99202 OFFICE O/P NEW SF 15 MIN: CPT | Performed by: NURSE PRACTITIONER

## 2023-07-05 RX ORDER — CLOBETASOL PROPIONATE 0.5 MG/G
CREAM TOPICAL 2 TIMES DAILY
Qty: 30 G | Refills: 0 | Status: SHIPPED | OUTPATIENT
Start: 2023-07-05

## 2023-07-05 NOTE — PROGRESS NOTES
SUBJECTIVE:     Nicholas Davis  78-year-old postmenopausal female. Presents with concerns of external vaginal itching and sores. She states in May she developed an itch all over her body minus her feet. She was seen by PCP and prescribed Elimate. She had been applying the Elimite daily all over. Since using this cream she noticed the external vaginal itching became worse. She would apply a wash cloth to help w/ itching and caused some bleeding from rubbing on it. She recently stopped using the Elimite and started applying neosporin and Vaseline. No LMP recorded. Patient is postmenopausal.    OBJECTIVE:     Physical Exam  Constitutional:       Appearance: Normal appearance. Genitourinary:         Musculoskeletal:      Cervical back: Neck supple. Neurological:      Mental Status: She is alert. Skin:     General: Skin is warm. Psychiatric:         Behavior: Behavior is cooperative. Vitals and nursing note reviewed. ASSESSMENT AND PLAN:     Diagnoses and all orders for this visit:    Dermatitis due to topical drug  -     clobetasol (TEMOVATE) 0.05 % cream; Apply topically 2 (two) times a day  -     Cancel: Herpes Simplex Virus (HSV) Types 1 and 2, MADY; Future  -     Herpes Simplex Virus (HSV) Types 1 and 2, MADY    I explained that Elimite is a one time use medication and it seems she has experienced a reaction from using it daily. Culture obtained to r/o herpes. Clobetasol prescribed - apply small amount BID for 7 to 10 days. Results will be released to [facility], if abnormal will call to review and discuss treatment plan.

## 2023-07-07 LAB
HSV1 DNA SPEC QL NAA+PROBE: NEGATIVE
HSV2 DNA SPEC QL NAA+PROBE: NEGATIVE

## 2023-07-12 DIAGNOSIS — N18.30 STAGE 3 CHRONIC KIDNEY DISEASE, UNSPECIFIED WHETHER STAGE 3A OR 3B CKD (HCC): ICD-10-CM

## 2023-07-12 DIAGNOSIS — R41.89 BRAIN FOG: Primary | ICD-10-CM

## 2023-07-13 ENCOUNTER — TELEPHONE (OUTPATIENT)
Dept: OBGYN CLINIC | Facility: CLINIC | Age: 83
End: 2023-07-13

## 2023-07-13 NOTE — TELEPHONE ENCOUNTER
Patient states the lumps are not improved and they are bothersome when she sits and when her clothing rubs on them. She is has applying the clobetasol twice a day. Recommended she come in for follow-up.

## 2023-07-13 NOTE — TELEPHONE ENCOUNTER
Pt called and made aware her culture results were negative. She states she still has the sores and are very painful and not sure what is recommended at this point. Would like to speak to you when you have a moment.

## 2023-07-14 ENCOUNTER — TELEPHONE (OUTPATIENT)
Dept: DERMATOLOGY | Facility: CLINIC | Age: 83
End: 2023-07-14

## 2023-07-14 DIAGNOSIS — M10.9 GOUT, ARTHROPATHY: ICD-10-CM

## 2023-07-14 NOTE — TELEPHONE ENCOUNTER
Rec'd call from patient requesting new patient appt for VULVULAR LESION    Explained wait list process. Patient verbalized understanding. Created wait list for patient at [facility] location.

## 2023-07-17 RX ORDER — COLCHICINE 0.6 MG/1
TABLET ORAL
Qty: 30 TABLET | Refills: 0 | Status: SHIPPED | OUTPATIENT
Start: 2023-07-17

## 2023-07-31 DIAGNOSIS — R60.0 BILATERAL LOWER EXTREMITY EDEMA: ICD-10-CM

## 2023-08-01 DIAGNOSIS — N18.30 STAGE 3 CHRONIC KIDNEY DISEASE, UNSPECIFIED WHETHER STAGE 3A OR 3B CKD (HCC): Primary | ICD-10-CM

## 2023-08-01 RX ORDER — FUROSEMIDE 20 MG/1
20 TABLET ORAL DAILY
Qty: 90 TABLET | Refills: 0 | Status: SHIPPED | OUTPATIENT
Start: 2023-08-01

## 2023-08-04 ENCOUNTER — OFFICE VISIT (OUTPATIENT)
Dept: FAMILY MEDICINE CLINIC | Facility: CLINIC | Age: 83
End: 2023-08-04
Payer: COMMERCIAL

## 2023-08-04 VITALS
TEMPERATURE: 97.2 F | DIASTOLIC BLOOD PRESSURE: 74 MMHG | RESPIRATION RATE: 12 BRPM | HEIGHT: 64 IN | HEART RATE: 84 BPM | WEIGHT: 120 LBS | OXYGEN SATURATION: 96 % | SYSTOLIC BLOOD PRESSURE: 110 MMHG | BODY MASS INDEX: 20.49 KG/M2

## 2023-08-04 DIAGNOSIS — I48.91 ATRIAL FIBRILLATION, UNSPECIFIED TYPE (HCC): ICD-10-CM

## 2023-08-04 DIAGNOSIS — K70.9 ALCOHOLIC LIVER DISEASE (HCC): ICD-10-CM

## 2023-08-04 DIAGNOSIS — I10 PRIMARY HYPERTENSION: ICD-10-CM

## 2023-08-04 DIAGNOSIS — L29.9 PRURITUS: Primary | ICD-10-CM

## 2023-08-04 PROCEDURE — 99214 OFFICE O/P EST MOD 30 MIN: CPT | Performed by: FAMILY MEDICINE

## 2023-08-04 RX ORDER — PREDNISONE 20 MG/1
TABLET ORAL
Qty: 14 TABLET | Refills: 0 | Status: SHIPPED | OUTPATIENT
Start: 2023-08-04 | End: 2023-08-10 | Stop reason: SDUPTHER

## 2023-08-04 NOTE — PATIENT INSTRUCTIONS
Hydration  Wash with gentle soap - unscented dove  Trial of mycolog cream and prednsione    Rv 2 weeks

## 2023-08-07 NOTE — PROGRESS NOTES
Name: Elli Platt      : 1940      MRN: [id number]  Encounter Provider: Gallo Rae MD  Encounter Date: 2023   Encounter department: [facility]     1. Pruritus  -     nystatin-triamcinolone (MYCOLOG-II) cream; Apply topically in the morning  -     predniSONE 20 mg tablet; 2 PO QD X 4 DAYS, THEN 1 PO QD X 4 DAYS, THEN 1/2 PO QD X 4 DAYS    2. Alcoholic liver disease ([address] )  Assessment & Plan:  STABLE  LFTS NL        3. Atrial fibrillation, unspecified type ([address])  Assessment & Plan:  STABLE        4. Primary hypertension           Subjective      PATIENT CONTINUES TO EXPERIENCE PERSISTENT ITCHING - GLOBALLY, BUT AMADA IN VAGINAL REGION  DENIES ANY RECENT ILLNESS, FEVER OR CHILLS  NO NEW SUPPLEMENTS OR FOODS    HAS BEEN GOING ON FOR A WHILE  PREVIOUSLY SOME PREDNISONE HELPED    HAS SEEN ALLERGIST, DERMATOLOGIST AND OBGYN  NONE OF THEIR [place] HELPED    Review of Systems   Constitutional: Negative for chills, fatigue and fever. HENT: Negative for congestion, ear discharge, ear pain, mouth sores, postnasal drip, sore throat and trouble swallowing. Eyes: Negative for pain, discharge and visual disturbance. Respiratory: Negative for cough, shortness of breath and wheezing. Cardiovascular: Negative for chest pain, palpitations and leg swelling. Gastrointestinal: Negative for abdominal distention, abdominal pain, blood in stool, diarrhea and nausea. Endocrine: Negative for polydipsia, polyphagia and polyuria. Genitourinary: Negative for dysuria, frequency, hematuria and urgency. Musculoskeletal: Positive for arthralgias. Negative for gait problem and joint swelling. Skin: Negative for pallor and rash. Neurological: Negative for dizziness, syncope, speech difficulty, weakness, light-headedness, numbness and headaches. Hematological: Negative for adenopathy.    Psychiatric/Behavioral: Negative for behavioral problems, confusion and sleep disturbance. The patient is not nervous/anxious. Current Outpatient Medications on File Prior to Visit   Medication Sig   • allopurinol (ZYLOPRIM) 100 mg tablet Take 1 tablet (100 mg total) by mouth daily   • aspirin 81 mg chewable tablet Chew 1 tablet daily   • colchicine (COLCRYS) 0.6 mg tablet TAKE ONE BY MOUTH TABLET DAILY   • ELIQUIS 2.5 MG 2.5 mg 2 (two) times a day     • furosemide (LASIX) 20 mg tablet TAKE 1 TABLET (20 MG TOTAL) BY MOUTH DAILY   • hydrOXYzine HCL (ATARAX) 25 mg tablet Take 1 tablet (25 mg total) by mouth every 6 (six) hours as needed for itching   • levothyroxine 50 mcg tablet TAKE 1 TABLET (50 MCG TOTAL) BY MOUTH DAILY IN THE EARLY MORNING   • metoprolol succinate (TOPROL-XL) 25 mg 24 hr tablet Take 1 tablet (25 mg total) by mouth daily   • pantoprazole (PROTONIX) 20 mg tablet Take 2 tablets (40 mg total) by mouth 2 (two) times a day   • simvastatin (ZOCOR) 20 mg tablet Take 1 tablet by mouth daily   • [DISCONTINUED] clobetasol (TEMOVATE) 0.05 % cream Apply topically 2 (two) times a day (Patient not taking: Reported on 8/4/2023)   • [DISCONTINUED] folic acid (FOLVITE) 1 mg tablet  (Patient not taking: Reported on 8/4/2023)       Objective     /74 (BP Location: Left arm, Patient Position: Sitting, Cuff Size: Adult)   Pulse 84   Temp (!) 97.2 °F (36.2 °C) (Temporal)   Resp 12   Ht 5' 4" (1.626 m)   Wt 54.4 kg (120 lb)   SpO2 96%   BMI 20.60 kg/m²     Physical Exam  Constitutional:       Appearance: She is well-developed. HENT:      Head: Normocephalic and atraumatic. Eyes:      General:         Right eye: No discharge. Left eye: No discharge. Conjunctiva/sclera: Conjunctivae normal.      Pupils: Pupils are equal, round, and reactive to light. Neck:      Thyroid: No thyromegaly. Cardiovascular:      Rate and Rhythm: Normal rate and regular rhythm. Heart sounds: Normal heart sounds. No murmur heard.   Pulmonary:      Effort: Pulmonary effort is normal. No respiratory distress. Breath sounds: Normal breath sounds. No wheezing or rales. Abdominal:      General: Bowel sounds are normal.      Palpations: Abdomen is soft. Tenderness: There is no abdominal tenderness. Genitourinary:     Comments: MILD ERYTHEMA OF VULVAR REGION  OBVIOUS YEAST RASH IN BUTTOCK CREASE  Musculoskeletal:         General: No tenderness. Cervical back: Normal range of motion and neck supple. Comments: MODERATE DJD CHANGES   Lymphadenopathy:      Cervical: No cervical adenopathy. Skin:     General: Skin is warm and dry. Findings: No erythema or rash. Neurological:      General: No focal deficit present. Mental Status: She is alert and oriented to person, place, and time. Psychiatric:         Behavior: Behavior normal.         Thought Content:  Thought content normal.         Judgment: Judgment normal.       Elva Veras MD

## 2023-08-10 DIAGNOSIS — L29.9 PRURITUS: ICD-10-CM

## 2023-08-10 RX ORDER — PREDNISONE 20 MG/1
TABLET ORAL
Qty: 14 TABLET | Refills: 0 | Status: SHIPPED | OUTPATIENT
Start: 2023-08-10

## 2023-08-10 NOTE — TELEPHONE ENCOUNTER
I would like to exam her in the office and do another culture of her lesions and discuss symptoms. I do not want her to keep taking prednisone due to side effects from long term use. Thanks!

## 2023-08-14 DIAGNOSIS — E03.9 HYPOTHYROIDISM, UNSPECIFIED TYPE: ICD-10-CM

## 2023-08-14 RX ORDER — LEVOTHYROXINE SODIUM 0.05 MG/1
50 TABLET ORAL
Qty: 90 TABLET | Refills: 0 | Status: SHIPPED | OUTPATIENT
Start: 2023-08-14

## 2023-08-17 ENCOUNTER — OFFICE VISIT (OUTPATIENT)
Dept: FAMILY MEDICINE CLINIC | Facility: CLINIC | Age: 83
End: 2023-08-17
Payer: COMMERCIAL

## 2023-08-17 VITALS
HEART RATE: 84 BPM | RESPIRATION RATE: 12 BRPM | BODY MASS INDEX: 20.66 KG/M2 | HEIGHT: 64 IN | SYSTOLIC BLOOD PRESSURE: 130 MMHG | TEMPERATURE: 96.6 F | DIASTOLIC BLOOD PRESSURE: 80 MMHG | WEIGHT: 121 LBS

## 2023-08-17 DIAGNOSIS — I48.91 ATRIAL FIBRILLATION, UNSPECIFIED TYPE (HCC): ICD-10-CM

## 2023-08-17 DIAGNOSIS — I10 PRIMARY HYPERTENSION: ICD-10-CM

## 2023-08-17 DIAGNOSIS — L29.9 PRURITUS: Primary | ICD-10-CM

## 2023-08-17 PROCEDURE — 99214 OFFICE O/P EST MOD 30 MIN: CPT | Performed by: FAMILY MEDICINE

## 2023-08-17 PROCEDURE — 96372 THER/PROPH/DIAG INJ SC/IM: CPT | Performed by: FAMILY MEDICINE

## 2023-08-17 RX ORDER — METHYLPREDNISOLONE ACETATE 40 MG/ML
40 INJECTION, SUSPENSION INTRA-ARTICULAR; INTRALESIONAL; INTRAMUSCULAR; SOFT TISSUE ONCE
Status: COMPLETED | OUTPATIENT
Start: 2023-08-17 | End: 2023-08-17

## 2023-08-17 RX ORDER — SPIRONOLACTONE 100 MG/1
100 TABLET, FILM COATED ORAL DAILY
Qty: 90 TABLET | Refills: 1 | Status: SHIPPED | OUTPATIENT
Start: 2023-08-17

## 2023-08-17 RX ADMIN — METHYLPREDNISOLONE ACETATE 40 MG: 40 INJECTION, SUSPENSION INTRA-ARTICULAR; INTRALESIONAL; INTRAMUSCULAR; SOFT TISSUE at 15:24

## 2023-08-17 NOTE — PATIENT INSTRUCTIONS
CONTINUE CURRENT TREATMENT PLAN  REPLACE FUROSEMIDE WITH SPIRONOLACTONE  COMPLETE PREDNISONE  DEPOMEDROL    MONITOR SYMPTOMS    CALL IN 2 WEEKS WITH UPDATE, SOONER PRN

## 2023-08-17 NOTE — PROGRESS NOTES
Name: Theresa Norton      : 1940      MRN: [id number]  Encounter Provider: Ish Douglas MD  Encounter Date: 2023   Encounter department: [facility]     1. Pruritus  Assessment & Plan:  SYMPTOMS IMPROVED ON PREDNISONE  FEELS MUCH BETTER  LESIONS DRYING UP    REVIEWED THERAPEUTIC PLAN    Orders:  -     methylPREDNISolone acetate (DEPO-MEDROL) injection 40 mg    2. Primary hypertension  Assessment & Plan:  STABLE  DENIES ANY CP, SOB, PALPITATIONS, OR HEADACHE  NOTES NO WATER RETENTION  COMPLIANT WITH MEDICATION  NO CONCERNS    - CONTINUE CURRENT TREATMENT PLAN  - MONITOR DIETARY SODIUM INTAKE  - ENCOURAGE PHYSICAL ACTIVITY  - RV 3 MONTHS      Orders:  -     spironolactone (ALDACTONE) 100 mg tablet; Take 1 tablet (100 mg total) by mouth daily REPLACEMENT FOR FUROSEMIDE    3. Atrial fibrillation, unspecified type [facility])  Assessment & Plan:  STABLE      Orders:  -     spironolactone (ALDACTONE) 100 mg tablet; Take 1 tablet (100 mg total) by mouth daily REPLACEMENT FOR FUROSEMIDE           Subjective      PATIENT RETURNS FOR ROUTINE EVALUATION OF PATIENT'S MEDICAL ISSUES    INDIVIDUAL MEDICAL ISSUES WITH THEIR CURRENT STATUS, ASSESSMENT AND PLANS ARE LISTED ABOVE        Review of Systems   Constitutional: Negative for chills, fatigue and fever. HENT: Negative for congestion, ear discharge, ear pain, mouth sores, postnasal drip, sore throat and trouble swallowing. Eyes: Negative for pain, discharge and visual disturbance. Respiratory: Negative for cough, shortness of breath and wheezing. Cardiovascular: Negative for chest pain, palpitations and leg swelling. Gastrointestinal: Negative for abdominal distention, abdominal pain, blood in stool, diarrhea and nausea. Endocrine: Negative for polydipsia, polyphagia and polyuria. Genitourinary: Negative for dysuria, frequency, hematuria and urgency.    Musculoskeletal: Positive for arthralgias and neck stiffness. Negative for gait problem and joint swelling. Skin: Negative for pallor and rash. Neurological: Negative for dizziness, syncope, speech difficulty, weakness, light-headedness, numbness and headaches. Hematological: Negative for adenopathy. Psychiatric/Behavioral: Negative for behavioral problems, confusion and sleep disturbance. The patient is not nervous/anxious. Current Outpatient Medications on File Prior to Visit   Medication Sig   • allopurinol (ZYLOPRIM) 100 mg tablet Take 1 tablet (100 mg total) by mouth daily   • aspirin 81 mg chewable tablet Chew 1 tablet daily   • colchicine (COLCRYS) 0.6 mg tablet TAKE ONE BY MOUTH TABLET DAILY   • ELIQUIS 2.5 MG 2.5 mg 2 (two) times a day     • furosemide (LASIX) 20 mg tablet TAKE 1 TABLET (20 MG TOTAL) BY MOUTH DAILY   • hydrOXYzine HCL (ATARAX) 25 mg tablet Take 1 tablet (25 mg total) by mouth every 6 (six) hours as needed for itching   • levothyroxine 50 mcg tablet TAKE 1 TABLET (50 MCG TOTAL) BY MOUTH DAILY IN THE EARLY MORNING   • metoprolol succinate (TOPROL-XL) 25 mg 24 hr tablet Take 1 tablet (25 mg total) by mouth daily   • nystatin-triamcinolone (MYCOLOG-II) cream Apply topically in the morning   • pantoprazole (PROTONIX) 20 mg tablet Take 2 tablets (40 mg total) by mouth 2 (two) times a day   • predniSONE 20 mg tablet 2 PO QD X 4 DAYS, THEN 1 PO QD X 4 DAYS, THEN 1/2 PO QD X 4 DAYS   • simvastatin (ZOCOR) 20 mg tablet Take 1 tablet by mouth daily       Objective     /80 (BP Location: Left arm, Patient Position: Sitting, Cuff Size: Adult)   Pulse 84   Temp (!) 96.6 °F (35.9 °C) (Temporal)   Resp 12   Ht 5' 4" (1.626 m)   Wt 54.9 kg (121 lb)   BMI 20.77 kg/m²     Physical Exam  Constitutional:       General: She is not in acute distress. Appearance: Normal appearance. She is well-developed and normal weight. She is not ill-appearing. HENT:      Head: Normocephalic and atraumatic.    Eyes:      General: Right eye: No discharge. Left eye: No discharge. Conjunctiva/sclera: Conjunctivae normal.      Pupils: Pupils are equal, round, and reactive to light. Neck:      Thyroid: No thyromegaly. Cardiovascular:      Rate and Rhythm: Normal rate and regular rhythm. Heart sounds: Normal heart sounds. No murmur heard. Pulmonary:      Effort: Pulmonary effort is normal. No respiratory distress. Breath sounds: Normal breath sounds. No wheezing or rales. Abdominal:      General: Bowel sounds are normal.      Palpations: Abdomen is soft. Tenderness: There is no abdominal tenderness. Musculoskeletal:         General: No tenderness. Normal range of motion. Cervical back: Normal range of motion and neck supple. Lymphadenopathy:      Cervical: No cervical adenopathy. Skin:     General: Skin is warm and dry. Findings: No erythema or rash. Neurological:      Mental Status: She is alert and oriented to person, place, and time. Psychiatric:         Mood and Affect: Mood normal.         Behavior: Behavior normal.         Thought Content:  Thought content normal.         Judgment: Judgment normal.       Sally Man MD

## 2023-09-01 ENCOUNTER — TELEPHONE (OUTPATIENT)
Dept: FAMILY MEDICINE CLINIC | Facility: CLINIC | Age: 83
End: 2023-09-01

## 2023-09-01 NOTE — TELEPHONE ENCOUNTER
Calling with an update    Done with all the med that were prescribed    The allergist prescribed Fexofenadine hydrochloride 180 mg. That is working    The itching is gone. But when she urinates the 3rd time, she has itching. She has to go in the shower to clean. She puts Vaseline on (does not have any cream). This happens every 3rd time she goes. Still has the blisters on her hairline in privates. When she sits they hurt. Is there a cream that she can use to help them go away.     [facility]

## 2023-09-05 ENCOUNTER — OFFICE VISIT (OUTPATIENT)
Dept: FAMILY MEDICINE CLINIC | Facility: CLINIC | Age: 83
End: 2023-09-05
Payer: COMMERCIAL

## 2023-09-05 VITALS
BODY MASS INDEX: 19.46 KG/M2 | SYSTOLIC BLOOD PRESSURE: 98 MMHG | HEIGHT: 64 IN | WEIGHT: 114 LBS | RESPIRATION RATE: 12 BRPM | HEART RATE: 60 BPM | TEMPERATURE: 94.9 F | DIASTOLIC BLOOD PRESSURE: 68 MMHG

## 2023-09-05 DIAGNOSIS — L73.9 FOLLICULITIS: ICD-10-CM

## 2023-09-05 DIAGNOSIS — R30.0 DYSURIA: Primary | ICD-10-CM

## 2023-09-05 LAB
SL AMB  POCT GLUCOSE, UA: ABNORMAL
SL AMB LEUKOCYTE ESTERASE,UA: 500
SL AMB POCT BILIRUBIN,UA: 1
SL AMB POCT BLOOD,UA: ABNORMAL
SL AMB POCT CLARITY,UA: CLEAR
SL AMB POCT COLOR,UA: ABNORMAL
SL AMB POCT KETONES,UA: 15
SL AMB POCT NITRITE,UA: ABNORMAL
SL AMB POCT PH,UA: 5
SL AMB POCT SPECIFIC GRAVITY,UA: 1.02
SL AMB POCT URINE PROTEIN: ABNORMAL
SL AMB POCT UROBILINOGEN: ABNORMAL

## 2023-09-05 PROCEDURE — 87147 CULTURE TYPE IMMUNOLOGIC: CPT | Performed by: FAMILY MEDICINE

## 2023-09-05 PROCEDURE — 87077 CULTURE AEROBIC IDENTIFY: CPT | Performed by: FAMILY MEDICINE

## 2023-09-05 PROCEDURE — 99214 OFFICE O/P EST MOD 30 MIN: CPT | Performed by: FAMILY MEDICINE

## 2023-09-05 PROCEDURE — 87186 SC STD MICRODIL/AGAR DIL: CPT | Performed by: FAMILY MEDICINE

## 2023-09-05 PROCEDURE — 81003 URINALYSIS AUTO W/O SCOPE: CPT | Performed by: FAMILY MEDICINE

## 2023-09-05 PROCEDURE — 87086 URINE CULTURE/COLONY COUNT: CPT | Performed by: FAMILY MEDICINE

## 2023-09-05 RX ORDER — LEVOFLOXACIN 250 MG/1
250 TABLET ORAL DAILY
Qty: 10 TABLET | Refills: 0 | Status: SHIPPED | OUTPATIENT
Start: 2023-09-05 | End: 2023-09-14

## 2023-09-05 RX ORDER — FLUCONAZOLE 100 MG/1
100 TABLET ORAL DAILY
Qty: 10 TABLET | Refills: 0 | Status: SHIPPED | OUTPATIENT
Start: 2023-09-05 | End: 2023-09-15

## 2023-09-05 NOTE — PROGRESS NOTES
Name: Mary Cm      : 1940      MRN: [id number]  Encounter Provider: Yvonne De León MD  Encounter Date: 2023   Encounter department: [facility]     1. Dysuria  -     mupirocin (BACTROBAN) 2 % ointment; Apply topically 3 (three) times a day  -     levofloxacin (LEVAQUIN) 250 mg tablet; Take 1 tablet (250 mg total) by mouth daily for 10 days  -     fluconazole (DIFLUCAN) 100 mg tablet; Take 1 tablet (100 mg total) by mouth daily for 10 days    2. Folliculitis  -     mupirocin (BACTROBAN) 2 % ointment; Apply topically 3 (three) times a day  -     levofloxacin (LEVAQUIN) 250 mg tablet; Take 1 tablet (250 mg total) by mouth daily for 10 days  -     fluconazole (DIFLUCAN) 100 mg tablet; Take 1 tablet (100 mg total) by mouth daily for 10 days           Subjective      PATIENT CONTINUES TO HAVE DYSURIA  ALSO C/O SEVERAL SORES ON HER BUTTOCKS  NO FEVER OR CHILLS  DENIES ANY HEMATURIA  DOES NOTE A LOT OF ITCHING IN THE AREA    SORES ARE NOT WEEPY  JUST UNCOMFORTABLE    Review of Systems   Constitutional: Negative for chills, fatigue and fever. HENT: Negative for congestion, ear discharge, ear pain, mouth sores, postnasal drip, sore throat and trouble swallowing. Eyes: Negative for pain, discharge and visual disturbance. Respiratory: Negative for cough, shortness of breath and wheezing. Cardiovascular: Negative for chest pain, palpitations and leg swelling. Gastrointestinal: Negative for abdominal distention, abdominal pain, blood in stool, diarrhea and nausea. Endocrine: Negative for polydipsia, polyphagia and polyuria. Genitourinary: Negative for dysuria, frequency, hematuria and urgency. Musculoskeletal: Negative for arthralgias, gait problem and joint swelling. Skin: Negative for pallor and rash. Neurological: Negative for dizziness, syncope, speech difficulty, weakness, light-headedness, numbness and headaches.    Hematological: Negative for adenopathy. Psychiatric/Behavioral: Negative for behavioral problems, confusion and sleep disturbance. The patient is not nervous/anxious. Current Outpatient Medications on File Prior to Visit   Medication Sig   • allopurinol (ZYLOPRIM) 100 mg tablet Take 1 tablet (100 mg total) by mouth daily   • aspirin 81 mg chewable tablet Chew 1 tablet daily   • colchicine (COLCRYS) 0.6 mg tablet TAKE ONE BY MOUTH TABLET DAILY   • ELIQUIS 2.5 MG 2.5 mg 2 (two) times a day     • hydrOXYzine HCL (ATARAX) 25 mg tablet Take 1 tablet (25 mg total) by mouth every 6 (six) hours as needed for itching   • levothyroxine 50 mcg tablet TAKE 1 TABLET (50 MCG TOTAL) BY MOUTH DAILY IN THE EARLY MORNING   • metoprolol succinate (TOPROL-XL) 25 mg 24 hr tablet Take 1 tablet (25 mg total) by mouth daily   • nystatin-triamcinolone (MYCOLOG-II) cream Apply topically in the morning   • pantoprazole (PROTONIX) 20 mg tablet Take 2 tablets (40 mg total) by mouth 2 (two) times a day   • simvastatin (ZOCOR) 20 mg tablet Take 1 tablet by mouth daily   • spironolactone (ALDACTONE) 100 mg tablet Take 1 tablet (100 mg total) by mouth daily REPLACEMENT FOR FUROSEMIDE   • [DISCONTINUED] furosemide (LASIX) 20 mg tablet TAKE 1 TABLET (20 MG TOTAL) BY MOUTH DAILY (Patient not taking: Reported on 9/5/2023)   • [DISCONTINUED] predniSONE 20 mg tablet 2 PO QD X 4 DAYS, THEN 1 PO QD X 4 DAYS, THEN 1/2 PO QD X 4 DAYS       Objective     BP 98/68 (BP Location: Left arm, Patient Position: Sitting, Cuff Size: Standard)   Pulse 60   Temp (!) 94.9 °F (34.9 °C) (Temporal)   Resp 12   Ht 5' 4" (1.626 m)   Wt 51.7 kg (114 lb)   BMI 19.57 kg/m²     Physical Exam  Constitutional:       Appearance: Normal appearance. She is well-developed and normal weight. HENT:      Head: Normocephalic and atraumatic. Eyes:      General:         Right eye: No discharge. Left eye: No discharge.       Conjunctiva/sclera: Conjunctivae normal.      Pupils: Pupils are equal, round, and reactive to light. Neck:      Thyroid: No thyromegaly. Cardiovascular:      Rate and Rhythm: Normal rate and regular rhythm. Heart sounds: Normal heart sounds. No murmur heard. Pulmonary:      Effort: Pulmonary effort is normal. No respiratory distress. Breath sounds: Normal breath sounds. No wheezing or rales. Abdominal:      General: Bowel sounds are normal.      Palpations: Abdomen is soft. Tenderness: There is no abdominal tenderness. Genitourinary:     Comments: SEVERAL ERYTHEMATOUS MACULES ON BUTTOCK  SL TENDER TO TOUCH  NOT VESICULAR  NO ULCERATION    VULVAR ATROPHY  SL ERYTHEMATOUS  NO VAGINAL DISCHARGE  Musculoskeletal:         General: No tenderness. Normal range of motion. Cervical back: Normal range of motion and neck supple. Lymphadenopathy:      Cervical: No cervical adenopathy. Skin:     General: Skin is warm and dry. Findings: No erythema or rash. Neurological:      General: No focal deficit present. Mental Status: She is alert and oriented to person, place, and time. Psychiatric:         Mood and Affect: Mood normal.         Behavior: Behavior normal.         Thought Content:  Thought content normal.         Judgment: Judgment normal.       Calvin Laird MD

## 2023-09-07 LAB
BACTERIA UR CULT: ABNORMAL
BACTERIA UR CULT: ABNORMAL

## 2023-09-11 ENCOUNTER — APPOINTMENT (EMERGENCY)
Dept: RADIOLOGY | Facility: HOSPITAL | Age: 83
DRG: 683 | End: 2023-09-11
Payer: COMMERCIAL

## 2023-09-11 ENCOUNTER — HOSPITAL ENCOUNTER (INPATIENT)
Facility: HOSPITAL | Age: 83
LOS: 3 days | Discharge: HOME | DRG: 683 | End: 2023-09-14
Attending: EMERGENCY MEDICINE | Admitting: FAMILY MEDICINE
Payer: COMMERCIAL

## 2023-09-11 DIAGNOSIS — R53.1 GENERAL WEAKNESS: ICD-10-CM

## 2023-09-11 DIAGNOSIS — N17.9 AKI (ACUTE KIDNEY INJURY) (HCC): Primary | ICD-10-CM

## 2023-09-11 DIAGNOSIS — R21 RASH: ICD-10-CM

## 2023-09-11 DIAGNOSIS — E87.1 HYPONATREMIA: ICD-10-CM

## 2023-09-11 DIAGNOSIS — L29.9 PRURITUS: ICD-10-CM

## 2023-09-11 DIAGNOSIS — E87.5 HYPERKALEMIA: ICD-10-CM

## 2023-09-11 PROBLEM — R19.7 DIARRHEA OF PRESUMED INFECTIOUS ORIGIN: Status: ACTIVE | Noted: 2023-09-11

## 2023-09-11 LAB
2HR DELTA HS TROPONIN: -4 NG/L
ALBUMIN SERPL BCP-MCNC: 4.9 G/DL (ref 3.5–5)
ALP SERPL-CCNC: 74 U/L (ref 34–104)
ALT SERPL W P-5'-P-CCNC: 27 U/L (ref 7–52)
ANION GAP SERPL CALCULATED.3IONS-SCNC: 10 MMOL/L
ANION GAP SERPL CALCULATED.3IONS-SCNC: 6 MMOL/L
AST SERPL W P-5'-P-CCNC: 32 U/L (ref 13–39)
BASOPHILS # BLD AUTO: 0.03 THOUSANDS/ÂΜL (ref 0–0.1)
BASOPHILS NFR BLD AUTO: 1 % (ref 0–1)
BILIRUB SERPL-MCNC: 0.68 MG/DL (ref 0.2–1)
BILIRUB UR QL STRIP: NEGATIVE
BUN SERPL-MCNC: 41 MG/DL (ref 5–25)
BUN SERPL-MCNC: 45 MG/DL (ref 5–25)
CALCIUM SERPL-MCNC: 8.2 MG/DL (ref 8.4–10.2)
CALCIUM SERPL-MCNC: 9.7 MG/DL (ref 8.4–10.2)
CARDIAC TROPONIN I PNL SERPL HS: 16 NG/L
CARDIAC TROPONIN I PNL SERPL HS: 20 NG/L
CHLORIDE SERPL-SCNC: 102 MMOL/L (ref 96–108)
CHLORIDE SERPL-SCNC: 107 MMOL/L (ref 96–108)
CK SERPL-CCNC: 43 U/L (ref 26–192)
CLARITY UR: CLEAR
CO2 SERPL-SCNC: 16 MMOL/L (ref 21–32)
CO2 SERPL-SCNC: 17 MMOL/L (ref 21–32)
COLOR UR: ABNORMAL
CREAT SERPL-MCNC: 1.33 MG/DL (ref 0.6–1.3)
CREAT SERPL-MCNC: 1.44 MG/DL (ref 0.6–1.3)
EOSINOPHIL # BLD AUTO: 0.04 THOUSAND/ÂΜL (ref 0–0.61)
EOSINOPHIL NFR BLD AUTO: 1 % (ref 0–6)
ERYTHROCYTE [DISTWIDTH] IN BLOOD BY AUTOMATED COUNT: 15.3 % (ref 11.6–15.1)
FOLATE SERPL-MCNC: 21.3 NG/ML
GFR SERPL CREATININE-BSD FRML MDRD: 33 ML/MIN/1.73SQ M
GFR SERPL CREATININE-BSD FRML MDRD: 36 ML/MIN/1.73SQ M
GLUCOSE SERPL-MCNC: 84 MG/DL (ref 65–140)
GLUCOSE SERPL-MCNC: 95 MG/DL (ref 65–140)
GLUCOSE SERPL-MCNC: 96 MG/DL (ref 65–140)
GLUCOSE UR STRIP-MCNC: NEGATIVE MG/DL
HCT VFR BLD AUTO: 47.2 % (ref 34.8–46.1)
HGB BLD-MCNC: 16 G/DL (ref 11.5–15.4)
HGB UR QL STRIP.AUTO: NEGATIVE
IMM GRANULOCYTES # BLD AUTO: 0.01 THOUSAND/UL (ref 0–0.2)
IMM GRANULOCYTES NFR BLD AUTO: 0 % (ref 0–2)
KETONES UR STRIP-MCNC: ABNORMAL MG/DL
LEUKOCYTE ESTERASE UR QL STRIP: NEGATIVE
LIPASE SERPL-CCNC: 48 U/L (ref 11–82)
LYMPHOCYTES # BLD AUTO: 1 THOUSANDS/ÂΜL (ref 0.6–4.47)
LYMPHOCYTES NFR BLD AUTO: 19 % (ref 14–44)
MAGNESIUM SERPL-MCNC: 2.1 MG/DL (ref 1.9–2.7)
MCH RBC QN AUTO: 34.7 PG (ref 26.8–34.3)
MCHC RBC AUTO-ENTMCNC: 33.9 G/DL (ref 31.4–37.4)
MCV RBC AUTO: 102 FL (ref 82–98)
MONOCYTES # BLD AUTO: 0.36 THOUSAND/ÂΜL (ref 0.17–1.22)
MONOCYTES NFR BLD AUTO: 7 % (ref 4–12)
NEUTROPHILS # BLD AUTO: 3.84 THOUSANDS/ÂΜL (ref 1.85–7.62)
NEUTS SEG NFR BLD AUTO: 72 % (ref 43–75)
NITRITE UR QL STRIP: NEGATIVE
NRBC BLD AUTO-RTO: 0 /100 WBCS
OSMOLALITY UR/SERPL-RTO: 292 MMOL/KG (ref 282–298)
OSMOLALITY UR: 538 MMOL/KG
PH UR STRIP.AUTO: 5 [PH]
PLATELET # BLD AUTO: 231 THOUSANDS/UL (ref 149–390)
PMV BLD AUTO: 11 FL (ref 8.9–12.7)
POTASSIUM SERPL-SCNC: 5.3 MMOL/L (ref 3.5–5.3)
POTASSIUM SERPL-SCNC: 6.5 MMOL/L (ref 3.5–5.3)
POTASSIUM SERPL-SCNC: 6.8 MMOL/L (ref 3.5–5.3)
PROCALCITONIN SERPL-MCNC: 0.06 NG/ML
PROT SERPL-MCNC: 7.7 G/DL (ref 6.4–8.4)
PROT UR STRIP-MCNC: NEGATIVE MG/DL
QRS AXIS: -9 DEGREES
QRSD INTERVAL: 88 MS
QT INTERVAL: 386 MS
QTC INTERVAL: 428 MS
RBC # BLD AUTO: 4.61 MILLION/UL (ref 3.81–5.12)
SARS-COV-2 RNA RESP QL NAA+PROBE: NEGATIVE
SODIUM 24H UR-SCNC: 96 MOL/L
SODIUM SERPL-SCNC: 129 MMOL/L (ref 135–147)
SODIUM SERPL-SCNC: 129 MMOL/L (ref 135–147)
SP GR UR STRIP.AUTO: 1.02 (ref 1–1.03)
T WAVE AXIS: 199 DEGREES
UROBILINOGEN UR QL STRIP.AUTO: 0.2 E.U./DL
VENTRICULAR RATE: 74 BPM
VIT B12 SERPL-MCNC: 427 PG/ML (ref 180–914)
WBC # BLD AUTO: 5.28 THOUSAND/UL (ref 4.31–10.16)

## 2023-09-11 PROCEDURE — 87635 SARS-COV-2 COVID-19 AMP PRB: CPT | Performed by: EMERGENCY MEDICINE

## 2023-09-11 PROCEDURE — 99223 1ST HOSP IP/OBS HIGH 75: CPT | Performed by: STUDENT IN AN ORGANIZED HEALTH CARE EDUCATION/TRAINING PROGRAM

## 2023-09-11 PROCEDURE — 83690 ASSAY OF LIPASE: CPT | Performed by: EMERGENCY MEDICINE

## 2023-09-11 PROCEDURE — 36415 COLL VENOUS BLD VENIPUNCTURE: CPT | Performed by: EMERGENCY MEDICINE

## 2023-09-11 PROCEDURE — 80048 BASIC METABOLIC PNL TOTAL CA: CPT | Performed by: EMERGENCY MEDICINE

## 2023-09-11 PROCEDURE — 82550 ASSAY OF CK (CPK): CPT | Performed by: STUDENT IN AN ORGANIZED HEALTH CARE EDUCATION/TRAINING PROGRAM

## 2023-09-11 PROCEDURE — 82746 ASSAY OF FOLIC ACID SERUM: CPT | Performed by: STUDENT IN AN ORGANIZED HEALTH CARE EDUCATION/TRAINING PROGRAM

## 2023-09-11 PROCEDURE — 82948 REAGENT STRIP/BLOOD GLUCOSE: CPT

## 2023-09-11 PROCEDURE — 85025 COMPLETE CBC W/AUTO DIFF WBC: CPT | Performed by: EMERGENCY MEDICINE

## 2023-09-11 PROCEDURE — 84588 ASSAY OF VASOPRESSIN: CPT | Performed by: STUDENT IN AN ORGANIZED HEALTH CARE EDUCATION/TRAINING PROGRAM

## 2023-09-11 PROCEDURE — 80053 COMPREHEN METABOLIC PANEL: CPT | Performed by: EMERGENCY MEDICINE

## 2023-09-11 PROCEDURE — 82607 VITAMIN B-12: CPT | Performed by: STUDENT IN AN ORGANIZED HEALTH CARE EDUCATION/TRAINING PROGRAM

## 2023-09-11 PROCEDURE — 83935 ASSAY OF URINE OSMOLALITY: CPT | Performed by: STUDENT IN AN ORGANIZED HEALTH CARE EDUCATION/TRAINING PROGRAM

## 2023-09-11 PROCEDURE — 84484 ASSAY OF TROPONIN QUANT: CPT | Performed by: EMERGENCY MEDICINE

## 2023-09-11 PROCEDURE — 84132 ASSAY OF SERUM POTASSIUM: CPT | Performed by: STUDENT IN AN ORGANIZED HEALTH CARE EDUCATION/TRAINING PROGRAM

## 2023-09-11 PROCEDURE — 84145 PROCALCITONIN (PCT): CPT | Performed by: STUDENT IN AN ORGANIZED HEALTH CARE EDUCATION/TRAINING PROGRAM

## 2023-09-11 PROCEDURE — 99284 EMERGENCY DEPT VISIT MOD MDM: CPT

## 2023-09-11 PROCEDURE — 99291 CRITICAL CARE FIRST HOUR: CPT | Performed by: EMERGENCY MEDICINE

## 2023-09-11 PROCEDURE — 83930 ASSAY OF BLOOD OSMOLALITY: CPT | Performed by: STUDENT IN AN ORGANIZED HEALTH CARE EDUCATION/TRAINING PROGRAM

## 2023-09-11 PROCEDURE — 93010 ELECTROCARDIOGRAM REPORT: CPT | Performed by: INTERNAL MEDICINE

## 2023-09-11 PROCEDURE — 96374 THER/PROPH/DIAG INJ IV PUSH: CPT

## 2023-09-11 PROCEDURE — G1004 CDSM NDSC: HCPCS

## 2023-09-11 PROCEDURE — 96361 HYDRATE IV INFUSION ADD-ON: CPT

## 2023-09-11 PROCEDURE — 93005 ELECTROCARDIOGRAM TRACING: CPT

## 2023-09-11 PROCEDURE — 74177 CT ABD & PELVIS W/CONTRAST: CPT

## 2023-09-11 PROCEDURE — 84300 ASSAY OF URINE SODIUM: CPT | Performed by: STUDENT IN AN ORGANIZED HEALTH CARE EDUCATION/TRAINING PROGRAM

## 2023-09-11 PROCEDURE — 96375 TX/PRO/DX INJ NEW DRUG ADDON: CPT

## 2023-09-11 PROCEDURE — 81003 URINALYSIS AUTO W/O SCOPE: CPT | Performed by: EMERGENCY MEDICINE

## 2023-09-11 PROCEDURE — 83735 ASSAY OF MAGNESIUM: CPT | Performed by: EMERGENCY MEDICINE

## 2023-09-11 RX ORDER — PRAVASTATIN SODIUM 40 MG
40 TABLET ORAL
Status: DISCONTINUED | OUTPATIENT
Start: 2023-09-11 | End: 2023-09-14 | Stop reason: HOSPADM

## 2023-09-11 RX ORDER — ALLOPURINOL 100 MG/1
100 TABLET ORAL DAILY
Status: DISCONTINUED | OUTPATIENT
Start: 2023-09-12 | End: 2023-09-14 | Stop reason: HOSPADM

## 2023-09-11 RX ORDER — SACCHAROMYCES BOULARDII 250 MG
250 CAPSULE ORAL 2 TIMES DAILY
Status: DISCONTINUED | OUTPATIENT
Start: 2023-09-11 | End: 2023-09-14 | Stop reason: HOSPADM

## 2023-09-11 RX ORDER — METOPROLOL SUCCINATE 25 MG/1
25 TABLET, EXTENDED RELEASE ORAL DAILY
Status: DISCONTINUED | OUTPATIENT
Start: 2023-09-12 | End: 2023-09-14 | Stop reason: HOSPADM

## 2023-09-11 RX ORDER — DEXTROSE MONOHYDRATE 25 G/50ML
25 INJECTION, SOLUTION INTRAVENOUS ONCE
Status: COMPLETED | OUTPATIENT
Start: 2023-09-11 | End: 2023-09-11

## 2023-09-11 RX ORDER — ASPIRIN 81 MG/1
81 TABLET, CHEWABLE ORAL DAILY
Status: DISCONTINUED | OUTPATIENT
Start: 2023-09-12 | End: 2023-09-14 | Stop reason: HOSPADM

## 2023-09-11 RX ORDER — LEVOTHYROXINE SODIUM 0.05 MG/1
50 TABLET ORAL
Status: DISCONTINUED | OUTPATIENT
Start: 2023-09-12 | End: 2023-09-14 | Stop reason: HOSPADM

## 2023-09-11 RX ORDER — CALCIUM GLUCONATE 20 MG/ML
1 INJECTION, SOLUTION INTRAVENOUS ONCE
Status: COMPLETED | OUTPATIENT
Start: 2023-09-11 | End: 2023-09-11

## 2023-09-11 RX ORDER — HYDROXYZINE HYDROCHLORIDE 10 MG/1
10 TABLET, FILM COATED ORAL EVERY 6 HOURS PRN
Status: DISCONTINUED | OUTPATIENT
Start: 2023-09-11 | End: 2023-09-14 | Stop reason: HOSPADM

## 2023-09-11 RX ORDER — PANTOPRAZOLE SODIUM 40 MG/1
40 TABLET, DELAYED RELEASE ORAL 2 TIMES DAILY
Status: DISCONTINUED | OUTPATIENT
Start: 2023-09-11 | End: 2023-09-14 | Stop reason: HOSPADM

## 2023-09-11 RX ADMIN — SODIUM BICARBONATE 100 ML/HR: 84 INJECTION, SOLUTION INTRAVENOUS at 19:49

## 2023-09-11 RX ADMIN — INSULIN HUMAN 5 UNITS: 100 INJECTION, SOLUTION PARENTERAL at 16:53

## 2023-09-11 RX ADMIN — PANTOPRAZOLE SODIUM 40 MG: 40 TABLET, DELAYED RELEASE ORAL at 21:19

## 2023-09-11 RX ADMIN — SODIUM BICARBONATE 50 MEQ: 84 INJECTION INTRAVENOUS at 17:02

## 2023-09-11 RX ADMIN — SODIUM CHLORIDE, SODIUM LACTATE, POTASSIUM CHLORIDE, AND CALCIUM CHLORIDE 1000 ML: .6; .31; .03; .02 INJECTION, SOLUTION INTRAVENOUS at 17:28

## 2023-09-11 RX ADMIN — SODIUM CHLORIDE 1000 ML: 0.9 INJECTION, SOLUTION INTRAVENOUS at 14:41

## 2023-09-11 RX ADMIN — APIXABAN 2.5 MG: 2.5 TABLET, FILM COATED ORAL at 21:19

## 2023-09-11 RX ADMIN — IOHEXOL 80 ML: 350 INJECTION, SOLUTION INTRAVENOUS at 15:53

## 2023-09-11 RX ADMIN — CALCIUM GLUCONATE 1 G: 20 INJECTION, SOLUTION INTRAVENOUS at 17:28

## 2023-09-11 RX ADMIN — DEXTROSE MONOHYDRATE 25 ML: 25 INJECTION, SOLUTION INTRAVENOUS at 16:44

## 2023-09-11 RX ADMIN — Medication 250 MG: at 18:39

## 2023-09-11 RX ADMIN — PRAVASTATIN SODIUM 40 MG: 40 TABLET ORAL at 21:19

## 2023-09-11 NOTE — ED NOTES
Pt IV leaking slightly from site injury, fluids running at a slower rate to preserve access.       David Shane RN  09/11/23 [id number]

## 2023-09-11 NOTE — ASSESSMENT & PLAN NOTE
Patient presents for diarrhea  CT abdomen pelvis on admission showin. No acute findings in the abdomen or pelvis. 2.  Stable pancreatic cysts. 3.  Increased size of now 3.5 cm simple appearing right adnexal cyst. Recommend yearly follow-up.     Hold antibiotics  Check C diff, stool ova parasites, stool count  Patient admits to having a recent course of antibiotics for UTI was placed on Levaquin 250 mg daily for 10 days and fluconazole 100 mg daily for 10 days IVF fluids

## 2023-09-11 NOTE — ASSESSMENT & PLAN NOTE
Patient presents with [place]  on CKD stage IIIb.   Admission creatinine 1.44  Baseline creatinine 0.7  Suspect in setting of dehydration from diarrhea  Status post 1 L bolus in the ER  We will place on bicarb drip at 125 mL an hour

## 2023-09-11 NOTE — ED PROVIDER NOTES
History  Chief Complaint   Patient presents with   • Abdominal Pain     Abdominal discomfort, diarrhea and nausea for a couple of days. . nail beds cyanotic. Admits to some trouble breathing states hx of copd     Patient is an 47-year-old female with a history of Raynaud's, chronic A-fib on Eliquis, hypothyroidism that presents to the emergency department with complaint of generalized abdominal pain, diarrhea and nausea for a few days. Patient states that she was recently started on a course of Levaquin and Diflucan for vaginal infection. History provided by:  Patient   used: No        Prior to Admission Medications   Prescriptions Last Dose Informant Patient Reported? Taking?    ELIQUIS 2.5 MG  Self Yes No   Si.5 mg 2 (two) times a day     allopurinol (ZYLOPRIM) 100 mg tablet  Self No No   Sig: Take 1 tablet (100 mg total) by mouth daily   aspirin 81 mg chewable tablet  Self Yes No   Sig: Chew 1 tablet daily   colchicine (COLCRYS) 0.6 mg tablet  Self No No   Sig: TAKE ONE BY MOUTH TABLET DAILY   fluconazole (DIFLUCAN) 100 mg tablet   No No   Sig: Take 1 tablet (100 mg total) by mouth daily for 10 days   hydrOXYzine HCL (ATARAX) 25 mg tablet  Self No No   Sig: Take 1 tablet (25 mg total) by mouth every 6 (six) hours as needed for itching   levofloxacin (LEVAQUIN) 250 mg tablet   No No   Sig: Take 1 tablet (250 mg total) by mouth daily for 10 days   levothyroxine 50 mcg tablet  Self No No   Sig: TAKE 1 TABLET (50 MCG TOTAL) BY MOUTH DAILY IN THE EARLY MORNING   metoprolol succinate (TOPROL-XL) 25 mg 24 hr tablet  Self No No   Sig: Take 1 tablet (25 mg total) by mouth daily   mupirocin (BACTROBAN) 2 % ointment   No No   Sig: Apply topically 3 (three) times a day   nystatin-triamcinolone (MYCOLOG-II) cream  Self No No   Sig: Apply topically in the morning   pantoprazole (PROTONIX) 20 mg tablet  Self No No   Sig: Take 2 tablets (40 mg total) by mouth 2 (two) times a day   simvastatin (ZOCOR) 20 mg tablet  Self Yes No   Sig: Take 1 tablet by mouth daily   spironolactone (ALDACTONE) 100 mg tablet  Self No No   Sig: Take 1 tablet (100 mg total) by mouth daily REPLACEMENT FOR FUROSEMIDE      Facility-Administered Medications: None       Past Medical History:   Diagnosis Date   • Anemia    • Arthritis    • Atherosclerosis of native coronary artery of native heart without angina pectoris 2019   • Cataract    • Chronic atrial fibrillation (HCC)    • Degeneration of cervical intervertebral disc    • Diarrhea     onset 07/10   • Disease of thyroid gland    • Occipital infarction [facility])    • Wrist fracture     right        Past Surgical History:   Procedure Laterality Date   • COLON SURGERY     • FL INJECTION LEFT HIP (NON ARTHROGRAM)  2021   • KNEE ARTHROSCOPY      with medial meniscus repair    • WI ARTHROCENTESIS ASPIR&/INJ MAJOR JT/BURSA W/O US Left 2021    Procedure: Hip corticosteroid injection (01247 [id number]); Surgeon: Sonia Casas MD;  Location: [facility] MAIN OR;  Service: Pain Management        Family History   Problem Relation Age of Onset   • Heart attack Mother    • Valvular heart disease Mother    • Heart attack Father    • Hypertension Sister    • Mental illness Neg Hx      I have reviewed and agree with the history as documented. E-Cigarette/Vaping   • E-Cigarette Use Never User      E-Cigarette/Vaping Substances   • Nicotine No    • THC No    • CBD No    • Flavoring No    • Other No    • Unknown No      Social History     Tobacco Use   • Smoking status: Former     Packs/day: 1.00     Years: 7.00     Total pack years: 7.00     Types: Cigarettes     Quit date:      Years since quittin.7   • Smokeless tobacco: Never   Vaping Use   • Vaping Use: Never used   Substance Use Topics   • Alcohol use:  Yes     Alcohol/week: 7.0 standard drinks of alcohol     Types: 7 Glasses of wine per week     Comment: occasionally, drinks wine , moderate    • Drug use: No       Review of Systems   Constitutional: Negative for chills and fever. Respiratory: Negative for cough, chest tightness and shortness of breath. Gastrointestinal: Positive for abdominal pain, diarrhea and nausea. Negative for vomiting. Genitourinary: Negative for dysuria, frequency, hematuria and urgency. Musculoskeletal: Negative for back pain, neck pain and neck stiffness. Skin: Positive for color change and pallor. All other systems reviewed and are negative. Physical Exam  Physical Exam  Vitals and nursing note reviewed. Constitutional:       General: She is not in acute distress. Appearance: She is well-developed. She is not diaphoretic. HENT:      Head: Normocephalic and atraumatic. Eyes:      Conjunctiva/sclera: Conjunctivae normal.      Pupils: Pupils are equal, round, and reactive to light. Cardiovascular:      Rate and Rhythm: Normal rate and regular rhythm. Heart sounds: Normal heart sounds. No murmur heard. Pulmonary:      Effort: Pulmonary effort is normal. No respiratory distress. Breath sounds: Normal breath sounds. Abdominal:      General: Abdomen is flat. Bowel sounds are normal. There is no distension. Palpations: Abdomen is soft. Tenderness: There is generalized abdominal tenderness. Musculoskeletal:         General: No deformity. Normal range of motion. Cervical back: Normal range of motion and neck supple. Skin:     General: Skin is warm and dry. Capillary Refill: Capillary refill takes less than 2 seconds. Coloration: Skin is cyanotic. Skin is not pale. Findings: No rash. Comments: Severe cyanosis noted to fingertips   Neurological:      General: No focal deficit present. Mental Status: She is alert and oriented to person, place, and time. Cranial Nerves: No cranial nerve deficit. Psychiatric:         Mood and Affect: Mood is not anxious.          Behavior: Behavior normal.         Vital Signs  ED Triage Vitals Temperature Pulse Respirations Blood Pressure SpO2   09/11/23 1346 09/11/23 1346 09/11/23 1346 09/11/23 1400 09/11/23 1450   (!) 97.3 °F (36.3 °C) 80 20 97/63 100 %      Temp Source Heart Rate Source Patient Position - Orthostatic VS BP Location FiO2 (%)   09/11/23 1346 09/11/23 1346 09/11/23 1400 09/11/23 1400 --   Tympanic Monitor Lying Left arm       Pain Score       09/11/23 1346       5           Vitals:    09/11/23 1530 09/11/23 1545 09/11/23 1615 09/11/23 1700   BP: 91/61 95/58 100/58 109/63   Pulse: 80 81 95 86   Patient Position - Orthostatic VS:   Lying          Visual Acuity      ED Medications  Medications   calcium gluconate 1 g in sodium chloride 0.9% 50 mL (premix) (1 g Intravenous New Bag 9/11/23 1728)   lactated ringers bolus 1,000 mL (1,000 mL Intravenous New Bag 9/11/23 1728)   sodium bicarbonate 150 mEq in sodium chloride 0.45 % 1,000 mL infusion (has no administration in time range)   sodium chloride 0.9 % bolus 1,000 mL (0 mL Intravenous Stopped 9/11/23 1541)   iohexol (OMNIPAQUE) 350 MG/ML injection (SINGLE-DOSE) 80 mL (80 mL Intravenous Given 9/11/23 1553)   sodium bicarbonate 8.4 % injection 50 mEq (50 mEq Intravenous Given 9/11/23 1702)   insulin regular (HumuLIN R,NovoLIN R) injection 5 Units (5 Units Intravenous Given 9/11/23 1653)   dextrose 50 % IV solution 25 mL (25 mL Intravenous Given 9/11/23 1644)       Diagnostic Studies  Results Reviewed     Procedure Component Value Units Date/Time    CK [[id number]]     Lab Status: No result Specimen: Blood     Arginine vasopressin hormone [[id number]]     Lab Status: No result Specimen: Blood     Osmolality-"If this is regarding a toxic alcohol, STOP. Test is not routinely indicated.  Please consult medical  on call for further guidance." [[id number]]     Lab Status: No result Specimen: Blood     Osmolality, urine [[id number]]     Lab Status: No result Specimen: Urine     Sodium, urine, random [[id number]]     Lab Status: No result Specimen: Urine     HS Troponin I 2hr [[id number]]  (Normal) Collected: 09/11/23 1625    Lab Status: Final result Specimen: Blood from Arm, Right Updated: 09/11/23 1654     hs TnI 2hr 16 ng/L      Delta 2hr hsTnI -4 ng/L     UA w Reflex to Microscopic w Reflex to Culture [[id number]]  (Abnormal) Collected: 09/11/23 1622    Lab Status: Final result Specimen: Urine, Clean Catch Updated: 09/11/23 1652     Color, UA Light Yellow     Clarity, UA Clear     Specific Gravity, UA 1.020     pH, UA 5.0     Leukocytes, UA Negative     Nitrite, UA Negative     Protein, UA Negative mg/dl      Glucose, UA Negative mg/dl      Ketones, UA Trace mg/dl      Urobilinogen, UA 0.2 E.U./dl      Bilirubin, UA Negative     Occult Blood, UA Negative    HS Troponin I 4hr [[id number]]     Lab Status: No result Specimen: Blood     Basic metabolic panel [[id number]]  (Abnormal) Collected: 09/11/23 1522    Lab Status: Final result Specimen: Blood from Arm, Right Updated: 09/11/23 1559     Sodium 129 mmol/L      Potassium 6.8 mmol/L      Chloride 107 mmol/L      CO2 16 mmol/L      ANION GAP 6 mmol/L      BUN 41 mg/dL      Creatinine 1.33 mg/dL      Glucose 84 mg/dL      Calcium 8.2 mg/dL      eGFR 36 ml/min/1.73sq m     Narrative:      [facility] guidelines for Chronic Kidney Disease (CKD):   •  Stage 1 with normal or high GFR (GFR > 90 mL/min/1.73 square meters)  •  Stage 2 Mild CKD (GFR = 60-89 mL/min/1.73 square meters)  •  Stage 3A Moderate CKD (GFR = 45-59 mL/min/1.73 square meters)  •  Stage 3B Moderate CKD (GFR = 30-44 mL/min/1.73 square meters)  •  Stage 4 Severe CKD (GFR = 15-29 mL/min/1.73 square meters)  •  Stage 5 End Stage CKD (GFR <15 mL/min/1.73 square meters)  Note: GFR calculation is accurate only with a steady state creatinine    HS Troponin 0hr (reflex protocol) [[id number]]  (Normal) Collected: 09/11/23 1439    Lab Status: Final result Specimen: Blood from Arm, Right Updated: 09/11/23 1518     hs TnI 0hr 20 ng/L     Comprehensive metabolic panel [[id number]]  (Abnormal) Collected: 09/11/23 1439    Lab Status: Final result Specimen: Blood from Arm, Right Updated: 09/11/23 1514     Sodium 129 mmol/L      Potassium 6.5 mmol/L      Chloride 102 mmol/L      CO2 17 mmol/L      ANION GAP 10 mmol/L      BUN 45 mg/dL      Creatinine 1.44 mg/dL      Glucose 96 mg/dL      Calcium 9.7 mg/dL      AST 32 U/L      ALT 27 U/L      Alkaline Phosphatase 74 U/L      Total Protein 7.7 g/dL      Albumin 4.9 g/dL      Total Bilirubin 0.68 mg/dL      eGFR 33 ml/min/1.73sq m     Narrative:      [employer] guidelines for Chronic Kidney Disease (CKD):   •  Stage 1 with normal or high GFR (GFR > 90 mL/min/1.73 square meters)  •  Stage 2 Mild CKD (GFR = 60-89 mL/min/1.73 square meters)  •  Stage 3A Moderate CKD (GFR = 45-59 mL/min/1.73 square meters)  •  Stage 3B Moderate CKD (GFR = 30-44 mL/min/1.73 square meters)  •  Stage 4 Severe CKD (GFR = 15-29 mL/min/1.73 square meters)  •  Stage 5 End Stage CKD (GFR <15 mL/min/1.73 square meters)  Note: GFR calculation is accurate only with a steady state creatinine    Magnesium [[id number]]  (Normal) Collected: 09/11/23 1439    Lab Status: Final result Specimen: Blood from Arm, Right Updated: 09/11/23 1512     Magnesium 2.1 mg/dL     Lipase [[id number]]  (Normal) Collected: 09/11/23 1439    Lab Status: Final result Specimen: Blood from Arm, Right Updated: 09/11/23 1512     Lipase 48 u/L     CBC and differential [[id number]]  (Abnormal) Collected: 09/11/23 1439    Lab Status: Final result Specimen: Blood from Arm, Right Updated: 09/11/23 1449     WBC 5.28 Thousand/uL      RBC 4.61 Million/uL      Hemoglobin 16.0 g/dL      Hematocrit 47.2 %       fL      MCH 34.7 pg      MCHC 33.9 g/dL      RDW 15.3 %      MPV 11.0 fL      Platelets 720 Thousands/uL      nRBC 0 /100 WBCs      Neutrophils Relative 72 %      Immat GRANS % 0 %      Lymphocytes Relative 19 %      Monocytes Relative 7 %      Eosinophils Relative 1 %      Basophils Relative 1 %      Neutrophils Absolute 3.84 Thousands/µL      Immature Grans Absolute 0.01 Thousand/uL      Lymphocytes Absolute 1.00 Thousands/µL      Monocytes Absolute 0.36 Thousand/µL      Eosinophils Absolute 0.04 Thousand/µL      Basophils Absolute 0.03 Thousands/µL                  CT abdomen pelvis with contrast   Final Result by Jerod Alvarez MD (09/11 1619)      1. No acute findings in the abdomen or pelvis. 2.  Stable pancreatic cysts. 3.  Increased size of now 3.5 cm simple appearing right adnexal cyst. Recommend yearly follow-up. The study was marked in EPIC for significant notification.             Workstation performed: [id number]                    Procedures  ECG 12 Lead Documentation Only    Date/Time: 9/11/2023 2:10 PM    Performed by: Lupe Roberts DO  Authorized by: Lupe Roberts DO    Indications / Diagnosis:  Weakness  ECG reviewed by me, the ED Provider: yes    Patient location:  ED  Previous ECG:     Previous ECG:  Unavailable    Comparison to cardiac monitor: Yes    Interpretation:     Interpretation: abnormal    Rate:     ECG rate:  74bpm    ECG rate assessment: normal    Rhythm:     Rhythm: atrial fibrillation    Ectopy:     Ectopy: none    QRS:     QRS axis:  Normal  Conduction:     Conduction: normal    ST segments:     ST segments:  Normal  T waves:     T waves: inverted      Inverted:  V4, V5 and V6    CriticalCare Time    Date/Time: 9/11/2023 4:00 PM    Performed by: Lupe Roberts DO  Authorized by: Lupe Roberts DO    Critical care provider statement:     Critical care time (minutes):  65    Critical care time was exclusive of:  Separately billable procedures and treating other patients and teaching time    Critical care was necessary to treat or prevent imminent or life-threatening deterioration of the following conditions:  Dehydration, renal failure and metabolic crisis    Critical care was time spent personally by me on the following activities:  Blood draw for specimens, obtaining history from patient or surrogate, development of treatment plan with patient or surrogate, evaluation of patient's response to treatment, examination of patient, interpretation of cardiac output measurements, ordering and performing treatments and interventions, ordering and review of laboratory studies, ordering and review of radiographic studies, re-evaluation of patient's condition and review of old charts    I assumed direction of critical care for this patient from another provider in my specialty: no               ED Course                                             Medical Decision Making  75-year-old female to the ED with complaint of abdominal pain, nausea, vomiting, diarrhea. Differential diagnosis includes but is not limited to colitis, diverticulitis, gastroenteritis, dehydration. Labs, urinalysis, CT abdomen ordered and reviewed. Patient noted to have an LAKE (labs obtained in the ED compared to previous labs on most recent admission), hyponatremia and hyperkalemia. No EKG changes consistent with hyperkalemia noted. Patient will be started on a course of sodium bicarb, calcium gluconate, insulin and dextrose. Patient will be admitted to the hospitalist.    Amount and/or Complexity of Data Reviewed  Labs: ordered. Radiology: ordered. Risk  OTC drugs. Prescription drug management. Decision regarding hospitalization.           Disposition  Final diagnoses:   LAKE (acute kidney injury) ([address])   Hyperkalemia   Hyponatremia     Time reflects when diagnosis was documented in both MDM as applicable and the Disposition within this note     Time User Action Codes Description Comment    9/11/2023  4:43 PM Harvinder ZAMORA Add [N17.9] LAKE (acute kidney injury) ([address])     9/11/2023  4:43 PM Harvinder ZAMORA Add [E87.5] Hyperkalemia     9/11/2023  4:43 PM Harvinder ZAMORA Add [E87.1] Hyponatremia ED Disposition     ED Disposition   Admit    Condition   Stable    Date/Time   Mon Sep 11, 2023  5:24 PM    Comment   Case was discussed with Dr Therese Osman and the patient's admission status was agreed to be Admission Status: inpatient status to the service of Dr. Therese Osman . Follow-up Information    None         Patient's Medications   Discharge Prescriptions    No medications on file       No discharge procedures on file.     PDMP Review     None          ED Provider  Electronically Signed by           Jayden Noble DO  09/12/23 3164

## 2023-09-11 NOTE — ASSESSMENT & PLAN NOTE
Potassium on admission 6.8  Received calcium gluconate, insulin 10 units with dextrose, 1 L bolus, and sodium bicarb 50 meq  We will place patient on bicarb drip 125 mL an hour  Telemetry  No peaked T waves on admission ekg  Potassium every 4 hours until normal x2

## 2023-09-11 NOTE — H&P
[address] [facility]  H&P  Name: Laura Mays 78 y.o. female I MRN: [id number]  Unit/Bed#: ED CT2 I Date of Admission: 2023   Date of Service: 2023 I Hospital Day: 0      Assessment/Plan   * Diarrhea of presumed infectious origin  Assessment & Plan  Patient presents for diarrhea  CT abdomen pelvis on admission showin. No acute findings in the abdomen or pelvis. 2.  Stable pancreatic cysts. 3.  Increased size of now 3.5 cm simple appearing right adnexal cyst. Recommend yearly follow-up. Hold antibiotics  Check C diff, stool ova parasites, stool count  Patient admits to having a recent course of antibiotics for UTI was placed on Levaquin 250 mg daily for 10 days and fluconazole 100 mg daily for 10 days IVF fluids    LAKE (acute kidney injury) [facility])  Assessment & Plan  Patient presents with LAKE  on CKD stage IIIb. Admission creatinine 1.44  Baseline creatinine 0.7  Suspect in setting of dehydration from diarrhea  Status post 1 L bolus in the ER  We will place on bicarb drip at 125 mL an hour    Hyperkalemia  Assessment & Plan  Potassium on admission 6.8  Received calcium gluconate, insulin 10 units with dextrose, 1 L bolus, and sodium bicarb 50 meq  We will place patient on bicarb drip 125 mL an hour  Potassium every 4 hours until normal x2      TIA (transient ischemic attack)  Assessment & Plan  Continue aspirin 81 mg daily, statin, Eliquis    Hypertension  Assessment & Plan  Continue Toprol-XL  Hold Aldactone in setting of hyperkalemia    Atrial fibrillation (HCC)  Assessment & Plan  Continue home Eliquis and Toprol-XL 25 mg daily  Hold Aldactone in setting of hyperkalemia    VTE Pharmacologic Prophylaxis:   Moderate Risk (Score 3-4) - Pharmacological DVT Prophylaxis Ordered: apixaban (Eliquis). Code Status: Level 1 - Full Code   Discussion with family: Updated  (daughter) via phone.     Anticipated Length of Stay: Patient will be admitted on an inpatient basis with an anticipated length of stay of greater than 2 midnights secondary to ele, hyperkalemia. Total Time Spent on Date of Encounter in care of patient: 55 minutes This time was spent on one or more of the following: performing physical exam; counseling and coordination of care; obtaining or reviewing history; documenting in the medical record; reviewing/ordering tests, medications or procedures; communicating with other healthcare professionals and discussing with patient's family/caregivers. .    Chief Complaint: Diarrhea    History of Present Illness:  Vazquez Abrams is a 78 y.o. female with a PMH of chronic A-fib on Eliquis 2.5 mg twice daily, hypothyroidism, CKD stage III, hypertension, hyperlipidemia who presents with diarrhea and worsening fatigue over the last 1 week. Patient states that she was recently started on antibiotics for urinary tract infection (Levaquin and fluconazole). She states that diarrhea is watery unable to tell whether it is foul-smelling due to her nose being stuffy. Patient states that the diarrhea has let up over the last 24 hours but she continues to feel terrible thus presented to the ER. In the ED patient was noted to have ELE on CKD with a creatinine 1.44 and hyperkalemia of 6.5. Patient was given calcium gluconate, 5 units of IV insulin with dextrose, and 50 mill equivalents of bicarb. Patient states that she is now feeling better. Currently denies having any diarrhea or abdominal pain. Review of Systems:  Review of Systems   Constitutional: Positive for fatigue. Negative for chills and fever. HENT: Negative for ear pain and sore throat. Eyes: Negative for pain and visual disturbance. Respiratory: Negative for cough and shortness of breath. Cardiovascular: Negative for chest pain and palpitations. Gastrointestinal: Positive for diarrhea. Negative for abdominal pain and vomiting. Genitourinary: Negative for dysuria and hematuria.    Musculoskeletal: Negative for arthralgias and back pain. Skin: Negative for color change and rash. Neurological: Negative for seizures and syncope. All other systems reviewed and are negative. Past Medical and Surgical History:   Past Medical History:   Diagnosis Date   • Anemia    • Arthritis    • Atherosclerosis of native coronary artery of native heart without angina pectoris 02/06/2019   • Cataract    • Chronic atrial fibrillation (HCC)    • Degeneration of cervical intervertebral disc    • Diarrhea     onset 07/10   • Disease of thyroid gland    • Occipital infarction [facility])    • Wrist fracture     right        Past Surgical History:   Procedure Laterality Date   • COLON SURGERY     • FL INJECTION LEFT HIP (NON ARTHROGRAM)  5/28/2021   • KNEE ARTHROSCOPY      with medial meniscus repair    • NV ARTHROCENTESIS ASPIR&/INJ MAJOR JT/BURSA W/O US Left 5/28/2021    Procedure: Hip corticosteroid injection ([id number]); Surgeon: Fredonia Schirmer, MD;  Location: [facility] MAIN OR;  Service: Pain Management        Meds/Allergies:  Prior to Admission medications    Medication Sig Start Date End Date Taking?  Authorizing Provider   allopurinol (ZYLOPRIM) 100 mg tablet Take 1 tablet (100 mg total) by mouth daily 9/27/22   JORDAN Rizo   aspirin 81 mg chewable tablet Chew 1 tablet daily    Historical Provider, MD   colchicine (COLCRYS) 0.6 mg tablet TAKE ONE BY MOUTH TABLET DAILY 7/17/23   JORDAN Rizo   ELIQUIS 2.5 MG 2.5 mg 2 (two) times a day   2/5/20   Historical Provider, MD   fluconazole (DIFLUCAN) 100 mg tablet Take 1 tablet (100 mg total) by mouth daily for 10 days 9/5/23 9/15/23  Alcides Ramos MD   hydrOXYzine HCL (ATARAX) 25 mg tablet Take 1 tablet (25 mg total) by mouth every 6 (six) hours as needed for itching 5/18/23   JORDAN Rizo   levofloxacin (LEVAQUIN) 250 mg tablet Take 1 tablet (250 mg total) by mouth daily for 10 days 9/5/23 9/15/23  Alcides Ramos MD   levothyroxine 50 mcg tablet TAKE 1 TABLET (50 MCG TOTAL) BY MOUTH DAILY IN THE EARLY MORNING 23   Filemon Hood MD   metoprolol succinate (TOPROL-XL) 25 mg 24 hr tablet Take 1 tablet (25 mg total) by mouth daily 3/28/22   JORDAN Pope   mupirocin (BACTROBAN) 2 % ointment Apply topically 3 (three) times a day 23   Filemon Hood MD   nystatin-triamcinolone [facility]) cream Apply topically in the morning 23   Filemon Hood MD   pantoprazole (PROTONIX) 20 mg tablet Take 2 tablets (40 mg total) by mouth 2 (two) times a day 22   Jael Bejarano MD   simvastatin (ZOCOR) 20 mg tablet Take 1 tablet by mouth daily 10/16/14   Historical Provider, MD   spironolactone (ALDACTONE) 100 mg tablet Take 1 tablet (100 mg total) by mouth daily REPLACEMENT FOR FUROSEMIDE 23   Filemon Hood MD     I have reviewed home medications with patient personally. Allergies: Allergies   Allergen Reactions   • Clobetasol Blisters   • Lidocaine      Annotation - [id number]: Seizure with 20 cc during a surgical block.  FS   • Penicillins Other (See Comments)     unknown   • Sulfa Antibiotics Edema       Social History:  Marital Status: Single     Substance Use History:   Social History     Substance and Sexual Activity   Alcohol Use Yes   • Alcohol/week: 7.0 standard drinks of alcohol   • Types: 7 Glasses of wine per week    Comment: occasionally, drinks wine , moderate      Social History     Tobacco Use   Smoking Status Former   • Packs/day: 1.00   • Years: 7.00   • Total pack years: 7.00   • Types: Cigarettes   • Quit date:    • Years since quittin.7   Smokeless Tobacco Never     Social History     Substance and Sexual Activity   Drug Use No       Family History:  Family History   Problem Relation Age of Onset   • Heart attack Mother    • Valvular heart disease Mother    • Heart attack Father    • Hypertension Sister    • Mental illness Neg Hx        Physical Exam:     Vitals:   Blood Pressure: 98/61 (09/11/23 1844)  Pulse: 81 (09/11/23 1844)  Temperature: (!) 97.3 °F (36.3 °C) (09/11/23 1346)  Temp Source: Tympanic (09/11/23 1346)  Respirations: 22 (09/11/23 1844)  SpO2: 100 % (got good read across forehead) (09/11/23 1450)    Physical Exam  Vitals and nursing note reviewed. Constitutional:       General: She is not in acute distress. Appearance: She is well-developed. HENT:      Head: Normocephalic and atraumatic. Eyes:      Conjunctiva/sclera: Conjunctivae normal.   Cardiovascular:      Rate and Rhythm: Normal rate and regular rhythm. Heart sounds: No murmur heard. Pulmonary:      Effort: Pulmonary effort is normal. No respiratory distress. Breath sounds: Normal breath sounds. Abdominal:      Palpations: Abdomen is soft. Tenderness: There is no abdominal tenderness. Musculoskeletal:         General: No swelling. Cervical back: Neck supple. Skin:     General: Skin is warm and dry. Capillary Refill: Capillary refill takes less than 2 seconds. Neurological:      Mental Status: She is alert. Mental status is at baseline.    Psychiatric:         Mood and Affect: Mood normal.          Additional Data:     Lab Results:  Results from last 7 days   Lab Units 09/11/23  1439   WBC Thousand/uL 5.28   HEMOGLOBIN g/dL 16.0*   HEMATOCRIT % 47.2*   PLATELETS Thousands/uL 231   NEUTROS PCT % 72   LYMPHS PCT % 19   MONOS PCT % 7   EOS PCT % 1     Results from last 7 days   Lab Units 09/11/23  1522 09/11/23  1439   SODIUM mmol/L 129* 129*   POTASSIUM mmol/L 6.8* 6.5*   CHLORIDE mmol/L 107 102   CO2 mmol/L 16* 17*   BUN mg/dL 41* 45*   CREATININE mg/dL 1.33* 1.44*   ANION GAP mmol/L 6 10   CALCIUM mg/dL 8.2* 9.7   ALBUMIN g/dL  --  4.9   TOTAL BILIRUBIN mg/dL  --  0.68   ALK PHOS U/L  --  74   ALT U/L  --  27   AST U/L  --  32   GLUCOSE RANDOM mg/dL 84 96         Results from last 7 days   Lab Units 09/11/23  1754   POC GLUCOSE mg/dl 95         Results from last 7 days   Lab Units 09/11/23  1522   PROCALCITONIN ng/ml 0.06       Lines/Drains:  Invasive Devices     Peripheral Intravenous Line  Duration           Peripheral IV 09/11/23 Distal;Right;Upper;Ventral (anterior) Arm <1 day                    Imaging: Reviewed radiology reports from this admission including: abdominal/pelvic CT  CT abdomen pelvis with contrast   Final Result by Griselda Jamaica, MD (09/11 1619)      1. No acute findings in the abdomen or pelvis. 2.  Stable pancreatic cysts. 3.  Increased size of now 3.5 cm simple appearing right adnexal cyst. Recommend yearly follow-up. The study was marked in EPIC for significant notification. Workstation performed: [id number]             EKG and Other Studies Reviewed on Admission:   · EKG: Atrial fibrillation. HR 74.    ** Please Note: This note has been constructed using a voice recognition system.  **

## 2023-09-11 NOTE — Clinical Note
Case was discussed with  and the patient's admission status was agreed to be Admission Status: inpatient status to the service of Dr. Peraza

## 2023-09-12 ENCOUNTER — TELEPHONE (OUTPATIENT)
Age: 83
End: 2023-09-12

## 2023-09-12 ENCOUNTER — TELEPHONE (OUTPATIENT)
Dept: OTHER | Facility: HOSPITAL | Age: 83
End: 2023-09-12

## 2023-09-12 LAB
ALBUMIN SERPL BCP-MCNC: 3.1 G/DL (ref 3.5–5)
ANION GAP SERPL CALCULATED.3IONS-SCNC: 4 MMOL/L
ANION GAP SERPL CALCULATED.3IONS-SCNC: 9 MMOL/L
BUN SERPL-MCNC: 22 MG/DL (ref 5–25)
BUN SERPL-MCNC: 27 MG/DL (ref 5–25)
CALCIUM ALBUM COR SERPL-MCNC: 8.9 MG/DL (ref 8.3–10.1)
CALCIUM SERPL-MCNC: 7.2 MG/DL (ref 8.4–10.2)
CALCIUM SERPL-MCNC: 8.2 MG/DL (ref 8.4–10.2)
CHLORIDE SERPL-SCNC: 106 MMOL/L (ref 96–108)
CHLORIDE SERPL-SCNC: 97 MMOL/L (ref 96–108)
CO2 SERPL-SCNC: 18 MMOL/L (ref 21–32)
CO2 SERPL-SCNC: 37 MMOL/L (ref 21–32)
CREAT SERPL-MCNC: 0.86 MG/DL (ref 0.6–1.3)
CREAT SERPL-MCNC: 0.92 MG/DL (ref 0.6–1.3)
ERYTHROCYTE [DISTWIDTH] IN BLOOD BY AUTOMATED COUNT: 15.1 % (ref 11.6–15.1)
GFR SERPL CREATININE-BSD FRML MDRD: 56 ML/MIN/1.73SQ M
GFR SERPL CREATININE-BSD FRML MDRD: 61 ML/MIN/1.73SQ M
GLUCOSE SERPL-MCNC: 109 MG/DL (ref 65–140)
GLUCOSE SERPL-MCNC: 64 MG/DL (ref 65–140)
HCT VFR BLD AUTO: 37.3 % (ref 34.8–46.1)
HGB BLD-MCNC: 12.5 G/DL (ref 11.5–15.4)
MAGNESIUM SERPL-MCNC: 1.6 MG/DL (ref 1.9–2.7)
MCH RBC QN AUTO: 34.8 PG (ref 26.8–34.3)
MCHC RBC AUTO-ENTMCNC: 33.5 G/DL (ref 31.4–37.4)
MCV RBC AUTO: 104 FL (ref 82–98)
PHOSPHATE SERPL-MCNC: 2.7 MG/DL (ref 2.3–4.1)
PLATELET # BLD AUTO: 166 THOUSANDS/UL (ref 149–390)
PMV BLD AUTO: 10.7 FL (ref 8.9–12.7)
POTASSIUM SERPL-SCNC: 4.2 MMOL/L (ref 3.5–5.3)
POTASSIUM SERPL-SCNC: 5 MMOL/L (ref 3.5–5.3)
RBC # BLD AUTO: 3.59 MILLION/UL (ref 3.81–5.12)
SODIUM SERPL-SCNC: 133 MMOL/L (ref 135–147)
SODIUM SERPL-SCNC: 138 MMOL/L (ref 135–147)
WBC # BLD AUTO: 5.56 THOUSAND/UL (ref 4.31–10.16)

## 2023-09-12 PROCEDURE — 85027 COMPLETE CBC AUTOMATED: CPT | Performed by: STUDENT IN AN ORGANIZED HEALTH CARE EDUCATION/TRAINING PROGRAM

## 2023-09-12 PROCEDURE — 99232 SBSQ HOSP IP/OBS MODERATE 35: CPT | Performed by: INTERNAL MEDICINE

## 2023-09-12 PROCEDURE — 80069 RENAL FUNCTION PANEL: CPT | Performed by: STUDENT IN AN ORGANIZED HEALTH CARE EDUCATION/TRAINING PROGRAM

## 2023-09-12 PROCEDURE — 80048 BASIC METABOLIC PNL TOTAL CA: CPT | Performed by: INTERNAL MEDICINE

## 2023-09-12 PROCEDURE — 83735 ASSAY OF MAGNESIUM: CPT | Performed by: STUDENT IN AN ORGANIZED HEALTH CARE EDUCATION/TRAINING PROGRAM

## 2023-09-12 RX ORDER — MAGNESIUM SULFATE HEPTAHYDRATE 40 MG/ML
4 INJECTION, SOLUTION INTRAVENOUS ONCE
Status: COMPLETED | OUTPATIENT
Start: 2023-09-12 | End: 2023-09-12

## 2023-09-12 RX ADMIN — MAGNESIUM SULFATE HEPTAHYDRATE 4 G: 40 INJECTION, SOLUTION INTRAVENOUS at 08:43

## 2023-09-12 RX ADMIN — Medication 250 MG: at 17:21

## 2023-09-12 RX ADMIN — LEVOTHYROXINE SODIUM 50 MCG: 50 TABLET ORAL at 05:45

## 2023-09-12 RX ADMIN — PANTOPRAZOLE SODIUM 40 MG: 40 TABLET, DELAYED RELEASE ORAL at 21:01

## 2023-09-12 RX ADMIN — SODIUM BICARBONATE 100 ML/HR: 84 INJECTION, SOLUTION INTRAVENOUS at 06:36

## 2023-09-12 RX ADMIN — ASPIRIN 81 MG CHEWABLE TABLET 81 MG: 81 TABLET CHEWABLE at 08:47

## 2023-09-12 RX ADMIN — APIXABAN 2.5 MG: 2.5 TABLET, FILM COATED ORAL at 08:47

## 2023-09-12 RX ADMIN — HYDROXYZINE HYDROCHLORIDE 10 MG: 10 TABLET ORAL at 21:01

## 2023-09-12 RX ADMIN — Medication 250 MG: at 08:47

## 2023-09-12 RX ADMIN — ALLOPURINOL 100 MG: 100 TABLET ORAL at 08:47

## 2023-09-12 RX ADMIN — PRAVASTATIN SODIUM 40 MG: 40 TABLET ORAL at 16:54

## 2023-09-12 RX ADMIN — PANTOPRAZOLE SODIUM 40 MG: 40 TABLET, DELAYED RELEASE ORAL at 08:47

## 2023-09-12 RX ADMIN — APIXABAN 2.5 MG: 2.5 TABLET, FILM COATED ORAL at 17:21

## 2023-09-12 NOTE — APP STUDENT NOTE
VTE Pharmacologic Prophylaxis:   Moderate Risk (Score 3-4) - Pharmacological DVT Prophylaxis Ordered: apixaban (Eliquis). Patient Centered Rounds: I performed bedside rounds with nursing staff today. Discussions with Specialists or Other Care Team Provider: none    Education and Discussions with Family / Patient: Patient declined call to . Total Time Spent on Date of Encounter in care of patient: 40 mins. This time was spent on one or more of the following: performing physical exam; counseling and coordination of care; obtaining or reviewing history; documenting in the medical record; reviewing/ordering tests, medications or procedures; communicating with other healthcare professionals and discussing with patient's family/caregivers. Current Length of Stay: 1 day(s)  Current Patient Status: Inpatient   Certification Statement: The patient will continue to require additional inpatient hospital stay due to LAKE, hyperkalemia, further testing  Discharge Plan: Anticipate discharge in 24-48 hrs to home. Code Status: Level 1 - Full Code    Subjective:   79 y/o female who presented yesterday for diarrhea and generalized weakness. The patient relates that she already feels about 50% better than she did when she first presented to the ED. Her fatigue has improved but is still present. Able to ambulate with no difficulty and no shortness of breath. Reports that she has had one bowel movement over the last 24 hours, and describes it as a "small pebble." Denies any watery stools at this time. Denies fever, chills, chest pain, abdominal pain, nausea, vomiting, and dizziness. Objective:     Vitals:   Temp (24hrs), Av °F (36.7 °C), Min:97.3 °F (36.3 °C), Max:98.5 °F (36.9 °C)    Temp:  [97.3 °F (36.3 °C)-98.5 °F (36.9 °C)] 97.6 °F (36.4 °C)  HR:  [55-95] 76  Resp:  [12-32] 16  BP: ()/(50-80) 103/68  SpO2:  [98 %-100 %] 98 %  Body mass index is 19.22 kg/m².      Input and Output Summary (last 24 hours):   No intake or output data in the 24 hours ending 09/12/23 1003    Physical Exam:   Physical Exam  Constitutional:       General: She is not in acute distress. Appearance: She is not toxic-appearing. Cardiovascular:      Rate and Rhythm: Normal rate and regular rhythm. Heart sounds: Normal heart sounds. Pulmonary:      Effort: Pulmonary effort is normal. No respiratory distress. Breath sounds: Normal breath sounds. No wheezing or rales. Abdominal:      General: Bowel sounds are normal.      Palpations: Abdomen is soft. Tenderness: There is no abdominal tenderness. Musculoskeletal:      Right lower leg: No edema. Left lower leg: No edema. Skin:     Capillary Refill: Capillary refill takes less than 2 seconds. Neurological:      Mental Status: She is alert and oriented to person, place, and time. Additional Data:     Labs:  Results from last 7 days   Lab Units 09/12/23  0524 09/11/23  1439   WBC Thousand/uL 5.56 5.28   HEMOGLOBIN g/dL 12.5 16.0*   HEMATOCRIT % 37.3 47.2*   PLATELETS Thousands/uL 166 231   NEUTROS PCT %  --  72   LYMPHS PCT %  --  19   MONOS PCT %  --  7   EOS PCT %  --  1     Results from last 7 days   Lab Units 09/12/23  0524 09/11/23  1522 09/11/23  1439   SODIUM mmol/L 133*   < > 129*   POTASSIUM mmol/L 5.0   < > 6.5*   CHLORIDE mmol/L 106   < > 102   CO2 mmol/L 18*   < > 17*   BUN mg/dL 27*   < > 45*   CREATININE mg/dL 0.92   < > 1.44*   ANION GAP mmol/L 9   < > 10   CALCIUM mg/dL 8.2*   < > 9.7   ALBUMIN g/dL 3.1*  --  4.9   TOTAL BILIRUBIN mg/dL  --   --  0.68   ALK PHOS U/L  --   --  74   ALT U/L  --   --  27   AST U/L  --   --  32   GLUCOSE RANDOM mg/dL 64*   < > 96    < > = values in this interval not displayed.          Results from last 7 days   Lab Units 09/11/23  1754   POC GLUCOSE mg/dl 95         Results from last 7 days   Lab Units 09/11/23  1522   PROCALCITONIN ng/ml 0.06       Lines/Drains:  Invasive Devices     Peripheral Intravenous Line  Duration           Peripheral IV 09/11/23 Distal;Right;Upper;Ventral (anterior) Arm <1 day    Peripheral IV 09/11/23 Left Forearm <1 day                  Telemetry:  Telemetry Orders (From admission, onward)             24 Hour Telemetry Monitoring  Continuous x 24 Hours (Telem)        Question:  Reason for 24 Hour Telemetry  Answer:  Metabolic/electrolyte disturbance with high probability of dysrhythmia. K level <3 or >6 OR KCL infusion >10mEq/hr                 Telemetry Reviewed: Normal Sinus Rhythm  Indication for Continued Telemetry Use: Metabolic/electrolyte disturbance with high probability of dysrhythmia             Imaging: Reviewed radiology reports from this admission including: abdominal/pelvic CT    Recent Cultures (last 7 days):         Last 24 Hours Medication List:   Current Facility-Administered Medications   Medication Dose Route Frequency Provider Last Rate   • allopurinol  100 mg Oral Daily Pandi Todhe, DO     • apixaban  2.5 mg Oral BID Pandi Todhe, DO     • aspirin  81 mg Oral Daily Pandi Todhe, DO     • hydrOXYzine HCL  10 mg Oral Q6H PRN Pandi Todhe, DO     • levothyroxine  50 mcg Oral Early Morning Pandi Todhe, DO     • magnesium sulfate  4 g Intravenous Once [facility], DO 4 g (09/12/23 2880)   • metoprolol succinate  25 mg Oral Daily Pandi Todhe, DO     • pantoprazole  40 mg Oral BID Pandi Todhe, DO     • pravastatin  40 mg Oral Daily With [employer], DO     • saccharomyces boulardii  250 mg Oral BID Pandi Todhe, DO     • sodium bicarbonate 150 mEq in sodium chloride 0.45 % 1,000 mL infusion  100 mL/hr Intravenous Continuous Pandi Todhe,  mL/hr (09/12/23 0636)        Today, Patient Was Seen By: Jarad June    **Please Note: This note may have been constructed using a voice recognition system. **

## 2023-09-12 NOTE — ASSESSMENT & PLAN NOTE
Patient presents with LAKE on CKD stage IIIb. Admission creatinine 1.44, improved to 0.92 today. Baseline creatinine 0.7. Suspect in setting of dehydration from diarrhea. Continue to trend with am BMP. Continue bicarb drip.

## 2023-09-12 NOTE — UTILIZATION REVIEW
Initial Clinical Review    Admission: Date/Time/Statement:   Admission Orders (From admission, onward)     Ordered        09/11/23 1725  INPATIENT ADMISSION  Once                      Orders Placed This Encounter   Procedures   • INPATIENT ADMISSION     Standing Status:   Standing     Number of Occurrences:   1     Order Specific Question:   Level of Care     Answer:   Med Surg [16]     Order Specific Question:   Estimated length of stay     Answer:   More than 2 Midnights     Order Specific Question:   Certification     Answer:   I certify that inpatient services are medically necessary for this patient for a duration of greater than two midnights. See H&P and MD Progress Notes for additional information about the patient's course of treatment. ED Arrival Information     Expected   -    Arrival   9/11/2023 13:16    Acuity   Emergent            Means of arrival   Wheelchair    Escorted by   Family Member    Service   Hospitalist    Admission type   Emergency            Arrival complaint   n/v/diarrhea           Chief Complaint   Patient presents with   • Abdominal Pain     Abdominal discomfort, diarrhea and nausea for a couple of days. . nail beds cyanotic. Admits to some trouble breathing states hx of copd       Initial Presentation:   78 yof to ER from home c/o generalized abd pain, diarrhea & nausea for few days. Reports recently started on a course of Levaquin and Diflucan for UTI. Hx Raynaud's, chronic A-fib on Eliquis 2.5 mg twice daily, hypothyroidism, CKD stage III, hypertension, hyperlipidemia. Presents fatigued, BP soft. Admission work-up showing hyponatremia, hyperkalemia, LAKE. Admitted to inpatient status for hyperkalemia, LAKE. Started on IV Bicarb gtt. Date: 9/12/23   Day 2:   Diarrhea slowing, stools pending. LAKE, hyperkalemia, and metabolic acidosis improving, trial off IVF. Afib rate controlled with Toprol.      Date: 9/13/23    Day 3: Has surpassed a 2nd midnight with active treatments and services, which include monitoring diarrhea, labs/lytes, VS.     ED Triage Vitals   Temperature Pulse Respirations Blood Pressure SpO2   09/11/23 1346 09/11/23 1346 09/11/23 1346 09/11/23 1400 09/11/23 1450   (!) 97.3 °F (36.3 °C) 80 20 97/63 100 %      Temp Source Heart Rate Source Patient Position - Orthostatic VS BP Location FiO2 (%)   09/11/23 1346 09/11/23 1346 09/11/23 1400 09/11/23 1400 --   Tympanic Monitor Lying Left arm       Pain Score       09/11/23 1346       5          Wt Readings from Last 1 Encounters:   09/11/23 50.8 kg (112 lb)     Additional Vital Signs:   Date/Time Temp Pulse Resp BP MAP (mmHg) SpO2 O2 Device Patient Position - Orthostatic VS   09/12/23 07:25:51 97.6 °F (36.4 °C) 76 16 103/68 80 98 % -- --   09/12/23 04:00:48 -- 78 -- 127/80 96 100 % -- --   09/12/23 03:09:52 98.2 °F (36.8 °C) 79 18 101/62 75 100 % -- --   09/11/23 23:26:05 98.5 °F (36.9 °C) 76 18 122/76 91 98 % -- --   09/11/23 23:24:14 98.5 °F (36.9 °C) 77 -- 122/76 91 100 % -- --   09/11/23 20:52:07 -- 73 -- 90/59 69 100 % -- --   09/11/23 2000 -- 69 18 98/56 72 99 % None (Room air) Lying   09/11/23 1930 -- 68 16 95/50 70 99 % None (Room air) Lying   09/11/23 1915 -- 79 18 103/60 76 99 % None (Room air) Sitting   09/11/23 1900 -- 69 17 102/59 77 -- -- --   09/11/23 1844 -- 81 22 98/61 75 -- -- --   09/11/23 1815 -- 81 18 120/71 91 -- -- --   09/11/23 1754 -- 76 18 106/71 84 -- -- --   09/11/23 1745 -- 75 18 104/66 79 -- -- Lying   09/11/23 1734 -- 81 24 Abnormal  108/69 84 -- -- --   09/11/23 1724 -- 87 19 115/76 91 -- -- --   09/11/23 1715 -- 86 18 106/70 84 -- -- --   09/11/23 1700 -- 86 12 109/63 79 -- -- --   09/11/23 1654 -- 94 30 Abnormal  118/74 92 -- -- --   09/11/23 1644 -- 89 32 Abnormal  103/65 79 -- -- --   09/11/23 1634 -- 88 18 108/58 78 -- -- --   09/11/23 1624 -- 88 23 Abnormal  114/62 83 -- -- --   09/11/23 1615 -- 95 23 Abnormal  100/58 76 -- -- Lying       Pertinent Labs/Diagnostic Test Results:   CT abdomen pelvis with contrast   Final Result  (09/11 1619)      1. No acute findings in the abdomen or pelvis. 2.  Stable pancreatic cysts. 3.  Increased size of now 3.5 cm simple appearing right adnexal cyst. Recommend yearly follow-up.         9/11 Ekg=  Atrial fibrillation  Low voltage QRS  Cannot rule out Anteroseptal infarct (cited on or before 16-APR-2023)  T wave abnormality, consider inferolateral ischemia    Results from last 7 days   Lab Units 09/11/23  1802   SARS-COV-2  Negative     Results from last 7 days   Lab Units 09/13/23  0503 09/12/23  0524 09/11/23  1439   WBC Thousand/uL 4.50 5.56 5.28   HEMOGLOBIN g/dL 12.2 12.5 16.0*   HEMATOCRIT % 34.5* 37.3 47.2*   PLATELETS Thousands/uL 161 166 231   NEUTROS ABS Thousands/µL  --   --  3.84     Results from last 7 days   Lab Units 09/13/23  0503 09/12/23  1515 09/12/23 0524 09/11/23  1947 09/11/23  1522 09/11/23  1439   SODIUM mmol/L 133* 138 133*  --  129* 129*   POTASSIUM mmol/L 4.7 4.2 5.0 5.3 6.8* 6.5*   CHLORIDE mmol/L 102 97 106  --  107 102   CO2 mmol/L 27 37* 18*  --  16* 17*   ANION GAP mmol/L 4 4 9  --  6 10   BUN mg/dL 18 22 27*  --  41* 45*   CREATININE mg/dL 0.85 0.86 0.92  --  1.33* 1.44*   EGFR ml/min/1.73sq m 62 61 56  --  36 33   CALCIUM mg/dL 8.2* 7.2* 8.2*  --  8.2* 9.7   MAGNESIUM mg/dL 2.2  --  1.6*  --   --  2.1   PHOSPHORUS mg/dL  --   --  2.7  --   --   --      Results from last 7 days   Lab Units 09/12/23  0524 09/11/23  1439   AST U/L  --  32   ALT U/L  --  27   ALK PHOS U/L  --  74   TOTAL PROTEIN g/dL  --  7.7   ALBUMIN g/dL 3.1* 4.9   TOTAL BILIRUBIN mg/dL  --  0.68     Results from last 7 days   Lab Units 09/11/23  1754   POC GLUCOSE mg/dl 95     Results from last 7 days   Lab Units 09/13/23  0503 09/12/23  1515 09/12/23  0524 09/11/23  1522 09/11/23  1439   GLUCOSE RANDOM mg/dL 96 109 64* 84 96     Results from last 7 days   Lab Units 09/11/23  1947   OSMOLALITY, SERUM mmol/     Results from last 7 days   Lab Units 09/11/23  1522   CK TOTAL U/L 43     Results from last 7 days   Lab Units 09/11/23  1625 09/11/23  1439   HS TNI 0HR ng/L  --  20   HS TNI 2HR ng/L 16  --    HSTNI D2 ng/L -4  --        Results from last 7 days   Lab Units 09/11/23  1522   PROCALCITONIN ng/ml 0.06     Results from last 7 days   Lab Units 09/11/23  1439   LIPASE u/L 48     Results from last 7 days   Lab Units 09/11/23  1947 09/11/23  1622   OSMOLALITY, SERUM mmol/  --    OSMO UR mmol/KG  --  538     Results from last 7 days   Lab Units 09/11/23  1622   CLARITY UA  Clear   COLOR UA  Light Yellow   SPEC GRAV UA  1.020   PH UA  5.0   GLUCOSE UA mg/dl Negative   KETONES UA mg/dl Trace*   BLOOD UA  Negative   PROTEIN UA mg/dl Negative   NITRITE UA  Negative   BILIRUBIN UA  Negative   UROBILINOGEN UA E.U./dl 0.2   LEUKOCYTES UA  Negative   SODIUM UR  96     ED Treatment:   Medication Administration from 09/11/2023 1315 to 09/11/2023 2042       Date/Time Order Dose Route Action     09/11/2023 1441 EDT sodium chloride 0.9 % bolus 1,000 mL 1,000 mL Intravenous New Bag     09/11/2023 1553 EDT iohexol (OMNIPAQUE) 350 MG/ML injection (SINGLE-DOSE) 80 mL 80 mL Intravenous Given     09/11/2023 1728 EDT calcium gluconate 1 g in sodium chloride 0.9% 50 mL (premix) 1 g Intravenous New Bag     09/11/2023 1702 EDT sodium bicarbonate 8.4 % injection 50 mEq 50 mEq Intravenous Given     09/11/2023 1653 EDT insulin regular (HumuLIN R,NovoLIN R) injection 5 Units 5 Units Intravenous Given     09/11/2023 1644 EDT dextrose 50 % IV solution 25 mL 25 mL Intravenous Given     09/11/2023 1728 EDT lactated ringers bolus 1,000 mL 1,000 mL Intravenous New Bag     09/11/2023 1949 EDT sodium bicarbonate 150 mEq in sodium chloride 0.45 % 1,000 mL infusion 100 mL/hr Intravenous New Bag     09/11/2023 1839 EDT saccharomyces boulardii (FLORASTOR) capsule 250 mg 250 mg Oral Given        Past Medical History:   Diagnosis Date   • Anemia    • Arthritis    • Atherosclerosis of native coronary artery of native heart without angina pectoris 02/06/2019   • Cataract    • Chronic atrial fibrillation [facility])    • Degeneration of cervical intervertebral disc    • Diarrhea     onset 07/10   • Disease of thyroid gland    • Occipital infarction [facility])    • Wrist fracture     right      Present on Admission:  • Diarrhea of presumed infectious origin  • Atrial fibrillation (HCC)  • Hypertension  • Hyperkalemia  • TIA (transient ischemic attack)      Admitting Diagnosis: Hyperkalemia [E87.5]  Hyponatremia [E87.1]  Abdominal pain [R10.9]  LAKE (acute kidney injury) ([address] [facility]) [N17.9]  Age/Sex: 78 y.o. female  Admission Orders:  Cont pulse ox  Telemetry  Scd/foot pumps  Consult renal    Scheduled Medications:  allopurinol, 100 mg, Oral, Daily  apixaban, 2.5 mg, Oral, BID  aspirin, 81 mg, Oral, Daily  levothyroxine, 50 mcg, Oral, Early Morning  magnesium sulfate, 4 g, Intravenous, Once  metoprolol succinate, 25 mg, Oral, Daily  pantoprazole, 40 mg, Oral, BID  pravastatin, 40 mg, Oral, Daily With Dinner  saccharomyces boulardii, 250 mg, Oral, BID    Continuous IV Infusions:  sodium bicarbonate 150 mEq in sodium chloride 0.45 % 1,000 mL infusion, 100 mL/hr, Intravenous, Continuous    PRN Meds:  hydrOXYzine HCL, 10 mg, Oral, Q6H PRN    Network Utilization Review Department  ATTENTION: Please call with any questions or concerns to [phone] and carefully listen to the prompts so that you are directed to the right person. All voicemails are confidential.  Ruben Finder all requests for admission clinical reviews, approved or denied determinations and any other requests to dedicated fax number below belonging to the campus where the patient is receiving treatment. List of dedicated fax numbers for the Facilities:  [place] DENIALS (Administrative/Medical Necessity) [phone] [address] (Maternity/NICU/Pediatrics) [phone]   [address] [phone]   [facility] [address] [facility] [phone] [address] [facility] [address] [facility] [address] [address] [facility] [address] [place] [address] [phone]

## 2023-09-12 NOTE — ASSESSMENT & PLAN NOTE
Potassium on admission 6.8, improved today at 5.0  Continue bicarb drip. Continue to monitor telemetry. No peaked T waves on admission ekg.

## 2023-09-12 NOTE — TELEPHONE ENCOUNTER
Patricia's daughter Alison Amaral called says her mom is in the hospital and wants to discuss with you the next steps going forward    Please call Alison Amaral @ [phone]

## 2023-09-12 NOTE — PROGRESS NOTES
[address] [facility]  Progress Note  Name: Elizabeth Henry  MRN: [id number]  Unit/Bed#: 2 [address] Date of Admission: 2023   Date of Service: 2023 I Hospital Day: 1    Assessment/Plan   * Diarrhea of presumed infectious origin  Assessment & Plan  Patient presents for diarrhea  CT abdomen pelvis on admission showin. No acute findings in the abdomen or pelvis. 2.  Stable pancreatic cysts. 3.  Increased size of now 3.5 cm simple appearing right adnexal cyst. Recommend yearly follow-up. Patient admits to having a recent course of antibiotics for UTI was placed on Levaquin 250 mg daily for 10 days and fluconazole 100 mg daily for 10 days IVF fluids. C.diff panel, stool counts and stool ova parasites pending. Patient reports that she has had one bowel movement today, non-watery. Continue to hold antibiotics. Repleted Mg today, at 1.6 from 2.1 yesterday. Continue to trend electrolytes with am BMP. LAKE (acute kidney injury) [facility])  Assessment & Plan  Patient presents with LAKE on CKD stage IIIb. Admission creatinine 1.44, improved to 0.92 today. Baseline creatinine 0.7. Suspect in setting of dehydration from diarrhea. Continue to trend with am BMP. Continue bicarb drip. Hyperkalemia  Assessment & Plan  Potassium on admission 6.8, improved today at 5.0  Continue bicarb drip. Continue to monitor telemetry. No peaked T waves on admission ekg. TIA (transient ischemic attack)  Assessment & Plan  Continue aspirin 81 mg daily, statin, Eliquis    Hypertension  Assessment & Plan  Continue Toprol-XL. Continue to hold Aldactone in setting of hyperkalemia. Atrial fibrillation (HCC)  Assessment & Plan  Continue home Eliquis and Toprol-XL 25 mg daily. Continue to hold Aldactone in setting of hyperkalemia. VTE Pharmacologic Prophylaxis:   Moderate Risk (Score 3-4) - Pharmacological DVT Prophylaxis Ordered: apixaban (Eliquis).     Patient Centered Rounds:  I performed bedside rounds with nursing staff today. Discussions with Specialists or Other Care Team Provider: none     Education and Discussions with Family / Patient: Patient declined call to .      Total Time Spent on Date of Encounter in care of patient: 40 mins. This time was spent on one or more of the following: performing physical exam; counseling and coordination of care; obtaining or reviewing history; documenting in the medical record; reviewing/ordering tests, medications or procedures; communicating with other healthcare professionals and discussing with patient's family/caregivers.     Current Length of Stay: 1 day(s)  Current Patient Status: Inpatient   Certification Statement: The patient will continue to require additional inpatient hospital stay due to LAKE, hyperkalemia, further testing  Discharge Plan: Anticipate discharge in 24-48 hrs to home.     Code Status: Level 1 - Full Code     Subjective:   77 y/o female who presented yesterday for diarrhea and generalized weakness. The patient relates that she already feels about 50% better than she did when she first presented to the ED. Her fatigue has improved but is still present. Able to ambulate with no difficulty and no shortness of breath. Reports that she has had one bowel movement over the last 24 hours, and describes it as a "small pebble." Denies any watery stools at this time.  Denies fever, chills, chest pain, abdominal pain, nausea, vomiting, and dizziness.     Objective:      Vitals:   Temp (24hrs), Av °F (36.7 °C), Min:97.3 °F (36.3 °C), Max:98.5 °F (36.9 °C)     Temp:  [97.3 °F (36.3 °C)-98.5 °F (36.9 °C)] 97.6 °F (36.4 °C)  HR:  [55-95] 76  Resp:  [12-32] 16  BP: ()/(50-80) 103/68  SpO2:  [98 %-100 %] 98 %  Body mass index is 19.22 kg/m².      Input and Output Summary (last 24 hours):   No intake or output data in the 24 hours ending 23 1003     Physical Exam:   Physical Exam  Constitutional:       General: She is not in acute distress. Appearance: She is not toxic-appearing. Cardiovascular:      Rate and Rhythm: Normal rate and regular rhythm. Heart sounds: Normal heart sounds. Pulmonary:      Effort: Pulmonary effort is normal. No respiratory distress. Breath sounds: Normal breath sounds. No wheezing or rales. Abdominal:      General: Bowel sounds are normal.      Palpations: Abdomen is soft. Tenderness: There is no abdominal tenderness. Musculoskeletal:      Right lower leg: No edema. Left lower leg: No edema. Skin:     Capillary Refill: Capillary refill takes less than 2 seconds.    Neurological:      Mental Status: She is alert and oriented to person, place, and time.          Additional Data:      Labs:        Results from last 7 days   Lab Units 09/12/23  0524 09/11/23  1439   WBC Thousand/uL 5.56 5.28   HEMOGLOBIN g/dL 12.5 16.0*   HEMATOCRIT % 37.3 47.2*   PLATELETS Thousands/uL 166 231   NEUTROS PCT %  --  72   LYMPHS PCT %  --  19   MONOS PCT %  --  7   EOS PCT %  --  1             Results from last 7 days   Lab Units 09/12/23  0524 09/11/23  1522 09/11/23  1439   SODIUM mmol/L 133*   < > 129*   POTASSIUM mmol/L 5.0   < > 6.5*   CHLORIDE mmol/L 106   < > 102   CO2 mmol/L 18*   < > 17*   BUN mg/dL 27*   < > 45*   CREATININE mg/dL 0.92   < > 1.44*   ANION GAP mmol/L 9   < > 10   CALCIUM mg/dL 8.2*   < > 9.7   ALBUMIN g/dL 3.1*  --  4.9   TOTAL BILIRUBIN mg/dL  --   --  0.68   ALK PHOS U/L  --   --  74   ALT U/L  --   --  27   AST U/L  --   --  32   GLUCOSE RANDOM mg/dL 64*   < > 96    < > = values in this interval not displayed.               Results from last 7 days   Lab Units 09/11/23  1754   POC GLUCOSE mg/dl 95               Results from last 7 days   Lab Units 09/11/23  1522   PROCALCITONIN ng/ml 0.06         Lines/Drains:      Invasive Devices            Peripheral Intravenous Line  Duration             Peripheral IV 09/11/23 Distal;Right;Upper;Ventral (anterior) Arm <1 day     Peripheral IV 09/11/23 Left Forearm <1 day                       Telemetry:      Telemetry Orders (From admission, onward)                 24 Hour Telemetry Monitoring  Continuous x 24 Hours (Telem)        Question:  Reason for 24 Hour Telemetry  Answer:  Metabolic/electrolyte disturbance with high probability of dysrhythmia. K level <3 or >6 OR KCL infusion >10mEq/hr                    Telemetry Reviewed: Normal Sinus Rhythm  Indication for Continued Telemetry Use: Metabolic/electrolyte disturbance with high probability of dysrhythmia                  Imaging: Reviewed radiology reports from this admission including: abdominal/pelvic CT     Recent Cultures (last 7 days):          Last 24 Hours Medication List:            Current Facility-Administered Medications   Medication Dose Route Frequency Provider Last Rate   • allopurinol  100 mg Oral Daily Pandi Todhe, DO     • apixaban  2.5 mg Oral BID Pandi Todhe, DO     • aspirin  81 mg Oral Daily Pandi Todhe, DO     • hydrOXYzine HCL  10 mg Oral Q6H PRN Pandi Todhe, DO     • levothyroxine  50 mcg Oral Early Morning Pandi Todhe, DO     • magnesium sulfate  4 g Intravenous Once Cele Taylor, DO 4 g (09/12/23 1107)   • metoprolol succinate  25 mg Oral Daily Pandi Todhe, DO     • pantoprazole  40 mg Oral BID Pandi Todhe, DO     • pravastatin  40 mg Oral Daily With Gallagher Oil, DO     • saccharomyces boulardii  250 mg Oral BID Pandi Todhe, DO     • sodium bicarbonate 150 mEq in sodium chloride 0.45 % 1,000 mL infusion  100 mL/hr Intravenous Continuous Pandi Todhe,  mL/hr (09/12/23 0636)         Today, Patient Was Seen By: [facility]     **Please Note: This note may have been constructed using a voice recognition system. **

## 2023-09-12 NOTE — PLAN OF CARE
Problem: GASTROINTESTINAL - ADULT  Goal: Maintains or returns to baseline bowel function  Description: INTERVENTIONS:  - Assess bowel function  - Encourage oral fluids to ensure adequate hydration  - Administer IV fluids if ordered to ensure adequate hydration  - Administer ordered medications as needed  - Encourage mobilization and activity  - Consider nutritional services referral to assist patient with adequate nutrition and appropriate food choices  Outcome: Progressing  Goal: Maintains adequate nutritional intake  Description: INTERVENTIONS:  - Monitor percentage of each meal consumed  - Identify factors contributing to decreased intake, treat as appropriate  - Assist with meals as needed  - Monitor I&O, weight, and lab values if indicated  - Obtain nutrition services referral as needed  Outcome: Progressing     Problem: INFECTION - ADULT  Goal: Absence or prevention of progression during hospitalization  Description: INTERVENTIONS:  - Assess and monitor for signs and symptoms of infection  - Monitor lab/diagnostic results  - Monitor all insertion sites, i.e. indwelling lines, tubes, and drains  - Monitor endotracheal if appropriate and nasal secretions for changes in amount and color  - Simpson appropriate cooling/warming therapies per order  - Administer medications as ordered  - Instruct and encourage patient and family to use good hand hygiene technique  - Identify and instruct in appropriate isolation precautions for identified infection/condition  Outcome: Progressing  Goal: Absence of fever/infection during neutropenic period  Description: INTERVENTIONS:  - Monitor WBC    Outcome: Progressing     Problem: SAFETY ADULT  Goal: Patient will remain free of falls  Description: INTERVENTIONS:  - Educate patient/family on patient safety including physical limitations  - Instruct patient to call for assistance with activity   - Consult OT/PT to assist with strengthening/mobility   - Keep Call bell within reach  - Keep bed low and locked with side rails adjusted as appropriate  - Keep care items and personal belongings within reach  - Initiate and maintain comfort rounds  - Make Fall Risk Sign visible to staff  - Offer Toileting every  Hours, in advance of need  - Initiate/Maintain alarm  - Obtain necessary fall risk management equipment  - Apply yellow socks and bracelet for high fall risk patients  - Consider moving patient to room near nurses station  Outcome: Progressing  Goal: Maintain or return to baseline ADL function  Description: INTERVENTIONS:  -  Assess patient's ability to carry out ADLs; assess patient's baseline for ADL function and identify physical deficits which impact ability to perform ADLs (bathing, care of mouth/teeth, toileting, grooming, dressing, etc.)  - Assess/evaluate cause of self-care deficits   - Assess range of motion  - Assess patient's mobility; develop plan if impaired  - Assess patient's need for assistive devices and provide as appropriate  - Encourage maximum independence but intervene and supervise when necessary  - Involve family in performance of ADLs  - Assess for home care needs following discharge   - Consider OT consult to assist with ADL evaluation and planning for discharge  - Provide patient education as appropriate  Outcome: Progressing  Goal: Maintains/Returns to pre admission functional level  Description: INTERVENTIONS:  - Perform BMAT or MOVE assessment daily.   - Set and communicate daily mobility goal to care team and patient/family/caregiver. - Collaborate with rehabilitation services on mobility goals if consulted  - Perform Range of Motion 3 times a day. - Reposition patient every 2 hours.   - Dangle patient 3 times a day  - Stand patient 3 times a day  - Ambulate patient 3 times a day  - Out of bed to chair 3 times a day   - Out of bed for meals 3 times a day  - Out of bed for toileting  - Record patient progress and toleration of activity level   Outcome: Progressing     Problem: DISCHARGE PLANNING  Goal: Discharge to home or other facility with appropriate resources  Description: INTERVENTIONS:  - Identify barriers to discharge w/patient and caregiver  - Arrange for needed discharge resources and transportation as appropriate  - Identify discharge learning needs (meds, wound care, etc.)  - Arrange for interpretive services to assist at discharge as needed  - Refer to Case Management Department for coordinating discharge planning if the patient needs post-hospital services based on physician/advanced practitioner order or complex needs related to functional status, cognitive ability, or social support system  Outcome: Progressing     Problem: Knowledge Deficit  Goal: Patient/family/caregiver demonstrates understanding of disease process, treatment plan, medications, and discharge instructions  Description: Complete learning assessment and assess knowledge base.   Interventions:  - Provide teaching at level of understanding  - Provide teaching via preferred learning methods  Outcome: Progressing

## 2023-09-12 NOTE — TELEPHONE ENCOUNTER
Daughter Aimee Bruno called wanting to speak to Rishabh Purcell or Katelin Harrell. Said she had been on hold for quite some time. Please call Aimee Bruno at your earliest convenience.

## 2023-09-12 NOTE — ASSESSMENT & PLAN NOTE
Continue home Eliquis and Toprol-XL 25 mg daily. Continue to hold Aldactone in setting of hyperkalemia.

## 2023-09-12 NOTE — PLAN OF CARE
Problem: GASTROINTESTINAL - ADULT  Goal: Maintains or returns to baseline bowel function  Description: INTERVENTIONS:  - Assess bowel function  - Encourage oral fluids to ensure adequate hydration  - Administer IV fluids if ordered to ensure adequate hydration  - Administer ordered medications as needed  - Encourage mobilization and activity  - Consider nutritional services referral to assist patient with adequate nutrition and appropriate food choices  Outcome: Progressing  Goal: Maintains adequate nutritional intake  Description: INTERVENTIONS:  - Monitor percentage of each meal consumed  - Identify factors contributing to decreased intake, treat as appropriate  - Assist with meals as needed  - Monitor I&O, weight, and lab values if indicated  - Obtain nutrition services referral as needed  Outcome: Progressing     Problem: INFECTION - ADULT  Goal: Absence or prevention of progression during hospitalization  Description: INTERVENTIONS:  - Assess and monitor for signs and symptoms of infection  - Monitor lab/diagnostic results  - Monitor all insertion sites, i.e. indwelling lines, tubes, and drains  - Monitor endotracheal if appropriate and nasal secretions for changes in amount and color  - Orient appropriate cooling/warming therapies per order  - Administer medications as ordered  - Instruct and encourage patient and family to use good hand hygiene technique  - Identify and instruct in appropriate isolation precautions for identified infection/condition  Outcome: Progressing  Goal: Absence of fever/infection during neutropenic period  Description: INTERVENTIONS:  - Monitor WBC    Outcome: Progressing     Problem: SAFETY ADULT  Goal: Patient will remain free of falls  Description: INTERVENTIONS:  - Educate patient/family on patient safety including physical limitations  - Instruct patient to call for assistance with activity   - Consult OT/PT to assist with strengthening/mobility   - Keep Call bell within reach  - Keep bed low and locked with side rails adjusted as appropriate  - Keep care items and personal belongings within reach  - Initiate and maintain comfort rounds  - Make Fall Risk Sign visible to staff  - Apply yellow socks and bracelet for high fall risk patients  - Consider moving patient to room near nurses station  Outcome: Progressing  Goal: Maintain or return to baseline ADL function  Description: INTERVENTIONS:  -  Assess patient's ability to carry out ADLs; assess patient's baseline for ADL function and identify physical deficits which impact ability to perform ADLs (bathing, care of mouth/teeth, toileting, grooming, dressing, etc.)  - Assess/evaluate cause of self-care deficits   - Assess range of motion  - Assess patient's mobility; develop plan if impaired  - Assess patient's need for assistive devices and provide as appropriate  - Encourage maximum independence but intervene and supervise when necessary  - Involve family in performance of ADLs  - Assess for home care needs following discharge   - Consider OT consult to assist with ADL evaluation and planning for discharge  - Provide patient education as appropriate  Outcome: Progressing  Goal: Maintains/Returns to pre admission functional level  Description: INTERVENTIONS:  - Perform BMAT or MOVE assessment daily.   - Set and communicate daily mobility goal to care team and patient/family/caregiver. - Collaborate with rehabilitation services on mobility goals if consulted  - Perform Range of Motion 3 times a day. - Reposition patient every 2 hours.   - Dangle patient 3 times a day  - Stand patient 3 times a day  - Ambulate patient 3 times a day  - Out of bed to chair 3 times a day   - Out of bed for meals 3 times a day  - Out of bed for toileting  - Record patient progress and toleration of activity level   Outcome: Progressing     Problem: DISCHARGE PLANNING  Goal: Discharge to home or other facility with appropriate resources  Description: INTERVENTIONS:  - Identify barriers to discharge w/patient and caregiver  - Arrange for needed discharge resources and transportation as appropriate  - Identify discharge learning needs (meds, wound care, etc.)  - Arrange for interpretive services to assist at discharge as needed  - Refer to Case Management Department for coordinating discharge planning if the patient needs post-hospital services based on physician/advanced practitioner order or complex needs related to functional status, cognitive ability, or social support system  Outcome: Progressing     Problem: Knowledge Deficit  Goal: Patient/family/caregiver demonstrates understanding of disease process, treatment plan, medications, and discharge instructions  Description: Complete learning assessment and assess knowledge base.   Interventions:  - Provide teaching at level of understanding  - Provide teaching via preferred learning methods  Outcome: Progressing

## 2023-09-12 NOTE — ASSESSMENT & PLAN NOTE
Patient presents for diarrhea  CT abdomen pelvis on admission showin. No acute findings in the abdomen or pelvis. 2.  Stable pancreatic cysts. 3.  Increased size of now 3.5 cm simple appearing right adnexal cyst. Recommend yearly follow-up. Patient admits to having a recent course of antibiotics for UTI was placed on Levaquin 250 mg daily for 10 days and fluconazole 100 mg daily for 10 days IVF fluids. C.diff panel, stool counts and stool ova parasites pending. Patient reports that she has had one bowel movement today, non-watery. Continue to hold antibiotics. Repleted Mg today, at 1.6 from 2.1 yesterday. Continue to trend electrolytes with am BMP.

## 2023-09-13 ENCOUNTER — RA CDI HCC (OUTPATIENT)
Dept: OTHER | Facility: HOSPITAL | Age: 83
End: 2023-09-13

## 2023-09-13 PROBLEM — N17.9 AKI (ACUTE KIDNEY INJURY) (HCC): Status: RESOLVED | Noted: 2023-04-16 | Resolved: 2023-09-13

## 2023-09-13 PROBLEM — E87.5 HYPERKALEMIA: Status: RESOLVED | Noted: 2023-09-11 | Resolved: 2023-09-13

## 2023-09-13 PROBLEM — R21 RASH: Status: ACTIVE | Noted: 2023-09-13

## 2023-09-13 LAB
ANION GAP SERPL CALCULATED.3IONS-SCNC: 4 MMOL/L
BUN SERPL-MCNC: 18 MG/DL (ref 5–25)
CALCIUM SERPL-MCNC: 8.2 MG/DL (ref 8.4–10.2)
CHLORIDE SERPL-SCNC: 102 MMOL/L (ref 96–108)
CO2 SERPL-SCNC: 27 MMOL/L (ref 21–32)
CREAT SERPL-MCNC: 0.85 MG/DL (ref 0.6–1.3)
ERYTHROCYTE [DISTWIDTH] IN BLOOD BY AUTOMATED COUNT: 15 % (ref 11.6–15.1)
GFR SERPL CREATININE-BSD FRML MDRD: 62 ML/MIN/1.73SQ M
GLUCOSE SERPL-MCNC: 96 MG/DL (ref 65–140)
HCT VFR BLD AUTO: 34.5 % (ref 34.8–46.1)
HGB BLD-MCNC: 12.2 G/DL (ref 11.5–15.4)
MAGNESIUM SERPL-MCNC: 2.2 MG/DL (ref 1.9–2.7)
MCH RBC QN AUTO: 35.1 PG (ref 26.8–34.3)
MCHC RBC AUTO-ENTMCNC: 35.4 G/DL (ref 31.4–37.4)
MCV RBC AUTO: 99 FL (ref 82–98)
PLATELET # BLD AUTO: 161 THOUSANDS/UL (ref 149–390)
PMV BLD AUTO: 10.3 FL (ref 8.9–12.7)
POTASSIUM SERPL-SCNC: 4.7 MMOL/L (ref 3.5–5.3)
RBC # BLD AUTO: 3.48 MILLION/UL (ref 3.81–5.12)
SODIUM SERPL-SCNC: 133 MMOL/L (ref 135–147)
WBC # BLD AUTO: 4.5 THOUSAND/UL (ref 4.31–10.16)

## 2023-09-13 PROCEDURE — 97166 OT EVAL MOD COMPLEX 45 MIN: CPT

## 2023-09-13 PROCEDURE — 85027 COMPLETE CBC AUTOMATED: CPT | Performed by: INTERNAL MEDICINE

## 2023-09-13 PROCEDURE — 97163 PT EVAL HIGH COMPLEX 45 MIN: CPT

## 2023-09-13 PROCEDURE — 83735 ASSAY OF MAGNESIUM: CPT | Performed by: INTERNAL MEDICINE

## 2023-09-13 PROCEDURE — 80048 BASIC METABOLIC PNL TOTAL CA: CPT | Performed by: INTERNAL MEDICINE

## 2023-09-13 PROCEDURE — 99232 SBSQ HOSP IP/OBS MODERATE 35: CPT | Performed by: NURSE PRACTITIONER

## 2023-09-13 PROCEDURE — 97129 THER IVNTJ 1ST 15 MIN: CPT

## 2023-09-13 RX ORDER — PREDNISONE 20 MG/1
20 TABLET ORAL DAILY
Status: DISCONTINUED | OUTPATIENT
Start: 2023-09-13 | End: 2023-09-14 | Stop reason: HOSPADM

## 2023-09-13 RX ORDER — DIAPER,BRIEF,INFANT-TODD,DISP
EACH MISCELLANEOUS 3 TIMES DAILY
Status: DISCONTINUED | OUTPATIENT
Start: 2023-09-13 | End: 2023-09-14 | Stop reason: HOSPADM

## 2023-09-13 RX ORDER — FLUCONAZOLE 100 MG/1
100 TABLET ORAL DAILY
Status: DISCONTINUED | OUTPATIENT
Start: 2023-09-13 | End: 2023-09-14 | Stop reason: HOSPADM

## 2023-09-13 RX ADMIN — PRAVASTATIN SODIUM 40 MG: 40 TABLET ORAL at 17:01

## 2023-09-13 RX ADMIN — HYDROXYZINE HYDROCHLORIDE 10 MG: 10 TABLET ORAL at 13:50

## 2023-09-13 RX ADMIN — HYDROCORTISONE: 1 CREAM TOPICAL at 21:14

## 2023-09-13 RX ADMIN — Medication 250 MG: at 09:30

## 2023-09-13 RX ADMIN — METOPROLOL SUCCINATE 25 MG: 25 TABLET, FILM COATED, EXTENDED RELEASE ORAL at 09:30

## 2023-09-13 RX ADMIN — PANTOPRAZOLE SODIUM 40 MG: 40 TABLET, DELAYED RELEASE ORAL at 09:30

## 2023-09-13 RX ADMIN — ALLOPURINOL 100 MG: 100 TABLET ORAL at 09:30

## 2023-09-13 RX ADMIN — LEVOTHYROXINE SODIUM 50 MCG: 50 TABLET ORAL at 05:02

## 2023-09-13 RX ADMIN — APIXABAN 2.5 MG: 2.5 TABLET, FILM COATED ORAL at 17:01

## 2023-09-13 RX ADMIN — HYDROCORTISONE: 1 CREAM TOPICAL at 12:12

## 2023-09-13 RX ADMIN — PANTOPRAZOLE SODIUM 40 MG: 40 TABLET, DELAYED RELEASE ORAL at 21:14

## 2023-09-13 RX ADMIN — Medication 250 MG: at 17:01

## 2023-09-13 RX ADMIN — FLUCONAZOLE 100 MG: 100 TABLET ORAL at 12:12

## 2023-09-13 RX ADMIN — HYDROCORTISONE: 1 CREAM TOPICAL at 17:01

## 2023-09-13 RX ADMIN — APIXABAN 2.5 MG: 2.5 TABLET, FILM COATED ORAL at 09:30

## 2023-09-13 RX ADMIN — GLYCERIN 1 DROP: .002; .002; .01 SOLUTION/ DROPS OPHTHALMIC at 17:01

## 2023-09-13 RX ADMIN — ASPIRIN 81 MG CHEWABLE TABLET 81 MG: 81 TABLET CHEWABLE at 09:30

## 2023-09-13 RX ADMIN — PREDNISONE 20 MG: 20 TABLET ORAL at 18:50

## 2023-09-13 NOTE — ASSESSMENT & PLAN NOTE
Developed pruritic rash to bilateral hands, right > left  Hx of intermittent rashes  Previously seen by dermatology  Trial hydrocortisone cream and Atarax  Consider Prednisone if no improvement  Outpatient dermatology follow-up

## 2023-09-13 NOTE — PROGRESS NOTES
[address] [facility]  Progress Note  Name: Derick Ibarra  MRN: [id number]  Unit/Bed#: 2 [address] Date of Admission: 2023   Date of Service: 2023 I Hospital Day: 2    Assessment/Plan   * Diarrhea of presumed infectious origin  Assessment & Plan  Patient presents for diarrhea  CT abdomen pelvis on admission showin. No acute findings in the abdomen or pelvis. 2.  Stable pancreatic cysts. 3.  Increased size of now 3.5 cm simple appearing right adnexal cyst. Recommend yearly follow-up. Patient admits to having a recent course of antibiotics for UTI was placed on Levaquin 250 mg daily for 10 days and fluconazole 100 mg daily for 10 days  S/p IVF fluids  Continue to hold antibiotics  Continue to trend electrolytes with am BMP  Encourage oral intake  probiotics  OK to d/c C diff order     LAKE (acute kidney injury) (HCC)-resolved as of 2023  Assessment & Plan  Patient presents with LAKE on CKD stage IIIb. Admission creatinine 1.44, Cr improved to 0.92 -> 0.85. Baseline creatinine 0.7. Suspect in setting of dehydration from diarrhea. Continue to trend with am BMP. No longer on bicarb drip. Holding Aldactone     Rash  Assessment & Plan  Developed pruritic rash to bilateral hands, right > left  Hx of intermittent rashes  Previously seen by dermatology  Trial hydrocortisone cream and Atarax  Consider Prednisone if no improvement  Outpatient dermatology follow-up      TIA (transient ischemic attack)  Assessment & Plan  Continue aspirin 81 mg daily, statin, Eliquis    Hypertension  Assessment & Plan  Continue Toprol-XL. Continue to hold Aldactone in setting of hyperkalemia/ dehydration    Atrial fibrillation (HCC)  Assessment & Plan  Continue home Eliquis and Toprol-XL 25 mg daily. Continue to hold Aldactone in setting of hyperkalemia. Hyperkalemia-resolved as of 2023  Assessment & Plan  Potassium on admission 6.8, improved to 5.0 -> 4.7  S/p bicarb drip.   No peaked T waves on admission ekg. VTE Pharmacologic Prophylaxis:   Moderate Risk (Score 3-4) - Pharmacological DVT Prophylaxis Ordered: apixaban (Eliquis). Patient Centered Rounds: I performed bedside rounds with nursing staff today. Discussions with Specialists or Other Care Team Provider: nursing, CM    Education and Discussions with Family / Patient: Updated  (daughter and granddaughter) at bedside. Total Time Spent on Date of Encounter in care of patient: 40 mins. This time was spent on one or more of the following: performing physical exam; counseling and coordination of care; obtaining or reviewing history; documenting in the medical record; reviewing/ordering tests, medications or procedures; communicating with other healthcare professionals and discussing with patient's family/caregivers. Current Length of Stay: 2 day(s)  Current Patient Status: Inpatient   Certification Statement: The patient will continue to require additional inpatient hospital stay due to generalized weakness, hyponatremia, rash  Discharge Plan: Anticipate discharge tomorrow to home. Code Status: Level 1 - Full Code    Subjective:   Patient seen and examined at bedside. Daughter and granddaughter at bedside. Patient reports no further diarrhea. She reports some generalized weakness. Reports an itchy rash to bilateral hands which started last night at 7pm. She reports an intermittent rash to vaginal area for which she was on a course of Diflucan. Also reports bilateral foot rash for which she saw dermatology in the past. Currently, eating and drinking okay. No HA, dizziness, CP, or SOB. Objective:     Vitals:   Temp (24hrs), Av.9 °F (36.6 °C), Min:97.6 °F (36.4 °C), Max:98.3 °F (36.8 °C)    Temp:  [97.6 °F (36.4 °C)-98.3 °F (36.8 °C)] 97.7 °F (36.5 °C)  HR:  [65-80] 65  Resp:  [18] 18  BP: (115-131)/(75-99) 115/75  SpO2:  [99 %-100 %] 99 %  Body mass index is 19.22 kg/m².      Input and Output Summary (last 24 hours): Intake/Output Summary (Last 24 hours) at 9/13/2023 1541  Last data filed at 9/13/2023 0900  Gross per 24 hour   Intake 300 ml   Output --   Net 300 ml       Physical Exam:   Physical Exam  Vitals and nursing note reviewed. Constitutional:       General: She is not in acute distress. Appearance: She is not toxic-appearing or diaphoretic. HENT:      Head: Normocephalic. Mouth/Throat:      Mouth: Mucous membranes are moist.   Eyes:      Conjunctiva/sclera: Conjunctivae normal.   Cardiovascular:      Rate and Rhythm: Normal rate. Pulmonary:      Effort: Pulmonary effort is normal.      Breath sounds: Normal breath sounds. No wheezing, rhonchi or rales. Abdominal:      General: Bowel sounds are normal. There is no distension. Palpations: Abdomen is soft. Tenderness: There is no abdominal tenderness. Musculoskeletal:         General: Normal range of motion. Cervical back: Normal range of motion. Right lower leg: No edema. Left lower leg: No edema. Skin:     General: Skin is warm and dry. Capillary Refill: Capillary refill takes less than 2 seconds. Comments: Bilateral hand with red rash, see picture attached    Neurological:      Mental Status: She is alert and oriented to person, place, and time. Mental status is at baseline. Motor: Weakness (generalized weakness ) present. Psychiatric:         Mood and Affect: Mood normal.         Behavior: Behavior normal.         Thought Content:  Thought content normal.         Judgment: Judgment normal.              Additional Data:     Labs:  Results from last 7 days   Lab Units 09/13/23  0503 09/12/23  0524 09/11/23  1439   WBC Thousand/uL 4.50   < > 5.28   HEMOGLOBIN g/dL 12.2   < > 16.0*   HEMATOCRIT % 34.5*   < > 47.2*   PLATELETS Thousands/uL 161   < > 231   NEUTROS PCT %  --   --  72   LYMPHS PCT %  --   --  19   MONOS PCT %  --   --  7   EOS PCT %  --   --  1    < > = values in this interval not displayed. Results from last 7 days   Lab Units 09/13/23  0503 09/12/23  1515 09/12/23  0524 09/11/23  1522 09/11/23  1439   SODIUM mmol/L 133*   < > 133*   < > 129*   POTASSIUM mmol/L 4.7   < > 5.0   < > 6.5*   CHLORIDE mmol/L 102   < > 106   < > 102   CO2 mmol/L 27   < > 18*   < > 17*   BUN mg/dL 18   < > 27*   < > 45*   CREATININE mg/dL 0.85   < > 0.92   < > 1.44*   ANION GAP mmol/L 4   < > 9   < > 10   CALCIUM mg/dL 8.2*   < > 8.2*   < > 9.7   ALBUMIN g/dL  --   --  3.1*  --  4.9   TOTAL BILIRUBIN mg/dL  --   --   --   --  0.68   ALK PHOS U/L  --   --   --   --  74   ALT U/L  --   --   --   --  27   AST U/L  --   --   --   --  32   GLUCOSE RANDOM mg/dL 96   < > 64*   < > 96    < > = values in this interval not displayed.          Results from last 7 days   Lab Units 09/11/23  1754   POC GLUCOSE mg/dl 95         Results from last 7 days   Lab Units 09/11/23  1522   PROCALCITONIN ng/ml 0.06       Lines/Drains:  Invasive Devices     Peripheral Intravenous Line  Duration           Peripheral IV 09/11/23 Left Forearm 1 day                      Imaging: Reviewed radiology reports from this admission including: abdominal/pelvic CT    Recent Cultures (last 7 days):         Last 24 Hours Medication List:   Current Facility-Administered Medications   Medication Dose Route Frequency Provider Last Rate   • allopurinol  100 mg Oral Daily Radha Ott, DO     • apixaban  2.5 mg Oral BID Radha Ott, DO     • aspirin  81 mg Oral Daily Radha Toaustin, DO     • fluconazole  100 mg Oral Daily JORDAN Schulz     • hydrocortisone   Topical TID JORDAN Schulz     • hydrOXYzine HCL  10 mg Oral Q6H PRN Radha Ott, DO     • levothyroxine  50 mcg Oral Early Morning Radha Toaustin, DO     • metoprolol succinate  25 mg Oral Daily Radha Ott, DO     • pantoprazole  40 mg Oral BID Radha Ott, DO     • pravastatin  40 mg Oral Daily With [employer] DO     • saccharomyces boulardii  250 mg Oral BID Radha DO [place]          Today, Patient Was Seen By: JORDAN Horn    **Please Note: This note may have been constructed using a voice recognition system. **

## 2023-09-13 NOTE — PLAN OF CARE
Problem: GASTROINTESTINAL - ADULT  Goal: Maintains or returns to baseline bowel function  Description: INTERVENTIONS:  - Assess bowel function  - Encourage oral fluids to ensure adequate hydration  - Administer IV fluids if ordered to ensure adequate hydration  - Administer ordered medications as needed  - Encourage mobilization and activity  - Consider nutritional services referral to assist patient with adequate nutrition and appropriate food choices  Outcome: Progressing  Goal: Maintains adequate nutritional intake  Description: INTERVENTIONS:  - Monitor percentage of each meal consumed  - Identify factors contributing to decreased intake, treat as appropriate  - Assist with meals as needed  - Monitor I&O, weight, and lab values if indicated  - Obtain nutrition services referral as needed  Outcome: Progressing     Problem: INFECTION - ADULT  Goal: Absence or prevention of progression during hospitalization  Description: INTERVENTIONS:  - Assess and monitor for signs and symptoms of infection  - Monitor lab/diagnostic results  - Monitor all insertion sites, i.e. indwelling lines, tubes, and drains  - Monitor endotracheal if appropriate and nasal secretions for changes in amount and color  - Nahma appropriate cooling/warming therapies per order  - Administer medications as ordered  - Instruct and encourage patient and family to use good hand hygiene technique  - Identify and instruct in appropriate isolation precautions for identified infection/condition  Outcome: Progressing  Goal: Absence of fever/infection during neutropenic period  Description: INTERVENTIONS:  - Monitor WBC    Outcome: Progressing     Problem: SAFETY ADULT  Goal: Patient will remain free of falls  Description: INTERVENTIONS:  - Educate patient/family on patient safety including physical limitations  - Instruct patient to call for assistance with activity   - Consult OT/PT to assist with strengthening/mobility   - Keep Call bell within reach  - Keep bed low and locked with side rails adjusted as appropriate  - Keep care items and personal belongings within reach  - Initiate and maintain comfort rounds  - Make Fall Risk Sign visible to staff  - Offer Toileting every 2 Hours, in advance of need  - Initiate/Maintain bed alarm  - Obtain necessary fall risk management equipment: yellow socks  - Apply yellow socks and bracelet for high fall risk patients  - Consider moving patient to room near nurses station  Outcome: Progressing  Goal: Maintain or return to baseline ADL function  Description: INTERVENTIONS:  -  Assess patient's ability to carry out ADLs; assess patient's baseline for ADL function and identify physical deficits which impact ability to perform ADLs (bathing, care of mouth/teeth, toileting, grooming, dressing, etc.)  - Assess/evaluate cause of self-care deficits   - Assess range of motion  - Assess patient's mobility; develop plan if impaired  - Assess patient's need for assistive devices and provide as appropriate  - Encourage maximum independence but intervene and supervise when necessary  - Involve family in performance of ADLs  - Assess for home care needs following discharge   - Consider OT consult to assist with ADL evaluation and planning for discharge  - Provide patient education as appropriate  Outcome: Progressing  Goal: Maintains/Returns to pre admission functional level  Description: INTERVENTIONS:  - Perform BMAT or MOVE assessment daily.   - Set and communicate daily mobility goal to care team and patient/family/caregiver. - Collaborate with rehabilitation services on mobility goals if consulted  - Perform Range of Motion 3 times a day. - Reposition patient every 2 hours.   - Dangle patient 3 times a day  - Stand patient 3 times a day  - Ambulate patient 3 times a day  - Out of bed to chair 3 times a day   - Out of bed for meals 3 times a day  - Out of bed for toileting  - Record patient progress and toleration of activity level   Outcome: Progressing     Problem: DISCHARGE PLANNING  Goal: Discharge to home or other facility with appropriate resources  Description: INTERVENTIONS:  - Identify barriers to discharge w/patient and caregiver  - Arrange for needed discharge resources and transportation as appropriate  - Identify discharge learning needs (meds, wound care, etc.)  - Arrange for interpretive services to assist at discharge as needed  - Refer to Case Management Department for coordinating discharge planning if the patient needs post-hospital services based on physician/advanced practitioner order or complex needs related to functional status, cognitive ability, or social support system  Outcome: Progressing     Problem: Knowledge Deficit  Goal: Patient/family/caregiver demonstrates understanding of disease process, treatment plan, medications, and discharge instructions  Description: Complete learning assessment and assess knowledge base. Interventions:  - Provide teaching at level of understanding  - Provide teaching via preferred learning methods  Outcome: Progressing     Problem: Nutrition/Hydration-ADULT  Goal: Nutrient/Hydration intake appropriate for improving, restoring or maintaining nutritional needs  Description: Monitor and assess patient's nutrition/hydration status for malnutrition. Collaborate with interdisciplinary team and initiate plan and interventions as ordered. Monitor patient's weight and dietary intake as ordered or per policy. Utilize nutrition screening tool and intervene as necessary. Determine patient's food preferences and provide high-protein, high-caloric foods as appropriate.      INTERVENTIONS:  - Monitor oral intake, urinary output, labs, and treatment plans  - Assess nutrition and hydration status and recommend course of action  - Evaluate amount of meals eaten  - Assist patient with eating if necessary   - Allow adequate time for meals  - Recommend/ encourage appropriate diets, oral nutritional supplements, and vitamin/mineral supplements  - Order, calculate, and assess calorie counts as needed  - Recommend, monitor, and adjust tube feedings and TPN/PPN based on assessed needs  - Assess need for intravenous fluids  - Provide specific nutrition/hydration education as appropriate  - Include patient/family/caregiver in decisions related to nutrition  Outcome: Progressing

## 2023-09-13 NOTE — ASSESSMENT & PLAN NOTE
Patient presents with LAKE on CKD stage IIIb. Admission creatinine 1.44, Cr improved to 0.92 -> 0.85. Baseline creatinine 0.7. Suspect in setting of dehydration from diarrhea. Continue to trend with am BMP. No longer on bicarb drip.   Holding Aldactone

## 2023-09-13 NOTE — ASSESSMENT & PLAN NOTE
Potassium on admission 6.8, improved to 5.0 -> 4.7  S/p bicarb drip. No peaked T waves on admission ekg.

## 2023-09-13 NOTE — PHYSICAL THERAPY NOTE
PT EVALUATION       09/13/23 1155   PT Last Visit   PT Visit Date 09/13/23   Note Type   Note type Evaluation   Pain Assessment   Pain Assessment Tool 0-10   Pain Score No Pain   Patient's Stated Pain Goal No pain   Hospital Pain Intervention(s) Ambulation/increased activity   Multiple Pain Sites No   Restrictions/Precautions   Weight Bearing Precautions Per Order No   Other Precautions Fall Risk;Pain   Home Living   Type of [address] Two level;Bed/bath upstairs;Stairs to enter with rails  (4 DARYA)   Bathroom Equipment Grab bars in shower;Grab bars around toilet   Home Equipment Walker;Cane   Prior Function   Level of [place] Independent with ADLs; Independent with functional mobility; Independent with IADLS   Lives With Marsha Demark Help From Family   IADLs Independent with driving; Independent with meal prep; Independent with medication management   Falls in the last 6 months 1 to 4  (Family reports 1 fall a few days after she was d/c from hospital)   Vocational Retired   General   Additional Pertinent History Pt is an 78year-old female who was admitted to the hospital on 9/11/23 due to diarrhea, rule out c diff. Family/Caregiver Present Yes  (dtrs + granddtr at bedside throughout session)   Cognition   Overall Cognitive Status WFL   Arousal/Participation Cooperative   Orientation Level Oriented X4   Following Commands Follows multistep commands with increased time or repetition   Subjective   Subjective "I feel much better"   RLE Assessment   RLE Assessment WFL   LLE Assessment   LLE Assessment WFL   Bed Mobility   Supine to Sit 7  Independent   Sit to Supine 7  Independent   Transfers   Sit to Stand 5  Supervision   Stand to Sit 5  Supervision   Ambulation/Elevation   Gait pattern Step through pattern; Wide HUSSAIN   Gait Assistance 5  Supervision   Assistive Device None   Distance 200 feet   Stair Management Assistance 5  Supervision   Additional items Verbal cues   Stair Management Technique One rail L;Step to pattern   Number of Stairs 4   Balance   Static Sitting Good   Dynamic Sitting Fair +   Static Standing Fair   Dynamic Standing Fair   Ambulatory Fair   Activity Tolerance   Activity Tolerance Patient tolerated treatment well   Nurse Made Aware yes KSENIA Johnson   Assessment   Prognosis Good   Problem List Decreased endurance; Impaired balance;Decreased mobility; Decreased safety awareness;Pain   Assessment Patient seen for Physical Therapy evaluation. Patient admitted with Diarrhea of presumed infectious origin. Comorbidities affecting patient's physical performance include: Afib, LAKE, arthritis, cardiac disease, CKD, hypertension, hypothyroid, osteoarthritis, osteoporosis and TIA. Personal factors affecting patient at time of initial evaluation include: lives in 2 story house, stairs to enter home and positive fall history. Prior to admission, patient was independent with functional mobility without assistive device, independent with ADLS, independent with IADLS, living with son in a 2 level home with 4 steps to enter, ambulating household distance and ambulating community distances. Please find objective findings from Physical Therapy assessment regarding body systems outlined above with impairments and limitations including weakness, impaired balance, decreased endurance, gait deviations, decreased functional mobility tolerance and fall risk. The Barthel Index was used as a functional outcome tool presenting with a score of Barthel Index Score: 75 today indicating moderate limitations of functional mobility and ADLS. Patient's clinical presentation is currently unstable/unpredictable as seen in patient's presentation of increased fall risk and decreased endurance. Pt would benefit from continued Physical Therapy treatment to address deficits as defined above and maximize level of functional mobility.  As demonstrated by objective findings, the assigned level of complexity for this evaluation is high.The patient's AM-PAC Basic Mobility Inpatient Short Form Raw Score is 20. A Raw score of greater than 16 suggests the patient may benefit from discharge to home. Please also refer to the recommendation of the Physical Therapist for safe discharge planning. Goals   Patient Goals to go home   STG Expiration Date 09/20/23   Short Term Goal #1 Pt will perform all bed mobility/transfers IND ; Pt will ambulate x 250 feet IND ; Pt will negotiate x 12 steps with supervision ; Standing balance will improve to Fair+ to decrease risk of falls   LTG Expiration Date 09/27/23   Long Term Goal #1 Pt will ambulate x 500 feet IND ; Pt will negotiate x 12 steps Mod I with rail ; Standing balance will improve to Good to decrease risk of falls ; AMPAC score will improve >22/24 to demonstrate improved functional independence   Plan   Treatment/Interventions Functional transfer training;LE strengthening/ROM; Endurance training; Therapeutic exercise;Gait training;Spoke to nursing   PT Frequency 3-5x/wk   Recommendation   PT Discharge Recommendation No rehabilitation needs   Additional Comments Discussed bringing cane with her out of the house to decrease risk of falls - pt verbalizes understanding   AM-PAC Basic Mobility Inpatient   Turning in Flat Bed Without Bedrails 4   Lying on Back to Sitting on Edge of Flat Bed Without Bedrails 4   Moving Bed to Chair 3   Standing Up From Chair Using Arms 3   Walk in Room 3   Climb 3-5 Stairs With Railing 3   Basic Mobility Inpatient Raw Score 20   Basic Mobility Standardized Score 43.99   Highest Level Of Mobility   JH-HLM Goal 6: Walk 10 steps or more   JH-HLM Achieved 7: Walk 25 feet or more   Barthel Index   Feeding 10   Bathing 5   Grooming Score 5   Dressing Score 5   Bladder Score 10   Bowels Score 10   Toilet Use Score 5   Transfers (Bed/Chair) Score 10   Mobility (Level Surface) Score 10   Stairs Score 5   Barthel Index Score 75   End of Consult   Patient Position at End of Consult Supine; All needs within reach   [employer] Number  Bhavani Susanna [id number]

## 2023-09-13 NOTE — CASE MANAGEMENT
Case Management Assessment & Discharge Planning Note    Patient name Darvin Allen  Location [address] [place], [place] [address] [facility]  Betzaida Charles MRN [id number]  : 1940 Date 2023       Current Admission Date: 2023  Current Admission Diagnosis:Diarrhea of presumed infectious origin   Patient Active Problem List    Diagnosis Date Noted   • Rash 2023   • Diarrhea of presumed infectious origin 2023   • Urinary incontinence, post-void dribbling 2023   • Pruritus 2023   • Aneurysm of ascending aorta without rupture ([address]) 2023   • Edema 2023   • Severe protein-calorie malnutrition ([address]) 2023   • Raynaud's disease without gangrene    • Alcoholic liver disease ([address]) 2023   • Abnormal liver enzymes 2022   • Stage 3 chronic kidney disease, unspecified whether stage 3a or 3b CKD ([address]) 2022   • TIA (transient ischemic attack) 2022   • Pancreatic cyst 2022   • Abdominal distention 2022   • Esophageal dysphagia 2022   • Pulmonary hypertension, unspecified ([address]) 2021   • Thoracic aortic aneurysm without rupture, unspecified part ([address])    • Age-related osteoporosis without current pathological fracture 2020   • Gout, arthropathy 2019   • Tricuspid incompetence 2019   • Atherosclerosis of native coronary artery of native heart without angina pectoris 2019   • History of cerebrovascular accident 2019   • Long term current use of anticoagulant 2019   • Long term current use of aspirin 2019   • Pure hypercholesterolemia 2019   • Knee osteoarthritis 2015   • Anemia 10/31/2014   • Atrial fibrillation ([address]) 10/31/2014   • Hyperlipidemia 10/16/2014   • Hypertension 10/13/2014   • Hypothyroidism 10/13/2014      LOS (days): 2  Geometric Mean LOS (GMLOS) (days): 3.10  Days to GMLOS:1.1     OBJECTIVE:    Risk of Unplanned Readmission Score: 19.35     Current admission status: Inpatient    Preferred Pharmacy:   [facility], [address]  [address] [place] [id number]  Phone: [phone] Fax: [address] [facility], [address]  [address] [address]  Phone: [phone] Fax: [phone]    Primary Care Provider: JORDAN Wilson    Primary Insurance: [facility]  Secondary Insurance:     ASSESSMENT:  Teresa Proxies    There are no active Health Care Proxies on file. Advance Directives  Does patient have a Health Care POA?: Yes  Does patient have Advance Directives?: Yes  Advance Directives: Power of  for health care, Living will (on chart)  Primary Contact: Susie Jimenez     Readmission Root Cause  30 Day Readmission: No    Patient Information  Admitted from[de-identified] Home  Mental Status: Alert  During Assessment patient was accompanied by: Other-Comment (granddaughter)  Assessment information provided by[de-identified] Patient, Other - please comment (granddaughter)  Primary Caregiver: Family  Caregiver's Name[de-identified] Farnaz Scriver  Caregiver's Relationship to Patient[de-identified] Family Member (daughter)  Caregiver's Telephone Number[de-identified] [phone]  Support Systems: Son, Daughter, Family members  [place] of Residence: [address] [place] [address] do you live in?: [employer] entry access options.  Select all that apply.: Stairs  Number of steps to enter home.: 4  Do the steps have railings?: Yes  Type of Current Residence: 2 [address] home  Upon entering residence, is there a bedroom on the main floor (no further steps)?: No  A bedroom is located on the following floor levels of residence (select all that apply):: 2nd Floor  Upon entering residence, is there a bathroom on the main floor (no further steps)?: No  Indicate which floors of current residence have a bathroom (select all the apply):: 2nd Floor  Number of steps to 2nd floor from main floor: One Flight  In the last 12 months, was there a time when you were not able to pay the mortgage or rent on time?: No  In the last 12 months, how many places have you lived?: 1  In the last 12 months, was there a time when you did not have a steady place to sleep or slept in a shelter (including now)?: No  Homeless/housing insecurity resource given?: N/A  Living Arrangements: Lives w/ Son    Activities of Daily Living Prior to Admission  Functional Status: Independent  Completes ADLs independently?: Yes  Ambulates independently?: Yes  Does patient use assisted devices?: No  Does patient currently own DME?: Yes  What DME does the patient currently own?: [employer] [place]  Does patient have a history of Outpatient Therapy (PT/OT)?: Yes  Does the patient have a history of Short-Term Rehab?: Yes  Does patient have a history of HHC?: No  Does patient currently have [address]?: No    Patient Information Continued  Income Source: Pension/[employer]  Does patient have prescription coverage?: Yes ([address])  Within the past 12 months, you worried that your food would run out before you got the money to buy more.: Never true  Within the past 12 months, the food you bought just didn't last and you didn't have money to get more.: Never true  Food insecurity resource given?: N/A  Does patient receive dialysis treatments?: No    Means of Transportation  Means of Transport to Appts[de-identified] Drives Self (recommendation by OT not to drive, family aware, family available to assist)  In the past 12 months, has lack of transportation kept you from medical appointments or from getting medications?: No  In the past 12 months, has lack of transportation kept you from meetings, work, or from getting things needed for daily living?: No  Was application for public transport provided?: N/A    DISCHARGE DETAILS:    Discharge planning discussed with[de-identified] Patient and granddaughter, Jasen Sullivan  [place] of Choice: Yes  Comments - Freedom of Choice: SW met with pt and granddaughter assess needs and discuss plans.   Pt is planning on returning home with her son when discharged. Per granddaughter family is in area and also able to assist pt if needed. Current therapy recommendation is for outpatient follow up. Pt and family aware and will consider option. Granddaughter called pt's daughter, Olvin English, during  visit to inquire about other questions or needs she may have. No other needs expressed by daughter at that time. Shortly after visit granddaughter approached SW at desk with a few additional questions including options for HHA. SW discussed option for private pay caregivers as well as services through [facility]. SW also offered that [employer] could assist with [employer] if desired. Granddaughter expressed that agency contact information would be helpful. SW offered to place on AVS.  SW offered ongoing support and assistance to granddaughter if needed. CM contacted family/caregiver?: Yes  Were Treatment Team discharge recommendations reviewed with patient/caregiver?: Yes  Did patient/caregiver verbalize understanding of patient care needs?: Yes  Were patient/caregiver advised of the risks associated with not following Treatment Team discharge recommendations?: Yes    Contacts  Patient Contacts: Fidencio Vera  Relationship to Patient[de-identified] Family (granddaughter)  Contact Method: In Person  Reason/Outcome: Continuity of Care, Discharge [address]         Is the patient interested in [facility] at discharge?: No    DME Referral Provided  Referral made for DME?: No    Other Referral/Resources/Interventions Provided:  Interventions:  Other (Specify) (Community resources)  Referral Comments: Contact information for [facility] placed on AVS as discussed with granddaughter    Treatment Team Recommendation: Home (with outpatient OT)  Discharge Destination Plan[de-identified] Home (with outpatient OT)  Transport at Discharge : Family    IMM Given (Date):: 09/11/23 (Initial)

## 2023-09-13 NOTE — PLAN OF CARE
Problem: PHYSICAL THERAPY ADULT  Goal: Performs mobility at highest level of function for planned discharge setting. See evaluation for individualized goals. Description: Treatment/Interventions: Functional transfer training, LE strengthening/ROM, Endurance training, Therapeutic exercise, Gait training, Spoke to nursing          See flowsheet documentation for full assessment, interventions and recommendations. Note: Prognosis: Good  Problem List: Decreased endurance, Impaired balance, Decreased mobility, Decreased safety awareness, Pain  Assessment: Patient seen for Physical Therapy evaluation. Patient admitted with Diarrhea of presumed infectious origin. Comorbidities affecting patient's physical performance include: Afib, LAKE, arthritis, cardiac disease, CKD, hypertension, hypothyroid, osteoarthritis, osteoporosis and TIA. Personal factors affecting patient at time of initial evaluation include: lives in 2 story house, stairs to enter home and positive fall history. Prior to admission, patient was independent with functional mobility without assistive device, independent with ADLS, independent with IADLS, living with son in a 2 level home with 4 steps to enter, ambulating household distance and ambulating community distances. Please find objective findings from Physical Therapy assessment regarding body systems outlined above with impairments and limitations including weakness, impaired balance, decreased endurance, gait deviations, decreased functional mobility tolerance and fall risk. The Barthel Index was used as a functional outcome tool presenting with a score of Barthel Index Score: 75 today indicating moderate limitations of functional mobility and ADLS. Patient's clinical presentation is currently unstable/unpredictable as seen in patient's presentation of increased fall risk and decreased endurance.  Pt would benefit from continued Physical Therapy treatment to address deficits as defined above and maximize level of functional mobility. As demonstrated by objective findings, the assigned level of complexity for this evaluation is high. The patient's AM-PAC Basic Mobility Inpatient Short Form Raw Score is 20. A Raw score of greater than 16 suggests the patient may benefit from discharge to home. Please also refer to the recommendation of the Physical Therapist for safe discharge planning. PT Discharge Recommendation: No rehabilitation needs    See flowsheet documentation for full assessment.

## 2023-09-13 NOTE — OCCUPATIONAL THERAPY NOTE
Occupational Therapy Evaluation     Patient Name: Maryellen LAZOX Date: 9/13/2023  Problem List  Principal Problem:    Diarrhea of presumed infectious origin  Active Problems:    Atrial fibrillation ([address])    Hypertension    TIA (transient ischemic attack)    Rash    Past Medical History  Past Medical History:   Diagnosis Date    Anemia     Arthritis     Atherosclerosis of native coronary artery of native heart without angina pectoris 02/06/2019    Cataract     Chronic atrial fibrillation (HCC)     Degeneration of cervical intervertebral disc     Diarrhea     onset 07/10    Disease of thyroid gland     Occipital infarction [facility])     Wrist fracture     right      Past Surgical History  Past Surgical History:   Procedure Laterality Date    COLON SURGERY      FL INJECTION LEFT HIP (NON ARTHROGRAM)  5/28/2021    KNEE ARTHROSCOPY      with medial meniscus repair     NC ARTHROCENTESIS ASPIR&/INJ MAJOR JT/BURSA W/O US Left 5/28/2021    Procedure: Hip corticosteroid injection ([id number]); Surgeon: Tim Riggs MD;  Location: [facility] MAIN OR;  Service: Pain Management            09/13/23 1535   OT Last Visit   OT Visit Date 09/13/23  (Wednesday)   Note Type   Note type Evaluation   Pain Assessment   Pain Assessment Tool 0-10   Pain Score No Pain   Pain Location/Orientation Orientation: Bilateral  (hands L>R)   Pain Radiating Towards itchy, burning   Patient's Stated Pain Goal No pain   Hospital Pain Intervention(s) Repositioned; Ambulation/increased activity; Emotional support   Restrictions/Precautions   Weight Bearing Precautions Per Order No   Other Precautions Fall Risk;Pain   Home Living   Type of [address] [place] [address] Two level;Bed/bath upstairs  (4 DARYA)   Bathroom Shower/Tub Tub/shower unit   Bathroom Toilet Standard   Bathroom Equipment Grab bars in shower; Shower chair;Commode;Grab bars around toilet   [address] [place];Cane;Grab bars; Other (Comment)  (pt reports she sent the stair glide back)   Additional Comments Pt reports living w/ her son in [address] [place] [address]   Prior Function   Level of Friona Independent with ADLs; Independent with functional mobility; Independent with IADLS  (+ drive)   Lives With Zak Dutta Help From Family   IADLs Independent with driving; Independent with meal prep; Independent with medication management   Falls in the last 6 months 1 to 4  (tripped outside getting the mail)   Vocational Retired   Comments Pt reports I w/ ADLs at baseline w/ out use of AD or DME   Lifestyle   Autonomy Pt reports I w/ ADLs w/ out use of AD   Reciprocal Relationships Pt reports living w/ her son. Raised 5 children. 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 Pt added that she reads 1-2 novels a week   General   Additional Pertinent History Significant PMH impacting her occupational performance includes HTN, a-fib, hx R wrist fracture, L hip fracture. Personal and environmental factors impacting performance includes multi level home, advanced age; supports / strengths include supportive family, I at baseline. Family/Caregiver Present Yes   Subjective   Subjective "I need to use the bathroom"   ADL   Where Assessed Edge of bed  (vs bathroom)   Eating Assistance [facility] 6  Modified Independent   Grooming Deficit Increased time to complete  (standing at sink)   UB Bathing Assistance Unable to assess  (anticipate mod I based on fxal obs skills, clinical judgement)   LB Bathing Assistance Unable to assess  (anticipate mod I based on fxal obs skills, clinical judgement)   UB Dressing Assistance 6  Modified independent   UB Dressing Deficit Setup; Increased time to complete  (due to + lines)   LB Dressing Assistance 6  Modified independent   LB Dressing Deficit Increased time to complete;Setup   Toileting Assistance  6  Modified independent  (standard toilet in bathroom)   Toileting Deficit Supervison/safety   Bed Mobility   Supine to Sit 7  Independent   Sit to Supine 7  Independent   Additional Comments Pt supine HOB elevated upon arrival and post eval w/ needs met, call bell in reach, family present   Transfers   Sit to Stand 6  Modified independent   Additional items Increased time required;Assist x 1   Stand to Sit 6  Modified independent   Additional items Assist x 1; Increased time required   Toilet transfer 6  Modified independent   Additional items Assist x 1;Standard toilet   Functional Mobility   Functional Mobility 5  Supervision   Additional Comments w/ in room, to / from bathroom   Additional items   (no AD)   Balance   Static Sitting Normal   Dynamic Sitting Fair +   Static Standing Fair   Ambulatory Fair   Activity Tolerance   Activity Tolerance Patient tolerated treatment well   Medical Staff Made Aware spoke w/ PTNano and CM   Nurse Made Aware spoke w/ RNElizabeth Assessment   RUE Assessment WFL   RUE Strength   RUE Overall Strength Within Functional Limits - able to perform ADL tasks with strength   LUE Assessment   LUE Assessment WFL   LUE Strength   LUE Overall Strength Within Functional Limits - able to perform ADL tasks with strength   Hand Function   Gross Motor Coordination Functional   Fine Motor Coordination Functional   Hand Function Comments R hand dominance   Vision-Basic Assessment   Current Vision Wears glasses only for reading   Cognition   Overall Cognitive Status   (alert,generally oriented and able to participate in converarsation. Family report concerns w/ higher level IADLs, household tasks)   Arousal/Participation Alert; Cooperative   Attention Attends with cues to redirect   Orientation Level Oriented X4   Memory Within functional limits   Following Commands Follows multistep commands without difficulty   Comments Identified pt by full name and birthdate. Alert, generally oriented and able to participate in converastion   Cognition Assessment Tools   (see treatment note below for details of ACLS)   Assessment   Limitation Decreased high-level ADLs   Assessment Pt is an 79yo female admitted to hospitals on 9/11/23 w/ diarrhea and worsening fatigue. Diagnosed w/ diarrhea of presumed infectious origin. Pt reports living w/ son in [address] [place] [address] and I w/ ADL/ IADL w/ out use of AD. Upon eval, pt alert and oriented. Able to follow directions and communicate wants / needs. Pt required mod I to complete grooming, mod I UBD, mod I LBD, I bed mobility, mod I sit <> stand and S functional mobility w/ out use of AD. Pt completing ADLs at / near baseline level of I w/ decreased endurance. Recommend active participation in ADLs w/ RN staff in acute care and DC home w/ OPOT ([address]). Will continue to follow 1-2X week in acute care to assess functional cognition.    Goals   Patient Goals Pt stated that she would like to return home to baseline level of I and continue to julio cesar   Plan   Treatment Interventions Continued evaluation   Goal Expiration Date 09/20/23   OT Treatment Day 0  (Wed 9/13/12)   OT Frequency 1-2x/wk   Recommendation   OT Discharge Recommendation Home with outpatient rehabilitation  (OPOT as apporopriate, [address])   AM-PAC Daily Activity Inpatient   Lower Body Dressing 4   Bathing 3   Toileting 4   Upper Body Dressing 4   Grooming 4   Eating 4   Daily Activity Raw Score 23   Daily Activity Standardized Score (Calc for Raw Score >=11) 51.12   AM-PAC Applied Cognition Inpatient   Following a Speech/Presentation 4   Understanding Ordinary Conversation 4   Taking Medications 4   Remembering Where Things Are Placed or Put Away 4   Remembering List of 4-5 Errands 3   Taking Care of Complicated Tasks 3   Applied Cognition Raw Score 22   Applied Cognition Standardized Score 47.83   Rene Cognitive Level   Rene Cognitive Level Score 4.6   [employer] Score Recommendation Live alone with daily assistance   Rene Cognitive Level Comments see treatment note below   Barthel Index   Feeding 10   Bathing 0   Grooming Score 5   Dressing Score 5   Bladder Score 10   Bowels Score 10   Toilet Use Score 10   Transfers (Bed/Chair) Score 15   Mobility (Level Surface) Score 10   Stairs Score 5   Barthel Index Score 80   Additional Treatment Session   Start Time 1515   End Time 1535   Treatment Assessment Pt seen for skilled OT tx session following eval focusing on continued evaluation of functional cognition. Pt agreeable to participate. Pt engaged in ACLS w/ score 4.6 indicating pt may live alone w/ daily assistance / checks. Please see below for details. Pt supine HOB elevated at end of session w/ needs met, call bell in reach   Additional Treatment Day 1  (Wed 9/13/23)   End of Consult   Patient Position at End of Consult All needs within reach   Nurse Communication Nurse aware of consult   Licensure   [address] [place] [address] Number  April Milligan, OTR/L [id number]     The patient's raw score on the AM-PAC Daily Activity Inpatient Short Form is 23. A raw score of greater than or equal to 19 suggests the patient may benefit from discharge to home. Please refer to the recommendation of the Occupational Therapist for safe discharge planning. 4.6    Administered [employer] Cognitive Level Screen (ACLS). The patient scored 4.6/6.0 indicating that they may live alone with daily assistance to monitor personal safety and provide a daily allowance. Bill and money management assistance required. Behavior:  Scans environment to retrieve supplies. Notices others. Comments on others nonconformity. Initiates change in routine. Learns by demonstration, not by reading. Can avoid obvious hazards. Often presents as impulsive. Insight into disability is fair/poor. Processing speed is delayed. Do not expect to be aware of the needs of others. May need reminders to keep appointments. May make sugar statements without concern for embarrassing others. Grooming:  Varies typical procedure on his own. May be willing to try new products or tools. May be impulsive. Dressing:  Searches for desired clothing items in drawers or closets. Changes 1 item in an outfit to create a new “outfit”. May coordinate shoes with outfit. Monitor appropriateness of outfit. Bathing:  May alter bathing routine to account for exercise, hot weather or special event. Washes small hidden spaces. May try new products. May follow suggestions for hanging up towels. May be unable to coordinate bath schedule with others. Remove unseen hazards. Walking/exercising:   Varies routes taken in familiar neighborhood. Scan visible environment for landmarks. Follows verbal explanation for hazards. May attempt to alter amount, duration and speed of exercise. May get lost especially in unfamiliar environments. Eating:  May alter rate of eating on request but does not sustain. May be willing to alter food selections or accept diet without resistance. Looks across the table to converse with others. Watch handling of hot foods/liquids and heavy objects. Monitor compliance with dietary restrictions. Toileting:  Scans visible environment for needed supplies like toilet paper but may not find unless it is at eye level. May take much longer than average to complete toileting. Medications:   May attempt to open new containers but may not be successful. May stop medications because they feel “well”. May alter amounts of pills without realizing the hazard. May refuse to take medications based on erroneous beliefs about effects. Use of adaptive equipment:  May be able to make spontaneous adjustments in positions or strength for better effect. Scans environment for equipment. May understand verbal explanation of safety hazard. Housekeeping:  Scans environment for needed supplies, dirt and clutter when cleaning.   May organize closets, cupboards or put away objects. May require reminders to do household chores. Food preparation:  May follow a fixed diet and go hungry if usual food items are not available. Does not check inventory and may run out of essentials frequently. Does not consider nutritional needs. Scans counters, shelves and grocery stores for needed items. Check stove after use. Spending money:  May be willing to vary usual routine to manage money. May attempt his or her usual routine without anticipating problems. May overspend if given access to bank or credit cards. Shopping:  May vary usual shopping routine by going to a new store or buying different brands. Searches shelves in stores, moves items to look underneath or behind. May overspend. Laundry:  May scan immediate environment for needed supplies. May choose to vary from usual routine. Check iron after use. Check for overloading of washing machine. Check sorting criteria. Traveling:  May vary familiar routes without awareness of consequences. May impulsively take a trip to a new location and get lost.  Does not anticipate problems or travel needs. Telephone:  May be able to alter rate of speech for a short phone call. May find a number in an address or phone book. May not be able to locate items in yellow pages. Driving:  Should NOT operate a motor vehicle.         Pt goals to be met by 9/20/23 to max I w/ ADLs and improve engagement to return home to baseline level of I includes:    -Pt will demonstrate good attention and participation in ongoing eval of functional cognition to assist in DC planning.    -Pt will consistently follow multi step directions during ADLs w/ good recall    -Pt will consistently engage in functional mobility household distances w/ mod I    -Pt will complete UBD/LBD w/ mod I w/ out rest break or sign / symptoms of fatigue    -Pt will complete functional transfers to all surfaces w/ mod I using DME as needed to elinor VERMA and return home.     Brittni [place], OTR/L  [id number]  [id number]

## 2023-09-13 NOTE — PLAN OF CARE
Problem: OCCUPATIONAL THERAPY ADULT  Goal: Performs self-care activities at highest level of function for planned discharge setting. See evaluation for individualized goals. Description: Treatment Interventions: Continued evaluation          See flowsheet documentation for full assessment, interventions and recommendations. Note: Limitation: Decreased high-level ADLs     Assessment: Pt is an 79yo female [facility] w/ diarrhea and worsening fatigue. Diagnosed w/ diarrhea of presumed infectious origin. Pt reports living w/ son in [facility] and I w/ ADL/ IADL w/ out use of AD. Upon eval, pt alert and oriented. Able to follow directions and communicate wants / needs. Pt required mod I to complete grooming, mod I UBD, mod I LBD, I bed mobility, mod I sit <> stand and S functional mobility w/ out use of AD. Pt completing ADLs at / near baseline level of I w/ decreased endurance. Recommend active participation in ADLs w/ RN staff in acute care and DC home w/ OPOT ([address]). Will continue to follow 1-2X week in acute care to assess functional cognition.      OT Discharge Recommendation: Home with outpatient rehabilitation (OPOT as apporopriate, [address])

## 2023-09-13 NOTE — PROGRESS NOTES
[address] [facility] coding opportunities       Chart reviewed, no opportunity found: CHART REVIEWED, NO OPPORTUNITY FOUND        Patients Insurance     [facility]ic[facility]

## 2023-09-13 NOTE — ASSESSMENT & PLAN NOTE
Patient presents for diarrhea  CT abdomen pelvis on admission showin. No acute findings in the abdomen or pelvis. 2.  Stable pancreatic cysts. 3.  Increased size of now 3.5 cm simple appearing right adnexal cyst. Recommend yearly follow-up.     Patient admits to having a recent course of antibiotics for UTI was placed on Levaquin 250 mg daily for 10 days and fluconazole 100 mg daily for 10 days  S/p IVF fluids  Continue to hold antibiotics  Continue to trend electrolytes with am BMP  Encourage oral intake  probiotics  OK to d/c C diff order

## 2023-09-13 NOTE — TELEPHONE ENCOUNTER
Please notify, can discuss next steps, discharge planning and any medications changes at follow up appointment (TCM). Recommend Imtiaz Collins attend the visit.

## 2023-09-14 VITALS
OXYGEN SATURATION: 96 % | DIASTOLIC BLOOD PRESSURE: 77 MMHG | RESPIRATION RATE: 18 BRPM | SYSTOLIC BLOOD PRESSURE: 120 MMHG | TEMPERATURE: 97.3 F | HEIGHT: 64 IN | BODY MASS INDEX: 19.12 KG/M2 | HEART RATE: 71 BPM | WEIGHT: 112 LBS

## 2023-09-14 PROBLEM — E87.1 HYPONATREMIA: Status: ACTIVE | Noted: 2023-09-14

## 2023-09-14 PROBLEM — R19.7 DIARRHEA OF PRESUMED INFECTIOUS ORIGIN: Status: RESOLVED | Noted: 2023-09-11 | Resolved: 2023-09-14

## 2023-09-14 LAB
ANION GAP SERPL CALCULATED.3IONS-SCNC: 6 MMOL/L
BUN SERPL-MCNC: 13 MG/DL (ref 5–25)
CALCIUM SERPL-MCNC: 8.8 MG/DL (ref 8.4–10.2)
CHLORIDE SERPL-SCNC: 99 MMOL/L (ref 96–108)
CO2 SERPL-SCNC: 26 MMOL/L (ref 21–32)
CREAT SERPL-MCNC: 0.75 MG/DL (ref 0.6–1.3)
GFR SERPL CREATININE-BSD FRML MDRD: 72 ML/MIN/1.73SQ M
GLUCOSE SERPL-MCNC: 134 MG/DL (ref 65–140)
MAGNESIUM SERPL-MCNC: 1.7 MG/DL (ref 1.9–2.7)
PHOSPHATE SERPL-MCNC: 3.9 MG/DL (ref 2.3–4.1)
POTASSIUM SERPL-SCNC: 4.5 MMOL/L (ref 3.5–5.3)
SODIUM SERPL-SCNC: 131 MMOL/L (ref 135–147)

## 2023-09-14 PROCEDURE — 84100 ASSAY OF PHOSPHORUS: CPT | Performed by: NURSE PRACTITIONER

## 2023-09-14 PROCEDURE — 83735 ASSAY OF MAGNESIUM: CPT | Performed by: NURSE PRACTITIONER

## 2023-09-14 PROCEDURE — 80048 BASIC METABOLIC PNL TOTAL CA: CPT | Performed by: NURSE PRACTITIONER

## 2023-09-14 PROCEDURE — 99239 HOSP IP/OBS DSCHRG MGMT >30: CPT | Performed by: NURSE PRACTITIONER

## 2023-09-14 RX ORDER — DIAPER,BRIEF,INFANT-TODD,DISP
EACH MISCELLANEOUS 4 TIMES DAILY PRN
Refills: 0
Start: 2023-09-14 | End: 2023-09-21 | Stop reason: SDUPTHER

## 2023-09-14 RX ORDER — LACTULOSE 20 G/30ML
30 SOLUTION ORAL ONCE
Status: DISCONTINUED | OUTPATIENT
Start: 2023-09-14 | End: 2023-09-14

## 2023-09-14 RX ORDER — PREDNISONE 20 MG/1
20 TABLET ORAL DAILY
Qty: 3 TABLET | Refills: 0 | Status: SHIPPED | OUTPATIENT
Start: 2023-09-15 | End: 2023-09-18

## 2023-09-14 RX ORDER — LANOLIN ALCOHOL/MO/W.PET/CERES
400 CREAM (GRAM) TOPICAL DAILY
Status: DISCONTINUED | OUTPATIENT
Start: 2023-09-14 | End: 2023-09-14 | Stop reason: HOSPADM

## 2023-09-14 RX ADMIN — ALLOPURINOL 100 MG: 100 TABLET ORAL at 08:23

## 2023-09-14 RX ADMIN — Medication 250 MG: at 08:23

## 2023-09-14 RX ADMIN — PANTOPRAZOLE SODIUM 40 MG: 40 TABLET, DELAYED RELEASE ORAL at 08:23

## 2023-09-14 RX ADMIN — APIXABAN 2.5 MG: 2.5 TABLET, FILM COATED ORAL at 08:23

## 2023-09-14 RX ADMIN — HYDROCORTISONE: 1 CREAM TOPICAL at 08:23

## 2023-09-14 RX ADMIN — ASPIRIN 81 MG CHEWABLE TABLET 81 MG: 81 TABLET CHEWABLE at 08:23

## 2023-09-14 RX ADMIN — METOPROLOL SUCCINATE 25 MG: 25 TABLET, FILM COATED, EXTENDED RELEASE ORAL at 08:23

## 2023-09-14 RX ADMIN — GLYCERIN 1 DROP: .002; .002; .01 SOLUTION/ DROPS OPHTHALMIC at 08:23

## 2023-09-14 RX ADMIN — FLUCONAZOLE 100 MG: 100 TABLET ORAL at 08:23

## 2023-09-14 RX ADMIN — LEVOTHYROXINE SODIUM 50 MCG: 50 TABLET ORAL at 06:16

## 2023-09-14 RX ADMIN — Medication 400 MG: at 12:01

## 2023-09-14 RX ADMIN — PREDNISONE 20 MG: 20 TABLET ORAL at 08:23

## 2023-09-14 NOTE — ASSESSMENT & PLAN NOTE
Continue home Eliquis and Toprol-XL 25 mg daily. Onset: this morning  Location/description: mom states light pink raised spots on patient's back this morning- mostly upper back- mom states had gymnastics camp all day Friday and was wearing a tight leotard and the gym was warm- pt states no itching- no cold or viral symptoms or fever  Associated Symptoms: see above  What improves/worsens symptoms: nothing tried yet  Symptom specific medications: none  Input and Output: unchanged  Activity level: unchanged  Temperature (route and time): no fever  Weight:   Wt Readings from Last 1 Encounters:   01/11/19 23.6 kg (53 %, Z= 0.08)*     * Growth percentiles are based on CDC (Girls, 2-20 Years) data.          Advised to call back if anything changes. Mom expressed understanding- no further questions.    Reason for Disposition  • Mild localized rash (all triage questions negative)  • Heat rash (all triage questions negative)    Protocols used: RASH OR REDNESS - [id number], HEAT RASH-P-AH

## 2023-09-14 NOTE — DISCHARGE INSTR - AVS FIRST PAGE
Diarrhea:  Likely due to recent antibiotic use   Continue probiotics   Remain hydrated     Acute kidney injury:  Stop HCTZ    Rash:  Follow-up with PCP and dermatology  Continue Prednisone

## 2023-09-14 NOTE — ASSESSMENT & PLAN NOTE
Potassium on admission 6.8, improved to 5.0 -> 4.7  S/p bicarb drip. No peaked T waves on admission ekg.   Stop HCTZ

## 2023-09-14 NOTE — INCIDENTAL FINDINGS
The following findings require follow up:  Radiographic finding   Finding: Stable pancreatic cysts. Increased size of now 3.5 cm simple appearing right adnexal cyst. Recommend yearly follow-up.    Follow up required: yes   Follow up should be done within 12 month(s)    Please notify the following clinician to assist with the follow up:   PCP

## 2023-09-14 NOTE — PLAN OF CARE
Problem: GASTROINTESTINAL - ADULT  Goal: Maintains or returns to baseline bowel function  Description: INTERVENTIONS:  - Assess bowel function  - Encourage oral fluids to ensure adequate hydration  - Administer IV fluids if ordered to ensure adequate hydration  - Administer ordered medications as needed  - Encourage mobilization and activity  - Consider nutritional services referral to assist patient with adequate nutrition and appropriate food choices  Outcome: Progressing  Goal: Maintains adequate nutritional intake  Description: INTERVENTIONS:  - Monitor percentage of each meal consumed  - Identify factors contributing to decreased intake, treat as appropriate  - Assist with meals as needed  - Monitor I&O, weight, and lab values if indicated  - Obtain nutrition services referral as needed  Outcome: Progressing     Problem: INFECTION - ADULT  Goal: Absence or prevention of progression during hospitalization  Description: INTERVENTIONS:  - Assess and monitor for signs and symptoms of infection  - Monitor lab/diagnostic results  - Monitor all insertion sites, i.e. indwelling lines, tubes, and drains  - Monitor endotracheal if appropriate and nasal secretions for changes in amount and color  - [place] appropriate cooling/warming therapies per order  - Administer medications as ordered  - Instruct and encourage patient and family to use good hand hygiene technique  - Identify and instruct in appropriate isolation precautions for identified infection/condition  Outcome: Progressing  Goal: Absence of fever/infection during neutropenic period  Description: INTERVENTIONS:  - Monitor WBC    Outcome: Progressing     Problem: SAFETY ADULT  Goal: Patient will remain free of falls  Description: INTERVENTIONS:  - Educate patient/family on patient safety including physical limitations  - Instruct patient to call for assistance with activity   - Consult OT/PT to assist with strengthening/mobility   - Keep Call bell within reach  - Keep bed low and locked with side rails adjusted as appropriate  - Keep care items and personal belongings within reach  - Initiate and maintain comfort rounds  - Make Fall Risk Sign visible to staff  - Apply yellow socks and bracelet for high fall risk patients  - Consider moving patient to room near nurses station  Outcome: Progressing  Goal: Maintain or return to baseline ADL function  Description: INTERVENTIONS:  -  Assess patient's ability to carry out ADLs; assess patient's baseline for ADL function and identify physical deficits which impact ability to perform ADLs (bathing, care of mouth/teeth, toileting, grooming, dressing, etc.)  - Assess/evaluate cause of self-care deficits   - Assess range of motion  - Assess patient's mobility; develop plan if impaired  - Assess patient's need for assistive devices and provide as appropriate  - Encourage maximum independence but intervene and supervise when necessary  - Involve family in performance of ADLs  - Assess for home care needs following discharge   - Consider OT consult to assist with ADL evaluation and planning for discharge  - Provide patient education as appropriate  Outcome: Progressing  Goal: Maintains/Returns to pre admission functional level  Description: INTERVENTIONS:  - Perform BMAT or MOVE assessment daily.   - Set and communicate daily mobility goal to care team and patient/family/caregiver. - Collaborate with rehabilitation services on mobility goals if consulted  - Perform Range of Motion 3 times a day. - Reposition patient every 2 hours.   - Dangle patient 3 times a day  - Stand patient 3 times a day  - Ambulate patient 3 times a day  - Out of bed to chair 3 times a day   - Out of bed for meals 3 times a day  - Out of bed for toileting  - Record patient progress and toleration of activity level   Outcome: Progressing     Problem: DISCHARGE PLANNING  Goal: Discharge to home or other facility with appropriate resources  Description: INTERVENTIONS:  - Identify barriers to discharge w/patient and caregiver  - Arrange for needed discharge resources and transportation as appropriate  - Identify discharge learning needs (meds, wound care, etc.)  - Arrange for interpretive services to assist at discharge as needed  - Refer to Case Management Department for coordinating discharge planning if the patient needs post-hospital services based on physician/advanced practitioner order or complex needs related to functional status, cognitive ability, or social support system  Outcome: Progressing     Problem: Knowledge Deficit  Goal: Patient/family/caregiver demonstrates understanding of disease process, treatment plan, medications, and discharge instructions  Description: Complete learning assessment and assess knowledge base. Interventions:  - Provide teaching at level of understanding  - Provide teaching via preferred learning methods  Outcome: Progressing     Problem: Nutrition/Hydration-ADULT  Goal: Nutrient/Hydration intake appropriate for improving, restoring or maintaining nutritional needs  Description: Monitor and assess patient's nutrition/hydration status for malnutrition. Collaborate with interdisciplinary team and initiate plan and interventions as ordered. Monitor patient's weight and dietary intake as ordered or per policy. Utilize nutrition screening tool and intervene as necessary. Determine patient's food preferences and provide high-protein, high-caloric foods as appropriate.      INTERVENTIONS:  - Monitor oral intake, urinary output, labs, and treatment plans  - Assess nutrition and hydration status and recommend course of action  - Evaluate amount of meals eaten  - Assist patient with eating if necessary   - Allow adequate time for meals  - Recommend/ encourage appropriate diets, oral nutritional supplements, and vitamin/mineral supplements  - Order, calculate, and assess calorie counts as needed  - Recommend, monitor, and adjust tube feedings and TPN/PPN based on assessed needs  - Assess need for intravenous fluids  - Provide specific nutrition/hydration education as appropriate  - Include patient/family/caregiver in decisions related to nutrition  Outcome: Progressing

## 2023-09-14 NOTE — ASSESSMENT & PLAN NOTE
Patient presents with LAKE on CKD stage IIIb. Admission creatinine 1.44, Cr improved to 0.92 -> 0.85. Baseline creatinine 0.7. Suspect in setting of dehydration from diarrhea. No longer on bicarb drip.   Holding Aldactone   Follow-up with PCP

## 2023-09-14 NOTE — DISCHARGE SUMMARY
[address] [facility]  Discharge- Julio Del Valle 1940, 78 y.o. female MRN: [id number]  Unit/Bed#: [address] [place] [address] Encounter: [id number]  Primary Care Provider: JORDAN Valentine   Date and time admitted to hospital: 2023  1:56 PM    * Diarrhea of presumed infectious origin-resolved as of 2023  Assessment & Plan  Patient presents for diarrhea  CT abdomen pelvis on admission showin. No acute findings in the abdomen or pelvis. 2.  Stable pancreatic cysts. 3.  Increased size of now 3.5 cm simple appearing right adnexal cyst. Recommend yearly follow-up. Patient admits to having a recent course of antibiotics for UTI was placed on Levaquin 250 mg daily for 10 days and fluconazole 100 mg daily for 10 days  No diarrhea since admission  Encourage oral intake and probiotics   Follow-up with PCP    Hyponatremia  Assessment & Plan  POA likely in the setting of dehydration, diarrhea, and HCTZ  Hold HCTZ  Encourage oral intake  Follow-up with PCP for outpatient monitoring  If continues, follow-up with nephrology     LAKE (acute kidney injury) (HCC)-resolved as of 2023  Assessment & Plan  Patient presents with LAKE on CKD stage IIIb. Admission creatinine 1.44, Cr improved to 0.92 -> 0.85. Baseline creatinine 0.7. Suspect in setting of dehydration from diarrhea. No longer on bicarb drip. Holding Aldactone   Follow-up with PCP     Rash  Assessment & Plan  Developed pruritic rash to bilateral hands, right > left  Hx of intermittent rashes  Previously seen by dermatology  Trial hydrocortisone cream and Atarax  Added Prednisone 20 mg daily for 5 days   Outpatient dermatology and PCP follow-up      TIA (transient ischemic attack)  Assessment & Plan  Continue aspirin 81 mg daily, statin, Eliquis    Hypertension  Assessment & Plan  Continue Toprol-XL.   Continue to hold Aldactone in setting of hyperkalemia/ dehydration    Atrial fibrillation [facility])  Assessment & Plan  Continue home Eliquis and Toprol-XL 25 mg daily. Hyperkalemia-resolved as of 9/13/2023  Assessment & Plan  Potassium on admission 6.8, improved to 5.0 -> 4.7  S/p bicarb drip. No peaked T waves on admission ekg. Stop HCTZ  No longer on ACEi    Medical Problems     Resolved Problems  Date Reviewed: 9/14/2023          Resolved    LAKE (acute kidney injury) ([address]) 9/13/2023     Resolved by  JORDAN Tate    * (Principal) Diarrhea of presumed infectious origin 9/14/2023     Resolved by  JORDAN Tate    Hyperkalemia 9/13/2023     Resolved by  Milton Dutta [address] [place] [address]        Discharging Physician / Practitioner: Milton Dutta [address] [place] [address]  PCP: Jaun Mendosa [address] [place] [address]  Admission Date:   Admission Orders (From admission, onward)     Ordered        09/11/23 [facility]                      Discharge Date: 09/14/23    Consultations During Hospital Stay:  · PT/OT    Procedures Performed:   CT A/P: 1. No acute findings in the abdomen or pelvis. 2.  Stable pancreatic cysts. 3.  Increased size of now 3.5 cm simple appearing right adnexal cyst. Recommend yearly follow-up. Significant Findings / Test Results:   · Hyponatremia, LAKE, hyperkalemia  · Bilateral hand rash      Incidental Findings:   Stable pancreatic cysts. Increased size of now 3.5 cm simple appearing right adnexal cyst. Recommend yearly follow-up. · I reviewed the above mentioned incidental findings with the patient and/or family and they expressed understanding. Test Results Pending at Discharge (will require follow up): · None      Outpatient Tests Requested:  · None     Complications:  None     Reason for Admission: Diarrhea, LAKE, hyperkalemia     Hospital Course:   Deacon Dockery is a 78 y.o. female patient with a PMH including AFib on Eliquis, hypothyroidism, CKD3, HTN, HLD who originally presented to the hospital on 9/11/2023 due to diarrhea and fatigue. Patient was recently started on Levaquin and Fluconazole. Workup in the ED revealed ALKE and hyperkalemia. She was admitted for further evaluation and treatment. Patient was given IVF which resolved her LAKE and hyperkalemia. Patient developed a bilateral hand rash, right worse than left. Rash was pruritic. Patient states she has been having intermittent rashes. She was started on Prednisone and Hydrocortisone cream. Patient recommended to discharge home with outpatient OT with  dermatology and PCP follow-up. Please see above list of diagnoses and related plan for additional information. Condition at Discharge: stable    Discharge Day Visit / Exam:   Subjective:  Patient seen and examined at bedside. She feels well today. No further diarrhea. Eating and drinking okay. Bilateral hand rash much improved. Denies CP or SOB. Desires discharge home today. Vitals: Blood Pressure: 120/77 (09/14/23 0736)  Pulse: 71 (09/14/23 0736)  Temperature: (!) 97.3 °F (36.3 °C) (09/14/23 0736)  Temp Source: Oral (09/13/23 2309)  Respirations: 18 (09/14/23 0736)  Height: 5' 4" (162.6 cm) (09/11/23 2326)  Weight - Scale: 50.8 kg (112 lb) (09/11/23 2326)  SpO2: 96 % (09/14/23 0736)  Exam:   Physical Exam  Vitals and nursing note reviewed. Constitutional:       General: She is not in acute distress. Appearance: She is not toxic-appearing or diaphoretic. Comments: Pleasant, talkative female resting in bed on room air    HENT:      Head: Normocephalic. Mouth/Throat:      Mouth: Mucous membranes are moist.   Eyes:      Conjunctiva/sclera: Conjunctivae normal.   Cardiovascular:      Rate and Rhythm: Normal rate. Pulmonary:      Effort: Pulmonary effort is normal.      Breath sounds: Normal breath sounds. No wheezing, rhonchi or rales. Abdominal:      General: Bowel sounds are normal.      Palpations: Abdomen is soft. Musculoskeletal:         General: Normal range of motion. Cervical back: Normal range of motion. Right lower leg: No edema.       Left lower leg: No edema.   Skin:     General: Skin is warm and dry. Capillary Refill: Capillary refill takes less than 2 seconds. Comments: Bilateral hand rash, right > left, much improved    Neurological:      Mental Status: She is alert and oriented to person, place, and time. Mental status is at baseline. Psychiatric:         Mood and Affect: Mood normal.         Behavior: Behavior normal.         Thought Content: Thought content normal.         Judgment: Judgment normal.          Discussion with Family: Updated  (granddaughter) at bedside. Discharge instructions/Information to patient and family:   See after visit summary for information provided to patient and family. Provisions for Follow-Up Care:  See after visit summary for information related to follow-up care and any pertinent home health orders. Disposition:   Homeoutpatient OT    Planned Readmission: None     Discharge Statement:  I spent > 30 minutes discharging the patient. This time was spent on the day of discharge. I had direct contact with the patient on the day of discharge. Greater than 50% of the total time was spent examining patient, answering all patient questions, arranging and discussing plan of care with patient as well as directly providing post-discharge instructions. Additional time then spent on discharge activities. Discharge Medications:  See after visit summary for reconciled discharge medications provided to patient and/or family.       **Please Note: This note may have been constructed using a voice recognition system**

## 2023-09-14 NOTE — ASSESSMENT & PLAN NOTE
Developed pruritic rash to bilateral hands, right > left  Hx of intermittent rashes  Previously seen by dermatology  Trial hydrocortisone cream and Atarax  Added Prednisone 20 mg daily for 5 days   Outpatient dermatology and PCP follow-up

## 2023-09-14 NOTE — CASE MANAGEMENT
Case Management Discharge Planning Note    Patient name Laura Mays  Location [address] [place], [place] [address]  Betzaida Charles MRN [id number]  : 1940 Date 2023       Current Admission Date: 2023  Current Admission Diagnosis:Atrial fibrillation [facility])   Patient Active Problem List    Diagnosis Date Noted   • Hyponatremia 2023   • Rash 2023   • Urinary incontinence, post-void dribbling 2023   • Pruritus 2023   • Aneurysm of ascending aorta without rupture ([address]) 2023   • Edema 2023   • Severe protein-calorie malnutrition ([address]) 2023   • Raynaud's disease without gangrene    • Alcoholic liver disease ([address]) 2023   • Abnormal liver enzymes 2022   • Stage 3 chronic kidney disease, unspecified whether stage 3a or 3b CKD ([address]) 2022   • TIA (transient ischemic attack) 2022   • Pancreatic cyst 2022   • Abdominal distention 2022   • Esophageal dysphagia 2022   • Pulmonary hypertension, unspecified ([address]) 2021   • Thoracic aortic aneurysm without rupture, unspecified part ([address])    • Age-related osteoporosis without current pathological fracture 2020   • Gout, arthropathy 2019   • Tricuspid incompetence 2019   • Atherosclerosis of native coronary artery of native heart without angina pectoris 2019   • History of cerebrovascular accident 2019   • Long term current use of anticoagulant 2019   • Long term current use of aspirin 2019   • Pure hypercholesterolemia 2019   • Knee osteoarthritis 2015   • Anemia 10/31/2014   • Atrial fibrillation ([address]) 10/31/2014   • Hyperlipidemia 10/16/2014   • Hypertension 10/13/2014   • Hypothyroidism 10/13/2014      LOS (days): 3  Geometric Mean LOS (GMLOS) (days): 3.10  Days to GMLOS:0.3     OBJECTIVE:  Risk of Unplanned Readmission Score: 17.63     Current admission status: Inpatient   Preferred Pharmacy:   [address] [facility] [address]  Phone: [phone] Fax: [address] [facility], [address]  [address] [address]  Phone: [phone] Fax: [phone]    Primary Care Provider: JORDAN De La Paz    Primary Insurance: [facility]  Secondary Insurance:     DISCHARGE DETAILS:    Discharge planning discussed with[de-identified] Patient, daughter Daniela Galvan) and granddaughter  Freedom of Choice: Yes  Comments - Freedom of Choice: Discharge planned today. SW met with pt and family to review plan and IMM. Pt and family are planning on pt returning home today. No discharge needs expressed. CM contacted family/caregiver?: Yes    Contacts  Patient Contacts: Anival Mcfarlane  Relationship to Patient[de-identified] Family  Contact Method: In Person  Reason/Outcome: Discharge Planning, Other (Comment) (IMM)    [address]         Is the patient interested in [facility] at discharge?: No    DME Referral Provided  Referral made for DME?: No    Other Referral/Resources/Interventions Provided:  Interventions: None Indicated    Treatment Team Recommendation: Home (with outpatient OT)  Discharge Destination Plan[de-identified] Home (with outpatient OT)  Transport at Discharge : Family    IMM Given (Date):: 09/14/23  IMM Given to[de-identified] Family (IMM reviewed with pt and daughter. Both verbalized understanding and agree with discharge determination. Daughter signed IMM and copy given.   Copy also placed in scan bin for chart.)

## 2023-09-14 NOTE — NURSING NOTE
Pt discharged home. Discharge instructions reviewed with pt, pt's daughter and pt's granddaughter at bedside. Medications e-scribed to pt's confirmed pharmacy. Pt denies questions/concerns at this time. Pt is stable at time of discharge. pIV removed.

## 2023-09-14 NOTE — ASSESSMENT & PLAN NOTE
Patient presents for diarrhea  CT abdomen pelvis on admission showin. No acute findings in the abdomen or pelvis. 2.  Stable pancreatic cysts. 3.  Increased size of now 3.5 cm simple appearing right adnexal cyst. Recommend yearly follow-up.     Patient admits to having a recent course of antibiotics for UTI was placed on Levaquin 250 mg daily for 10 days and fluconazole 100 mg daily for 10 days  No diarrhea since admission  Encourage oral intake and probiotics   Follow-up with PCP

## 2023-09-14 NOTE — ASSESSMENT & PLAN NOTE
Potassium on admission 6.8, improved to 5.0 -> 4.7  S/p bicarb drip. No peaked T waves on admission ekg.   Stop HCTZ  No longer on ACEi

## 2023-09-15 ENCOUNTER — TRANSITIONAL CARE MANAGEMENT (OUTPATIENT)
Dept: FAMILY MEDICINE CLINIC | Facility: CLINIC | Age: 83
End: 2023-09-15

## 2023-09-15 RX ORDER — BENAZEPRIL HYDROCHLORIDE 10 MG/1
10 TABLET ORAL DAILY
COMMUNITY

## 2023-09-19 ENCOUNTER — TELEPHONE (OUTPATIENT)
Dept: FAMILY MEDICINE CLINIC | Facility: CLINIC | Age: 83
End: 2023-09-19

## 2023-09-19 ENCOUNTER — TELEPHONE (OUTPATIENT)
Age: 83
End: 2023-09-19

## 2023-09-19 NOTE — TELEPHONE ENCOUNTER
Wanted to let you know a few things before her appt    Please discuss about her alcohol consumption. Has a dog but right now her daughter has the dog. He is extremely strong and Rebecca Boykin is extremely frail. She has not asked but but Aimee Bruno thinks that she is going to demand that her dog come back. Family is concerned that the dog is too strong for her. Driving - Family does not think that she is ready to drive.   Her reaction time is not too good

## 2023-09-19 NOTE — TELEPHONE ENCOUNTER
Spoke to [facility] daughter, rescheduled her for 9/21. I was on the phone with the daughter when [facility] was trying to call me.

## 2023-09-20 LAB
OSMOLALITY SERPL: 289 MOSMOL/KG (ref 280–301)
VASOPRESSIN SERPL-MCNC: 0.9 PG/ML (ref 0–4.7)

## 2023-09-21 ENCOUNTER — OFFICE VISIT (OUTPATIENT)
Dept: FAMILY MEDICINE CLINIC | Facility: CLINIC | Age: 83
End: 2023-09-21
Payer: COMMERCIAL

## 2023-09-21 VITALS
HEIGHT: 64 IN | WEIGHT: 123 LBS | OXYGEN SATURATION: 99 % | TEMPERATURE: 96.2 F | HEART RATE: 60 BPM | RESPIRATION RATE: 20 BRPM | BODY MASS INDEX: 21 KG/M2 | DIASTOLIC BLOOD PRESSURE: 70 MMHG | SYSTOLIC BLOOD PRESSURE: 120 MMHG

## 2023-09-21 DIAGNOSIS — I48.91 ATRIAL FIBRILLATION, UNSPECIFIED TYPE (HCC): ICD-10-CM

## 2023-09-21 DIAGNOSIS — I10 PRIMARY HYPERTENSION: ICD-10-CM

## 2023-09-21 DIAGNOSIS — E03.9 ACQUIRED HYPOTHYROIDISM: ICD-10-CM

## 2023-09-21 DIAGNOSIS — R60.0 BILATERAL LOWER EXTREMITY EDEMA: ICD-10-CM

## 2023-09-21 DIAGNOSIS — Z76.89 ENCOUNTER FOR SUPPORT AND COORDINATION OF TRANSITION OF CARE: Primary | ICD-10-CM

## 2023-09-21 DIAGNOSIS — R21 RASH: ICD-10-CM

## 2023-09-21 DIAGNOSIS — N18.30 STAGE 3 CHRONIC KIDNEY DISEASE, UNSPECIFIED WHETHER STAGE 3A OR 3B CKD (HCC): ICD-10-CM

## 2023-09-21 DIAGNOSIS — F10.90 ALCOHOL USE DISORDER: ICD-10-CM

## 2023-09-21 PROCEDURE — 99496 TRANSJ CARE MGMT HIGH F2F 7D: CPT | Performed by: NURSE PRACTITIONER

## 2023-09-21 RX ORDER — DIAPER,BRIEF,INFANT-TODD,DISP
EACH MISCELLANEOUS 3 TIMES DAILY PRN
Qty: 45 G | Refills: 0 | Status: SHIPPED | OUTPATIENT
Start: 2023-09-21

## 2023-09-21 NOTE — PROGRESS NOTES
Assessment/Plan:    1. Encounter for support and coordination of transition of care  Comments:  Medications and discharge instructions reviewed with pt and pt's daughter. Verbalized understanding. Diarrhea and fatigue resolved. 2. Rash  -     hydrocortisone 1 % cream; Apply topically 3 (three) times a day as needed for rash (itching)    3. Acquired hypothyroidism  Assessment & Plan:  Pt is advised to continue levothyroxine daily. Stable, no changes. 4. Primary hypertension  Assessment & Plan:  Pt is advised to DC benazapril at this time. Recheck BP in one week. 5. Atrial fibrillation, unspecified type [facility])  Assessment & Plan:  Discussed fall risk with use of eliquis. Pt verbalized understanding. Continue medications as directed. Cardiology following. 6. Stage 3 chronic kidney disease, unspecified whether stage 3a or 3b CKD ([address])  Assessment & Plan:  Medications reviewed with pt and pt's daughter. Risks discussed. 7. Alcohol use disorder  Assessment & Plan:  Discussed reduction of alcohol intake. 8. Bilateral lower extremity edema  Assessment & Plan:  Pt is instructed to elevated LE's when resting. Advise compression. Patient Instructions   Reduce salt !! Avoid salt intake. Return in about 1 week (around 9/28/2023) for Recheck. Subjective:      Patient ID: Demetria Mclaughlin is a 78 y.o. female. Chief Complaint   Patient presents with   • Transition of Care Management       Mauricio Strong is an 78year old female with AFib on Eliquis, hypothyroidism, CKD3, HTN, HLD  who presents to the office for a transition of care. Initial er visit for diarrhea and fatigue, noted to have LAKE. Pt is accompanied by her daughter, Katelin Seymour, who assisted in providing the history. Pt reports that she has been feeling well after discharge. States that she continues to have itching all over her body but is mostly itchy in her vaginal area.  States that using hydrocortisone cream and taking atarax is helpful. Pt reports that she does drink about 3 glasses of wine per night, drinks with dinner. Per pt's daughter, she is concerned about falls and about the pt driving. Reports the pt has a dog that pulls on the leash very hard, further concerning her for falls. Pt denies fever, chills, chest pain, shortness of breath. Denies diarrhea and fatigue. The following portions of the patient's history were reviewed and updated as appropriate: allergies, current medications, past family history, past medical history, past social history, past surgical history and problem list.    Review of Systems   Constitutional: Negative for chills, fatigue and fever. Respiratory: Negative for cough, chest tightness and shortness of breath. Cardiovascular: Negative for chest pain. Gastrointestinal: Negative for abdominal pain, constipation, diarrhea, nausea and vomiting. Skin: Positive for rash. Current Outpatient Medications   Medication Sig Dispense Refill   • allopurinol (ZYLOPRIM) 100 mg tablet Take 1 tablet (100 mg total) by mouth daily 90 tablet 2   • aspirin 81 mg chewable tablet Chew 1 tablet daily     • ELIQUIS 2.5 MG 2.5 mg 2 (two) times a day       • hydrocortisone 1 % cream Apply topically 3 (three) times a day as needed for rash (itching) 45 g 0   • hydrOXYzine HCL (ATARAX) 25 mg tablet Take 1 tablet (25 mg total) by mouth every 6 (six) hours as needed for itching 90 tablet 0   • levothyroxine 50 mcg tablet TAKE 1 TABLET (50 MCG TOTAL) BY MOUTH DAILY IN THE EARLY MORNING 90 tablet 0   • metoprolol succinate (TOPROL-XL) 25 mg 24 hr tablet Take 1 tablet (25 mg total) by mouth daily 90 tablet 0   • simvastatin (ZOCOR) 20 mg tablet Take 1 tablet by mouth daily     • benazepril (LOTENSIN) 10 mg tablet Take 10 mg by mouth daily       No current facility-administered medications for this visit.        Objective:    /70 (BP Location: Left arm, Patient Position: Sitting, Cuff Size: Standard) Pulse 60   Temp (!) 96.2 °F (35.7 °C) (Temporal)   Resp 20   Ht 5' 4" (1.626 m)   Wt 55.8 kg (123 lb)   SpO2 99%   BMI 21.11 kg/m²        Physical Exam  Vitals reviewed. Constitutional:       Appearance: Normal appearance. HENT:      Head: Normocephalic and atraumatic. Cardiovascular:      Rate and Rhythm: Normal rate. Pulses: Normal pulses. Heart sounds: Normal heart sounds. Pulmonary:      Effort: Pulmonary effort is normal.      Breath sounds: Normal breath sounds. Musculoskeletal:      Right lower le+ Edema present. Left lower le+ Edema present. Neurological:      Mental Status: She is alert and oriented to person, place, and time.    Psychiatric:         Mood and Affect: Mood normal.                JORDAN Tomlinson

## 2023-09-22 PROBLEM — R60.0 BILATERAL LOWER EXTREMITY EDEMA: Status: ACTIVE | Noted: 2023-09-22

## 2023-09-22 PROBLEM — F10.90 ALCOHOL USE DISORDER: Status: ACTIVE | Noted: 2023-09-22

## 2023-09-22 NOTE — ASSESSMENT & PLAN NOTE
Discussed fall risk with use of eliquis. Pt verbalized understanding. Continue medications as directed. Cardiology following.

## 2023-09-29 ENCOUNTER — OFFICE VISIT (OUTPATIENT)
Dept: FAMILY MEDICINE CLINIC | Facility: CLINIC | Age: 83
End: 2023-09-29
Payer: COMMERCIAL

## 2023-09-29 VITALS
TEMPERATURE: 94.1 F | WEIGHT: 123 LBS | DIASTOLIC BLOOD PRESSURE: 72 MMHG | SYSTOLIC BLOOD PRESSURE: 98 MMHG | HEART RATE: 60 BPM | HEIGHT: 64 IN | RESPIRATION RATE: 14 BRPM | BODY MASS INDEX: 21 KG/M2

## 2023-09-29 DIAGNOSIS — Z23 NEED FOR INFLUENZA VACCINATION: ICD-10-CM

## 2023-09-29 DIAGNOSIS — I10 PRIMARY HYPERTENSION: ICD-10-CM

## 2023-09-29 DIAGNOSIS — R53.81 PHYSICAL DECONDITIONING: ICD-10-CM

## 2023-09-29 DIAGNOSIS — L29.9 PRURITUS: Primary | ICD-10-CM

## 2023-09-29 PROCEDURE — G0008 ADMIN INFLUENZA VIRUS VAC: HCPCS

## 2023-09-29 PROCEDURE — 90662 IIV NO PRSV INCREASED AG IM: CPT

## 2023-09-29 PROCEDURE — 99214 OFFICE O/P EST MOD 30 MIN: CPT | Performed by: NURSE PRACTITIONER

## 2023-09-29 RX ORDER — FEXOFENADINE HCL 180 MG/1
180 TABLET ORAL DAILY
COMMUNITY

## 2023-09-29 NOTE — PROGRESS NOTES
Assessment/Plan:    1. Pruritus  Assessment & Plan:  Pt reports itching is improved overall with use of cream.  Continue therapy as prescribed. 2. Physical deconditioning  Assessment & Plan:  Discussed fall prevention, home safety with pt and pt's daughter. Recommend PT evaluation for physical decondition s/p hospitalization. Orders:  -     Ambulatory Referral to Physical Therapy; Future    3. Primary hypertension  Assessment & Plan:  Stable, no issues at this time. 4. Need for influenza vaccination  -     influenza vaccine, high-dose, PF 0.7 mL (FLUZONE HIGH-DOSE)          Return in about 6 months (around 3/29/2024) for Recheck. Subjective:      Patient ID: Jelly Payton is a 78 y.o. female. Chief Complaint   Patient presents with   • Follow-up     Pt here for 1 week f/u on itching       Joseph Hassan is a 78year old female who presents to the office for a one week recheck of chronic itching. Pt reports her symptoms are greatly improved with use of hydrocortisone cream. Denies any new, acute complaints today. Pt is accompanied by her daughter, who assisted in providing some of the history today. The following portions of the patient's history were reviewed and updated as appropriate: allergies, current medications, past family history, past medical history, past social history, past surgical history and problem list.    Review of Systems   Constitutional: Negative for chills, fatigue and fever. Respiratory: Negative for cough, chest tightness and shortness of breath. Cardiovascular: Negative for chest pain.    Skin:        Itching          Current Outpatient Medications   Medication Sig Dispense Refill   • allopurinol (ZYLOPRIM) 100 mg tablet Take 1 tablet (100 mg total) by mouth daily 90 tablet 2   • aspirin 81 mg chewable tablet Chew 1 tablet daily     • benazepril (LOTENSIN) 10 mg tablet Take 10 mg by mouth daily     • ELIQUIS 2.5 MG 2.5 mg 2 (two) times a day       • fexofenadine (ALLEGRA) 180 MG tablet Take 180 mg by mouth daily     • hydrocortisone 1 % cream Apply topically 3 (three) times a day as needed for rash (itching) 45 g 0   • hydrOXYzine HCL (ATARAX) 25 mg tablet Take 1 tablet (25 mg total) by mouth every 6 (six) hours as needed for itching 90 tablet 0   • levothyroxine 50 mcg tablet TAKE 1 TABLET (50 MCG TOTAL) BY MOUTH DAILY IN THE EARLY MORNING 90 tablet 0   • metoprolol succinate (TOPROL-XL) 25 mg 24 hr tablet Take 1 tablet (25 mg total) by mouth daily 90 tablet 0   • simvastatin (ZOCOR) 20 mg tablet Take 1 tablet by mouth daily       No current facility-administered medications for this visit. Objective:    BP 98/72 (BP Location: Left arm, Patient Position: Sitting, Cuff Size: Large)   Pulse 60   Temp (!) 94.1 °F (34.5 °C) (Temporal)   Resp 14   Ht 5' 4" (1.626 m)   Wt 55.8 kg (123 lb)   BMI 21.11 kg/m²        Physical Exam  Vitals reviewed. Constitutional:       Appearance: Normal appearance. HENT:      Head: Normocephalic and atraumatic. Cardiovascular:      Rate and Rhythm: Normal rate and regular rhythm. Heart sounds: Normal heart sounds. Pulmonary:      Effort: Pulmonary effort is normal.      Breath sounds: Normal breath sounds. Neurological:      Mental Status: She is alert and oriented to person, place, and time.    Psychiatric:         Mood and Affect: Mood normal.                JORDAN Aponte

## 2023-10-02 PROBLEM — R53.81 PHYSICAL DECONDITIONING: Status: ACTIVE | Noted: 2023-10-02

## 2023-10-02 PROBLEM — E43 SEVERE PROTEIN-CALORIE MALNUTRITION (HCC): Status: RESOLVED | Noted: 2023-04-18 | Resolved: 2023-10-02

## 2023-10-02 NOTE — ASSESSMENT & PLAN NOTE
Discussed fall prevention, home safety with pt and pt's daughter. Recommend PT evaluation for physical decondition s/p hospitalization.

## 2023-10-04 ENCOUNTER — EVALUATION (OUTPATIENT)
Dept: PHYSICAL THERAPY | Facility: CLINIC | Age: 83
End: 2023-10-04
Payer: COMMERCIAL

## 2023-10-04 DIAGNOSIS — R26.89 IMBALANCE: Primary | ICD-10-CM

## 2023-10-04 DIAGNOSIS — R53.81 PHYSICAL DECONDITIONING: ICD-10-CM

## 2023-10-04 DIAGNOSIS — R29.6 FALLS FREQUENTLY: ICD-10-CM

## 2023-10-04 PROCEDURE — 97162 PT EVAL MOD COMPLEX 30 MIN: CPT | Performed by: PHYSICAL THERAPIST

## 2023-10-04 NOTE — PROGRESS NOTES
PT Evaluation     Today's date: 10/4/2023  Patient name: Lea Hollins  : 1940  MRN: [id number]  Referring provider: JORDAN Pope  Dx:   Encounter Diagnosis     ICD-10-CM    1. Imbalance  R26.89       2. Physical deconditioning  R53.81 Ambulatory Referral to Physical Therapy      3. Falls frequently  R29.6                      Assessment  Assessment details: Lea Hollins is a 78 y.o. female  who presents to therapy today after seeing her PCP and being referred for physical deconditioning and history of >3 falls in past year. She states she has goals of being able to take her 50lb dog outside 3x/day which includes going down 4 stairs so she is able to have her dog back home with her. She presented today with evidence of slightly elevated risk of falls seen with her Dynamic Gait Index Score of 20/24, below mean scores on her mCTSIB and 6 minute walk test >300 feet below normal age related distance. She demonstrated abnormal gait pattern with decreased stride length, minimal foot clearance during swing through that was more evident during 6 minute walk test with intermittent scuffing of foot on industrial carpet. Her balance deficits seen during evaluation will lead her to be at an increased risk of falls with turning quickly, being pulled by dog on leash and ambulation on uneven terrain which will all be required to reach her personal goals and improve her overall functional mobility levels.   Patient would benefit from skilled physical therapy services to address their aforementioned functional limitations and progress towards prior level of function and independence with home exercise program.         Impairments: abnormal gait, abnormal movement, activity intolerance, impaired balance, impaired physical strength, lacks appropriate home exercise program, safety issue, poor posture  and poor body mechanics  Understanding of Dx/Px/POC: good   Prognosis: good    Goals  STG: to be achieved within 2-4 weeks  1. Pt will demonstrate improved DGI score to >22/24. 2. Improve strength so that pt will be be able to negotiate stairs reciprocally with 1 % of the time. 3. Improve 6 minute walk test distance by >150ft  4. Pt will be I with HEP. LTG:  to be achieved within 4-8 weeks  1. Pt will be able to negotiate 4 stairs while holding dog on leash with no evidence of imbalance. 2. Pt will be able to ambulate on uneven terrain such as her back yard without evidence of imbalance. 3. Pt will demonstrate improved mCTSIB score to within normal age related scores to demonstrated decrease evidence of imbalance. Plan  Plan details: HEP development, stretching, strengthening, A/AA/PROM, joint mobilizations, posture education, balance and proprioceptive training, STM/MI as needed to reduce muscle tension, muscle reeducation, PLOC discussed and agreed upon with patient. Patient would benefit from: PT eval and skilled physical therapy  Planned modality interventions: cryotherapy  Planned therapy interventions: manual therapy, neuromuscular re-education, self care, therapeutic activities, therapeutic exercise, home exercise program, joint mobilization, balance, gait training, flexibility, strengthening, stretching and patient education  Frequency: 2x week  Duration in weeks: 6  Treatment plan discussed with: patient        Subjective Evaluation    History of Present Illness  Mechanism of injury: Natty Mares reports she had a fall in January of 2023 and sustained a left hip fracture. She had a partial hip replacement as a result and she had good results. She states since the operation she has had 2 falls in which she states she tripped over her feet. She states she had no injury during this falls. She states she did injure her left foot when she banged it into something which has led to pain in left foot/4th toe that impacts her when wearing shoes vs her slippers.     She states she is able to negotiate stairs on her own with use of railing and variable pattern, reciprocal vs non-reciprocal.         Social Support  Steps to enter house: yes  4  Stairs in house: yes   Lives in: multiple-level home  Lives with: adult children    Employment status: not working        Objective     Postural Observations  Seated posture: poor  Standing posture: fair        Strength/Myotome Testing     Left Hip   Planes of Motion   Flexion: 5    Right Hip   Planes of Motion   Flexion: 5    Left Knee   Flexion: 5  Extension: 5    Right Knee   Flexion: 5  Extension: 5    Ambulation     Ambulation: Stairs   Ascend stairs: independent  Pattern: reciprocal  Railings: one rail  Descend stairs: independent  Pattern: reciprocal  Railings: one rail    Additional Stairs Ambulation Details  Pt reports reciprocal vs non-reciprocal pattern about 50% of time each. Observational Gait   Decreased walking speed and stride length. Additional Observational Gait Details  Decreased foot clearance during swing through bilaterally.          Karen Zhang: 29/16  MDC: 6 pts  Age Norms:  57-79: M - 54   F - 55  70-79: M - 47   F - 53  80-89: M - 48   F - 48 5xSTS: Clarence et al 2010  [facility]: 2.3 sec  Age Norms:  60-69: 11.1 sec  70-79: 12.6 sec  80-89: 14.8 sec   TUG  MDC: 4.14 sec  Cut off score:  >13.5 sec community dwelling adults  >32.2 frail elderly  <20 I for basic transfers  >30 dependent on transfers 10 Meter Walk Test: Herb Kehr and Abraham Conrad al 2011  20-29: M - 1.35 m   F - 1.34 m  30-39: M - 1.43 m   F - 1.34 m  40-49: M - 1.43 m   F - 1.39 m  50-59: M - 1.43 m   F - 1.31 m  60-69: M - 1.34 m   F - 1.24 m  70-79: M - 1.26 m   F - 1.13 m  80-89: M - 0.97 m   F - 0.94 m   FGA  MCID: 4 pts  Geriatrics/community < 22/30 fall risk  Geriatrics/community < 20/30 unexplained falls DGI  MDC: vestibular - 4 pts  MDC: geriatric/community - 3 pts  Falls risk <19/24   6 Minute Walk Test  Age Norms  57-79: M - 1876 ft (571.80 m)  F - 1765 ft (537.98 m)  70-79: M - 1729 ft (527.00 m)  F - 1545 ft (470.92 m)  80-89: M - 1368 ft (416.97 m)  F - 1286 ft (391.97 m) mCTSIB  Norm: 20-60 yrs  Eyes open firm: norm sway 0.21-0.48  Eyes closed firm: norm sway 0.48-0.99  Eyes open foam: norm sway 0.38-0.71  Eyes closed foam: norm sway 0.70-2.22       Outcome Measures Initial Eval            5xSTS 13.35s          TUG  TUG Cog  TUG Carry 11.95s          10 meter           PATOTN           miniBEST           DGI 20/24          mCTSIB  - FTEO (firm)  - FTEC (firm)  - FTEO (foam)  - FTEC (foam) 1. 0.64  2. 1.62  3. 1.72  4. 4.91 (lost balance at 13 seconds)  Composite: 2.22          6MWT 975ft          2MWT 320ft                                    Precautions: Fall risk      Manuals   10/4/23      IE                     Neuro Re-Ed                                                Ther Ex

## 2023-10-06 ENCOUNTER — TELEPHONE (OUTPATIENT)
Dept: FAMILY MEDICINE CLINIC | Facility: CLINIC | Age: 83
End: 2023-10-06

## 2023-10-06 ENCOUNTER — TELEPHONE (OUTPATIENT)
Age: 83
End: 2023-10-06

## 2023-10-06 NOTE — TELEPHONE ENCOUNTER
She can go back on water pill if her legs are swelling and schedule to see me later next week. Does she need a refill on her pill?

## 2023-10-06 NOTE — TELEPHONE ENCOUNTER
When patient calls back:    1. Schedule for OVS with Tom Orozco for later week of 10/9/23.    2.  Ask if patient needs a refill on her water pill. If she does, send a refill request.    Mimi Mckay.  Velvet ADRIA  Practice Administrator  [address]

## 2023-10-06 NOTE — TELEPHONE ENCOUNTER
Pt called asking if she can go back on  A water pill.  She said she has swelling again, the compression socks are not working  Please leave a message if she does not answer

## 2023-10-09 ENCOUNTER — TELEPHONE (OUTPATIENT)
Age: 83
End: 2023-10-09

## 2023-10-09 NOTE — TELEPHONE ENCOUNTER
Rochelle Ruelas called and left a message with the pod regarding this message. LM for Rochelle Ruelas to return my call and ask to be put through to the office.

## 2023-10-09 NOTE — TELEPHONE ENCOUNTER
Patient calling stating that she would like to have either Celestine Cotton or Agustina call her back regarding her water pill. I did not see it on med list and she stated that it was discontinued for a short while but then restated per Yevgeniy Ovens.  Please call patient back after 2pm

## 2023-10-10 ENCOUNTER — APPOINTMENT (OUTPATIENT)
Dept: PHYSICAL THERAPY | Facility: CLINIC | Age: 83
End: 2023-10-10
Payer: COMMERCIAL

## 2023-10-10 NOTE — TELEPHONE ENCOUNTER
Ember Ames transferred José to the office. [place] states she forgot the name of the last water pill she was put on and because of that, she doesn't know if she needs a refill from CRISELDA. I informed her it was spironolactone. [place] said she would check her pills and will call back if she needs a refill. Otherwise she will restart it per CRISELDA and José scheduled an appt next week for CRISELDA to recheck the swelling.

## 2023-10-11 ENCOUNTER — APPOINTMENT (OUTPATIENT)
Dept: PHYSICAL THERAPY | Facility: CLINIC | Age: 83
End: 2023-10-11
Payer: COMMERCIAL

## 2023-10-13 ENCOUNTER — OFFICE VISIT (OUTPATIENT)
Dept: PHYSICAL THERAPY | Facility: CLINIC | Age: 83
End: 2023-10-13
Payer: COMMERCIAL

## 2023-10-13 DIAGNOSIS — R26.89 IMBALANCE: Primary | ICD-10-CM

## 2023-10-13 DIAGNOSIS — R53.81 PHYSICAL DECONDITIONING: ICD-10-CM

## 2023-10-13 DIAGNOSIS — R29.6 FALLS FREQUENTLY: ICD-10-CM

## 2023-10-13 PROCEDURE — 97112 NEUROMUSCULAR REEDUCATION: CPT

## 2023-10-13 NOTE — PROGRESS NOTES
Daily Note     Today's date: 10/13/2023  Patient name: Maris Peterson  : 1940  MRN: [id number]  Referring provider: JORDAN Angulo  Dx:   Encounter Diagnosis     ICD-10-CM    1. Imbalance  R26.89       2. Physical deconditioning  R53.81       3. Falls frequently  R29.6           Start Time: 1104  Stop Time: 1150  Total time in clinic (min): 46 minutes    Subjective: Maris Peterson arrived to scheduled PT treatment session with no reported symptoms. Patient has c/o gout in the right foot with associated pain of 3/10 and swelling visible on the dorsum of the foot. Objective: See treatment diary below      Assessment: Tolerated treatment well. Assessed patient's tolerance to single limb balance, obstacle negotiation, and ambulation exercises with use of eyes opened/closed as well as horizontal and vertical head movements. Patient displayed increased difficulty with maintaining balance during exercises with eyes closed, requiring CS-CG to maintain upright posturing. Between balance exercises, patient practiced exercises with the focus of improving LE strength as well as transfers to/from chair. Provided patient with HEP to practice balance exercises with use of counter support and supplemental chair for additional balance. Patient exhibited good technique with therapeutic exercises and would benefit from continued PT      Plan: Continue per plan of care. Progress treatment as tolerated. Precautions: Fall risk    Access Code: [email or web address]  URL: [email or web address]  Date: 10/13/2023  Prepared by: Serena Mueller    Exercises  - Sit to Stand  - 1-2 x daily - 7 x weekly - 3 sets - 10 reps  - Standing Single Leg Stance with Counter Support  - 1-2 x daily - 7 x weekly - 3 sets - 30 hold  - Narrow Stance with Unilateral Counter Support  - 1-2 x daily - 7 x weekly - 3 sets - 30 hold  - Side Stepping with Counter Support  - 1-2 x daily - 7 x weekly - 5 sets - 10 reps    Date  10/13 10/4/23 Visit 2 IE                  Neuro Re-Ed     STS 3x10; hands on thighs     SLS 2x30s B EO/EC FG; 1-2 UE support      SLS - Marches  3x10 on airex with 1-2 UE support    SLS - Trunk Rotations 1x10 with one foot on 8" step; EO/EC ea  1 UE support    Fwd Heel Taps 2x10 B on 8" step w/ 1 UE support    Lat Steps  2x10 B on 8" step w/ 1 UE support    Tandem Stance 2x30s EO/EC on FG;  Full Tandem    Tandem Walk  5 RTS fwd/bwd with 1 UE support    Clinic Ambulation 2 RTS of horizontal and vertical gaze; EO/EC x 20 ft each    Sidesteps on airex [id number] with 1-2 UE support    Alexia Negotiation Fwd/bwd over 2x hurdles with 1 UE support x 10    Ther Ex

## 2023-10-18 ENCOUNTER — OFFICE VISIT (OUTPATIENT)
Dept: PHYSICAL THERAPY | Facility: CLINIC | Age: 83
End: 2023-10-18
Payer: COMMERCIAL

## 2023-10-18 DIAGNOSIS — R53.81 PHYSICAL DECONDITIONING: ICD-10-CM

## 2023-10-18 DIAGNOSIS — R26.89 IMBALANCE: Primary | ICD-10-CM

## 2023-10-18 DIAGNOSIS — R29.6 FALLS FREQUENTLY: ICD-10-CM

## 2023-10-18 PROCEDURE — 97112 NEUROMUSCULAR REEDUCATION: CPT

## 2023-10-18 NOTE — PROGRESS NOTES
Daily Note     Today's date: 10/18/2023  Patient name: Pablo Bishop  : 1940  MRN: [id number]  Referring provider: JORDAN Valentine  Dx:   Encounter Diagnosis     ICD-10-CM    1. Imbalance  R26.89       2. Physical deconditioning  R53.81       3. Falls frequently  R29.6           Start Time: 1448  Stop Time: 1530  Total time in clinic (min): 42 minutes    Subjective: Pablo Bishop arrived to scheduled PT treatment session with denial of symptoms since previous session. Objective: See treatment diary below      Assessment: Tolerated treatment well. Reviewed prior balance exercises with progression towards obstacle negotiation with increasing gait speed. Overall, patient displayed increased lateral sway as gait speed increased which lead to difficulty negotiating obstacles. Patient continues to improve with standing balance exercises with eyes opened and eyes closed. Patient demonstrated fatigue post treatment, exhibited good technique with therapeutic exercises, and would benefit from continued PT      Plan: Continue per plan of care. Progress treatment as tolerated. Precautions: Fall risk    Access Code: [email or web address]  URL: [email or web address]  Date: 10/13/2023  Prepared by: Yudy Hernandez    Exercises  - Sit to Stand  - 1-2 x daily - 7 x weekly - 3 sets - 10 reps  - Standing Single Leg Stance with Counter Support  - 1-2 x daily - 7 x weekly - 3 sets - 30 hold  - Narrow Stance with Unilateral Counter Support  - 1-2 x daily - 7 x weekly - 3 sets - 30 hold  - Side Stepping with Counter Support  - 1-2 x daily - 7 x weekly - 5 sets - 10 reps    Date  10/18 10/13 10/4/23   Visit 3 2 IE                     Neuro Re-Ed      STS 3x10; hands on thighs  3x10; hands on thighs     SLS 2x30s B EO/EC FG; 1-2 UE support 2x30s B EO/EC FG; 1-2 UE support      SLS - Marches   3x10 on airex with 1-2 UE support    SLS - Trunk Rotations  1x10 with one foot on 8" step; EO/EC ea  1 UE support Obstacle Negotiation 6 RTS around 3 cones with EO; mild sway     Fwd Heel Taps 2x10 B on cone  2x10 B on 8" step w/ 1 UE support    Lat Steps   2x10 B on 8" step w/ 1 UE support    Tandem Stance 2x30s EO/EC on FG;  Full Tandem 2x30s EO/EC on FG;  Full Tandem    Tandem Walk  5 RTS fwd with EO + H head turns; 5 RTS with EC 5 RTS fwd/bwd with 1 UE support    Clinic Ambulation  2 RTS of horizontal and vertical gaze; EO/EC x 20 ft each    Sidesteps on airex  [id number] with 1-2 UE support    Alexia Negotiation  Fwd/bwd over 2x hurdles with 1 UE support x 10    LAQ 2x10 B     Ther Ex

## 2023-10-19 ENCOUNTER — OFFICE VISIT (OUTPATIENT)
Dept: FAMILY MEDICINE CLINIC | Facility: CLINIC | Age: 83
End: 2023-10-19
Payer: COMMERCIAL

## 2023-10-19 VITALS
BODY MASS INDEX: 20.83 KG/M2 | WEIGHT: 122 LBS | DIASTOLIC BLOOD PRESSURE: 80 MMHG | SYSTOLIC BLOOD PRESSURE: 110 MMHG | HEIGHT: 64 IN | TEMPERATURE: 95 F | HEART RATE: 72 BPM | RESPIRATION RATE: 16 BRPM

## 2023-10-19 DIAGNOSIS — K70.9 ALCOHOLIC LIVER DISEASE (HCC): ICD-10-CM

## 2023-10-19 DIAGNOSIS — N18.30 STAGE 3 CHRONIC KIDNEY DISEASE, UNSPECIFIED WHETHER STAGE 3A OR 3B CKD (HCC): Primary | ICD-10-CM

## 2023-10-19 DIAGNOSIS — R60.0 BILATERAL LOWER EXTREMITY EDEMA: ICD-10-CM

## 2023-10-19 PROCEDURE — 99213 OFFICE O/P EST LOW 20 MIN: CPT | Performed by: NURSE PRACTITIONER

## 2023-10-19 RX ORDER — AMOXICILLIN 500 MG/1
CAPSULE ORAL
COMMUNITY
Start: 2023-10-09

## 2023-10-19 RX ORDER — INDOMETHACIN 50 MG/1
50 CAPSULE ORAL 2 TIMES DAILY
COMMUNITY
Start: 2023-10-11

## 2023-10-19 NOTE — PROGRESS NOTES
Assessment/Plan:    1. Stage 3 chronic kidney disease, unspecified whether stage 3a or 3b CKD ([address] )  -     Basic metabolic panel; Future    2. Alcoholic liver disease ([address])  Assessment & Plan:  Pt recently treated for gout attack. Podiatry managing. 3. Bilateral lower extremity edema  Assessment & Plan:  Symptoms improved/resolved. Continue medications as directed. Sodium reduction. Depression Screening and Follow-up Plan: Patient was screened for depression during today's encounter. They screened negative with a PHQ-2 score of 0. Return in about 3 months (around 1/19/2024) for Recheck. Subjective:      Patient ID: Shira Guevara is a 78 y.o. female. Chief Complaint   Patient presents with    Sarika Villa is an 78year old female who presents to the office for a recheck of LE swelling. Reports that her symptoms have improved significantly. Denies shortness of breath. No new acute complaints at this time. The following portions of the patient's history were reviewed and updated as appropriate: allergies, current medications, past family history, past medical history, past social history, past surgical history and problem list.    Review of Systems   Constitutional:  Negative for chills, fatigue and fever. Respiratory:  Negative for cough, chest tightness and shortness of breath. Cardiovascular:  Negative for chest pain.          Current Outpatient Medications   Medication Sig Dispense Refill    allopurinol (ZYLOPRIM) 100 mg tablet Take 1 tablet (100 mg total) by mouth daily 90 tablet 2    amoxicillin (AMOXIL) 500 mg capsule TAKE BY MOUTH 4 CAPSULES ONE HOUR PRIOR TO DENTAL APPOINTMENT      aspirin 81 mg chewable tablet Chew 1 tablet daily      ELIQUIS 2.5 MG 2.5 mg 2 (two) times a day        fexofenadine (ALLEGRA) 180 MG tablet Take 180 mg by mouth daily      hydrocortisone 1 % cream Apply topically 3 (three) times a day as needed for rash (itching) 45 g 0 hydrOXYzine HCL (ATARAX) 25 mg tablet Take 1 tablet (25 mg total) by mouth every 6 (six) hours as needed for itching 90 tablet 0    indomethacin (INDOCIN) 50 mg capsule Take 50 mg by mouth 2 (two) times a day      levothyroxine 50 mcg tablet TAKE 1 TABLET (50 MCG TOTAL) BY MOUTH DAILY IN THE EARLY MORNING 90 tablet 0    metoprolol succinate (TOPROL-XL) 25 mg 24 hr tablet Take 1 tablet (25 mg total) by mouth daily 90 tablet 0    simvastatin (ZOCOR) 20 mg tablet Take 1 tablet by mouth daily      benazepril (LOTENSIN) 10 mg tablet Take 10 mg by mouth daily (Patient not taking: Reported on 10/19/2023)       No current facility-administered medications for this visit. Objective:    /80 (BP Location: Left arm, Patient Position: Sitting, Cuff Size: Standard)   Pulse 72   Temp (!) 95 °F (35 °C) (Temporal)   Resp 16   Ht 5' 4" (1.626 m)   Wt 55.3 kg (122 lb)   BMI 20.94 kg/m²        Physical Exam  Vitals reviewed. Constitutional:       Appearance: Normal appearance. HENT:      Head: Normocephalic and atraumatic. Cardiovascular:      Rate and Rhythm: Normal rate and regular rhythm. Heart sounds: Normal heart sounds. Pulmonary:      Effort: Pulmonary effort is normal.      Breath sounds: Normal breath sounds. Neurological:      Mental Status: She is alert and oriented to person, place, and time.    Psychiatric:         Mood and Affect: Mood normal.                JORDAN Beck

## 2023-10-20 ENCOUNTER — APPOINTMENT (OUTPATIENT)
Dept: PHYSICAL THERAPY | Facility: CLINIC | Age: 83
End: 2023-10-20
Payer: COMMERCIAL

## 2023-10-25 ENCOUNTER — APPOINTMENT (OUTPATIENT)
Dept: PHYSICAL THERAPY | Facility: CLINIC | Age: 83
End: 2023-10-25
Payer: COMMERCIAL

## 2023-11-14 ENCOUNTER — TELEPHONE (OUTPATIENT)
Age: 83
End: 2023-11-14

## 2023-11-14 NOTE — TELEPHONE ENCOUNTER
These are new symptoms and I would love to have her come in, get blood pressure, ecg and labs taken for her shakiness we can discuss further work up. But pls advise that it is in her best interest not to ignore these symptoms and it is not good clinical practice to go back and forth via phone.

## 2023-11-14 NOTE — TELEPHONE ENCOUNTER
The patient called she said no one called her she has been waiting for the call she does not want to schedule an appointment she just wants to know who she can see   she wants to be tested for parkinsons    ---she has been having symptoms for one week it is not constant   it comes and goes   her hands and arms shake   please call thank you   her number is [phone]

## 2023-11-14 NOTE — TELEPHONE ENCOUNTER
Patient called requesting to speak with the practice. She stated for about 1 week her hands and arms have been shaky. It does not last all day and usually starts late in the morning. She would like to know who she should see for this. She was offered an appointment with the practice, but asked to speak to someone first to find out if she will need to be referred to a specialist instead. Patient will be leaving out this afternoon and is requesting a call back before then if possible.

## 2023-11-14 NOTE — TELEPHONE ENCOUNTER
Left message on machine to return call for patient to return call to the office. Advised patient that she will need to schedule an OVS with [facility] for assessment before a referral an be given. KATY Jolly  Practice Administrator  [address]

## 2023-11-17 ENCOUNTER — RA CDI HCC (OUTPATIENT)
Dept: OTHER | Facility: HOSPITAL | Age: 83
End: 2023-11-17

## 2023-11-17 NOTE — PROGRESS NOTES
[address] [facility] coding opportunities       Chart reviewed, no opportunity found: CHART REVIEWED, NO OPPORTUNITY FOUND        Patients Insurance     [facility]

## 2023-11-18 DIAGNOSIS — E03.9 HYPOTHYROIDISM, UNSPECIFIED TYPE: ICD-10-CM

## 2023-11-20 RX ORDER — LEVOTHYROXINE SODIUM 0.05 MG/1
50 TABLET ORAL
Qty: 90 TABLET | Refills: 0 | Status: SHIPPED | OUTPATIENT
Start: 2023-11-20

## 2023-11-24 ENCOUNTER — OFFICE VISIT (OUTPATIENT)
Dept: FAMILY MEDICINE CLINIC | Facility: CLINIC | Age: 83
End: 2023-11-24
Payer: COMMERCIAL

## 2023-11-24 VITALS
RESPIRATION RATE: 20 BRPM | WEIGHT: 125 LBS | DIASTOLIC BLOOD PRESSURE: 80 MMHG | SYSTOLIC BLOOD PRESSURE: 124 MMHG | HEART RATE: 81 BPM | BODY MASS INDEX: 21.34 KG/M2 | TEMPERATURE: 97.3 F | OXYGEN SATURATION: 97 % | HEIGHT: 64 IN

## 2023-11-24 DIAGNOSIS — R25.1 SHAKINESS: ICD-10-CM

## 2023-11-24 DIAGNOSIS — Z82.0 FAMILY HISTORY OF PARKINSON DISEASE: ICD-10-CM

## 2023-11-24 DIAGNOSIS — R25.1 TREMOR: Primary | ICD-10-CM

## 2023-11-24 PROCEDURE — 36415 COLL VENOUS BLD VENIPUNCTURE: CPT | Performed by: NURSE PRACTITIONER

## 2023-11-24 PROCEDURE — 99214 OFFICE O/P EST MOD 30 MIN: CPT | Performed by: NURSE PRACTITIONER

## 2023-11-24 NOTE — PROGRESS NOTES
Assessment/Plan:    1. Tremor  Assessment & Plan:  New onset tremor/shaking about 3 weeks ago per pt. Advise lab evaluation. Encourage increase fluid intake throughout the day. Alcohol reduction, per pt. Could be side effect of less alcohol intake. Pt in no acute distress at this time. Recheck 4 weeks. Orders:  -     Ambulatory Referral to Neurology; Future  -     CBC and differential  -     Sedimentation rate, automated  -     C-reactive protein  -     Comprehensive metabolic panel  -     Fe+TIBC+Maximino    2. Shakiness  -     CBC and differential  -     Sedimentation rate, automated  -     C-reactive protein  -     Comprehensive metabolic panel  -     Fe+TIBC+Maximino    3. Family history of Parkinson disease  -     Ambulatory Referral to Neurology; Future              Return in about 4 weeks (around 12/22/2023) for Recheck. Subjective:      Patient ID: Elisha Triana is a 78 y.o. female. Chief Complaint   Patient presents with    trembling Argeliafarzaneh Dubon is an 78year old female who presents to the office for evaluation and management of shaking hands. Reports that her symptoms began about 2 weeks ago. Denies any changes to diet or daily routine. Reports that she was trying to make deviled eggs and she really noticed symptoms of hand trembling when trying to take the shell off of the eggs. Reports her sister did have parkinson's disease. Reports she continues to have itchy rash, itching mostly surrounding her vaginal area. Reports that she has been taking benadryl and anti-itch cream which is effective for temporary management. Reports she has reduced her alcohol intake. The following portions of the patient's history were reviewed and updated as appropriate: allergies, current medications, past family history, past medical history, past social history, past surgical history and problem list.    Review of Systems   Constitutional:  Negative for chills, fatigue and fever.    Respiratory: Negative for cough, chest tightness and shortness of breath. Cardiovascular:  Negative for chest pain. Skin:  Positive for rash (itching). Neurological:  Positive for tremors. Negative for dizziness, facial asymmetry, speech difficulty, light-headedness and headaches. Current Outpatient Medications   Medication Sig Dispense Refill    allopurinol (ZYLOPRIM) 100 mg tablet Take 1 tablet (100 mg total) by mouth daily 90 tablet 2    amoxicillin (AMOXIL) 500 mg capsule TAKE BY MOUTH 4 CAPSULES ONE HOUR PRIOR TO DENTAL APPOINTMENT      aspirin 81 mg chewable tablet Chew 1 tablet daily      ELIQUIS 2.5 MG 2.5 mg 2 (two) times a day        fexofenadine (ALLEGRA) 180 MG tablet Take 180 mg by mouth daily      hydrocortisone 1 % cream Apply topically 3 (three) times a day as needed for rash (itching) 45 g 0    hydrOXYzine HCL (ATARAX) 25 mg tablet Take 1 tablet (25 mg total) by mouth every 6 (six) hours as needed for itching 90 tablet 0    indomethacin (INDOCIN) 50 mg capsule Take 50 mg by mouth 2 (two) times a day      levothyroxine 50 mcg tablet TAKE 1 TABLET (50 MCG TOTAL) BY MOUTH DAILY IN THE EARLY MORNING 90 tablet 0    metoprolol succinate (TOPROL-XL) 25 mg 24 hr tablet Take 1 tablet (25 mg total) by mouth daily 90 tablet 0    simvastatin (ZOCOR) 20 mg tablet Take 1 tablet by mouth daily      benazepril (LOTENSIN) 10 mg tablet Take 10 mg by mouth daily (Patient not taking: Reported on 10/19/2023)       No current facility-administered medications for this visit. Objective:    /80 (BP Location: Left arm, Patient Position: Sitting, Cuff Size: Standard)   Pulse 81   Temp (!) 97.3 °F (36.3 °C) (Temporal)   Resp 20   Ht 5' 4" (1.626 m)   Wt 56.7 kg (125 lb)   SpO2 97%   BMI 21.46 kg/m²        Physical Exam  Vitals reviewed. Constitutional:       Appearance: Normal appearance. HENT:      Head: Normocephalic and atraumatic.    Eyes:      General: Lids are normal.      Extraocular Movements: Extraocular movements intact. Conjunctiva/sclera: Conjunctivae normal.      Pupils: Pupils are equal, round, and reactive to light. Cardiovascular:      Rate and Rhythm: Normal rate and regular rhythm. Heart sounds: Normal heart sounds. Pulmonary:      Effort: Pulmonary effort is normal.      Breath sounds: Normal breath sounds. Neurological:      General: No focal deficit present. Mental Status: She is alert and oriented to person, place, and time. Cranial Nerves: Cranial nerves 2-12 are intact. No facial asymmetry. Sensory: Sensation is intact. Motor: Tremor present. Deep Tendon Reflexes: Reflexes are normal and symmetric.       Comments: Pt does display shaking hands, tremor   Psychiatric:         Mood and Affect: Mood normal.                JORDAN Wild

## 2023-11-24 NOTE — ASSESSMENT & PLAN NOTE
New onset tremor/shaking about 3 weeks ago per pt. Advise lab evaluation. Encourage increase fluid intake throughout the day. Alcohol reduction, per pt. Could be side effect of less alcohol intake. Pt in no acute distress at this time. Recheck 4 weeks.

## 2023-11-25 LAB
ALBUMIN SERPL-MCNC: 4.6 G/DL (ref 3.7–4.7)
ALBUMIN/GLOB SERPL: 2.1 {RATIO} (ref 1.2–2.2)
ALP SERPL-CCNC: 91 IU/L (ref 44–121)
ALT SERPL-CCNC: 31 IU/L (ref 0–32)
AST SERPL-CCNC: 42 IU/L (ref 0–40)
BASOPHILS # BLD AUTO: 0.1 X10E3/UL (ref 0–0.2)
BASOPHILS NFR BLD AUTO: 1 %
BILIRUB SERPL-MCNC: 0.7 MG/DL (ref 0–1.2)
BUN SERPL-MCNC: 20 MG/DL (ref 8–27)
BUN/CREAT SERPL: 20 (ref 12–28)
CALCIUM SERPL-MCNC: 9.5 MG/DL (ref 8.7–10.3)
CHLORIDE SERPL-SCNC: 102 MMOL/L (ref 96–106)
CO2 SERPL-SCNC: 19 MMOL/L (ref 20–29)
CREAT SERPL-MCNC: 0.98 MG/DL (ref 0.57–1)
CRP SERPL-MCNC: <1 MG/L (ref 0–10)
EGFR: 57 ML/MIN/1.73
EOSINOPHIL # BLD AUTO: 0.1 X10E3/UL (ref 0–0.4)
EOSINOPHIL NFR BLD AUTO: 3 %
ERYTHROCYTE [DISTWIDTH] IN BLOOD BY AUTOMATED COUNT: 13.4 % (ref 11.7–15.4)
ERYTHROCYTE [SEDIMENTATION RATE] IN BLOOD BY WESTERGREN METHOD: 8 MM/HR (ref 0–40)
FERRITIN SERPL-MCNC: 103 NG/ML (ref 15–150)
GLOBULIN SER-MCNC: 2.2 G/DL (ref 1.5–4.5)
GLUCOSE SERPL-MCNC: 85 MG/DL (ref 70–99)
HCT VFR BLD AUTO: 34.1 % (ref 34–46.6)
HGB BLD-MCNC: 11.5 G/DL (ref 11.1–15.9)
IMM GRANULOCYTES # BLD: 0 X10E3/UL (ref 0–0.1)
IMM GRANULOCYTES NFR BLD: 0 %
IRON SATN MFR SERPL: 42 % (ref 15–55)
IRON SERPL-MCNC: 166 UG/DL (ref 27–139)
LYMPHOCYTES # BLD AUTO: 1.3 X10E3/UL (ref 0.7–3.1)
LYMPHOCYTES NFR BLD AUTO: 31 %
MCH RBC QN AUTO: 35.1 PG (ref 26.6–33)
MCHC RBC AUTO-ENTMCNC: 33.7 G/DL (ref 31.5–35.7)
MCV RBC AUTO: 104 FL (ref 79–97)
MONOCYTES # BLD AUTO: 0.4 X10E3/UL (ref 0.1–0.9)
MONOCYTES NFR BLD AUTO: 11 %
NEUTROPHILS # BLD AUTO: 2.2 X10E3/UL (ref 1.4–7)
NEUTROPHILS NFR BLD AUTO: 54 %
PLATELET # BLD AUTO: 219 X10E3/UL (ref 150–450)
POTASSIUM SERPL-SCNC: 5.1 MMOL/L (ref 3.5–5.2)
PROT SERPL-MCNC: 6.8 G/DL (ref 6–8.5)
RBC # BLD AUTO: 3.28 X10E6/UL (ref 3.77–5.28)
SODIUM SERPL-SCNC: 136 MMOL/L (ref 134–144)
TIBC SERPL-MCNC: 394 UG/DL (ref 250–450)
UIBC SERPL-MCNC: 228 UG/DL (ref 118–369)
WBC # BLD AUTO: 4.1 X10E3/UL (ref 3.4–10.8)

## 2023-11-27 ENCOUNTER — TELEPHONE (OUTPATIENT)
Age: 83
End: 2023-11-27

## 2023-11-27 NOTE — TELEPHONE ENCOUNTER
Patient called to request the name and phone number of a dermatologist.  Patient stated she has been having itching in ears that medication has not been helping, so she would now like to see a dermatologist.  Patient was given the number for Dr. Deric Castanon, [address] [place] [address] Dermatology in [facility].

## 2023-11-28 ENCOUNTER — TELEPHONE (OUTPATIENT)
Age: 83
End: 2023-11-28

## 2023-11-28 ENCOUNTER — OFFICE VISIT (OUTPATIENT)
Age: 83
End: 2023-11-28
Payer: COMMERCIAL

## 2023-11-28 VITALS — HEIGHT: 64 IN | BODY MASS INDEX: 21.17 KG/M2 | WEIGHT: 124 LBS | TEMPERATURE: 96.5 F

## 2023-11-28 DIAGNOSIS — L24.4 IRRITANT CONTACT DERMATITIS DUE TO DRUG IN CONTACT WITH SKIN: Primary | ICD-10-CM

## 2023-11-28 PROCEDURE — 99203 OFFICE O/P NEW LOW 30 MIN: CPT | Performed by: DERMATOLOGY

## 2023-11-28 RX ORDER — TRIAMCINOLONE ACETONIDE 1 MG/G
OINTMENT TOPICAL
Qty: 30 G | Refills: 1 | Status: SHIPPED | OUTPATIENT
Start: 2023-11-28

## 2023-11-28 RX ORDER — GABAPENTIN 100 MG/1
CAPSULE ORAL
Qty: 60 CAPSULE | Refills: 1 | Status: SHIPPED | OUTPATIENT
Start: 2023-11-28

## 2023-11-28 RX ORDER — TRIAMCINOLONE ACETONIDE 1 MG/G
CREAM TOPICAL 2 TIMES DAILY
Qty: 453 G | Refills: 2 | Status: SHIPPED | OUTPATIENT
Start: 2023-11-28

## 2023-11-28 NOTE — PROGRESS NOTES
[facility] Note     Patient Name: Yolette Vaughan  Encounter Date: 11/28/23     Have you been cared for by a [facility]ologist in the last 3 years and, if so, which description applies to you? NO. I am considered a "new" patient and must complete all patient intake questions. I am FEMALE/of child-bearing potential.    REVIEW OF SYSTEMS:  Have you recently had or currently have any of the following? Recent fever or chills? No  Any non-healing wound? No  Are you pregnant or planning to become pregnant? No  Are you currently or planning to be nursing or breast feeding? No   PAST MEDICAL HISTORY:  Have you personally ever had or currently have any of the following? If "YES," then please provide more detail. Skin cancer (such as Melanoma, Basal Cell Carcinoma, Squamous Cell Carcinoma? No  Tuberculosis, HIV/AIDS, Hepatitis B or C: No  Radiation Treatment No   HISTORY OF IMMUNOSUPPRESSION:   Do you have a history of any of the following:  Systemic Immunosuppression such as Diabetes, Biologic or Immunotherapy, Chemotherapy, Organ Transplantation, Bone Marrow Transplantation? No    Answering "YES" requires the addition of the dotphrase "IMMUNOSUPPRESSED" as the first diagnosis of the patient's visit. FAMILY HISTORY:  Any "first degree relatives" (parent, brother, sister, or child) with the following? Skin Cancer, Pancreatic or Other Cancer? YES, patient has hx of breast cancer, left breast, colon cancer and Moth Cancer of right temple   PATIENT EXPERIENCE:    Do you want the Dermatologist to perform a COMPLETE skin exam today including a clinical examination under the "bra and underwear" areas? Yes  If necessary, do we have your permission to call and leave a detailed message on your Preferred Phone number that includes your specific medical information?   Yes      Allergies   Allergen Reactions   • Clobetasol Blisters   • Lidocaine      Annotation - [id number]: Seizure with 20 cc during a surgical block. FS   • Penicillins Other (See Comments)     unknown   • Sulfa Antibiotics Edema      Current Outpatient Medications:   •  allopurinol (ZYLOPRIM) 100 mg tablet, Take 1 tablet (100 mg total) by mouth daily, Disp: 90 tablet, Rfl: 2  •  aspirin 81 mg chewable tablet, Chew 1 tablet daily, Disp: , Rfl:   •  ELIQUIS 2.5 MG, 2.5 mg 2 (two) times a day  , Disp: , Rfl:   •  fexofenadine (ALLEGRA) 180 MG tablet, Take 180 mg by mouth daily, Disp: , Rfl:   •  gabapentin (Neurontin) 100 mg capsule, Take 1 an hour before bedtime, Disp: 60 capsule, Rfl: 1  •  hydrocortisone 1 % cream, Apply topically 3 (three) times a day as needed for rash (itching), Disp: 45 g, Rfl: 0  •  hydrOXYzine HCL (ATARAX) 25 mg tablet, Take 1 tablet (25 mg total) by mouth every 6 (six) hours as needed for itching, Disp: 90 tablet, Rfl: 0  •  indomethacin (INDOCIN) 50 mg capsule, Take 50 mg by mouth 2 (two) times a day, Disp: , Rfl:   •  levothyroxine 50 mcg tablet, TAKE 1 TABLET (50 MCG TOTAL) BY MOUTH DAILY IN THE EARLY MORNING, Disp: 90 tablet, Rfl: 0  •  metoprolol succinate (TOPROL-XL) 25 mg 24 hr tablet, Take 1 tablet (25 mg total) by mouth daily, Disp: 90 tablet, Rfl: 0  •  simvastatin (ZOCOR) 20 mg tablet, Take 1 tablet by mouth daily, Disp: , Rfl:   •  triamcinolone (KENALOG) 0.1 % cream, Apply topically 2 (two) times a day To legs, arms, body for 2 months twice daily to all areas or itch, Disp: 453 g, Rfl: 2  •  triamcinolone (KENALOG) 0.1 % ointment, To vaginal area twice daily for 2 weeks, Disp: 30 g, Rfl: 1  •  amoxicillin (AMOXIL) 500 mg capsule, TAKE BY MOUTH 4 CAPSULES ONE HOUR PRIOR TO DENTAL APPOINTMENT, Disp: , Rfl:   •  benazepril (LOTENSIN) 10 mg tablet, Take 10 mg by mouth daily (Patient not taking: Reported on 10/19/2023), Disp: , Rfl:           Whom besides the patient is providing clinical information about today's encounter?    NO ADDITIONAL HISTORIAN (patient alone provided history)    Physical Exam and Assessment/Plan by Diagnosis:    CONTACT DERMATITIS GENITAL AREA WITH GENERALIZED PRURITUS    Physical Exam:  Anatomic Location Affected:  lower shins, vaginal  Morphological Description:  erythema edema of labia  Pertinent Positives:  Pertinent Negatives: Additional History of Present Condition:  patient was prescribed permethrin cream- burned genital area stopped, but still burns there especially after urination. got Hydrocortisone cream to apply worked- but ran out of medication. Has to rinse area after urination burns so much. History of thyroid disease, liver disease, kidney disease. Some benefit from hydroxyzyine. Assessment and Plan:  Diagnosis:  1Contact dermatitis  2. Generalized pruritus - secondary to either thyroid disease, kidney disease or liver disease  Based on a thorough discussion of this condition and the management approach to it (including a comprehensive discussion of the known risks, side effects and potential benefits of treatment), the patient (family) agrees to implement the following specific plan:   Start Gabapentin 100 mg take at night time  Apply Triamcinolone 1 % ointment for vaginal area twice daily for 2 weeks  Apply Triamcinolone 1% cream to arms, legs, body for 2 months twice daily to all areas or itch   Start PA for photo therapy; codes 30412/21200   Follow up in 2 months    What is contact dermatitis? Contact dermatitis is a type of eczema that results from something coming in contact with the skin. There are 2 types:  irritant and allergic. The majority of cases are from irritation. Usually that is from contact with strong soaps, repeated exposure to water, contact with cleaning agents or food, or friction. The minority are an actual allergy. In these cases something is coming in contact with the skin and causing an allergic reaction, similar to what happens with poison ivy. This usually occurs unexpectedly after using something for many years.   Even very tiny amounts of the substance on the skin can cause a reaction. Some common causes are fragrances, preservatives and metals. Sometimes this can occur when an allergic substance is eaten, as well, but most often it is from direct contact with the skin. To determine the cause of allergy a patch test is often done. Some general rules to follow for both types of contact dermatitis are:   Wear gloves when using strong cleansers or before prolonged contact with water (like washing dishes)   Use gentle cleansers and avoid strong soaps   Apply moisturizer to entire body after bathing    Avoid products with fragrance    Most often contact dermatitis is treated with topical medicines like topical steroids, eucrisa, pimecrolimus or tacrolimus. .  Some times oral steroids, ie prednisone, methylprednisone and prednisolone, are needed. In chronic cases, treatment options include:  light therapy, methotrexate, cyclosporin.     Skin Type:  I    Treatment Type:  Hi UVB    Schedule:  2 X WEEK     Secondary Treatment:  No    Topical Application:  Mineral oil     Total BSA%:75    Severity: severe    Starting Mjoules/MED:130    Maximum dose:2500    Increase dose for each treatment:10%      Diagnosis:contact dermatitis    Recommendations:f/u 2 months             Scribe Attestation    I,:  Ranjit Flores am acting as a scribe while in the presence of the attending physician.:       I,:  Lidia Murphy MD personally performed the services described in this documentation    as scribed in my presence.:

## 2023-11-28 NOTE — TELEPHONE ENCOUNTER
Phone call to patient to advise that she needs to make 2 month fu with Dr. Kelly Estrella and schedule a phototherapy nurse visit; ([address] [place] [address] team to start PA for phototherapy, but patient needs to schedule at least one day in order for [facility] team to  to start the PA) LVM for patient to me back to schedule 2 jeffrey FU and schedule the nurse visit for phototherapy

## 2023-11-28 NOTE — PATIENT INSTRUCTIONS
CONTACT DERMATITIS/PRURITUS    Assessment and Plan:  Diagnosis:  Contact dermatitis  Based on a thorough discussion of this condition and the management approach to it (including a comprehensive discussion of the known risks, side effects and potential benefits of treatment), the patient (family) agrees to implement the following specific plan:   Start Gabapentin 100 mg take at night time  Apply Triamcinolone 1 % ointment for vaginal area twice daily for 2 weeks  Apply Triamcinolone 1% cream to arms, legs, body for 2 months twice daily to all areas or itch   Start PA for photo therapy; codes 52963/69475   Follow up in 2 months    What is contact dermatitis? Contact dermatitis is a type of eczema that results from something coming in contact with the skin. There are 2 types:  irritant and allergic. The majority of cases are from irritation. Usually that is from contact with strong soaps, repeated exposure to water, contact with cleaning agents or food, or friction. The minority are an actual allergy. In these cases something is coming in contact with the skin and causing an allergic reaction, similar to what happens with poison ivy. This usually occurs unexpectedly after using something for many years. Even very tiny amounts of the substance on the skin can cause a reaction. Some common causes are fragrances, preservatives and metals. Sometimes this can occur when an allergic substance is eaten, as well, but most often it is from direct contact with the skin. To determine the cause of allergy a patch test is often done.     Some general rules to follow for both types of contact dermatitis are:   Wear gloves when using strong cleansers or before prolonged contact with water (like washing dishes)   Use gentle cleansers and avoid strong soaps   Apply moisturizer to entire body after bathing    Avoid products with fragrance    Most often contact dermatitis is treated with topical medicines like topical steroids, eucrisa, pimecrolimus or tacrolimus. .  Some times oral steroids, ie prednisone, methylprednisone and prednisolone, are needed. In chronic cases, treatment options include:  light therapy, methotrexate, cyclosporin.

## 2023-11-29 NOTE — TELEPHONE ENCOUNTER
Patient called back and was scheduled for 2 month fu in jan and first phototherapy was scheduled 12/13

## 2023-12-11 DIAGNOSIS — E03.9 HYPOTHYROIDISM, UNSPECIFIED TYPE: ICD-10-CM

## 2023-12-11 RX ORDER — LEVOTHYROXINE SODIUM 0.05 MG/1
50 TABLET ORAL
Qty: 90 TABLET | Refills: 1 | Status: SHIPPED | OUTPATIENT
Start: 2023-12-11

## 2023-12-22 ENCOUNTER — OFFICE VISIT (OUTPATIENT)
Dept: FAMILY MEDICINE CLINIC | Facility: CLINIC | Age: 83
End: 2023-12-22
Payer: COMMERCIAL

## 2023-12-22 VITALS
WEIGHT: 130 LBS | HEART RATE: 72 BPM | SYSTOLIC BLOOD PRESSURE: 120 MMHG | BODY MASS INDEX: 22.31 KG/M2 | RESPIRATION RATE: 18 BRPM | DIASTOLIC BLOOD PRESSURE: 82 MMHG | TEMPERATURE: 97.3 F

## 2023-12-22 DIAGNOSIS — N18.30 STAGE 3 CHRONIC KIDNEY DISEASE, UNSPECIFIED WHETHER STAGE 3A OR 3B CKD (HCC): ICD-10-CM

## 2023-12-22 DIAGNOSIS — L29.9 PRURITUS: ICD-10-CM

## 2023-12-22 DIAGNOSIS — R60.0 BILATERAL LOWER EXTREMITY EDEMA: ICD-10-CM

## 2023-12-22 DIAGNOSIS — R25.1 TREMOR: Primary | ICD-10-CM

## 2023-12-22 PROCEDURE — 99213 OFFICE O/P EST LOW 20 MIN: CPT | Performed by: NURSE PRACTITIONER

## 2023-12-22 NOTE — ASSESSMENT & PLAN NOTE
Pt reports that her symptoms come and go. Suspect caffeine intake as cause of her periodic hand tremors.  Advise reduction of caffeine use. Following up with neuro.

## 2023-12-22 NOTE — PROGRESS NOTES
Assessment/Plan:    1. Tremor  Assessment & Plan:  Pt reports that her symptoms come and go. Suspect caffeine intake as cause of her periodic hand tremors.  Advise reduction of caffeine use. Following up with neuro.       2. Stage 3 chronic kidney disease, unspecified whether stage 3a or 3b CKD (HCC)  -     Basic metabolic panel; Future; Expected date: 01/22/2024    3. Pruritus  Assessment & Plan:  Symptoms persist, management with topical cream - effective for symptom relief per patient.       4. Bilateral lower extremity edema  Assessment & Plan:  Pt taking furosemide which is effective for management.  Re-evaluate BMP in 4 weeks.  Continue potassium supplement as directed.               Return in about 3 months (around 3/22/2024) for Recheck.    Subjective:      Patient ID: Felicia Jackson is a 83 y.o. female.    Chief Complaint   Patient presents with   • Follow-up     Follow up hand tremors       Felicia is a 83 year old female who returns to the office for a recheck of hand tremors. Pt reports that her symptoms have persisted and notes that she has started drinking coffee since her most recent hospital stay which coincides with when her tremors began. Reports that she continues to have skin itching and this is managed with topical cream from dermatology.       The following portions of the patient's history were reviewed and updated as appropriate: allergies, current medications, past family history, past medical history, past social history, past surgical history and problem list.    Review of Systems   Constitutional:  Negative for chills, fatigue and fever.   Respiratory:  Negative for shortness of breath.    Cardiovascular:  Negative for chest pain.   Skin:  Negative for rash.         Current Outpatient Medications   Medication Sig Dispense Refill   • allopurinol (ZYLOPRIM) 100 mg tablet Take 1 tablet (100 mg total) by mouth daily 90 tablet 2   • amoxicillin (AMOXIL) 500 mg capsule TAKE BY MOUTH 4  CAPSULES ONE HOUR PRIOR TO DENTAL APPOINTMENT     • aspirin 81 mg chewable tablet Chew 1 tablet daily     • ELIQUIS 2.5 MG 2.5 mg 2 (two) times a day       • fexofenadine (ALLEGRA) 180 MG tablet Take 180 mg by mouth daily     • gabapentin (Neurontin) 100 mg capsule Take 1 an hour before bedtime 60 capsule 1   • hydrocortisone 1 % cream Apply topically 3 (three) times a day as needed for rash (itching) 45 g 0   • hydrOXYzine HCL (ATARAX) 25 mg tablet Take 1 tablet (25 mg total) by mouth every 6 (six) hours as needed for itching 90 tablet 0   • indomethacin (INDOCIN) 50 mg capsule Take 50 mg by mouth 2 (two) times a day     • levothyroxine 50 mcg tablet TAKE 1 TABLET (50 MCG TOTAL) BY MOUTH DAILY IN THE EARLY MORNING 90 tablet 1   • metoprolol succinate (TOPROL-XL) 25 mg 24 hr tablet Take 1 tablet (25 mg total) by mouth daily 90 tablet 0   • simvastatin (ZOCOR) 20 mg tablet Take 1 tablet by mouth daily     • triamcinolone (KENALOG) 0.1 % cream Apply topically 2 (two) times a day To legs, arms, body for 2 months twice daily to all areas or itch 453 g 2   • triamcinolone (KENALOG) 0.1 % ointment To vaginal area twice daily for 2 weeks 30 g 1   • benazepril (LOTENSIN) 10 mg tablet Take 10 mg by mouth daily (Patient not taking: Reported on 10/19/2023)       No current facility-administered medications for this visit.       Objective:    /82 (BP Location: Left arm, Patient Position: Sitting, Cuff Size: Extra-Large)   Pulse 72   Temp (!) 97.3 °F (36.3 °C) (Temporal)   Resp 18   Wt 59 kg (130 lb)   BMI 22.31 kg/m²        Physical Exam  Vitals reviewed.   Constitutional:       Appearance: Normal appearance.   HENT:      Head: Normocephalic and atraumatic.   Cardiovascular:      Rate and Rhythm: Normal rate and regular rhythm.      Heart sounds: Normal heart sounds.   Pulmonary:      Effort: Pulmonary effort is normal.      Breath sounds: Normal breath sounds.   Musculoskeletal:      Right lower leg: No edema.       Left lower leg: No edema.   Neurological:      Mental Status: She is alert and oriented to person, place, and time.   Psychiatric:         Mood and Affect: Mood normal.              JORDAN Menard

## 2023-12-22 NOTE — ASSESSMENT & PLAN NOTE
Pt taking furosemide which is effective for management.  Re-evaluate BMP in 4 weeks.  Continue potassium supplement as directed.

## 2023-12-27 DIAGNOSIS — L24.4 IRRITANT CONTACT DERMATITIS DUE TO DRUG IN CONTACT WITH SKIN: ICD-10-CM

## 2023-12-27 DIAGNOSIS — M10.9 GOUT, ARTHROPATHY: ICD-10-CM

## 2023-12-27 RX ORDER — ALLOPURINOL 100 MG/1
100 TABLET ORAL DAILY
Qty: 90 TABLET | Refills: 1 | Status: SHIPPED | OUTPATIENT
Start: 2023-12-27

## 2023-12-28 RX ORDER — TRIAMCINOLONE ACETONIDE 1 MG/G
OINTMENT TOPICAL
Qty: 30 G | Refills: 5 | Status: SHIPPED | OUTPATIENT
Start: 2023-12-28

## 2024-03-22 ENCOUNTER — OFFICE VISIT (OUTPATIENT)
Dept: FAMILY MEDICINE CLINIC | Facility: CLINIC | Age: 84
End: 2024-03-22
Payer: COMMERCIAL

## 2024-03-22 VITALS
RESPIRATION RATE: 18 BRPM | WEIGHT: 127.6 LBS | HEART RATE: 74 BPM | TEMPERATURE: 98.1 F | HEIGHT: 64 IN | BODY MASS INDEX: 21.78 KG/M2 | SYSTOLIC BLOOD PRESSURE: 108 MMHG | DIASTOLIC BLOOD PRESSURE: 64 MMHG | OXYGEN SATURATION: 96 %

## 2024-03-22 DIAGNOSIS — N18.30 STAGE 3 CHRONIC KIDNEY DISEASE, UNSPECIFIED WHETHER STAGE 3A OR 3B CKD (HCC): ICD-10-CM

## 2024-03-22 DIAGNOSIS — K70.9 ALCOHOLIC LIVER DISEASE (HCC): ICD-10-CM

## 2024-03-22 DIAGNOSIS — I48.91 ATRIAL FIBRILLATION, UNSPECIFIED TYPE (HCC): ICD-10-CM

## 2024-03-22 DIAGNOSIS — I10 PRIMARY HYPERTENSION: ICD-10-CM

## 2024-03-22 DIAGNOSIS — I71.21 ANEURYSM OF ASCENDING AORTA WITHOUT RUPTURE (HCC): ICD-10-CM

## 2024-03-22 DIAGNOSIS — L29.9 PRURITUS: Primary | ICD-10-CM

## 2024-03-22 DIAGNOSIS — I27.20 PULMONARY HYPERTENSION, UNSPECIFIED (HCC): ICD-10-CM

## 2024-03-22 PROBLEM — G25.81 RESTLESS LEG SYNDROME: Status: ACTIVE | Noted: 2024-03-22

## 2024-03-22 PROBLEM — G47.61 PERIODIC LIMB MOVEMENT DISORDER (PLMD): Status: ACTIVE | Noted: 2024-03-22

## 2024-03-22 PROCEDURE — G2211 COMPLEX E/M VISIT ADD ON: HCPCS | Performed by: NURSE PRACTITIONER

## 2024-03-22 PROCEDURE — 99214 OFFICE O/P EST MOD 30 MIN: CPT | Performed by: NURSE PRACTITIONER

## 2024-03-22 RX ORDER — METHYLPREDNISOLONE 4 MG/1
TABLET ORAL
COMMUNITY
Start: 2024-03-20

## 2024-03-22 NOTE — PROGRESS NOTES
Assessment/Plan:    1. Pruritus  Assessment & Plan:  Advise pt to change laundry detergent and monitor for improvement. Call in one week with update. Previous GYN and dermatology consults without any additional recommended interventions.     Orders:  -     Ambulatory Referral to Dermatology; Future    2. Alcoholic liver disease (HCC)  Assessment & Plan:  No issues at this time, stable       3. Pulmonary hypertension, unspecified (HCC)  Assessment & Plan:  No issues at this time. Stable.       4. Atrial fibrillation, unspecified type (HCC)  Assessment & Plan:  Stable, no issues at this time.       5. Aneurysm of ascending aorta without rupture (HCC)  Assessment & Plan:  No issues at this time.      6. Stage 3 chronic kidney disease, unspecified whether stage 3a or 3b CKD (HCC)  Assessment & Plan:  Encourage fluids throughout  the day.  No issues at this time.       7. Primary hypertension  Assessment & Plan:  BP stable in office today. No changes.               There are no Patient Instructions on file for this visit.    No follow-ups on file.    Subjective:      Patient ID: Felicia Jackson is a 84 y.o. female.    Chief Complaint   Patient presents with    Follow-up     3 month follow up, Adilene/KARLIE       Felicia is an 84 year old female with CKD, afib, hypertension, hyperlipidemia, osteoporosis, alcohol use disorder and chronic itching who presents to the office for a 3 month follow up. Pt is accompanied by her daughter who assisted with providing some of the history.         The following portions of the patient's history were reviewed and updated as appropriate: allergies, current medications, past family history, past medical history, past social history, past surgical history and problem list.    Review of Systems   Constitutional:  Negative for chills, fatigue and fever.   Respiratory:  Negative for cough, chest tightness and shortness of breath.    Cardiovascular:  Negative for chest pain.   Skin:  Positive  "for rash.         Current Outpatient Medications   Medication Sig Dispense Refill    allopurinol (ZYLOPRIM) 100 mg tablet TAKE 1 TABLET (100 MG TOTAL) BY MOUTH DAILY 90 tablet 1    amoxicillin (AMOXIL) 500 mg capsule TAKE BY MOUTH 4 CAPSULES ONE HOUR PRIOR TO DENTAL APPOINTMENT      aspirin 81 mg chewable tablet Chew 1 tablet daily      ELIQUIS 2.5 MG 2.5 mg 2 (two) times a day        fexofenadine (ALLEGRA) 180 MG tablet Take 180 mg by mouth daily      gabapentin (Neurontin) 100 mg capsule Take 1 an hour before bedtime 60 capsule 1    hydrocortisone 1 % cream Apply topically 3 (three) times a day as needed for rash (itching) 45 g 0    hydrOXYzine HCL (ATARAX) 25 mg tablet Take 1 tablet (25 mg total) by mouth every 6 (six) hours as needed for itching 90 tablet 0    levothyroxine 50 mcg tablet TAKE 1 TABLET (50 MCG TOTAL) BY MOUTH DAILY IN THE EARLY MORNING 90 tablet 1    methylPREDNISolone 4 MG tablet therapy pack TAKE BY ORAL ROUTE AS DIRECTED PER PACKAGE INSTRUCTIONS      metoprolol succinate (TOPROL-XL) 25 mg 24 hr tablet Take 1 tablet (25 mg total) by mouth daily 90 tablet 0    simvastatin (ZOCOR) 20 mg tablet Take 1 tablet by mouth daily      triamcinolone (KENALOG) 0.1 % cream Apply topically 2 (two) times a day To legs, arms, body for 2 months twice daily to all areas or itch 453 g 2    triamcinolone (KENALOG) 0.1 % ointment APPLY TO VAGINAL AREA TWICE DAILY FOR 2 WEEKS 30 g 5    indomethacin (INDOCIN) 50 mg capsule Take 50 mg by mouth 2 (two) times a day       No current facility-administered medications for this visit.       Objective:    /64   Pulse 74   Temp 98.1 °F (36.7 °C) (Temporal)   Resp 18   Ht 5' 4\" (1.626 m)   Wt 57.9 kg (127 lb 9.6 oz)   SpO2 96%   BMI 21.90 kg/m²        Physical Exam  Vitals reviewed.   Constitutional:       General: She is not in acute distress.     Appearance: She is well-developed. She is not diaphoretic.   HENT:      Head: Normocephalic and atraumatic.   Eyes: "      General: Lids are normal.         Right eye: No discharge.         Left eye: No discharge.      Conjunctiva/sclera: Conjunctivae normal.   Cardiovascular:      Rate and Rhythm: Normal rate and regular rhythm.      Heart sounds: Normal heart sounds.   Pulmonary:      Effort: Pulmonary effort is normal. No respiratory distress.      Breath sounds: Normal breath sounds. No decreased breath sounds, wheezing, rhonchi or rales.   Skin:     General: Skin is warm and dry.      Findings: No rash.   Neurological:      Mental Status: She is alert and oriented to person, place, and time.   Psychiatric:         Behavior: Behavior normal.         Thought Content: Thought content normal.         Judgment: Judgment normal.                JORDAN Menard

## 2024-03-25 NOTE — ASSESSMENT & PLAN NOTE
Advise pt to change laundry detergent and monitor for improvement. Call in one week with update. Previous GYN and dermatology consults without any additional recommended interventions.

## 2024-03-25 NOTE — ASSESSMENT & PLAN NOTE
No issues at this time. Stable.    Benzoyl Peroxide Counseling: Patient counseled that medicine may cause skin irritation and bleach clothing.  In the event of skin irritation, the patient was advised to reduce the amount of the drug applied or use it less frequently.   The patient verbalized understanding of the proper use and possible adverse effects of benzoyl peroxide.  All of the patient's questions and concerns were addressed.

## 2024-03-28 DIAGNOSIS — R60.0 BILATERAL LOWER EXTREMITY EDEMA: ICD-10-CM

## 2024-03-28 DIAGNOSIS — L24.4 IRRITANT CONTACT DERMATITIS DUE TO DRUG IN CONTACT WITH SKIN: ICD-10-CM

## 2024-03-28 RX ORDER — GABAPENTIN 100 MG/1
CAPSULE ORAL
Qty: 60 CAPSULE | Refills: 1 | Status: SHIPPED | OUTPATIENT
Start: 2024-03-28

## 2024-03-28 RX ORDER — FUROSEMIDE 20 MG/1
20 TABLET ORAL DAILY
Qty: 90 TABLET | Refills: 0 | Status: SHIPPED | OUTPATIENT
Start: 2024-03-28

## 2024-04-15 DIAGNOSIS — E78.2 MIXED HYPERLIPIDEMIA: ICD-10-CM

## 2024-04-15 DIAGNOSIS — I27.20 PULMONARY HYPERTENSION, UNSPECIFIED (HCC): ICD-10-CM

## 2024-04-15 DIAGNOSIS — I10 PRIMARY HYPERTENSION: ICD-10-CM

## 2024-04-15 DIAGNOSIS — E03.9 ACQUIRED HYPOTHYROIDISM: ICD-10-CM

## 2024-04-15 DIAGNOSIS — Z00.00 ENCOUNTER FOR SUBSEQUENT ANNUAL WELLNESS VISIT (AWV) IN MEDICARE PATIENT: Primary | ICD-10-CM

## 2024-04-15 DIAGNOSIS — D64.9 ANEMIA, UNSPECIFIED TYPE: ICD-10-CM

## 2024-04-15 DIAGNOSIS — E03.9 HYPOTHYROIDISM, UNSPECIFIED TYPE: ICD-10-CM

## 2024-04-18 ENCOUNTER — OFFICE VISIT (OUTPATIENT)
Age: 84
End: 2024-04-18
Payer: COMMERCIAL

## 2024-04-18 VITALS — WEIGHT: 129.8 LBS | HEIGHT: 64 IN | TEMPERATURE: 96.5 F | BODY MASS INDEX: 22.16 KG/M2

## 2024-04-18 DIAGNOSIS — L24.4 IRRITANT CONTACT DERMATITIS DUE TO DRUG IN CONTACT WITH SKIN: ICD-10-CM

## 2024-04-18 PROCEDURE — 99213 OFFICE O/P EST LOW 20 MIN: CPT | Performed by: DERMATOLOGY

## 2024-04-18 RX ORDER — TRIAMCINOLONE ACETONIDE 1 MG/G
CREAM TOPICAL 2 TIMES DAILY
Qty: 453 G | Refills: 2 | Status: SHIPPED | OUTPATIENT
Start: 2024-04-18

## 2024-04-18 RX ORDER — TRIAMCINOLONE ACETONIDE 1 MG/G
OINTMENT TOPICAL
Qty: 30 G | Refills: 5 | Status: SHIPPED | OUTPATIENT
Start: 2024-04-18

## 2024-04-18 RX ORDER — GABAPENTIN 100 MG/1
CAPSULE ORAL
Qty: 60 CAPSULE | Refills: 1 | Status: SHIPPED | OUTPATIENT
Start: 2024-04-18

## 2024-04-18 NOTE — PROGRESS NOTES
"[facility] Note     Patient Name: Felicia Jackson  Encounter Date: 4/18/24     Have you been cared for by a [facility] Dermatologist in the last 3 years and, if so, which description applies to you?    Yes.  I have been here within the last 3 years, and my medical history has NOT changed since that time.  I am FEMALE/of child-bearing potential.    REVIEW OF SYSTEMS:  Have you recently had or currently have any of the following? No changes in my recent health.   PAST MEDICAL HISTORY:  Have you personally ever had or currently have any of the following?  If \"YES,\" then please provide more detail. No changes in my medical history.   HISTORY OF IMMUNOSUPPRESSION: Do you have a history of any of the following:  Systemic Immunosuppression such as Diabetes, Biologic or Immunotherapy, Chemotherapy, Organ Transplantation, Bone Marrow Transplantation?  No     Answering \"YES\" requires the addition of the dotphrase \"IMMUNOSUPPRESSED\" as the first diagnosis of the patient's visit.   FAMILY HISTORY:  Any \"first degree relatives\" (parent, brother, sister, or child) with the following?    No changes in my family's known health.   PATIENT EXPERIENCE:    Do you want the Dermatologist to perform a COMPLETE skin exam today including a clinical examination under the \"bra and underwear\" areas?  .8  If necessary, do we have your permission to call and leave a detailed message on your Preferred Phone number that includes your specific medical information?  Yes      Allergies   Allergen Reactions   • Clobetasol Blisters   • Lidocaine      Annotation - [id number]: Seizure with 20 cc during a surgical block. FS   • Penicillins Other (See Comments)     unknown   • Sulfa Antibiotics Edema      Current Outpatient Medications:   •  allopurinol (ZYLOPRIM) 100 mg tablet, TAKE 1 TABLET (100 MG TOTAL) BY MOUTH DAILY, Disp: 90 tablet, Rfl: 1  •  amoxicillin (AMOXIL) 500 mg capsule, TAKE BY MOUTH 4 CAPSULES ONE HOUR PRIOR TO " DENTAL APPOINTMENT, Disp: , Rfl:   •  aspirin 81 mg chewable tablet, Chew 1 tablet daily, Disp: , Rfl:   •  ELIQUIS 2.5 MG, 2.5 mg 2 (two) times a day  , Disp: , Rfl:   •  fexofenadine (ALLEGRA) 180 MG tablet, Take 180 mg by mouth daily, Disp: , Rfl:   •  furosemide (LASIX) 20 mg tablet, TAKE 1 TABLET (20 MG TOTAL) BY MOUTH DAILY, Disp: 90 tablet, Rfl: 0  •  gabapentin (NEURONTIN) 100 mg capsule, TAKE 1 CAPSULE BY MOUTH AN HOUR BEFORE BEDTIME, Disp: 60 capsule, Rfl: 1  •  hydrocortisone 1 % cream, Apply topically 3 (three) times a day as needed for rash (itching), Disp: 45 g, Rfl: 0  •  hydrOXYzine HCL (ATARAX) 25 mg tablet, Take 1 tablet (25 mg total) by mouth every 6 (six) hours as needed for itching, Disp: 90 tablet, Rfl: 0  •  indomethacin (INDOCIN) 50 mg capsule, Take 50 mg by mouth 2 (two) times a day, Disp: , Rfl:   •  levothyroxine 50 mcg tablet, TAKE 1 TABLET (50 MCG TOTAL) BY MOUTH DAILY IN THE EARLY MORNING, Disp: 90 tablet, Rfl: 1  •  methylPREDNISolone 4 MG tablet therapy pack, TAKE BY ORAL ROUTE AS DIRECTED PER PACKAGE INSTRUCTIONS, Disp: , Rfl:   •  metoprolol succinate (TOPROL-XL) 25 mg 24 hr tablet, Take 1 tablet (25 mg total) by mouth daily, Disp: 90 tablet, Rfl: 0  •  simvastatin (ZOCOR) 20 mg tablet, Take 1 tablet by mouth daily, Disp: , Rfl:   •  triamcinolone (KENALOG) 0.1 % cream, Apply topically 2 (two) times a day To legs, arms, body for 2 months twice daily to all areas or itch, Disp: 453 g, Rfl: 2  •  triamcinolone (KENALOG) 0.1 % ointment, APPLY TO VAGINAL AREA TWICE DAILY FOR 2 WEEKS, Disp: 30 g, Rfl: 5          Whom besides the patient is providing clinical information about today's encounter?   NO ADDITIONAL HISTORIAN (patient alone provided history)    Physical Exam and Assessment/Plan by Diagnosis:    CONTACT DERMATITIS GENITAL AREA WITH GENERALIZED PURITUS  Physical Exam:  Anatomic Location Affected:  legs and vaginal area  Morphological Description:  mild edema ad erythema  of  labia minora and majora  Pertinent Positives:  Pertinent Negatives:    Additional History of Present Condition:  Patient states that her legs are still itchy . Vaginal area is itchy after urinating. Patient takes two showers a day to stop the itching in the vaginal area. Much better. Creams help.  All started after a hospitalization.  Still taking gabapentin    Assessment and Plan:  Based on a thorough discussion of this condition and the management approach to it (including a comprehensive discussion of the known risks, side effects and potential benefits of treatment), the patient (family) agrees to implement the following specific plan:  Continue using Gabapentin and Triamcinolone ointment and cream 0.1 %.  Schedule patch testing      Scribe Attestation    I,:  Orlando Saleh am acting as a scribe while in the presence of the attending physician.:       I,:  Alison Tolbert MD personally performed the services described in this documentation    as scribed in my presence.:

## 2024-04-18 NOTE — PATIENT INSTRUCTIONS
ssessment and Plan:  Based on a thorough discussion of this condition and the management approach to it (including a comprehensive discussion of the known risks, side effects and potential benefits of treatment), the patient (family) agrees to implement the following specific plan:  Continue using Gabapentin and Triamcinolone ointment and cream 0.1 %.  Schedule patch testing

## 2024-04-19 ENCOUNTER — TELEPHONE (OUTPATIENT)
Age: 84
End: 2024-04-19

## 2024-04-19 ENCOUNTER — TELEPHONE (OUTPATIENT)
Dept: DERMATOLOGY | Facility: CLINIC | Age: 84
End: 2024-04-19

## 2024-04-19 NOTE — TELEPHONE ENCOUNTER
"Pt of Dr Rosas calling for an appt with her \"next week\"    Advised  Alison is totally booked with nothing available, offered wait list    Pt then stated she needs to schedule PATCH TESTING with her    Advised that is done at [place], is she able to come to that office, pt said absolutely not. She wants it done at [place] as she lives in [place]    Pt demanded to speak to THAT office, advised I can assist with scheduling but pt disconnected call    Please call pt back and explain patch testing process [phone]  "

## 2024-04-19 NOTE — TELEPHONE ENCOUNTER
Called [facility] on 4/19/24 and they stated no pre authorization was required. Patient will just have a co-pay of $40.00.    Orlando Saleh

## 2024-04-22 NOTE — TELEPHONE ENCOUNTER
Call patient no answer left voice mail message \, along with advising pt to call back for explanation of process for patch testing.

## 2024-05-21 ENCOUNTER — TELEPHONE (OUTPATIENT)
Dept: DERMATOLOGY | Facility: CLINIC | Age: 84
End: 2024-05-21

## 2024-05-21 NOTE — TELEPHONE ENCOUNTER
Lvm to pt to make them aware that their appt has been moved from 6/14 to 6/4 due to change in the schedule. This pt has an appt scheduled within another dept on 6/4 in the early afternoon so I scheduled her for our latest appt time in that day. There appt is for the same location, just a different date. included cb number in message.

## 2024-05-24 NOTE — TELEPHONE ENCOUNTER
6/6/2024 was jeremie incorrectly. This appt was for patch testing reading. It needed to be jeremie for after 6/10 and 6/12.      Patient's three patch appt's needed to be rearranged dur to this.    Patient is now jeremie for patch 07/01/2024,07/ 03,/2024, and 07/05/2024

## 2024-06-03 RX ORDER — TRAMADOL HYDROCHLORIDE 50 MG/1
50 TABLET ORAL EVERY 6 HOURS PRN
COMMUNITY
Start: 2024-03-28

## 2024-06-04 ENCOUNTER — OFFICE VISIT (OUTPATIENT)
Dept: FAMILY MEDICINE CLINIC | Facility: CLINIC | Age: 84
End: 2024-06-04
Payer: COMMERCIAL

## 2024-06-04 VITALS
TEMPERATURE: 97.3 F | HEART RATE: 78 BPM | OXYGEN SATURATION: 94 % | WEIGHT: 128 LBS | BODY MASS INDEX: 22.68 KG/M2 | HEIGHT: 63 IN | SYSTOLIC BLOOD PRESSURE: 128 MMHG | RESPIRATION RATE: 16 BRPM | DIASTOLIC BLOOD PRESSURE: 70 MMHG

## 2024-06-04 DIAGNOSIS — E03.9 ACQUIRED HYPOTHYROIDISM: ICD-10-CM

## 2024-06-04 DIAGNOSIS — Z00.00 MEDICARE ANNUAL WELLNESS VISIT, SUBSEQUENT: Primary | ICD-10-CM

## 2024-06-04 DIAGNOSIS — N18.30 STAGE 3 CHRONIC KIDNEY DISEASE, UNSPECIFIED WHETHER STAGE 3A OR 3B CKD (HCC): ICD-10-CM

## 2024-06-04 DIAGNOSIS — N39.498 OTHER URINARY INCONTINENCE: ICD-10-CM

## 2024-06-04 DIAGNOSIS — M10.9 ACUTE GOUT OF RIGHT FOOT, UNSPECIFIED CAUSE: ICD-10-CM

## 2024-06-04 DIAGNOSIS — R14.0 ABDOMINAL DISTENTION: ICD-10-CM

## 2024-06-04 DIAGNOSIS — F10.90 ALCOHOL USE DISORDER: ICD-10-CM

## 2024-06-04 DIAGNOSIS — I48.91 ATRIAL FIBRILLATION, UNSPECIFIED TYPE (HCC): ICD-10-CM

## 2024-06-04 DIAGNOSIS — E78.00 PURE HYPERCHOLESTEROLEMIA: ICD-10-CM

## 2024-06-04 DIAGNOSIS — I10 PRIMARY HYPERTENSION: ICD-10-CM

## 2024-06-04 DIAGNOSIS — M10.9 GOUT, ARTHROPATHY: ICD-10-CM

## 2024-06-04 DIAGNOSIS — R60.0 BILATERAL LOWER EXTREMITY EDEMA: ICD-10-CM

## 2024-06-04 PROCEDURE — 99214 OFFICE O/P EST MOD 30 MIN: CPT | Performed by: NURSE PRACTITIONER

## 2024-06-04 PROCEDURE — G0439 PPPS, SUBSEQ VISIT: HCPCS | Performed by: NURSE PRACTITIONER

## 2024-06-04 RX ORDER — COLCHICINE 0.6 MG/1
0.6 TABLET ORAL 2 TIMES DAILY
Qty: 30 TABLET | Refills: 0 | Status: SHIPPED | OUTPATIENT
Start: 2024-06-04 | End: 2024-06-09

## 2024-06-04 RX ORDER — ALLOPURINOL 100 MG/1
200 TABLET ORAL DAILY
Qty: 180 TABLET | Refills: 0 | Status: SHIPPED | OUTPATIENT
Start: 2024-06-04 | End: 2024-09-02

## 2024-06-04 RX ORDER — DIPHENHYDRAMINE HCL 25 MG
25 TABLET ORAL EVERY 6 HOURS PRN
COMMUNITY

## 2024-06-04 NOTE — PROGRESS NOTES
Ambulatory Visit  Name: Felicia Jackson      : 1940      MRN: [id number]  Encounter Provider: JORDAN Menard  Encounter Date: 2024   Encounter department: [facility]    Assessment & Plan   1. Medicare annual wellness visit, subsequent  Comments:  Age appropriate screenings and recommendations discussed.  2. Gout, arthropathy  -     allopurinol (ZYLOPRIM) 100 mg tablet; Take 2 tablets (200 mg total) by mouth daily  3. Acute gout of right foot, unspecified cause  -     colchicine (COLCRYS) 0.6 mg tablet; Take 1 tablet (0.6 mg total) by mouth 2 (two) times a day for 5 days  4. Acquired hypothyroidism  -     T4, free; Future  5. Primary hypertension  Assessment & Plan:  BP wnl in office today. No changes.   6. Atrial fibrillation, unspecified type (HCC)  Assessment & Plan:  Stable, no issues. Continue cardiology follow up as scheduled.   7. Stage 3 chronic kidney disease, unspecified whether stage 3a or 3b CKD (HCC)  Assessment & Plan:  Lab Results   Component Value Date    EGFR 61 2024    EGFR 57 (L) 2023    EGFR 72 2023    CREATININE 0.86 2024    CREATININE 0.98 2023    CREATININE 0.75 2023   Encourage pt to complete lab work evaluation for kidney function.  No issues at this time.   8. Alcohol use disorder  Assessment & Plan:  Pt reports mild alcohol use. Encourage moderation or cessation of intake.   9. Bilateral lower extremity edema  Assessment & Plan:  Stable, no issues at this time  10. Abdominal distention  11. Pure hypercholesterolemia  12. Other urinary incontinence  Comments:  Periodically with lasix use       Preventive health issues were discussed with patient, and age appropriate screening tests were ordered as noted in patient's After Visit Summary. Personalized health advice and appropriate referrals for health education or preventive services given if needed, as noted in patient's After Visit Summary.    History of  "Present Illness     HPI   Patient Care Team:  JORDAN Menard as PCP - General (Family Medicine)  Kulwant Canales MD as PCP - PCP-[facility] ([facility])  MD Herman Alexis MD (Cardiology)    Review of Systems  Medical History Reviewed by provider this encounter:  Tobacco  Allergies  Meds  Problems  Med Hx  Surg Hx  Fam Hx       Annual Wellness Visit Questionnaire       Depression Screening:   PHQ-2 Score: 0      Social Determinants of Health     Food Insecurity: No Food Insecurity (9/13/2023)    Hunger Vital Sign     Worried About Running Out of Food in the Last Year: Never true     Ran Out of Food in the Last Year: Never true   Transportation Needs: No Transportation Needs (9/13/2023)    PRAPARE - Transportation     Lack of Transportation (Medical): No     Lack of Transportation (Non-Medical): No   Housing Stability: Low Risk  (9/13/2023)    Housing Stability Vital Sign     Unable to Pay for Housing in the Last Year: No     Number of Places Lived in the Last Year: 1     Unstable Housing in the Last Year: No     No results found.    Objective     /70 (BP Location: Left arm, Patient Position: Sitting, Cuff Size: Standard)   Pulse 78   Temp (!) 97.3 °F (36.3 °C) (Temporal)   Resp 16   Ht 5' 2.75\" (1.594 m)   Wt 58.1 kg (128 lb)   SpO2 94%   BMI 22.86 kg/m²     Physical Exam  Administrative Statements           "

## 2024-06-04 NOTE — PROGRESS NOTES
Ambulatory Visit  Name: Felicia Jackson      : 1940      MRN: [id number]  Encounter Provider: JORDAN Menard  Encounter Date: 2024   Encounter department: [facility]    Assessment & Plan   1. Medicare annual wellness visit, subsequent  Comments:  Age appropriate screenings and recommendations discussed.  2. Gout, arthropathy  -     allopurinol (ZYLOPRIM) 100 mg tablet; Take 2 tablets (200 mg total) by mouth daily  3. Acute gout of right foot, unspecified cause  -     colchicine (COLCRYS) 0.6 mg tablet; Take 1 tablet (0.6 mg total) by mouth 2 (two) times a day for 5 days  4. Acquired hypothyroidism  -     T4, free; Future  5. Primary hypertension  Assessment & Plan:  BP wnl in office today. No changes.   6. Atrial fibrillation, unspecified type (HCC)  Assessment & Plan:  Stable, no issues. Continue cardiology follow up as scheduled.   7. Stage 3 chronic kidney disease, unspecified whether stage 3a or 3b CKD (HCC)  Assessment & Plan:  Lab Results   Component Value Date    EGFR 61 2024    EGFR 57 (L) 2023    EGFR 72 2023    CREATININE 0.86 2024    CREATININE 0.98 2023    CREATININE 0.75 2023   Encourage pt to complete lab work evaluation for kidney function.  No issues at this time.   8. Alcohol use disorder  Assessment & Plan:  Pt reports mild alcohol use. Encourage moderation or cessation of intake.   9. Bilateral lower extremity edema  Assessment & Plan:  Stable, no issues at this time  10. Abdominal distention  11. Pure hypercholesterolemia  12. Other urinary incontinence  Comments:  Periodically with lasix use      Depression Screening and Follow-up Plan: Patient was screened for depression during today's encounter. They screened negative with a PHQ-2 score of 0.    Falls Plan of Care: balance, strength, and gait training instructions were provided. Medications that increase falls were reviewed.       Preventive health issues were  discussed with patient, and age appropriate screening tests were ordered as noted in patient's After Visit Summary. Personalized health advice and appropriate referrals for health education or preventive services given if needed, as noted in patient's After Visit Summary.    History of Present Illness     Felicia is an 84 year old female who presents to the office for her annual medicare exam. Denies fever, chills, chest pain, shortness of breath. Reports right foot pain and redness, gout flare. Has been taking indomethacin with mild improvement.        Patient Care Team:  JORDAN Menard as PCP - General (Family Medicine)  Kulwant Canales MD as PCP - PCP-[facility] ([facility])  MD Herman Alexis MD (Cardiology)    Review of Systems   Constitutional:  Negative for diaphoresis, fatigue and fever.   HENT:  Negative for ear pain and hearing loss.    Eyes:  Negative for pain and visual disturbance.   Respiratory:  Negative for chest tightness and shortness of breath.    Cardiovascular:  Negative for chest pain, palpitations and leg swelling.   Gastrointestinal:  Negative for abdominal pain, constipation and diarrhea.   Genitourinary:  Negative for difficulty urinating.        Chronic vaginal itching   Musculoskeletal:  Positive for arthralgias (right foot) and joint swelling. Negative for myalgias.   Skin:  Negative for rash.        Generalized body itching, chronic   Neurological:  Negative for dizziness, numbness and headaches.   Psychiatric/Behavioral:  Negative for sleep disturbance.      Medical History Reviewed by provider this encounter:  Tobacco  Allergies  Meds  Problems  Med Hx  Surg Hx  Fam Hx       Annual Wellness Visit Questionnaire   Felicia is here for her Subsequent Wellness visit. Last Medicare Wellness visit information reviewed, patient interviewed and updates made to the record today.      Health Risk Assessment:   Patient rates overall health as good. Patient  feels that their physical health rating is much better. Patient is very satisfied with their life. Eyesight was rated as slightly worse. Hearing was rated as slightly worse. Patients states they are never, rarely angry. Patient states they are never, rarely unusually tired/fatigued. Pain experienced in the last 7 days has been none. Patient states that she has experienced no weight loss or gain in last 6 months.     Depression Screening:   PHQ-2 Score: 0      Fall Risk Screening:   In the past year, patient has experienced: no history of falling in past year      Urinary Incontinence Screening:   Patient has leaked urine accidently in the last six months.     Home Safety:  Patient does not have trouble with stairs inside or outside of their home. Patient has working smoke alarms and has no working carbon monoxide detector. Home safety hazards include: none.     Nutrition:   Current diet is Regular.     Medications:   Patient is able to manage medications.     Activities of Daily Living (ADLs)/Instrumental Activities of Daily Living (IADLs):   Walk and transfer into and out of bed and chair?: Yes  Dress and groom yourself?: Yes    Bathe or shower yourself?: Yes    Feed yourself? Yes  Do your laundry/housekeeping?: Yes  Manage your money, pay your bills and track your expenses?: Yes  Make your own meals?: Yes    Do your own shopping?: Yes    Advance Care Planning:   Living will: Yes    Durable POA for healthcare: Yes    Advanced directive: Yes      Cognitive Screening:   Provider or family/friend/caregiver concerned regarding cognition?: No    PREVENTIVE SCREENINGS      Cardiovascular Screening:    General: History Lipid Disorder and Screening Current      Diabetes Screening:     General: Screening Current      Colorectal Cancer Screening:     General: Screening Not Indicated      Cervical Cancer Screening:    General: Screening Not Indicated      Osteoporosis Screening:    General: History Osteoporosis and Screening  "Current      Lung Cancer Screening:     General: Screening Not Indicated    Other Counseling Topics:   Car/seat belt/driving safety, skin self-exam, sunscreen and calcium and vitamin D intake and regular weightbearing exercise.     Social Determinants of Health     Food Insecurity: No Food Insecurity (9/13/2023)    Hunger Vital Sign     Worried About Running Out of Food in the Last Year: Never true     Ran Out of Food in the Last Year: Never true   Transportation Needs: No Transportation Needs (9/13/2023)    PRAPARE - Transportation     Lack of Transportation (Medical): No     Lack of Transportation (Non-Medical): No   Housing Stability: Low Risk  (9/13/2023)    Housing Stability Vital Sign     Unable to Pay for Housing in the Last Year: No     Number of Places Lived in the Last Year: 1     Unstable Housing in the Last Year: No     No results found.    Objective     /70 (BP Location: Left arm, Patient Position: Sitting, Cuff Size: Standard)   Pulse 78   Temp (!) 97.3 °F (36.3 °C) (Temporal)   Resp 16   Ht 5' 2.75\" (1.594 m)   Wt 58.1 kg (128 lb)   SpO2 94%   BMI 22.86 kg/m²     Physical Exam  Vitals reviewed.   Constitutional:       Appearance: Normal appearance.   HENT:      Head: Normocephalic and atraumatic.   Cardiovascular:      Rate and Rhythm: Normal rate and regular rhythm.      Heart sounds: Normal heart sounds.   Pulmonary:      Effort: Pulmonary effort is normal.      Breath sounds: Normal breath sounds.   Musculoskeletal:        Feet:    Neurological:      Mental Status: She is alert and oriented to person, place, and time.   Psychiatric:         Mood and Affect: Mood normal.       Administrative Statements   I have spent a total time of 40 minutes on 06/06/24 In caring for this patient including Impressions, Counseling / Coordination of care, Documenting in the medical record, and Reviewing / ordering tests, medicine, procedures  .        "

## 2024-06-05 ENCOUNTER — APPOINTMENT (OUTPATIENT)
Dept: LAB | Facility: CLINIC | Age: 84
End: 2024-06-05
Payer: COMMERCIAL

## 2024-06-05 DIAGNOSIS — I27.20 PULMONARY HYPERTENSION, UNSPECIFIED (HCC): ICD-10-CM

## 2024-06-05 DIAGNOSIS — N18.30 STAGE 3 CHRONIC KIDNEY DISEASE, UNSPECIFIED WHETHER STAGE 3A OR 3B CKD (HCC): ICD-10-CM

## 2024-06-05 DIAGNOSIS — Z00.00 ENCOUNTER FOR SUBSEQUENT ANNUAL WELLNESS VISIT (AWV) IN MEDICARE PATIENT: ICD-10-CM

## 2024-06-05 DIAGNOSIS — I10 PRIMARY HYPERTENSION: ICD-10-CM

## 2024-06-05 DIAGNOSIS — E78.2 MIXED HYPERLIPIDEMIA: ICD-10-CM

## 2024-06-05 DIAGNOSIS — E03.9 ACQUIRED HYPOTHYROIDISM: ICD-10-CM

## 2024-06-05 DIAGNOSIS — E03.9 HYPOTHYROIDISM, UNSPECIFIED TYPE: ICD-10-CM

## 2024-06-05 DIAGNOSIS — D64.9 ANEMIA, UNSPECIFIED TYPE: ICD-10-CM

## 2024-06-05 LAB
ALBUMIN SERPL BCP-MCNC: 4.3 G/DL (ref 3.5–5)
ALP SERPL-CCNC: 82 U/L (ref 34–104)
ALT SERPL W P-5'-P-CCNC: 50 U/L (ref 7–52)
ANION GAP SERPL CALCULATED.3IONS-SCNC: 9 MMOL/L (ref 4–13)
AST SERPL W P-5'-P-CCNC: 48 U/L (ref 13–39)
BASOPHILS # BLD AUTO: 0.06 THOUSANDS/ÂΜL (ref 0–0.1)
BASOPHILS NFR BLD AUTO: 2 % (ref 0–1)
BILIRUB SERPL-MCNC: 0.85 MG/DL (ref 0.2–1)
BUN SERPL-MCNC: 20 MG/DL (ref 5–25)
CALCIUM SERPL-MCNC: 9.3 MG/DL (ref 8.4–10.2)
CHLORIDE SERPL-SCNC: 101 MMOL/L (ref 96–108)
CHOLEST SERPL-MCNC: 177 MG/DL
CO2 SERPL-SCNC: 26 MMOL/L (ref 21–32)
CREAT SERPL-MCNC: 0.86 MG/DL (ref 0.6–1.3)
EOSINOPHIL # BLD AUTO: 0.18 THOUSAND/ÂΜL (ref 0–0.61)
EOSINOPHIL NFR BLD AUTO: 4 % (ref 0–6)
ERYTHROCYTE [DISTWIDTH] IN BLOOD BY AUTOMATED COUNT: 13.4 % (ref 11.6–15.1)
FERRITIN SERPL-MCNC: 69 NG/ML (ref 11–307)
GFR SERPL CREATININE-BSD FRML MDRD: 61 ML/MIN/1.73SQ M
GLUCOSE P FAST SERPL-MCNC: 97 MG/DL (ref 65–99)
HCT VFR BLD AUTO: 37.8 % (ref 34.8–46.1)
HDLC SERPL-MCNC: 119 MG/DL
HGB BLD-MCNC: 12.4 G/DL (ref 11.5–15.4)
IMM GRANULOCYTES # BLD AUTO: 0.01 THOUSAND/UL (ref 0–0.2)
IMM GRANULOCYTES NFR BLD AUTO: 0 % (ref 0–2)
IRON SATN MFR SERPL: 23 % (ref 15–50)
IRON SERPL-MCNC: 84 UG/DL (ref 50–212)
LDLC SERPL CALC-MCNC: 48 MG/DL (ref 0–100)
LYMPHOCYTES # BLD AUTO: 1.37 THOUSANDS/ÂΜL (ref 0.6–4.47)
LYMPHOCYTES NFR BLD AUTO: 34 % (ref 14–44)
MCH RBC QN AUTO: 34.7 PG (ref 26.8–34.3)
MCHC RBC AUTO-ENTMCNC: 32.8 G/DL (ref 31.4–37.4)
MCV RBC AUTO: 106 FL (ref 82–98)
MONOCYTES # BLD AUTO: 0.52 THOUSAND/ÂΜL (ref 0.17–1.22)
MONOCYTES NFR BLD AUTO: 13 % (ref 4–12)
NEUTROPHILS # BLD AUTO: 1.94 THOUSANDS/ÂΜL (ref 1.85–7.62)
NEUTS SEG NFR BLD AUTO: 47 % (ref 43–75)
NONHDLC SERPL-MCNC: 58 MG/DL
NRBC BLD AUTO-RTO: 0 /100 WBCS
PLATELET # BLD AUTO: 188 THOUSANDS/UL (ref 149–390)
PMV BLD AUTO: 12.1 FL (ref 8.9–12.7)
POTASSIUM SERPL-SCNC: 5.1 MMOL/L (ref 3.5–5.3)
PROT SERPL-MCNC: 6.7 G/DL (ref 6.4–8.4)
RBC # BLD AUTO: 3.57 MILLION/UL (ref 3.81–5.12)
SODIUM SERPL-SCNC: 136 MMOL/L (ref 135–147)
T4 FREE SERPL-MCNC: 0.8 NG/DL (ref 0.61–1.12)
TIBC SERPL-MCNC: 369 UG/DL (ref 250–450)
TRIGL SERPL-MCNC: 50 MG/DL
TSH SERPL DL<=0.05 MIU/L-ACNC: 6.49 UIU/ML (ref 0.45–4.5)
UIBC SERPL-MCNC: 285 UG/DL (ref 155–355)
WBC # BLD AUTO: 4.08 THOUSAND/UL (ref 4.31–10.16)

## 2024-06-05 PROCEDURE — 80053 COMPREHEN METABOLIC PANEL: CPT

## 2024-06-05 PROCEDURE — 82728 ASSAY OF FERRITIN: CPT

## 2024-06-05 PROCEDURE — 85025 COMPLETE CBC W/AUTO DIFF WBC: CPT

## 2024-06-05 PROCEDURE — 83540 ASSAY OF IRON: CPT

## 2024-06-05 PROCEDURE — 36415 COLL VENOUS BLD VENIPUNCTURE: CPT

## 2024-06-05 PROCEDURE — 84439 ASSAY OF FREE THYROXINE: CPT

## 2024-06-05 PROCEDURE — 83550 IRON BINDING TEST: CPT

## 2024-06-05 PROCEDURE — 84443 ASSAY THYROID STIM HORMONE: CPT

## 2024-06-05 PROCEDURE — 80061 LIPID PANEL: CPT

## 2024-06-06 NOTE — ASSESSMENT & PLAN NOTE
Lab Results   Component Value Date    EGFR 61 06/05/2024    EGFR 57 (L) 11/24/2023    EGFR 72 09/14/2023    CREATININE 0.86 06/05/2024    CREATININE 0.98 11/24/2023    CREATININE 0.75 09/14/2023   Encourage pt to complete lab work evaluation for kidney function.  No issues at this time.

## 2024-06-29 DIAGNOSIS — M10.9 ACUTE GOUT OF RIGHT FOOT, UNSPECIFIED CAUSE: ICD-10-CM

## 2024-07-01 ENCOUNTER — CLINICAL SUPPORT (OUTPATIENT)
Dept: DERMATOLOGY | Facility: CLINIC | Age: 84
End: 2024-07-01
Payer: COMMERCIAL

## 2024-07-01 DIAGNOSIS — L24.4 IRRITANT CONTACT DERMATITIS DUE TO DRUG IN CONTACT WITH SKIN: Primary | ICD-10-CM

## 2024-07-01 PROCEDURE — 95044 PATCH/APPLICATION TESTS: CPT | Performed by: DERMATOLOGY

## 2024-07-01 RX ORDER — COLCHICINE 0.6 MG/1
0.6 TABLET ORAL 2 TIMES DAILY
Qty: 30 TABLET | Refills: 0 | Status: SHIPPED | OUTPATIENT
Start: 2024-07-01 | End: 2024-07-06

## 2024-07-01 NOTE — PROGRESS NOTES
PATCH TEST DAY 1-NURSE VISIT ONLY    Assessment and Plan:  Area of body affected: Legs and Vaginal Area  Prior treatment: Gabapentin and Triamcinolone  Indication for patch test: Itching and rash persistent.    The indication for Patch Testing is to test all patients in whom contact allergy is suspected or needs to be ruled out, regardless of age or anatomical site of dermatitis. [facility] provides all tools required to perform patch tests the IQ way - the gold standard for diagnosing contact allergy. In order to perform a diagnostic Patch Test, two crucial components are required; Topical Haptens and Patch Test Units. Topical Haptens The hapten preparations used in patch testing should ideally be specifically developed for patch testing purposes. The Topical Haptens manufactured by [employer] are standardized and prepared by mixing high purity fine particle ground raw material with an appropriate vehicle, such as high purity grade white petrolatum, using state of the art technology. Patch Test Units To ensure that the hapten remains in direct contact with the skin for the time required (48h) to create a standardized controlled reaction, a Patch Test Unit is needed. A Patch Test Unit is composed of sets of chambers mounted on an adhesive tape. The purpose of the patch test chambers is to provide a defined areai in which the skin will be exposed to the haptens during the testing.  Recommended no showering, sweating or excessive moisture as this reduces the effectiveness of the test.  Also, no oral steroids and do not apply topical steroids to the testing field.   The patient understands that they must come to the clinic on Monday to have the patches placed, Wednesday to have the patches removed and an initial read performed,  and Friday of the testing week for the final reading.     PROCEDURE: PATCH TEST APPLICATION (North American 80 Comprehensive Series NAC-80)-Temporarily Unavailable #36-(mx-16),#48  (mx-30),#72-(L-003)    Total number of patches applied: 8 individual patches    8 patches with 10 haptens in each patch was placed on patient back and a surgical marker was used to appropriately juana notches on skin. Hypafix was applied over patches.        PATIENT INSTRUCTIONS     After your patch tests are applied, you will need to return in two days (48 hours) to have your patch-test sites read. The appointments should have been made at the time your initial appointment was scheduled.        Please do not take a bath or get your back wet until after you have completed all your patch test visits. Do not get your back wet between the time the patch tests are removed and the time of your final visit.     Please do not take part in any strenuous activities that will loosen the tape on your back or cause you to perspire heavily.    Any medication altering the immune system (e.g cortisone and antihistamines) cannot be used during the test period.     If the tape becomes loose, it may be reinforced with a medical tape from your home.    Don’t expose your back to sunlight for the duration of the patch test.    Do resist the urge to scratch your back during the test     You may want to wear an OLD undershirt to prevent the adhesives and/or patch test substances from sticking to or staining your clothes during and after the patch tests are removed.     Please call the patch test nurse at: [phone] to report any reactions occurring after your final patch test appointment.

## 2024-07-03 ENCOUNTER — CLINICAL SUPPORT (OUTPATIENT)
Dept: DERMATOLOGY | Facility: CLINIC | Age: 84
End: 2024-07-03

## 2024-07-03 DIAGNOSIS — L24.4 IRRITANT CONTACT DERMATITIS DUE TO DRUG IN CONTACT WITH SKIN: Primary | ICD-10-CM

## 2024-07-03 PROCEDURE — RECHECK: Performed by: DERMATOLOGY

## 2024-07-03 NOTE — PROGRESS NOTES
PATCH TEST DAY 2: NURSE VISIT ONLY    Physical Exam  Observation that tape stayed on correctly: YES  Itching or burning where allergens were placed: No  Photo is taken to reassure patch placement       Additional History of Present Condition:  Patches were placed on 07/01/2024    Assessment and Plan:  Based on a thorough discussion of this condition and the management approach to it (including a comprehensive discussion of the known risks, side effects and potential benefits of treatment), the patient (family) agrees to implement the following specific plan:  Patches and tape were removed, marking were identified and darkened with surgical marking pen, photo was taken , all patient questions were answered     On Arrival    On Arrival    Antigens 1-10 and 11-20 are on Left upper shoulder    Antigens 21-30 and 31-40 Right upper shoulder    Antigens 41-50 and 51-60 on left lower back    Antigens 61-70 and 71-80 right lower back

## 2024-07-05 ENCOUNTER — OFFICE VISIT (OUTPATIENT)
Dept: DERMATOLOGY | Facility: CLINIC | Age: 84
End: 2024-07-05
Payer: COMMERCIAL

## 2024-07-05 VITALS — WEIGHT: 128 LBS | HEIGHT: 63 IN | TEMPERATURE: 97.8 F | BODY MASS INDEX: 22.68 KG/M2

## 2024-07-05 DIAGNOSIS — L24.4 IRRITANT CONTACT DERMATITIS DUE TO DRUG IN CONTACT WITH SKIN: Primary | ICD-10-CM

## 2024-07-05 PROCEDURE — 99213 OFFICE O/P EST LOW 20 MIN: CPT | Performed by: STUDENT IN AN ORGANIZED HEALTH CARE EDUCATION/TRAINING PROGRAM

## 2024-07-05 RX ORDER — TACROLIMUS 1 MG/G
OINTMENT TOPICAL 2 TIMES DAILY
Qty: 60 G | Refills: 3 | Status: SHIPPED | OUTPATIENT
Start: 2024-07-05

## 2024-07-05 NOTE — PATIENT INSTRUCTIONS
PATCH TEST DAY 3            Assessment and Plan:  Use Triamcinolone 0.1% cream sparingly   Start using Tacrolimus 0.1% ointment on groin area twice a day   Can try Sarna sensitive over the counter up to 4 times a day with itching when present   If rash comes back please call office so we can evaluate in person      We explained the interpretation of the results to the patient and provided the patient with a semi-comprehensive list of appropriate products that they could use, while avoiding the allergens most effectively.  Once again, we explained that the patch testing was only a test, and that the best outcome for the patient, would happen only if they adhered to the results found today.      Patient was provided information sheets regarding the common and not so common locations of each positive allergen, which should be avoided. Any products that goes on patient's skin should be carefully reviewed, and if the chemicals or their potential cross-reactors (listed) are present, then these products should not be used.     Patch Negative     During this previous week, the patient was patch tested to the North American Contact Dermatitis standard series.  Upon review, the patient had no positive reactions.   We explained the interpretation of the results and that patient does not appear to be allergic to the products tested. Additionally, the patient understands that there is a possibility of a negative test as there are over 4,300 known allergens and it is impossible to test for everything but testing was performed for the more common causes that can cause this pattern of pruritus.

## 2024-07-05 NOTE — PROGRESS NOTES
"[facility] Dermatology Clinic Note     Patient Name: Felicia Jackson  Encounter Date: 7/5/24     Have you been cared for by a [facility] Dermatologist in the last 3 years and, if so, which description applies to you?    Yes.  I have been here within the last 3 years, and my medical history has NOT changed since that time.  I am FEMALE/of child-bearing potential.    REVIEW OF SYSTEMS:  Have you recently had or currently have any of the following? No changes in my recent health.   PAST MEDICAL HISTORY:  Have you personally ever had or currently have any of the following?  If \"YES,\" then please provide more detail. No changes in my medical history.   HISTORY OF IMMUNOSUPPRESSION: Do you have a history of any of the following:  Systemic Immunosuppression such as Diabetes, Biologic or Immunotherapy, Chemotherapy, Organ Transplantation, Bone Marrow Transplantation?  No     Answering \"YES\" requires the addition of the dotphrase \"IMMUNOSUPPRESSED\" as the first diagnosis of the patient's visit.   FAMILY HISTORY:  Any \"first degree relatives\" (parent, brother, sister, or child) with the following?    No changes in my family's known health.   PATIENT EXPERIENCE:    Do you want the Dermatologist to perform a COMPLETE skin exam today including a clinical examination under the \"bra and underwear\" areas?  NO  If necessary, do we have your permission to call and leave a detailed message on your Preferred Phone number that includes your specific medical information?  Yes      Allergies   Allergen Reactions    Clobetasol Blisters    Lidocaine      Annotation - [id number]: Seizure with 20 cc during a surgical block. FS    Penicillins Other (See Comments)     unknown    Sulfa Antibiotics Edema      Current Outpatient Medications:     allopurinol (ZYLOPRIM) 100 mg tablet, Take 2 tablets (200 mg total) by mouth daily, Disp: 180 tablet, Rfl: 0    aspirin 81 mg chewable tablet, Chew 1 tablet daily, Disp: , Rfl:     colchicine (COLCRYS) 0.6 " mg tablet, TAKE 1 TABLET (0.6 MG TOTAL) BY MOUTH 2 (TWO) TIMES A DAY FOR 5 DAYS, Disp: 30 tablet, Rfl: 0    diphenhydrAMINE (BENADRYL) 25 mg tablet, Take 25 mg by mouth every 6 (six) hours as needed for allergies, Disp: , Rfl:     ELIQUIS 2.5 MG, 2.5 mg 2 (two) times a day  , Disp: , Rfl:     fexofenadine (ALLEGRA) 180 MG tablet, Take 180 mg by mouth daily, Disp: , Rfl:     furosemide (LASIX) 20 mg tablet, TAKE 1 TABLET (20 MG TOTAL) BY MOUTH DAILY, Disp: 90 tablet, Rfl: 0    gabapentin (NEURONTIN) 100 mg capsule, TAKE 1 CAPSULE BY MOUTH AN HOUR BEFORE BEDTIME, Disp: 60 capsule, Rfl: 1    hydrocortisone 1 % cream, Apply topically 3 (three) times a day as needed for rash (itching), Disp: 45 g, Rfl: 0    hydrOXYzine HCL (ATARAX) 25 mg tablet, Take 1 tablet (25 mg total) by mouth every 6 (six) hours as needed for itching, Disp: 90 tablet, Rfl: 0    indomethacin (INDOCIN) 50 mg capsule, Take 50 mg by mouth 2 (two) times a day, Disp: , Rfl:     levothyroxine 50 mcg tablet, TAKE 1 TABLET (50 MCG TOTAL) BY MOUTH DAILY IN THE EARLY MORNING, Disp: 90 tablet, Rfl: 1    metoprolol succinate (TOPROL-XL) 25 mg 24 hr tablet, Take 1 tablet (25 mg total) by mouth daily, Disp: 90 tablet, Rfl: 0    simvastatin (ZOCOR) 20 mg tablet, Take 1 tablet by mouth daily, Disp: , Rfl:     traMADol (ULTRAM) 50 mg tablet, Take 50 mg by mouth every 6 (six) hours as needed for moderate pain, Disp: , Rfl:     triamcinolone (KENALOG) 0.1 % cream, Apply topically 2 (two) times a day To legs, arms, body for 2 months twice daily to all areas or itch, Disp: 453 g, Rfl: 2    triamcinolone (KENALOG) 0.1 % ointment, APPLY TO VAGINAL AREA TWICE DAILY FOR 2 WEEKS, Disp: 30 g, Rfl: 5          Whom besides the patient is providing clinical information about today's encounter?   NO ADDITIONAL HISTORIAN (patient alone provided history)    Physical Exam and Assessment/Plan by Diagnosis:  CHIEF COMPLAINT    84 year old male or female patient presents today for   Patch test day 3 results .  Patient has a No history of skin cancer     PATCH TEST DAY 3  Patient reports no active rash today.      Assessment and Plan:  Use Triamcinolone 0.1% cream sparingly on legs only when you can see an active rash.   Stop using triamcinolone on the groin and start using Tacrolimus 0.1% ointment on groin area twice a day. Discussed this can cause a burning/stinging sensation.   Can try Sarna sensitive over the counter lotion up to 4 times a day with itching. Recommend storing it in the refrigerator as cool lotions can help calm down overactive skin nerves.   If rash comes back please call office so we can evaluate in person     We explained the interpretation of the results to the patient and provided the patient with a semi-comprehensive list of appropriate products that they could use, while avoiding the allergens most effectively.  Once again, we explained that the patch testing was only a test, and that the best outcome for the patient, would happen only if they adhered to the results found today.      Patient was provided information sheets regarding the common and not so common locations of each positive allergen, which should be avoided. Any products that goes on patient's skin should be carefully reviewed, and if the chemicals or their potential cross-reactors (listed) are present, then these products should not be used.     Patch Negative     During this previous week, the patient was patch tested to the North American Contact Dermatitis standard series.  Upon review, the patient had no positive reactions.   We explained the interpretation of the results and that patient does not appear to be allergic to the products tested. Additionally, the patient understands that there is a possibility of a negative test as there are over 4,300 known allergens and it is impossible to test for everything but testing was performed for the more common causes that can cause this pattern of  pruritus.               Scribe Attestation      I,:  Melina Louie am acting as a scribe while in the presence of the attending physician.:       I,:  Katherin Parker MD personally performed the services described in this documentation    as scribed in my presence.:            Lynne Burk MD  Dermatology, PGY-4    This encounter (46254 or [id number]) has a MODERATE level of medical decision making (MDM) given the presence of at least 2 of the elements of MDM (below):  *MODERATE number and complexity of problems addressed: 1 or more chronic illnesses with exacerbation, progression, or side effects of treatment; OR 2 or more stable chronic illnesses; OR 1 undiagnosed new problem with uncertain prognosis; OR 1 acute illness with systemic symptoms; OR 1 acute uncomplicated injury  *MODERATE amount and/or complexity of data reviewed: this includes review of previous notes and/or lab tests, independent interpretation of tests performed by another healthcare professional, ordering tests, assessment requiring independent historian, or discussion with other healthcare professionals.  *MODERATE risk of complications and/or morbidity or mortality of patient management (for example, prescription drug management, decisions regarding minor surgery with identified patient or procedure risk factors).

## 2024-07-08 ENCOUNTER — TELEPHONE (OUTPATIENT)
Dept: DERMATOLOGY | Facility: CLINIC | Age: 84
End: 2024-07-08

## 2024-07-09 NOTE — TELEPHONE ENCOUNTER
PA for Tacrolimus 0.1%    Submitted via    []CMM-KEY    [x][employer]-Case ID # [id number]   []Faxed to plan   []Other website    []Phone call Case ID #      Office notes sent, clinical questions answered. Awaiting determination    Turnaround time for your insurance to make a decision on your Prior Authorization can take 7-21 business days.

## 2024-07-09 NOTE — TELEPHONE ENCOUNTER
PA for Tacrolimus Approved     Date(s) approved 12/31/24    Case #     Patient advised by          [x] MyChart Message  [] Phone call   []LMOM  []L/M to call office as no active Communication consent on file  []Unable to leave detailed message as VM not approved on Communication consent       Pharmacy advised by    [x]Fax  []Phone call    Approval letter scanned into Media Yes

## 2024-07-17 ENCOUNTER — NURSE TRIAGE (OUTPATIENT)
Age: 84
End: 2024-07-17

## 2024-07-17 NOTE — TELEPHONE ENCOUNTER
Call transferred from [place], pt states she was able to obtain tacrolimus that Dr. Burk prescribed yesterday, took shower last night and applied cream to groin area and back side area and developed severe burning. Relayed burning/stinging is known side effect and should go away with continued use but pt very concerned and feels burning was severe.     States she took 4 cool/lukewarm showers last night with oatmeal and then applied cold teabags in attempts to resolve burning but still feels burning and feels that area is blistered now because of cream. Does not want to proceed with the tacrolimus as she states she cannot tolerate that burning and is requesting alternate prescription to help resolve this. States she would also be okay with continuing triamcinolone that Dr. Pineda previously prescribed.     Requested pt send pictures of areas she described as now blistered but pt states she would not be able to, also states she would not be able to come to [place] office and only can do follow up at [place] location. Noted will route for review and call back pt to update on plan. No other questions at this time.

## 2024-07-17 NOTE — TELEPHONE ENCOUNTER
Patient should discontinue Tacrolimus.  However, reported side effect of burning is common.  Patient should not resume triamcinolone at this time.  Topical steroid should not be applied longterm, particularly to vaginal area, OK to apply legs/arms as needed for flares.

## 2024-08-06 DIAGNOSIS — E03.9 HYPOTHYROIDISM, UNSPECIFIED TYPE: ICD-10-CM

## 2024-08-06 RX ORDER — LEVOTHYROXINE SODIUM 0.05 MG/1
50 TABLET ORAL
Qty: 100 TABLET | Refills: 1 | Status: SHIPPED | OUTPATIENT
Start: 2024-08-06

## 2024-08-09 ENCOUNTER — APPOINTMENT (OUTPATIENT)
Dept: LAB | Facility: CLINIC | Age: 84
End: 2024-08-09
Payer: COMMERCIAL

## 2024-08-23 DIAGNOSIS — M10.9 ACUTE GOUT OF RIGHT FOOT, UNSPECIFIED CAUSE: ICD-10-CM

## 2024-08-23 RX ORDER — COLCHICINE 0.6 MG/1
0.6 TABLET ORAL 2 TIMES DAILY
Qty: 30 TABLET | Refills: 0 | Status: SHIPPED | OUTPATIENT
Start: 2024-08-23 | End: 2024-09-07

## 2024-08-26 RX ORDER — NYSTATIN 100000 U/G
1 CREAM TOPICAL 2 TIMES DAILY
COMMUNITY
Start: 2024-08-02

## 2024-08-26 RX ORDER — PIMECROLIMUS 10 MG/G
CREAM TOPICAL DAILY
COMMUNITY
Start: 2024-08-14

## 2024-08-26 RX ORDER — NYSTATIN 100000 [USP'U]/G
1 POWDER TOPICAL 2 TIMES DAILY
COMMUNITY
Start: 2024-08-14

## 2024-08-27 ENCOUNTER — OFFICE VISIT (OUTPATIENT)
Dept: FAMILY MEDICINE CLINIC | Facility: CLINIC | Age: 84
End: 2024-08-27
Payer: COMMERCIAL

## 2024-08-27 ENCOUNTER — TELEPHONE (OUTPATIENT)
Age: 84
End: 2024-08-27

## 2024-08-27 VITALS
DIASTOLIC BLOOD PRESSURE: 72 MMHG | HEART RATE: 80 BPM | WEIGHT: 126 LBS | SYSTOLIC BLOOD PRESSURE: 130 MMHG | OXYGEN SATURATION: 99 % | HEIGHT: 63 IN | BODY MASS INDEX: 22.32 KG/M2 | RESPIRATION RATE: 20 BRPM | TEMPERATURE: 95.1 F

## 2024-08-27 DIAGNOSIS — R09.89 ABDOMINAL BRUIT: Primary | ICD-10-CM

## 2024-08-27 DIAGNOSIS — R10.9 ABDOMINAL PRESSURE: ICD-10-CM

## 2024-08-27 DIAGNOSIS — R14.0 ABDOMINAL DISTENSION: ICD-10-CM

## 2024-08-27 PROCEDURE — 99214 OFFICE O/P EST MOD 30 MIN: CPT | Performed by: NURSE PRACTITIONER

## 2024-08-27 PROCEDURE — G2211 COMPLEX E/M VISIT ADD ON: HCPCS | Performed by: NURSE PRACTITIONER

## 2024-08-27 NOTE — Clinical Note
Can you provide the information for [facility] vascular? PT needs to get in ASAP, cannot drive to [place]. If she needs assistance with scheduling, can you provide assistance?

## 2024-08-27 NOTE — PROGRESS NOTES
Assessment/Plan:    1. Abdominal bruit  -     CTA abdomen pelvis w wo contrast; Future; Expected date: 08/27/2024  -     Ambulatory Referral to Vascular Surgery; Future  2. Abdominal pressure  -     CTA abdomen pelvis w wo contrast; Future; Expected date: 08/27/2024  -     Ambulatory Referral to Vascular Surgery; Future  3. Abdominal distension  -     CTA abdomen pelvis w wo contrast; Future; Expected date: 08/27/2024  -     Ambulatory Referral to Vascular Surgery; Future    BP wnl in office today. CMP 8/9/24 - reviewed. Elevated liver enzymes. Hepatic steatosis noted on CT from 9/203. Otherwise normal CMP.           Return in about 4 days (around 8/31/2024) for Recheck, Blood pressure check.    Subjective:      Patient ID: Felicia Jackson is a 84 y.o. female.    Chief Complaint   Patient presents with    Abdominal Pain     Pt here for pain in abdomen/feels heavy when getting out of bed x's 3 weeks       Felicia is an 84 year old female who presents to the office for evaluation and management of abdominal pain. Reports symptoms began about 2 weeks ago. Reports pressure and squeezing senastion that awakens her from sleep at night. States that standing up and walking helps relieve the symptoms. Denies n/v/d/c. Denies heartburn. Denies any recent antibiotic use or change in diet.         The following portions of the patient's history were reviewed and updated as appropriate: allergies, current medications, past family history, past medical history, past social history, past surgical history and problem list.    Review of Systems   Constitutional:  Negative for chills, fatigue and fever.   Respiratory:  Negative for cough, chest tightness and shortness of breath.    Cardiovascular:  Negative for chest pain.   Gastrointestinal:  Positive for abdominal distention and abdominal pain. Negative for constipation, diarrhea, nausea and vomiting.   Neurological:  Negative for dizziness, weakness, light-headedness and  headaches.         Current Outpatient Medications   Medication Sig Dispense Refill    allopurinol (ZYLOPRIM) 100 mg tablet Take 2 tablets (200 mg total) by mouth daily 180 tablet 0    aspirin 81 mg chewable tablet Chew 1 tablet daily      colchicine (COLCRYS) 0.6 mg tablet TAKE 1 TABLET (0.6 MG TOTAL) BY MOUTH 2 (TWO) TIMES A DAY FOR 5 DAYS 30 tablet 0    diphenhydrAMINE (BENADRYL) 25 mg tablet Take 25 mg by mouth every 6 (six) hours as needed for allergies      ELIQUIS 2.5 MG 2.5 mg 2 (two) times a day        fexofenadine (ALLEGRA) 180 MG tablet Take 180 mg by mouth daily      furosemide (LASIX) 20 mg tablet TAKE 1 TABLET (20 MG TOTAL) BY MOUTH DAILY 90 tablet 0    gabapentin (NEURONTIN) 100 mg capsule TAKE 1 CAPSULE BY MOUTH AN HOUR BEFORE BEDTIME 60 capsule 1    hydrocortisone 1 % cream Apply topically 3 (three) times a day as needed for rash (itching) 45 g 0    hydrOXYzine HCL (ATARAX) 25 mg tablet Take 1 tablet (25 mg total) by mouth every 6 (six) hours as needed for itching 90 tablet 0    indomethacin (INDOCIN) 50 mg capsule Take 50 mg by mouth 2 (two) times a day      levothyroxine 50 mcg tablet TAKE 1 TABLET (50 MCG TOTAL) BY MOUTH DAILY IN THE EARLY MORNING 100 tablet 1    metoprolol succinate (TOPROL-XL) 25 mg 24 hr tablet Take 1 tablet (25 mg total) by mouth daily 90 tablet 0    nystatin (MYCOSTATIN) cream Apply 1 Application topically 2 (two) times a day To affected area      nystatin (MYCOSTATIN) powder Apply 1 Application topically 2 (two) times a day      pimecrolimus (ELIDEL) 1 % cream Apply topically daily      simvastatin (ZOCOR) 20 mg tablet Take 1 tablet by mouth daily      tacrolimus (PROTOPIC) 0.1 % ointment Apply topically 2 (two) times a day On groin area 60 g 3    traMADol (ULTRAM) 50 mg tablet Take 50 mg by mouth every 6 (six) hours as needed for moderate pain      triamcinolone (KENALOG) 0.1 % cream Apply topically 2 (two) times a day To legs, arms, body for 2 months twice daily to all  "areas or itch 453 g 2    triamcinolone (KENALOG) 0.1 % ointment APPLY TO VAGINAL AREA TWICE DAILY FOR 2 WEEKS 30 g 5     No current facility-administered medications for this visit.       Objective:    /72 (BP Location: Left arm, Patient Position: Sitting, Cuff Size: Large)   Pulse 80   Temp (!) 95.1 °F (35.1 °C) (Temporal)   Resp 20   Ht 5' 2.75\" (1.594 m)   Wt 57.2 kg (126 lb)   SpO2 99%   BMI 22.50 kg/m²        Physical Exam  Vitals reviewed.   Constitutional:       General: She is not in acute distress.     Appearance: She is well-developed. She is not diaphoretic.   HENT:      Head: Normocephalic and atraumatic.   Eyes:      General: Lids are normal.         Right eye: No discharge.         Left eye: No discharge.      Conjunctiva/sclera: Conjunctivae normal.   Neck:      Thyroid: No thyromegaly.   Cardiovascular:      Rate and Rhythm: Normal rate and regular rhythm.      Heart sounds: Normal heart sounds.   Pulmonary:      Effort: Pulmonary effort is normal. No respiratory distress.      Breath sounds: Normal breath sounds. No decreased breath sounds, wheezing, rhonchi or rales.   Abdominal:      General: Bowel sounds are normal. There is abdominal bruit. There is no distension.      Palpations: Abdomen is soft. There is no hepatomegaly or splenomegaly.      Tenderness: There is abdominal tenderness in the periumbilical area.   Musculoskeletal:      Cervical back: Normal range of motion and neck supple.   Lymphadenopathy:      Cervical: No cervical adenopathy.   Skin:     General: Skin is warm and dry.      Findings: No rash.   Neurological:      Mental Status: She is alert and oriented to person, place, and time.   Psychiatric:         Behavior: Behavior normal.         Thought Content: Thought content normal.         Judgment: Judgment normal.                JORDAN Menard  "

## 2024-08-27 NOTE — TELEPHONE ENCOUNTER
"Hello,    The following message was sent via e-mail to the leadership team:     Please advise if you can help facilitate the following overbook request:    Patient Name:  Felicia Jackson    Patient MRN: [id number]    Call back #: [phone]    Insurance:  [employer] medicare RP    Department:Vascular    Speciality:  VAscular    Reason for overbook request: STAT REFERRAL    Comments (Write \"N/a\" if no comments):  Patient calling in to schedule an appointment only at [place] with any doctor, Referral marked as STAT for 09.89 (ICD-10-CM) - Abdominal bruit. Patient needs to have CTA scan scheduled but will complete before appt.     Requested doctor and location:  [place]// any     Date of current appointment: 10/25/2024       Thank you.    "

## 2024-08-27 NOTE — TELEPHONE ENCOUNTER
Caller:  Felicia    Doctor: New patient     Reason for call:  Patient calling in to schedule an appointment only at [place] with any doctor, Referral marked as STAT for 09.89 (ICD-10-CM) - Abdominal bruit. Patient needs to have CTA scan scheduled but will complete before appt.     Call back#:  [phone]

## 2024-09-03 ENCOUNTER — HOSPITAL ENCOUNTER (OUTPATIENT)
Dept: RADIOLOGY | Facility: HOSPITAL | Age: 84
Discharge: HOME | End: 2024-09-03
Payer: COMMERCIAL

## 2024-09-03 ENCOUNTER — OFFICE VISIT (OUTPATIENT)
Dept: FAMILY MEDICINE CLINIC | Facility: CLINIC | Age: 84
End: 2024-09-03
Payer: COMMERCIAL

## 2024-09-03 VITALS
WEIGHT: 126 LBS | TEMPERATURE: 98 F | BODY MASS INDEX: 24.74 KG/M2 | RESPIRATION RATE: 18 BRPM | SYSTOLIC BLOOD PRESSURE: 128 MMHG | HEART RATE: 66 BPM | HEIGHT: 60 IN | OXYGEN SATURATION: 98 % | DIASTOLIC BLOOD PRESSURE: 80 MMHG

## 2024-09-03 DIAGNOSIS — R14.0 ABDOMINAL DISTENSION: ICD-10-CM

## 2024-09-03 DIAGNOSIS — K86.0 ALCOHOL-INDUCED CHRONIC PANCREATITIS (HCC): ICD-10-CM

## 2024-09-03 DIAGNOSIS — R10.9 ABDOMINAL PRESSURE: ICD-10-CM

## 2024-09-03 DIAGNOSIS — R10.9 ABDOMINAL PRESSURE: Primary | ICD-10-CM

## 2024-09-03 DIAGNOSIS — R09.89 ABDOMINAL BRUIT: ICD-10-CM

## 2024-09-03 PROCEDURE — G2211 COMPLEX E/M VISIT ADD ON: HCPCS | Performed by: NURSE PRACTITIONER

## 2024-09-03 PROCEDURE — 99214 OFFICE O/P EST MOD 30 MIN: CPT | Performed by: NURSE PRACTITIONER

## 2024-09-03 PROCEDURE — 36415 COLL VENOUS BLD VENIPUNCTURE: CPT | Performed by: NURSE PRACTITIONER

## 2024-09-03 PROCEDURE — 74174 CTA ABD&PLVS W/CONTRAST: CPT

## 2024-09-03 RX ADMIN — IOHEXOL 100 ML: 350 INJECTION, SOLUTION INTRAVENOUS at 10:28

## 2024-09-03 NOTE — PROGRESS NOTES
Assessment/Plan:    1. Abdominal pressure  -     Lipase; Future  -     Basic metabolic panel; Future  -     Ambulatory Referral to Gastroenterology; Future  -     CBC and differential; Future  2. Abdominal distension  -     Lipase; Future  -     Basic metabolic panel; Future  -     Ambulatory Referral to Gastroenterology; Future  -     CBC and differential; Future  3. Alcohol-induced chronic pancreatitis (HCC)  -     Basic metabolic panel; Future  -     Ambulatory Referral to Gastroenterology; Future  -     CBC and differential; Future  4. Abdominal bruit  -     Ambulatory Referral to Gastroenterology; Future  -     CBC and differential; Future            Return in about 3 months (around 12/3/2024) for Recheck.    Subjective:      Patient ID: Felicia Jackson is a 84 y.o. female.    Chief Complaint   Patient presents with    Follow-up     Abdominal bruit, LAustin/KARLIE       Felicia is an 84 year old female who returns to the office for a recheck of abdominal pain. Reports that she continues to have upper abdominal pain. States that her lower abdominal pain is currently resolved. Denies nausea or vomiting. Reports that she is not eating as much as she had been previously.         The following portions of the patient's history were reviewed and updated as appropriate: allergies, current medications, past family history, past medical history, past social history, past surgical history and problem list.    Review of Systems   Constitutional:  Negative for chills, fatigue and fever.   Respiratory:  Negative for shortness of breath.    Cardiovascular:  Negative for chest pain.   Gastrointestinal:  Positive for abdominal pain. Negative for abdominal distention, diarrhea, nausea and vomiting.         Current Outpatient Medications   Medication Sig Dispense Refill    allopurinol (ZYLOPRIM) 100 mg tablet Take 2 tablets (200 mg total) by mouth daily 180 tablet 0    aspirin 81 mg chewable tablet Chew 1 tablet daily       colchicine (COLCRYS) 0.6 mg tablet TAKE 1 TABLET (0.6 MG TOTAL) BY MOUTH 2 (TWO) TIMES A DAY FOR 5 DAYS 30 tablet 0    diphenhydrAMINE (BENADRYL) 25 mg tablet Take 25 mg by mouth every 6 (six) hours as needed for allergies      ELIQUIS 2.5 MG 2.5 mg 2 (two) times a day        fexofenadine (ALLEGRA) 180 MG tablet Take 180 mg by mouth daily      furosemide (LASIX) 20 mg tablet TAKE 1 TABLET (20 MG TOTAL) BY MOUTH DAILY 90 tablet 0    gabapentin (NEURONTIN) 100 mg capsule TAKE 1 CAPSULE BY MOUTH AN HOUR BEFORE BEDTIME 60 capsule 1    hydrocortisone 1 % cream Apply topically 3 (three) times a day as needed for rash (itching) 45 g 0    hydrOXYzine HCL (ATARAX) 25 mg tablet Take 1 tablet (25 mg total) by mouth every 6 (six) hours as needed for itching 90 tablet 0    indomethacin (INDOCIN) 50 mg capsule Take 50 mg by mouth 2 (two) times a day      levothyroxine 50 mcg tablet TAKE 1 TABLET (50 MCG TOTAL) BY MOUTH DAILY IN THE EARLY MORNING 100 tablet 1    metoprolol succinate (TOPROL-XL) 25 mg 24 hr tablet Take 1 tablet (25 mg total) by mouth daily 90 tablet 0    nystatin (MYCOSTATIN) cream Apply 1 Application topically 2 (two) times a day To affected area      nystatin (MYCOSTATIN) powder Apply 1 Application topically 2 (two) times a day      pimecrolimus (ELIDEL) 1 % cream Apply topically daily      simvastatin (ZOCOR) 20 mg tablet Take 1 tablet by mouth daily      tacrolimus (PROTOPIC) 0.1 % ointment Apply topically 2 (two) times a day On groin area 60 g 3    traMADol (ULTRAM) 50 mg tablet Take 50 mg by mouth every 6 (six) hours as needed for moderate pain      triamcinolone (KENALOG) 0.1 % cream Apply topically 2 (two) times a day To legs, arms, body for 2 months twice daily to all areas or itch 453 g 2    triamcinolone (KENALOG) 0.1 % ointment APPLY TO VAGINAL AREA TWICE DAILY FOR 2 WEEKS 30 g 5     No current facility-administered medications for this visit.       Objective:    /80   Pulse 66   Temp 98 °F  (36.7 °C) (Temporal)   Resp 18   Ht 5' (1.524 m)   Wt 57.2 kg (126 lb)   SpO2 98%   BMI 24.61 kg/m²        Physical Exam  Vitals reviewed.   Constitutional:       Appearance: Normal appearance.   HENT:      Head: Normocephalic and atraumatic.   Cardiovascular:      Rate and Rhythm: Normal rate and regular rhythm.      Heart sounds: Normal heart sounds.   Pulmonary:      Effort: Pulmonary effort is normal.      Breath sounds: Normal breath sounds.   Neurological:      Mental Status: She is alert and oriented to person, place, and time.   Psychiatric:         Mood and Affect: Mood normal.                JORDAN Menard

## 2024-09-05 ENCOUNTER — TELEPHONE (OUTPATIENT)
Dept: FAMILY MEDICINE CLINIC | Facility: CLINIC | Age: 84
End: 2024-09-05

## 2024-09-06 ENCOUNTER — NURSE TRIAGE (OUTPATIENT)
Age: 84
End: 2024-09-06

## 2024-09-06 NOTE — TELEPHONE ENCOUNTER
Spoke to Ashwin to see if she called Felicia with the results of the CTA.  She did not.    Finally got in touch with Felicia.  Informed her that her home phone was not working this morning.  She was aware.    I relayed Leonor's message regarding her results.  Judith ralph heard me talking to Felicia and filled me in that she already has an appt.  She spoke with the surgeons office last week after Felicia's appt and scheduled an appt.  Once Felicia's results come back and they review them, if they feel she needs to come in earlier they will call her.    Aubree stated that there is a message back to the team who reviews significant finding results.  Once they review the results, they will contact Felicia if they need to bring her in earlier

## 2024-09-06 NOTE — TELEPHONE ENCOUNTER
Regarding: Sooner appt  ----- Message from Estefania CORREIA sent at 9/6/2024 12:22 PM EDT -----  Patient called in for a sooner appt only at the [facility] office with Dr. Kahn. Patient is having abdominal pain. Patient has been scheduled for 1/8/2025.

## 2024-09-06 NOTE — TELEPHONE ENCOUNTER
"LOV 11/21/22 Dr. Klein (abn liver enzymes), Procedures EGD/Colon 7/26/22, currently scheduled with Dr. Kahn 1/8/25 per appt desk.    Patient started with upper abdominal pain below breasts 3 weeks ago, seems to occur at night, awakens with pain will dissipate after having coffee. Normal bowel movements, passing gas, burping.  No other symptoms I advised patient purchase OTC Pepcid 20 mg at this time and take before bed, can take TUMS prn,  reviewed GERD diet, lifestyle recommendations, report to ED for severe pain, worsening symptoms. Patient agreeable to plan.       Reason for Disposition   MILD pain (e.g., does not interfere with normal activities) and pain comes and goes (cramps) lasts > 48 hours (Exception: this same abdominal pain is a chronic symptom recurrent or ongoing AND present > 4 weeks)   Mild abdominal pain    Answer Assessment - Initial Assessment Questions  1. LOCATION: \"Where does it hurt?\"       Upper abdomen, below breasts  2. RADIATION: \"Does the pain shoot anywhere else?\" (e.g., chest, back)      denies  3. ONSET: \"When did the pain begin?\" (e.g., minutes, hours or days ago)       3 weeks ago  4. SUDDEN: \"Gradual or sudden onset?\"      sudden  5. PATTERN \"Does the pain come and go, or is it constant?\"     - If constant: \"Is it getting better, staying the same, or worsening?\"       (Note: Constant means the pain never goes away completely; most serious pain is constant and it progresses)      - If intermittent: \"How long does it last?\" \"Do you have pain now?\"      (Note: Intermittent means the pain goes away completely between bouts)      Intermittent, occurs night, in morning upon awakening  6. SEVERITY: \"How bad is the pain?\"  (e.g., Scale 1-10; mild, moderate, or severe)    - MILD (1-3): doesn't interfere with normal activities, abdomen soft and not tender to touch     - MODERATE (4-7): interferes with normal activities or awakens from sleep, tender to touch     - SEVERE (8-10): excruciating " "pain, doubled over, unable to do any normal activities       mild  7. RECURRENT SYMPTOM: \"Have you ever had this type of stomach pain before?\" If Yes, ask: \"When was the last time?\" and \"What happened that time?\"       no  8. CAUSE: \"What do you think is causing the stomach pain?\"      unknown  9. RELIEVING/AGGRAVATING FACTORS: \"What makes it better or worse?\" (e.g., movement, antacids, bowel movement)      Hasn't tried anything  10. OTHER SYMPTOMS: \"Has there been any vomiting, diarrhea, constipation, or urine problems?\"        denies  11. PREGNANCY: \"Is there any chance you are pregnant?\" \"When was your last menstrual period?\"        N/A    Protocols used: Abdominal Pain - Female-ADULT-[place]    "

## 2024-09-18 ENCOUNTER — CONSULT (OUTPATIENT)
Dept: VASCULAR SURGERY | Facility: CLINIC | Age: 84
End: 2024-09-18
Payer: COMMERCIAL

## 2024-09-18 VITALS
RESPIRATION RATE: 18 BRPM | OXYGEN SATURATION: 96 % | HEIGHT: 60 IN | DIASTOLIC BLOOD PRESSURE: 88 MMHG | WEIGHT: 129 LBS | BODY MASS INDEX: 25.32 KG/M2 | SYSTOLIC BLOOD PRESSURE: 142 MMHG | HEART RATE: 59 BPM

## 2024-09-18 DIAGNOSIS — R14.0 ABDOMINAL DISTENSION: ICD-10-CM

## 2024-09-18 DIAGNOSIS — R10.9 ABDOMINAL PRESSURE: ICD-10-CM

## 2024-09-18 DIAGNOSIS — R09.89 ABDOMINAL BRUIT: ICD-10-CM

## 2024-09-18 DIAGNOSIS — K55.1 SUPERIOR MESENTERIC ARTERY STENOSIS (HCC): Primary | ICD-10-CM

## 2024-09-18 PROCEDURE — 99204 OFFICE O/P NEW MOD 45 MIN: CPT | Performed by: STUDENT IN AN ORGANIZED HEALTH CARE EDUCATION/TRAINING PROGRAM

## 2024-09-18 NOTE — ASSESSMENT & PLAN NOTE
84F w/ afib (eliquis) and chronic pancreatitis who recently underwent CT scan for evaluation of abdominal pain.  Her symptoms (described below) are not typical for those associated with SMA stenosis.  She specifically denies any recent unexplained weight has or symptoms of food fear/worsening abdominal pain after eating.    Her CTA does demonstrate about 70% focal SMA stenosis and to a lesser degree some stenosis of the celiac.  Despite these findings, SMA stenosis can also be an incidental finding.  Given that her symptoms do not seem to be related to mesenteric stenosis, does not appear that she would benefit from intervention at this time.    Plan:  - RTC 6mo w/ mesenteric duplex and to re-assess symptoms  - She has an appointment with GI in 1/2025, which I encouraged her to keep

## 2024-09-18 NOTE — PROGRESS NOTES
Assessment/Plan:      Diagnoses and all orders for this visit:    Superior mesenteric artery stenosis (HCC)  -     VAS CELIAC AND/OR MESENTERIC DUPLEX; Future    84F w/ afib (eliquis) and chronic pancreatitis who recently underwent CT scan for evaluation of abdominal pain.  Her symptoms (described below) are not typical for those associated with SMA stenosis.  She specifically denies any recent unexplained weight has or symptoms of food fear/worsening abdominal pain after eating.     Her CTA does demonstrate about 70% focal SMA stenosis and to a lesser degree some stenosis of the celiac.  Despite these findings, SMA stenosis can also be an incidental finding.  Given that her symptoms do not seem to be related to mesenteric stenosis, does not appear that she would benefit from intervention at this time.     Plan:  - RTC 6mo w/ mesenteric duplex and to re-assess symptoms  - She has an appointment with GI in 1/2025, which I encouraged her to keep  - Continue asa 81 and simvastatin 20         Abdominal bruit  -     Ambulatory Referral to Vascular Surgery    Abdominal pressure  -     Ambulatory Referral to Vascular Surgery    Abdominal distension  -     Ambulatory Referral to Vascular Surgery          Subjective:     Patient ID: Felicia Jackson is a 84 y.o. female with history of A-fib (Eliquis), ascending aortic aneurysm, chronic pancreatitis (EtOH), and recurrent colitis.    Ms. Jackson presents today to discuss concerns about abdominal pain.  She characterizes to different sets of symptoms.  Her first is abdominal pain in a band distribution just below her rib cage/in the epigastrium that has been present for the last 4 weeks. She describes this as a squeezing/cramping kind of pain that begins first thing in the morning lasting for about 15 minutes, and resolves spontaneously. She never experiences this symptom at any point through the day and this is unrelated to oral intake.  Denies ever having symptoms like this  prior to 4 weeks ago.  She also characterizes a more longstanding abdominal bloating that has been present for many years.  Does not note any worsening or improvement associated with eating any types of food.    She reports eating no meat for many years (this is a result of a bad episode of colitis almost 50 years ago and she has not eaten meat since).  Reports a diet of primarily vegetables but occasionally eats cheese pizza on Fridays. Typically has formed bowel movements. She does not notice any change in her bowel habits after eating the cheese pizza.    She specifically denies any recent unexplained weight loss.  Has no relationship of onset of abdominal pain symptoms after eating.     HPI    Review of Systems   Constitutional: Negative.    HENT: Negative.     Eyes: Negative.    Respiratory: Negative.     Cardiovascular: Negative.    Gastrointestinal:  Positive for abdominal distention and abdominal pain.   Endocrine: Negative.    Genitourinary: Negative.    Musculoskeletal:  Positive for arthralgias and gait problem.   Skin: Negative.    Allergic/Immunologic: Negative.    Neurological:  Negative for dizziness, weakness and numbness.   Hematological: Negative.    Psychiatric/Behavioral: Negative.           Objective:     Physical Exam  HENT:      Head: Normocephalic.   Eyes:      Pupils: Pupils are equal, round, and reactive to light.   Cardiovascular:      Comments: Bilateral radial, brachial, DP, PT pulses  Abdominal:      General: Abdomen is flat.      Palpations: Abdomen is soft.      Comments: Mild distention, but non-tender   Skin:     General: Skin is warm.   Neurological:      General: No focal deficit present.      Mental Status: She is alert.

## 2024-10-01 ENCOUNTER — APPOINTMENT (OUTPATIENT)
Dept: LAB | Facility: CLINIC | Age: 84
End: 2024-10-01
Payer: COMMERCIAL

## 2024-10-01 DIAGNOSIS — R10.9 ABDOMINAL PRESSURE: ICD-10-CM

## 2024-10-01 DIAGNOSIS — K86.0 ALCOHOL-INDUCED CHRONIC PANCREATITIS (HCC): ICD-10-CM

## 2024-10-01 DIAGNOSIS — R14.0 ABDOMINAL DISTENSION: ICD-10-CM

## 2024-10-01 DIAGNOSIS — R09.89 ABDOMINAL BRUIT: ICD-10-CM

## 2024-10-01 LAB
BASOPHILS # BLD AUTO: 0.06 THOUSANDS/ÂΜL (ref 0–0.1)
BASOPHILS NFR BLD AUTO: 2 % (ref 0–1)
EOSINOPHIL # BLD AUTO: 0.36 THOUSAND/ÂΜL (ref 0–0.61)
EOSINOPHIL NFR BLD AUTO: 10 % (ref 0–6)
ERYTHROCYTE [DISTWIDTH] IN BLOOD BY AUTOMATED COUNT: 14.4 % (ref 11.6–15.1)
HCT VFR BLD AUTO: 38.6 % (ref 34.8–46.1)
HGB BLD-MCNC: 12.5 G/DL (ref 11.5–15.4)
IMM GRANULOCYTES # BLD AUTO: 0 THOUSAND/UL (ref 0–0.2)
IMM GRANULOCYTES NFR BLD AUTO: 0 % (ref 0–2)
LYMPHOCYTES # BLD AUTO: 1.22 THOUSANDS/ÂΜL (ref 0.6–4.47)
LYMPHOCYTES NFR BLD AUTO: 32 % (ref 14–44)
MCH RBC QN AUTO: 34.6 PG (ref 26.8–34.3)
MCHC RBC AUTO-ENTMCNC: 32.4 G/DL (ref 31.4–37.4)
MCV RBC AUTO: 107 FL (ref 82–98)
MONOCYTES # BLD AUTO: 0.46 THOUSAND/ÂΜL (ref 0.17–1.22)
MONOCYTES NFR BLD AUTO: 12 % (ref 4–12)
NEUTROPHILS # BLD AUTO: 1.69 THOUSANDS/ÂΜL (ref 1.85–7.62)
NEUTS SEG NFR BLD AUTO: 44 % (ref 43–75)
NRBC BLD AUTO-RTO: 0 /100 WBCS
PLATELET # BLD AUTO: 184 THOUSANDS/UL (ref 149–390)
PMV BLD AUTO: 11.8 FL (ref 8.9–12.7)
RBC # BLD AUTO: 3.61 MILLION/UL (ref 3.81–5.12)
WBC # BLD AUTO: 3.79 THOUSAND/UL (ref 4.31–10.16)

## 2024-10-01 PROCEDURE — 83690 ASSAY OF LIPASE: CPT

## 2024-10-01 PROCEDURE — 80048 BASIC METABOLIC PNL TOTAL CA: CPT

## 2024-10-01 PROCEDURE — 36415 COLL VENOUS BLD VENIPUNCTURE: CPT

## 2024-10-01 PROCEDURE — 85025 COMPLETE CBC W/AUTO DIFF WBC: CPT

## 2024-10-02 LAB
ANION GAP SERPL CALCULATED.3IONS-SCNC: 8 MMOL/L (ref 4–13)
BUN SERPL-MCNC: 18 MG/DL (ref 5–25)
CALCIUM SERPL-MCNC: 9.2 MG/DL (ref 8.4–10.2)
CHLORIDE SERPL-SCNC: 102 MMOL/L (ref 96–108)
CO2 SERPL-SCNC: 29 MMOL/L (ref 21–32)
CREAT SERPL-MCNC: 0.75 MG/DL (ref 0.6–1.3)
GFR SERPL CREATININE-BSD FRML MDRD: 72 ML/MIN/1.73SQ M
GLUCOSE P FAST SERPL-MCNC: 82 MG/DL (ref 65–99)
LIPASE SERPL-CCNC: 20 U/L (ref 11–82)
POTASSIUM SERPL-SCNC: 4.4 MMOL/L (ref 3.5–5.3)
SODIUM SERPL-SCNC: 139 MMOL/L (ref 135–147)

## 2024-10-09 ENCOUNTER — TELEPHONE (OUTPATIENT)
Dept: FAMILY MEDICINE CLINIC | Facility: CLINIC | Age: 84
End: 2024-10-09

## 2024-10-09 NOTE — TELEPHONE ENCOUNTER
Felicia called and stated that she received the message about seeing someone for red and white counts being off    But there is no name    She is going to go to [facility].    Please advise.

## 2024-10-10 NOTE — TELEPHONE ENCOUNTER
Dr. Jon is still out? Please provide phone number for [facility]'s hematology. They will schedule her with available provider, jabari Shore.    Leonor Kline

## 2024-10-10 NOTE — TELEPHONE ENCOUNTER
Left a message for Felicia relaying Leonor's message and leaving the telephone # for her to call  Hematology [phone]

## 2024-10-11 NOTE — TELEPHONE ENCOUNTER
Left another message on her house phone and cell phone relaying  Hematology's phone number.  Asked for Eflicia to call to confirm that she got this message.  Advised her to inform [facility] that there is a telephone note already started.

## 2024-10-14 ENCOUNTER — TELEPHONE (OUTPATIENT)
Age: 84
End: 2024-10-14

## 2024-10-14 NOTE — TELEPHONE ENCOUNTER
Patient calling to confirm appointment 10/25/2  and ask for directions . I informed that appointment had been rescheduled to 9/18/24 and she was seen then. Patient understands and no longer needs directions

## 2024-10-25 DIAGNOSIS — M10.9 ACUTE GOUT OF RIGHT FOOT, UNSPECIFIED CAUSE: ICD-10-CM

## 2024-10-25 DIAGNOSIS — M10.9 GOUT, ARTHROPATHY: ICD-10-CM

## 2024-10-25 RX ORDER — COLCHICINE 0.6 MG/1
0.6 TABLET ORAL 2 TIMES DAILY
Qty: 30 TABLET | Refills: 0 | Status: SHIPPED | OUTPATIENT
Start: 2024-10-25 | End: 2024-11-09

## 2024-10-25 RX ORDER — ALLOPURINOL 100 MG/1
100 TABLET ORAL DAILY
Qty: 90 TABLET | Refills: 1 | Status: SHIPPED | OUTPATIENT
Start: 2024-10-25

## 2024-12-03 ENCOUNTER — OFFICE VISIT (OUTPATIENT)
Dept: FAMILY MEDICINE CLINIC | Facility: CLINIC | Age: 84
End: 2024-12-03
Payer: COMMERCIAL

## 2024-12-03 VITALS
WEIGHT: 130 LBS | TEMPERATURE: 97.5 F | HEIGHT: 60 IN | HEART RATE: 60 BPM | BODY MASS INDEX: 25.52 KG/M2 | RESPIRATION RATE: 18 BRPM | OXYGEN SATURATION: 98 % | DIASTOLIC BLOOD PRESSURE: 64 MMHG | SYSTOLIC BLOOD PRESSURE: 118 MMHG

## 2024-12-03 DIAGNOSIS — N83.201 CYST OF RIGHT OVARY: ICD-10-CM

## 2024-12-03 DIAGNOSIS — R10.13 EPIGASTRIC PAIN: Primary | ICD-10-CM

## 2024-12-03 DIAGNOSIS — K86.0 ALCOHOL-INDUCED CHRONIC PANCREATITIS (HCC): ICD-10-CM

## 2024-12-03 DIAGNOSIS — K55.1 SUPERIOR MESENTERIC ARTERY STENOSIS (HCC): ICD-10-CM

## 2024-12-03 DIAGNOSIS — Z23 ENCOUNTER FOR IMMUNIZATION: ICD-10-CM

## 2024-12-03 PROCEDURE — 90662 IIV NO PRSV INCREASED AG IM: CPT

## 2024-12-03 PROCEDURE — 99214 OFFICE O/P EST MOD 30 MIN: CPT | Performed by: NURSE PRACTITIONER

## 2024-12-03 PROCEDURE — 99214 OFFICE O/P EST MOD 30 MIN: CPT

## 2024-12-03 PROCEDURE — G0008 ADMIN INFLUENZA VIRUS VAC: HCPCS

## 2024-12-03 PROCEDURE — 90471 IMMUNIZATION ADMIN: CPT

## 2024-12-03 RX ORDER — FAMOTIDINE 20 MG/1
20 TABLET, FILM COATED ORAL 2 TIMES DAILY
Qty: 180 TABLET | Refills: 3 | Status: SHIPPED | OUTPATIENT
Start: 2024-12-03 | End: 2025-11-28

## 2024-12-03 NOTE — PROGRESS NOTES
Assessment/Plan:    1. Epigastric pain  -     famotidine (PEPCID) 20 mg tablet; Take 1 tablet (20 mg total) by mouth 2 (two) times a day  2. Cyst of right ovary  -     US pelvis complete w transvaginal; Future; Expected date: 12/03/2024  3. Alcohol-induced chronic pancreatitis (HCC)  4. Superior mesenteric artery stenosis (HCC)  5. Encounter for immunization  -     influenza vaccine, high-dose, PF 0.5 mL (Fluzone High Dose)    Pt has scheduled follow up with GI. Advise that she keep this consult for further evaluation and management of epigastric discomfort in the AM.   Continue follow up with vascular as recommended by specialty.           Return in about 3 months (around 3/3/2025) for Recheck.    Subjective:      Patient ID: Felicia Jackson is a 84 y.o. female.    Chief Complaint   Patient presents with    Follow-up     3 month re-check RH       Felicia is an 84 year old female who presents to the office for a recheck of abdominal discomfort. Pt reports that she continues to have upper abdominal pain when waking up in the morning. States that the pain subsides as the day goes. She denies pain being associated with food. She does report nightly drinking of 1-2 glasses of wine with dinner. Denies fever, chills, chest pain, shortness of breath, n/v/d. She denies heartburn.         The following portions of the patient's history were reviewed and updated as appropriate: allergies, current medications, past family history, past medical history, past social history, past surgical history and problem list.    Review of Systems   Constitutional:  Negative for chills, fatigue and fever.   Respiratory:  Negative for cough, chest tightness and shortness of breath.    Cardiovascular:  Negative for chest pain.   Gastrointestinal:  Positive for abdominal pain. Negative for abdominal distention, constipation, diarrhea, nausea and vomiting.         Current Outpatient Medications   Medication Sig Dispense Refill    allopurinol  (ZYLOPRIM) 100 mg tablet TAKE 1 TABLET (100 MG TOTAL) BY MOUTH DAILY 90 tablet 1    aspirin 81 mg chewable tablet Chew 1 tablet daily      colchicine (COLCRYS) 0.6 mg tablet TAKE 1 TABLET (0.6 MG TOTAL) BY MOUTH 2 (TWO) TIMES A DAY FOR 5 DAYS 30 tablet 0    diphenhydrAMINE (BENADRYL) 25 mg tablet Take 25 mg by mouth every 6 (six) hours as needed for allergies      ELIQUIS 2.5 MG 2.5 mg 2 (two) times a day        famotidine (PEPCID) 20 mg tablet Take 1 tablet (20 mg total) by mouth 2 (two) times a day 180 tablet 3    fexofenadine (ALLEGRA) 180 MG tablet Take 180 mg by mouth daily      furosemide (LASIX) 20 mg tablet TAKE 1 TABLET (20 MG TOTAL) BY MOUTH DAILY 90 tablet 0    gabapentin (NEURONTIN) 100 mg capsule TAKE 1 CAPSULE BY MOUTH AN HOUR BEFORE BEDTIME 60 capsule 1    hydrocortisone 1 % cream Apply topically 3 (three) times a day as needed for rash (itching) 45 g 0    hydrOXYzine HCL (ATARAX) 25 mg tablet Take 1 tablet (25 mg total) by mouth every 6 (six) hours as needed for itching 90 tablet 0    indomethacin (INDOCIN) 50 mg capsule Take 50 mg by mouth 2 (two) times a day      levothyroxine 50 mcg tablet TAKE 1 TABLET (50 MCG TOTAL) BY MOUTH DAILY IN THE EARLY MORNING 100 tablet 1    metoprolol succinate (TOPROL-XL) 25 mg 24 hr tablet Take 1 tablet (25 mg total) by mouth daily 90 tablet 0    nystatin (MYCOSTATIN) cream Apply 1 Application topically 2 (two) times a day To affected area      nystatin (MYCOSTATIN) powder Apply 1 Application topically 2 (two) times a day      pimecrolimus (ELIDEL) 1 % cream Apply topically daily      simvastatin (ZOCOR) 20 mg tablet Take 1 tablet by mouth daily      tacrolimus (PROTOPIC) 0.1 % ointment Apply topically 2 (two) times a day On groin area 60 g 3    traMADol (ULTRAM) 50 mg tablet Take 50 mg by mouth every 6 (six) hours as needed for moderate pain      triamcinolone (KENALOG) 0.1 % cream Apply topically 2 (two) times a day To legs, arms, body for 2 months twice daily to  all areas or itch 453 g 2    triamcinolone (KENALOG) 0.1 % ointment APPLY TO VAGINAL AREA TWICE DAILY FOR 2 WEEKS 30 g 5     No current facility-administered medications for this visit.       Objective:    /64 (BP Location: Left arm, Patient Position: Sitting, Cuff Size: Large)   Pulse 60   Temp 97.5 °F (36.4 °C) (Temporal)   Resp 18   Ht 5' (1.524 m)   Wt 59 kg (130 lb)   SpO2 98%   BMI 25.39 kg/m²        Physical Exam  Vitals reviewed.   Constitutional:       General: She is not in acute distress.     Appearance: She is well-developed. She is not diaphoretic.   HENT:      Head: Normocephalic and atraumatic.   Eyes:      General: Lids are normal.         Right eye: No discharge.         Left eye: No discharge.      Conjunctiva/sclera: Conjunctivae normal.   Neck:      Thyroid: No thyromegaly.   Cardiovascular:      Rate and Rhythm: Normal rate and regular rhythm.      Heart sounds: Normal heart sounds.   Pulmonary:      Effort: Pulmonary effort is normal. No respiratory distress.      Breath sounds: Normal breath sounds. No decreased breath sounds, wheezing, rhonchi or rales.   Abdominal:      General: Bowel sounds are normal. There is abdominal bruit.      Palpations: Abdomen is soft. There is no hepatomegaly or splenomegaly.      Tenderness: There is no abdominal tenderness.   Musculoskeletal:      Cervical back: Normal range of motion and neck supple.   Lymphadenopathy:      Cervical: No cervical adenopathy.   Skin:     General: Skin is warm and dry.      Findings: No rash.   Neurological:      Mental Status: She is alert and oriented to person, place, and time.   Psychiatric:         Behavior: Behavior normal.         Thought Content: Thought content normal.         Judgment: Judgment normal.                JORDAN Menard

## 2024-12-03 NOTE — Clinical Note
Pt is scheduled to see GI in January 8 2025. Is she able to be seen any sooner? She has been having persistent epigastric pain the mornings, every morning. Vascular and cardiology consults have been complete. CT and CTA of abdomen and pelvis in chart for review.

## 2024-12-06 PROBLEM — K86.0 ALCOHOL-INDUCED CHRONIC PANCREATITIS (HCC): Status: ACTIVE | Noted: 2024-12-06

## 2024-12-06 PROBLEM — R10.13 EPIGASTRIC PAIN: Status: ACTIVE | Noted: 2024-12-06

## 2024-12-06 PROBLEM — N83.201 CYST OF RIGHT OVARY: Status: ACTIVE | Noted: 2024-12-06

## 2024-12-12 ENCOUNTER — HOSPITAL ENCOUNTER (OUTPATIENT)
Dept: RADIOLOGY | Facility: HOSPITAL | Age: 84
Discharge: HOME | End: 2024-12-12
Payer: COMMERCIAL

## 2024-12-12 DIAGNOSIS — M10.9 ACUTE GOUT OF RIGHT FOOT, UNSPECIFIED CAUSE: ICD-10-CM

## 2024-12-12 DIAGNOSIS — N83.201 CYST OF RIGHT OVARY: ICD-10-CM

## 2024-12-12 PROCEDURE — 76830 TRANSVAGINAL US NON-OB: CPT

## 2024-12-12 PROCEDURE — 76856 US EXAM PELVIC COMPLETE: CPT

## 2024-12-12 RX ORDER — COLCHICINE 0.6 MG/1
0.6 TABLET ORAL 2 TIMES DAILY
Qty: 30 TABLET | Refills: 0 | Status: SHIPPED | OUTPATIENT
Start: 2024-12-12 | End: 2024-12-27

## 2024-12-12 NOTE — TELEPHONE ENCOUNTER
Requested medication(s) are due for refill today: Yes  Patient has already received a courtesy refill: No  Other reason request has been forwarded to provider:  Please review to see if the refill is appropriate.

## 2024-12-18 ENCOUNTER — RESULTS FOLLOW-UP (OUTPATIENT)
Dept: FAMILY MEDICINE CLINIC | Facility: CLINIC | Age: 84
End: 2024-12-18

## 2024-12-18 ENCOUNTER — NURSE TRIAGE (OUTPATIENT)
Age: 84
End: 2024-12-18

## 2024-12-18 ENCOUNTER — TELEPHONE (OUTPATIENT)
Dept: FAMILY MEDICINE CLINIC | Facility: CLINIC | Age: 84
End: 2024-12-18

## 2024-12-18 DIAGNOSIS — H91.8X3 OTHER SPECIFIED HEARING LOSS OF BOTH EARS: ICD-10-CM

## 2024-12-18 DIAGNOSIS — N83.201 CYST OF RIGHT OVARY: Primary | ICD-10-CM

## 2024-12-18 NOTE — TELEPHONE ENCOUNTER
Called patient back, she is a new patient. She was told to follow up with Dr. Jackson  by her PCP regarding a 4 cm right ovarian cyst. She denies any lower pelvic pain. She reports a squeezing stomach pain but is following GI for this.    Attempted to make an appt, next available in [place] is late March. Will route to office to coordinate a sooner appt

## 2024-12-18 NOTE — TELEPHONE ENCOUNTER
Benita called and stated that there are significant findings on her US    Results are in the chart.

## 2024-12-18 NOTE — TELEPHONE ENCOUNTER
Regarding: NP-pain under breasts with abd swelling  ----- Message from Milvia ROBERTS sent at 12/18/2024  1:41 PM EST -----  New patient has pain under her breasts like a squeezing with abd swelling. She would like [facility].

## 2024-12-18 NOTE — RESULT ENCOUNTER NOTE
Patient informed,  she is asking for a recommendation for a hearing test because her hearing is getting worse.  Please advise

## 2024-12-19 NOTE — TELEPHONE ENCOUNTER
Spoke with pt and advised that we have an opening 12/26/2024 at 8am with Dr. Crump.  States she would like an appointment 11am or after. Advised will keep looking over the schedule.

## 2024-12-20 NOTE — RESULT ENCOUNTER NOTE
Patient informed and given information for Lauren Umanzor in [facility] [phone],  no further action required, Adilene/KARLIE

## 2024-12-26 NOTE — TELEPHONE ENCOUNTER
Called pt to offer her today with Dr. Crump as she has an 11am and 1pm opening.  Patient states she can not do today as she has company coming.  Advised pt we will keep checking for cancellations in the schedule.

## 2024-12-27 ENCOUNTER — TELEPHONE (OUTPATIENT)
Dept: HEMATOLOGY ONCOLOGY | Facility: MEDICAL CENTER | Age: 84
End: 2024-12-27

## 2024-12-27 DIAGNOSIS — D72.9 ABNORMAL WHITE BLOOD CELL (WBC) COUNT: ICD-10-CM

## 2024-12-27 DIAGNOSIS — R71.8 ABNORMAL RED BLOOD CELL COUNT: Primary | ICD-10-CM

## 2024-12-27 NOTE — TELEPHONE ENCOUNTER
Spoke with patient regarding labs needed to be drawn prior to appointment.  Indicated the scripts are in the system, they are non-fasting and patient can go to any [facility] facility to have the labs drawn.  Patient verbalized understanding.

## 2024-12-30 NOTE — TELEPHONE ENCOUNTER
Patient called back to verify this information again as she did not have a pen when previous message was given to her.  Let her know labs are in chart and  facility can draw labs for her prior to her appointment with Hem/Onc tomorrow morning 12/31

## 2024-12-31 ENCOUNTER — TELEPHONE (OUTPATIENT)
Dept: HEMATOLOGY ONCOLOGY | Facility: MEDICAL CENTER | Age: 84
End: 2024-12-31

## 2024-12-31 ENCOUNTER — APPOINTMENT (OUTPATIENT)
Dept: LAB | Facility: CLINIC | Age: 84
End: 2024-12-31
Payer: COMMERCIAL

## 2024-12-31 DIAGNOSIS — D72.9 ABNORMAL WHITE BLOOD CELL (WBC) COUNT: ICD-10-CM

## 2024-12-31 DIAGNOSIS — R71.8 ABNORMAL RED BLOOD CELL COUNT: ICD-10-CM

## 2024-12-31 LAB
ALBUMIN SERPL BCG-MCNC: 4.3 G/DL (ref 3.5–5)
ALP SERPL-CCNC: 77 U/L (ref 34–104)
ALT SERPL W P-5'-P-CCNC: 29 U/L (ref 7–52)
ANION GAP SERPL CALCULATED.3IONS-SCNC: 8 MMOL/L (ref 4–13)
AST SERPL W P-5'-P-CCNC: 33 U/L (ref 13–39)
BASOPHILS # BLD AUTO: 0.05 THOUSANDS/ΜL (ref 0–0.1)
BASOPHILS NFR BLD AUTO: 1 % (ref 0–1)
BILIRUB SERPL-MCNC: 0.8 MG/DL (ref 0.2–1)
BUN SERPL-MCNC: 15 MG/DL (ref 5–25)
CALCIUM SERPL-MCNC: 9.5 MG/DL (ref 8.4–10.2)
CHLORIDE SERPL-SCNC: 104 MMOL/L (ref 96–108)
CO2 SERPL-SCNC: 28 MMOL/L (ref 21–32)
CREAT SERPL-MCNC: 0.75 MG/DL (ref 0.6–1.3)
EOSINOPHIL # BLD AUTO: 0.22 THOUSAND/ΜL (ref 0–0.61)
EOSINOPHIL NFR BLD AUTO: 6 % (ref 0–6)
ERYTHROCYTE [DISTWIDTH] IN BLOOD BY AUTOMATED COUNT: 14.1 % (ref 11.6–15.1)
GFR SERPL CREATININE-BSD FRML MDRD: 72 ML/MIN/1.73SQ M
GLUCOSE SERPL-MCNC: 89 MG/DL (ref 65–140)
HCT VFR BLD AUTO: 37 % (ref 34.8–46.1)
HGB BLD-MCNC: 12.3 G/DL (ref 11.5–15.4)
IMM GRANULOCYTES # BLD AUTO: 0.01 THOUSAND/UL (ref 0–0.2)
IMM GRANULOCYTES NFR BLD AUTO: 0 % (ref 0–2)
LYMPHOCYTES # BLD AUTO: 1.27 THOUSANDS/ΜL (ref 0.6–4.47)
LYMPHOCYTES NFR BLD AUTO: 35 % (ref 14–44)
MCH RBC QN AUTO: 35.3 PG (ref 26.8–34.3)
MCHC RBC AUTO-ENTMCNC: 33.2 G/DL (ref 31.4–37.4)
MCV RBC AUTO: 106 FL (ref 82–98)
MONOCYTES # BLD AUTO: 0.53 THOUSAND/ΜL (ref 0.17–1.22)
MONOCYTES NFR BLD AUTO: 15 % (ref 4–12)
NEUTROPHILS # BLD AUTO: 1.56 THOUSANDS/ΜL (ref 1.85–7.62)
NEUTS SEG NFR BLD AUTO: 43 % (ref 43–75)
NRBC BLD AUTO-RTO: 0 /100 WBCS
PLATELET # BLD AUTO: 183 THOUSANDS/UL (ref 149–390)
PMV BLD AUTO: 11.5 FL (ref 8.9–12.7)
POTASSIUM SERPL-SCNC: 4.4 MMOL/L (ref 3.5–5.3)
PROT SERPL-MCNC: 6.7 G/DL (ref 6.4–8.4)
RBC # BLD AUTO: 3.48 MILLION/UL (ref 3.81–5.12)
SODIUM SERPL-SCNC: 140 MMOL/L (ref 135–147)
WBC # BLD AUTO: 3.64 THOUSAND/UL (ref 4.31–10.16)

## 2024-12-31 PROCEDURE — 80053 COMPREHEN METABOLIC PANEL: CPT

## 2024-12-31 PROCEDURE — 85025 COMPLETE CBC W/AUTO DIFF WBC: CPT

## 2024-12-31 PROCEDURE — 36415 COLL VENOUS BLD VENIPUNCTURE: CPT

## 2024-12-31 NOTE — TELEPHONE ENCOUNTER
Left  for pt. She was a NS for her appt wilber Shore on 12/31 @ 830 am. Left hope line [phone] for pt to call back to rs.

## 2025-01-05 NOTE — PATIENT INSTRUCTIONS
0 (no pain/absence of nonverbal indicators of pain) Medicare Preventive Visit Patient Instructions  Thank you for completing your Welcome to Medicare Visit or Medicare Annual Wellness Visit today. Your next wellness visit will be due in one year (6/5/2025).  The screening/preventive services that you may require over the next 5-10 years are detailed below. Some tests may not apply to you based off risk factors and/or age. Screening tests ordered at today's visit but not completed yet may show as past due. Also, please note that scanned in results may not display below.  Preventive Screenings:  Service Recommendations Previous Testing/Comments   Colorectal Cancer Screening  * Colonoscopy    * Fecal Occult Blood Test (FOBT)/Fecal Immunochemical Test (FIT)  * Fecal DNA/Cologuard Test  * Flexible Sigmoidoscopy Age: 45-75 years old   Colonoscopy: every 10 years (may be performed more frequently if at higher risk)  OR  FOBT/FIT: every 1 year  OR  Cologuard: every 3 years  OR  Sigmoidoscopy: every 5 years  Screening may be recommended earlier than age 45 if at higher risk for colorectal cancer. Also, an individualized decision between you and your healthcare provider will decide whether screening between the ages of 76-85 would be appropriate. Colonoscopy: 07/26/2022  FOBT/FIT: Not on file  Cologuard: Not on file  Sigmoidoscopy: Not on file    Screening Not Indicated     Breast Cancer Screening Age: 40+ years old  Frequency: every 1-2 years  Not required if history of left and right mastectomy Mammogram: 09/10/2020        Cervical Cancer Screening Between the ages of 21-29, pap smear recommended once every 3 years.   Between the ages of 30-65, can perform pap smear with HPV co-testing every 5 years.   Recommendations may differ for women with a history of total hysterectomy, cervical cancer, or abnormal pap smears in past. Pap Smear: Not on file    Screening Not Indicated   Hepatitis C Screening Once for adults born between 1945 and 1965  More frequently in patients at high  risk for Hepatitis C Hep C Antibody: Not on file        Diabetes Screening 1-2 times per year if you're at risk for diabetes or have pre-diabetes Fasting glucose: 79 mg/dL (8/11/2022)  A1C: No results in last 5 years (No results in last 5 years)  Screening Current   Cholesterol Screening Once every 5 years if you don't have a lipid disorder. May order more often based on risk factors. Lipid panel: 04/13/2022    Screening Not Indicated  History Lipid Disorder     Other Preventive Screenings Covered by Medicare:  Abdominal Aortic Aneurysm (AAA) Screening: covered once if your at risk. You're considered to be at risk if you have a family history of AAA.  Lung Cancer Screening: covers low dose CT scan once per year if you meet all of the following conditions: (1) Age 55-77; (2) No signs or symptoms of lung cancer; (3) Current smoker or have quit smoking within the last 15 years; (4) You have a tobacco smoking history of at least 20 pack years (packs per day multiplied by number of years you smoked); (5) You get a written order from a healthcare provider.  Glaucoma Screening: covered annually if you're considered high risk: (1) You have diabetes OR (2) Family history of glaucoma OR (3)  aged 50 and older OR (4)  American aged 65 and older  Osteoporosis Screening: covered every 2 years if you meet one of the following conditions: (1) You're estrogen deficient and at risk for osteoporosis based off medical history and other findings; (2) Have a vertebral abnormality; (3) On glucocorticoid therapy for more than 3 months; (4) Have primary hyperparathyroidism; (5) On osteoporosis medications and need to assess response to drug therapy.   Last bone density test (DXA Scan): Not on file.  HIV Screening: covered annually if you're between the age of 15-65. Also covered annually if you are younger than 15 and older than 65 with risk factors for HIV infection. For pregnant patients, it is covered up to 3  times per pregnancy.    Immunizations:  Immunization Recommendations   Influenza Vaccine Annual influenza vaccination during flu season is recommended for all persons aged >= 6 months who do not have contraindications   Pneumococcal Vaccine   * Pneumococcal conjugate vaccine = PCV13 (Prevnar 13), PCV15 (Vaxneuvance), PCV20 (Prevnar 20)  * Pneumococcal polysaccharide vaccine = PPSV23 (Pneumovax) Adults 19-65 yo with certain risk factors or if 65+ yo  If never received any pneumonia vaccine: recommend Prevnar 20 (PCV20)  Give PCV20 if previously received 1 dose of PCV13 or PPSV23   Hepatitis B Vaccine 3 dose series if at intermediate or high risk (ex: diabetes, end stage renal disease, liver disease)   Respiratory syncytial virus (RSV) Vaccine - COVERED BY MEDICARE PART D  * RSVPreF3 (Arexvy) CDC recommends that adults 60 years of age and older may receive a single dose of RSV vaccine using shared clinical decision-making (SCDM)   Tetanus (Td) Vaccine - COST NOT COVERED BY MEDICARE PART B Following completion of primary series, a booster dose should be given every 10 years to maintain immunity against tetanus. Td may also be given as tetanus wound prophylaxis.   Tdap Vaccine - COST NOT COVERED BY MEDICARE PART B Recommended at least once for all adults. For pregnant patients, recommended with each pregnancy.   Shingles Vaccine (Shingrix) - COST NOT COVERED BY MEDICARE PART B  2 shot series recommended in those 19 years and older who have or will have weakened immune systems or those 50 years and older     Health Maintenance Due:  There are no preventive care reminders to display for this patient.  Immunizations Due:      Topic Date Due   • COVID-19 Vaccine (5 - 2023-24 season) 09/01/2023     Advance Directives   What are advance directives?  Advance directives are legal documents that state your wishes and plans for medical care. These plans are made ahead of time in case you lose your ability to make decisions for  yourself. Advance directives can apply to any medical decision, such as the treatments you want, and if you want to donate organs.   What are the types of advance directives?  There are many types of advance directives, and each state has rules about how to use them. You may choose a combination of any of the following:  Living will:  This is a written record of the treatment you want. You can also choose which treatments you do not want, which to limit, and which to stop at a certain time. This includes surgery, medicine, IV fluid, and tube feedings.   Durable power of  for healthcare (DPAHC):  This is a written record that states who you want to make healthcare choices for you when you are unable to make them for yourself. This person, called a proxy, is usually a family member or a friend. You may choose more than 1 proxy.  Do not resuscitate (DNR) order:  A DNR order is used in case your heart stops beating or you stop breathing. It is a request not to have certain forms of treatment, such as CPR. A DNR order may be included in other types of advance directives.  Medical directive:  This covers the care that you want if you are in a coma, near death, or unable to make decisions for yourself. You can list the treatments you want for each condition. Treatment may include pain medicine, surgery, blood transfusions, dialysis, IV or tube feedings, and a ventilator (breathing machine).  Values history:  This document has questions about your views, beliefs, and how you feel and think about life. This information can help others choose the care that you would choose.  Why are advance directives important?  An advance directive helps you control your care. Although spoken wishes may be used, it is better to have your wishes written down. Spoken wishes can be misunderstood, or not followed. Treatments may be given even if you do not want them. An advance directive may make it easier for your family to make  difficult choices about your care.       © Copyright [employer] 2018 Information is for End User's use only and may not be sold, redistributed or otherwise used for commercial purposes. All illustrations and images included in CareNotes® are the copyrighted property of [employer] or "[employer]      Medicare Preventive Visit Patient Instructions  Thank you for completing your Welcome to Medicare Visit or Medicare Annual Wellness Visit today. Your next wellness visit will be due in one year (6/5/2025).  The screening/preventive services that you may require over the next 5-10 years are detailed below. Some tests may not apply to you based off risk factors and/or age. Screening tests ordered at today's visit but not completed yet may show as past due. Also, please note that scanned in results may not display below.  Preventive Screenings:  Service Recommendations Previous Testing/Comments   Colorectal Cancer Screening  * Colonoscopy    * Fecal Occult Blood Test (FOBT)/Fecal Immunochemical Test (FIT)  * Fecal DNA/Cologuard Test  * Flexible Sigmoidoscopy Age: 45-75 years old   Colonoscopy: every 10 years (may be performed more frequently if at higher risk)  OR  FOBT/FIT: every 1 year  OR  Cologuard: every 3 years  OR  Sigmoidoscopy: every 5 years  Screening may be recommended earlier than age 45 if at higher risk for colorectal cancer. Also, an individualized decision between you and your healthcare provider will decide whether screening between the ages of 76-85 would be appropriate. Colonoscopy: 07/26/2022  FOBT/FIT: Not on file  Cologuard: Not on file  Sigmoidoscopy: Not on file    Screening Not Indicated     Breast Cancer Screening Age: 40+ years old  Frequency: every 1-2 years  Not required if history of left and right mastectomy Mammogram: 09/10/2020        Cervical Cancer Screening Between the ages of 21-29, pap smear recommended once every 3 years.   Between the ages of 30-65, can perform pap  smear with HPV co-testing every 5 years.   Recommendations may differ for women with a history of total hysterectomy, cervical cancer, or abnormal pap smears in past. Pap Smear: Not on file    Screening Not Indicated   Hepatitis C Screening Once for adults born between 1945 and 1965  More frequently in patients at high risk for Hepatitis C Hep C Antibody: Not on file        Diabetes Screening 1-2 times per year if you're at risk for diabetes or have pre-diabetes Fasting glucose: 79 mg/dL (8/11/2022)  A1C: No results in last 5 years (No results in last 5 years)  Screening Current   Cholesterol Screening Once every 5 years if you don't have a lipid disorder. May order more often based on risk factors. Lipid panel: 04/13/2022    Screening Not Indicated  History Lipid Disorder     Other Preventive Screenings Covered by Medicare:  Abdominal Aortic Aneurysm (AAA) Screening: covered once if your at risk. You're considered to be at risk if you have a family history of AAA.  Lung Cancer Screening: covers low dose CT scan once per year if you meet all of the following conditions: (1) Age 55-77; (2) No signs or symptoms of lung cancer; (3) Current smoker or have quit smoking within the last 15 years; (4) You have a tobacco smoking history of at least 20 pack years (packs per day multiplied by number of years you smoked); (5) You get a written order from a healthcare provider.  Glaucoma Screening: covered annually if you're considered high risk: (1) You have diabetes OR (2) Family history of glaucoma OR (3)  aged 50 and older OR (4)  American aged 65 and older  Osteoporosis Screening: covered every 2 years if you meet one of the following conditions: (1) You're estrogen deficient and at risk for osteoporosis based off medical history and other findings; (2) Have a vertebral abnormality; (3) On glucocorticoid therapy for more than 3 months; (4) Have primary hyperparathyroidism; (5) On osteoporosis  medications and need to assess response to drug therapy.   Last bone density test (DXA Scan): Not on file.  HIV Screening: covered annually if you're between the age of 15-65. Also covered annually if you are younger than 15 and older than 65 with risk factors for HIV infection. For pregnant patients, it is covered up to 3 times per pregnancy.    Immunizations:  Immunization Recommendations   Influenza Vaccine Annual influenza vaccination during flu season is recommended for all persons aged >= 6 months who do not have contraindications   Pneumococcal Vaccine   * Pneumococcal conjugate vaccine = PCV13 (Prevnar 13), PCV15 (Vaxneuvance), PCV20 (Prevnar 20)  * Pneumococcal polysaccharide vaccine = PPSV23 (Pneumovax) Adults 19-63 yo with certain risk factors or if 65+ yo  If never received any pneumonia vaccine: recommend Prevnar 20 (PCV20)  Give PCV20 if previously received 1 dose of PCV13 or PPSV23   Hepatitis B Vaccine 3 dose series if at intermediate or high risk (ex: diabetes, end stage renal disease, liver disease)   Respiratory syncytial virus (RSV) Vaccine - COVERED BY MEDICARE PART D  * RSVPreF3 (Arexvy) CDC recommends that adults 60 years of age and older may receive a single dose of RSV vaccine using shared clinical decision-making (SCDM)   Tetanus (Td) Vaccine - COST NOT COVERED BY MEDICARE PART B Following completion of primary series, a booster dose should be given every 10 years to maintain immunity against tetanus. Td may also be given as tetanus wound prophylaxis.   Tdap Vaccine - COST NOT COVERED BY MEDICARE PART B Recommended at least once for all adults. For pregnant patients, recommended with each pregnancy.   Shingles Vaccine (Shingrix) - COST NOT COVERED BY MEDICARE PART B  2 shot series recommended in those 19 years and older who have or will have weakened immune systems or those 50 years and older     Health Maintenance Due:  There are no preventive care reminders to display for this  patient.  Immunizations Due:      Topic Date Due   • COVID-19 Vaccine (5 - 2023-24 season) 09/01/2023     Advance Directives   What are advance directives?  Advance directives are legal documents that state your wishes and plans for medical care. These plans are made ahead of time in case you lose your ability to make decisions for yourself. Advance directives can apply to any medical decision, such as the treatments you want, and if you want to donate organs.   What are the types of advance directives?  There are many types of advance directives, and each state has rules about how to use them. You may choose a combination of any of the following:  Living will:  This is a written record of the treatment you want. You can also choose which treatments you do not want, which to limit, and which to stop at a certain time. This includes surgery, medicine, IV fluid, and tube feedings.   Durable power of  for healthcare (DPAHC):  This is a written record that states who you want to make healthcare choices for you when you are unable to make them for yourself. This person, called a proxy, is usually a family member or a friend. You may choose more than 1 proxy.  Do not resuscitate (DNR) order:  A DNR order is used in case your heart stops beating or you stop breathing. It is a request not to have certain forms of treatment, such as CPR. A DNR order may be included in other types of advance directives.  Medical directive:  This covers the care that you want if you are in a coma, near death, or unable to make decisions for yourself. You can list the treatments you want for each condition. Treatment may include pain medicine, surgery, blood transfusions, dialysis, IV or tube feedings, and a ventilator (breathing machine).  Values history:  This document has questions about your views, beliefs, and how you feel and think about life. This information can help others choose the care that you would choose.  Why are advance  directives important?  An advance directive helps you control your care. Although spoken wishes may be used, it is better to have your wishes written down. Spoken wishes can be misunderstood, or not followed. Treatments may be given even if you do not want them. An advance directive may make it easier for your family to make difficult choices about your care.       © Copyright [employer] 2018 Information is for End User's use only and may not be sold, redistributed or otherwise used for commercial purposes. All illustrations and images included in CareNotes® are the copyrighted property of [employer] Inc[employer] or [employer]

## 2025-01-08 ENCOUNTER — OFFICE VISIT (OUTPATIENT)
Dept: GASTROENTEROLOGY | Facility: CLINIC | Age: 85
End: 2025-01-08
Payer: COMMERCIAL

## 2025-01-08 VITALS
SYSTOLIC BLOOD PRESSURE: 149 MMHG | HEIGHT: 64 IN | HEART RATE: 90 BPM | WEIGHT: 129.6 LBS | DIASTOLIC BLOOD PRESSURE: 98 MMHG | BODY MASS INDEX: 22.13 KG/M2

## 2025-01-08 DIAGNOSIS — Z85.038 HISTORY OF COLON CANCER: ICD-10-CM

## 2025-01-08 DIAGNOSIS — R09.89 ABDOMINAL BRUIT: ICD-10-CM

## 2025-01-08 DIAGNOSIS — K70.9 ALCOHOLIC LIVER DISEASE (HCC): ICD-10-CM

## 2025-01-08 DIAGNOSIS — R10.9 ABDOMINAL PAIN, UNSPECIFIED ABDOMINAL LOCATION: Primary | ICD-10-CM

## 2025-01-08 DIAGNOSIS — R10.9 ABDOMINAL PRESSURE: ICD-10-CM

## 2025-01-08 DIAGNOSIS — R14.0 ABDOMINAL DISTENSION: ICD-10-CM

## 2025-01-08 DIAGNOSIS — K86.0 ALCOHOL-INDUCED CHRONIC PANCREATITIS (HCC): ICD-10-CM

## 2025-01-08 PROCEDURE — 99214 OFFICE O/P EST MOD 30 MIN: CPT | Performed by: INTERNAL MEDICINE

## 2025-01-08 RX ORDER — SODIUM CHLORIDE, SODIUM LACTATE, POTASSIUM CHLORIDE, CALCIUM CHLORIDE 600; 310; 30; 20 MG/100ML; MG/100ML; MG/100ML; MG/100ML
125 INJECTION, SOLUTION INTRAVENOUS CONTINUOUS
OUTPATIENT
Start: 2025-01-08

## 2025-01-08 RX ORDER — POLYETHYLENE GLYCOL 3350, SODIUM CHLORIDE, SODIUM BICARBONATE, POTASSIUM CHLORIDE 420; 11.2; 5.72; 1.48 G/4L; G/4L; G/4L; G/4L
4000 POWDER, FOR SOLUTION ORAL ONCE
Qty: 4000 ML | Refills: 0 | Status: SHIPPED | OUTPATIENT
Start: 2025-01-08 | End: 2025-01-08

## 2025-01-08 NOTE — ASSESSMENT & PLAN NOTE
This certainly could be the cause of her ongoing intermittent upper abdominal pain.  If no other etiology identified, might consider a trial of pancreatic enzyme supplementation  Orders:    Ambulatory Referral to Gastroenterology

## 2025-01-08 NOTE — H&P (VIEW-ONLY)
Name: Felicia Jackson      : 1940      MRN: [id number]  Encounter Provider: Salvatore Kahn MD  Encounter Date: 2025   Encounter department: [facility]  :  Assessment & Plan  Abdominal pain, unspecified abdominal location  Report of upper abdominal pain though seems more severe in the right lower quadrant on physical exam.  Will evaluate with colonoscopy and upper endoscopy  Orders:    Colonoscopy; Future    EGD; Future    polyethylene glycol-electrolytes (TriLyte) 4000 mL solution; Take 4,000 mL by mouth once for 1 dose Take 4000 mL by mouth once for 1 dose. Use as directed    Abdominal pressure  Differential diagnosis would include peptic ulcer disease, possibly related to daily aspirin.  Will evaluate the above symptoms with colonoscopy and upper endoscopy.  Orders:    Ambulatory Referral to Gastroenterology    Abdominal distension  Differential diagnosis would include small intestinal bacterial overgrowth and contingency might include lactulose breath testing  Orders:    Ambulatory Referral to Gastroenterology    Alcohol-induced chronic pancreatitis (HCC)  This certainly could be the cause of her ongoing intermittent upper abdominal pain.  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 Use as directed    Alcoholic liver disease (HCC)  LFTs normalized.  She has minimized alcohol intake.           History of Present Illness   HPI  Felicia Jackson  "is a 84 y.o. female with a history of alcohol use disorder and prior diagnosis of chronic alcohol induced pancreatitis who presents for evaluation of a number of issues as above.  She has a history of colon cancer status post resection 10 years ago at [facility] by Dr. Rei Awad.  She reports for the past several months at least she has had upper abdominal pain, bloating and distention.  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  She has been seen by vascular surgery who does not feel this likely represents chronic mesenteric ischemia.  She does not describe postprandial pain or sitophobia.  Denies unexplained weight loss.  No nausea or vomiting, fever or chills, jaundice or rash or blood in her stool.  The pain occurs primarily in the morning upon waking and is described as severe pressure which often improves as the day goes on.  Did undergo colonoscopy and upper endoscopy last approximate 2 and half years ago with some gastritis and esophagitis.  Was prescribed pantoprazole at that time but is currently taking famotidine.      Review of Systems       Objective   /98 (BP Location: Left arm, Patient Position: Sitting)   Pulse 90   Ht 5' 4\" (1.626 m)   Wt 58.8 kg (129 lb 9.6 oz)   BMI 22.25 kg/m²      Physical Exam  Vitals and nursing note reviewed.   Constitutional:       General: She is not in acute distress.     Appearance: She is not ill-appearing.   HENT:      Head: Normocephalic and atraumatic.   Eyes:      General: No scleral icterus.     Extraocular Movements: Extraocular movements intact.   Cardiovascular:      Rate and Rhythm: Normal rate and regular rhythm.   Pulmonary:      Effort: Pulmonary effort is normal. No respiratory distress.   Abdominal:      General: There is no distension.      Palpations: Abdomen is soft.      Tenderness: There is abdominal tenderness. " There is no guarding or rebound.      Comments: Moderate discomfort in the upper abdomen bilaterally but most notably at the epigastrium.  Moderate discomfort in the right lower quadrant   Musculoskeletal:      Right lower leg: No edema.      Left lower leg: No edema.   Skin:     General: Skin is warm and dry.      Coloration: Skin is not cyanotic.      Findings: No erythema.   Neurological:      General: No focal deficit present.      Mental Status: She is alert and oriented to person, place, and time.   Psychiatric:         Mood and Affect: Mood normal.         Behavior: Behavior normal.

## 2025-01-08 NOTE — PROGRESS NOTES
Name: Felicia Jackson      : 1940      MRN: [id number]  Encounter Provider: Salvatore Kahn MD  Encounter Date: 2025   Encounter department: [facility]  :  Assessment & Plan  Abdominal pain, unspecified abdominal location  Report of upper abdominal pain though seems more severe in the right lower quadrant on physical exam.  Will evaluate with colonoscopy and upper endoscopy  Orders:    Colonoscopy; Future    EGD; Future    polyethylene glycol-electrolytes (TriLyte) 4000 mL solution; Take 4,000 mL by mouth once for 1 dose Take 4000 mL by mouth once for 1 dose. Use as directed    Abdominal pressure  Differential diagnosis would include peptic ulcer disease, possibly related to daily aspirin.  Will evaluate the above symptoms with colonoscopy and upper endoscopy.  Orders:    Ambulatory Referral to Gastroenterology    Abdominal distension  Differential diagnosis would include small intestinal bacterial overgrowth and contingency might include lactulose breath testing  Orders:    Ambulatory Referral to Gastroenterology    Alcohol-induced chronic pancreatitis (HCC)  This certainly could be the cause of her ongoing intermittent upper abdominal pain.  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 Use as directed    Alcoholic liver disease (HCC)  LFTs normalized.  She has minimized alcohol intake.           History of Present Illness   HPI  Felicia Jackson  "is a 84 y.o. female with a history of alcohol use disorder and prior diagnosis of chronic alcohol induced pancreatitis who presents for evaluation of a number of issues as above.  She has a history of colon cancer status post resection 10 years ago at [facility] by Dr. Rei Awad.  She reports for the past several months at least she has had upper abdominal pain, bloating and distention.  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  She has been seen by vascular surgery who does not feel this likely represents chronic mesenteric ischemia.  She does not describe postprandial pain or sitophobia.  Denies unexplained weight loss.  No nausea or vomiting, fever or chills, jaundice or rash or blood in her stool.  The pain occurs primarily in the morning upon waking and is described as severe pressure which often improves as the day goes on.  Did undergo colonoscopy and upper endoscopy last approximate 2 and half years ago with some gastritis and esophagitis.  Was prescribed pantoprazole at that time but is currently taking famotidine.      Review of Systems       Objective   /98 (BP Location: Left arm, Patient Position: Sitting)   Pulse 90   Ht 5' 4\" (1.626 m)   Wt 58.8 kg (129 lb 9.6 oz)   BMI 22.25 kg/m²      Physical Exam  Vitals and nursing note reviewed.   Constitutional:       General: She is not in acute distress.     Appearance: She is not ill-appearing.   HENT:      Head: Normocephalic and atraumatic.   Eyes:      General: No scleral icterus.     Extraocular Movements: Extraocular movements intact.   Cardiovascular:      Rate and Rhythm: Normal rate and regular rhythm.   Pulmonary:      Effort: Pulmonary effort is normal. No respiratory distress.   Abdominal:      General: There is no distension.      Palpations: Abdomen is soft.      Tenderness: There is abdominal tenderness. " There is no guarding or rebound.      Comments: Moderate discomfort in the upper abdomen bilaterally but most notably at the epigastrium.  Moderate discomfort in the right lower quadrant   Musculoskeletal:      Right lower leg: No edema.      Left lower leg: No edema.   Skin:     General: Skin is warm and dry.      Coloration: Skin is not cyanotic.      Findings: No erythema.   Neurological:      General: No focal deficit present.      Mental Status: She is alert and oriented to person, place, and time.   Psychiatric:         Mood and Affect: Mood normal.         Behavior: Behavior normal.

## 2025-01-10 ENCOUNTER — TELEPHONE (OUTPATIENT)
Dept: GASTROENTEROLOGY | Facility: CLINIC | Age: 85
End: 2025-01-10

## 2025-01-10 NOTE — TELEPHONE ENCOUNTER
Patient was seen on 1/8/25 in [facility] office. Dr. Kahn wants her to have an EGD/Colonoscopy at [facility], with golytely prep, thank you

## 2025-01-10 NOTE — TELEPHONE ENCOUNTER
Scheduled date of EGD/colonoscopy (as of today): 2/5/25  Physician performing EGD/colonoscopy: Dr Kahn  Location of EGD/colonoscopy: [facility]  Desired bowel prep reviewed with patient: Frandy emailed   Instructions reviewed with patient by: ls  Clearances:  faxed Eliquis clearance to Dr Fam [phone].  Will call their office to make sure received [phone].

## 2025-01-13 NOTE — TELEPHONE ENCOUNTER
Called Dr Fam's office [phone], lmminoo asking for a call back to let me know if Eliquis clearance request was received by them or not. Will call again in one week if do not hear back from them or receive clearance back.

## 2025-01-13 NOTE — TELEPHONE ENCOUNTER
Claudia calling from Dr. Rasmussen office to advise the doctor will be back in wed and once the clearance paperwork is filled out they will fax it back to us.

## 2025-01-15 NOTE — TELEPHONE ENCOUNTER
Received Eliquis clearance back from Dr Fam.  Pt is cleared to hold Eliquis 2 days prior to the procedure.  I called and spoke to pt and informed of this.  She informed she did not receive the instructions via email so I resent and also mailed to home address.

## 2025-01-22 ENCOUNTER — ANESTHESIA (OUTPATIENT)
Dept: ANESTHESIOLOGY | Facility: HOSPITAL | Age: 85
End: 2025-01-22

## 2025-01-22 ENCOUNTER — ANESTHESIA EVENT (OUTPATIENT)
Dept: ANESTHESIOLOGY | Facility: HOSPITAL | Age: 85
End: 2025-01-22

## 2025-01-29 ENCOUNTER — TELEPHONE (OUTPATIENT)
Dept: GASTROENTEROLOGY | Facility: CLINIC | Age: 85
End: 2025-01-29

## 2025-01-29 NOTE — TELEPHONE ENCOUNTER
I spoke to pt  confirming pt's colonoscopy  and egd scheduled on 2/5/25 at [facility]  with Dr Kahn .  Informed [facility] would be calling this pt with the arrival time.  Informed of clear liquid diet day prior as well as the bowel cleansing preparation.  Informed would need a  the day of the procedure due to being under sedation. I asked pt to please call back if has not received instructions or if has any questions.

## 2025-01-30 DIAGNOSIS — E03.9 HYPOTHYROIDISM, UNSPECIFIED TYPE: ICD-10-CM

## 2025-01-30 RX ORDER — LEVOTHYROXINE SODIUM 50 UG/1
50 TABLET ORAL
Qty: 100 TABLET | Refills: 1 | Status: SHIPPED | OUTPATIENT
Start: 2025-01-30

## 2025-02-05 ENCOUNTER — HOSPITAL ENCOUNTER (OUTPATIENT)
Dept: GASTROENTEROLOGY | Facility: AMBULARY SURGERY CENTER | Age: 85
Setting detail: OUTPATIENT SURGERY
Discharge: HOME | End: 2025-02-05
Attending: INTERNAL MEDICINE
Payer: COMMERCIAL

## 2025-02-05 ENCOUNTER — ANESTHESIA EVENT (OUTPATIENT)
Dept: GASTROENTEROLOGY | Facility: AMBULARY SURGERY CENTER | Age: 85
End: 2025-02-05
Payer: COMMERCIAL

## 2025-02-05 VITALS
RESPIRATION RATE: 18 BRPM | TEMPERATURE: 96.7 F | DIASTOLIC BLOOD PRESSURE: 98 MMHG | HEART RATE: 80 BPM | BODY MASS INDEX: 22.25 KG/M2 | SYSTOLIC BLOOD PRESSURE: 173 MMHG | OXYGEN SATURATION: 100 % | HEIGHT: 64 IN

## 2025-02-05 DIAGNOSIS — Z85.038 HISTORY OF COLON CANCER: ICD-10-CM

## 2025-02-05 DIAGNOSIS — R10.9 ABDOMINAL PAIN, UNSPECIFIED ABDOMINAL LOCATION: ICD-10-CM

## 2025-02-05 DIAGNOSIS — K26.9 DUODENAL ULCER: Primary | ICD-10-CM

## 2025-02-05 PROCEDURE — 45385 COLONOSCOPY W/LESION REMOVAL: CPT | Performed by: INTERNAL MEDICINE

## 2025-02-05 PROCEDURE — 43239 EGD BIOPSY SINGLE/MULTIPLE: CPT | Performed by: INTERNAL MEDICINE

## 2025-02-05 PROCEDURE — 88313 SPECIAL STAINS GROUP 2: CPT | Performed by: PATHOLOGY

## 2025-02-05 PROCEDURE — 88305 TISSUE EXAM BY PATHOLOGIST: CPT | Performed by: PATHOLOGY

## 2025-02-05 RX ORDER — SODIUM CHLORIDE, SODIUM LACTATE, POTASSIUM CHLORIDE, CALCIUM CHLORIDE 600; 310; 30; 20 MG/100ML; MG/100ML; MG/100ML; MG/100ML
125 INJECTION, SOLUTION INTRAVENOUS CONTINUOUS
Status: DISCONTINUED | OUTPATIENT
Start: 2025-02-05 | End: 2025-02-09 | Stop reason: HOSPADM

## 2025-02-05 RX ORDER — PROPOFOL 10 MG/ML
INJECTION, EMULSION INTRAVENOUS CONTINUOUS PRN
Status: DISCONTINUED | OUTPATIENT
Start: 2025-02-05 | End: 2025-02-05

## 2025-02-05 RX ORDER — SODIUM CHLORIDE, SODIUM LACTATE, POTASSIUM CHLORIDE, CALCIUM CHLORIDE 600; 310; 30; 20 MG/100ML; MG/100ML; MG/100ML; MG/100ML
INJECTION, SOLUTION INTRAVENOUS CONTINUOUS PRN
Status: DISCONTINUED | OUTPATIENT
Start: 2025-02-05 | End: 2025-02-05

## 2025-02-05 RX ORDER — PROPOFOL 10 MG/ML
INJECTION, EMULSION INTRAVENOUS AS NEEDED
Status: DISCONTINUED | OUTPATIENT
Start: 2025-02-05 | End: 2025-02-05

## 2025-02-05 RX ORDER — LIDOCAINE HYDROCHLORIDE 10 MG/ML
INJECTION, SOLUTION EPIDURAL; INFILTRATION; INTRACAUDAL; PERINEURAL AS NEEDED
Status: DISCONTINUED | OUTPATIENT
Start: 2025-02-05 | End: 2025-02-05

## 2025-02-05 RX ADMIN — PROPOFOL 100 MCG/KG/MIN: 10 INJECTION, EMULSION INTRAVENOUS at 13:00

## 2025-02-05 RX ADMIN — PROPOFOL 30 MG: 10 INJECTION, EMULSION INTRAVENOUS at 13:07

## 2025-02-05 RX ADMIN — PROPOFOL 100 MG: 10 INJECTION, EMULSION INTRAVENOUS at 12:54

## 2025-02-05 RX ADMIN — LIDOCAINE HYDROCHLORIDE 5 ML: 10 INJECTION, SOLUTION EPIDURAL; INFILTRATION; INTRACAUDAL; PERINEURAL at 12:54

## 2025-02-05 RX ADMIN — SODIUM CHLORIDE, SODIUM LACTATE, POTASSIUM CHLORIDE, AND CALCIUM CHLORIDE: .6; .31; .03; .02 INJECTION, SOLUTION INTRAVENOUS at 10:45

## 2025-02-05 RX ADMIN — PROPOFOL 50 MG: 10 INJECTION, EMULSION INTRAVENOUS at 12:55

## 2025-02-05 RX ADMIN — PROPOFOL 50 MG: 10 INJECTION, EMULSION INTRAVENOUS at 12:57

## 2025-02-05 NOTE — INTERVAL H&P NOTE
H&P reviewed. After examining the patient I find no changes in the patients condition since the H&P had been written.    Vitals:    02/05/25 1150   BP: (!) 173/96   Pulse: 86   Resp: 18   Temp: (!) 96.7 °F (35.9 °C)   SpO2: 98%

## 2025-02-05 NOTE — ANESTHESIA PREPROCEDURE EVALUATION
Procedure:  COLONOSCOPY  EGD    Relevant Problems   CARDIO   (+) Aneurysm of ascending aorta without rupture (HCC)   (+) Atherosclerosis of native coronary artery of native heart without angina pectoris   (+) Atrial fibrillation (HCC)   (+) Hyperlipidemia   (+) Hypertension   (+) Pulmonary hypertension, unspecified (HCC)   (+) Pure hypercholesterolemia   (+) Raynaud's disease without gangrene   (+) Superior mesenteric artery stenosis (HCC)   (+) Thoracic aortic aneurysm without rupture, unspecified part (HCC)   (+) Tricuspid incompetence      ENDO   (+) Hypothyroidism      GI/HEPATIC   (+) Alcohol-induced chronic pancreatitis (HCC)   (+) Alcoholic liver disease (HCC)   (+) Esophageal dysphagia   (+) Pancreatic cyst      /RENAL   (+) Stage 3 chronic kidney disease, unspecified whether stage 3a or 3b CKD (HCC)      HEMATOLOGY   (+) Anemia      MUSCULOSKELETAL   (+) Gout, arthropathy   (+) Knee osteoarthritis      NEURO/PSYCH   (+) TIA (transient ischemic attack)        Physical Exam    Airway    Mallampati score: II         Dental       Cardiovascular      Pulmonary      Other Findings  post-pubertal.      Anesthesia Plan  ASA Score- 3     Anesthesia Type- IV sedation with anesthesia with ASA Monitors.         Additional Monitors:     Airway Plan:            Plan Factors-    Induction- intravenous.    Postoperative Plan-         Informed Consent- Anesthetic plan and risks discussed with patient.  I personally reviewed this patient with the CRNA. Discussed and agreed on the Anesthesia Plan with the CRNA..      NPO Status:  Vitals Value Taken Time   Date of last liquid 02/05/25 02/05/25 1154   Time of last liquid 0630 02/05/25 1154   Date of last solid 02/03/25 02/05/25 1154   Time of last solid 1500 02/05/25 1154

## 2025-02-05 NOTE — ANESTHESIA POSTPROCEDURE EVALUATION
Post-Op Assessment Note    CV Status:  Stable         Mental Status:  Alert   PONV Controlled:  Controlled   Airway Patency:  Patent     Post Op Vitals Reviewed: Yes    No anethesia notable event occurred.    Staff: CRNA           Last Filed PACU Vitals:  Vitals Value Taken Time   Temp     Pulse 76    /75    Resp 20    SpO2 100

## 2025-02-07 PROCEDURE — 88313 SPECIAL STAINS GROUP 2: CPT | Performed by: PATHOLOGY

## 2025-02-07 PROCEDURE — 88305 TISSUE EXAM BY PATHOLOGIST: CPT | Performed by: PATHOLOGY

## 2025-02-10 DIAGNOSIS — M10.9 ACUTE GOUT OF RIGHT FOOT, UNSPECIFIED CAUSE: ICD-10-CM

## 2025-02-10 RX ORDER — COLCHICINE 0.6 MG/1
0.6 TABLET ORAL 2 TIMES DAILY
Qty: 30 TABLET | Refills: 0 | Status: SHIPPED | OUTPATIENT
Start: 2025-02-10 | End: 2025-02-25

## 2025-02-13 ENCOUNTER — RESULTS FOLLOW-UP (OUTPATIENT)
Age: 85
End: 2025-02-13

## 2025-02-13 NOTE — TELEPHONE ENCOUNTER
----- Message from Salvatore Kahn MD sent at 2/13/2025  3:34 PM EST -----  Please call the patient regarding results.  Biopsies were all benign.  Mild irritation in the stomach and small bowel without evidence of H. pylori or precancerous changes.  Mild irritation in the lower esophagus consistent with acid reflux.  No Espinoza's esophagus.  No need for further EGD or colonoscopy

## 2025-02-13 NOTE — RESULT ENCOUNTER NOTE
Please call the patient regarding results.  Biopsies were all benign.  Mild irritation in the stomach and small bowel without evidence of H. pylori or precancerous changes.  Mild irritation in the lower esophagus consistent with acid reflux.  No Espinoza's esophagus.  No need for further EGD or colonoscopy

## 2025-02-14 NOTE — TELEPHONE ENCOUNTER
Pt. Called back, Pt. Called for pathology results, reviewed results with pt. With recommendations, pt. Verbalized understanding, no further review needed

## 2025-02-19 ENCOUNTER — TELEPHONE (OUTPATIENT)
Dept: HEMATOLOGY ONCOLOGY | Facility: MEDICAL CENTER | Age: 85
End: 2025-02-19

## 2025-02-19 DIAGNOSIS — D72.9 ABNORMAL WHITE BLOOD CELL (WBC) COUNT: ICD-10-CM

## 2025-02-19 DIAGNOSIS — R71.8 ABNORMAL RED BLOOD CELL COUNT: Primary | ICD-10-CM

## 2025-02-19 NOTE — TELEPHONE ENCOUNTER
[facility] for Felicia indicating she missed 1:30pm consult with [facility] today, and that she would be rescheduled to 3/14 at 11:00am.  Provided [facility] callback number.    Also, asked if she could have new CBC/CMP drawn, as ones for consult will be ~3 1/2 months old by time of consultation.   
[id number] Comprehensive

## 2025-03-18 ENCOUNTER — HOSPITAL ENCOUNTER (OUTPATIENT)
Dept: RADIOLOGY | Facility: HOSPITAL | Age: 85
Discharge: HOME | End: 2025-03-18
Attending: STUDENT IN AN ORGANIZED HEALTH CARE EDUCATION/TRAINING PROGRAM
Payer: COMMERCIAL

## 2025-03-18 DIAGNOSIS — K55.1 SUPERIOR MESENTERIC ARTERY STENOSIS (HCC): ICD-10-CM

## 2025-03-18 PROCEDURE — 93975 VASCULAR STUDY: CPT

## 2025-03-19 PROCEDURE — 93975 VASCULAR STUDY: CPT | Performed by: STUDENT IN AN ORGANIZED HEALTH CARE EDUCATION/TRAINING PROGRAM

## 2025-04-02 ENCOUNTER — OFFICE VISIT (OUTPATIENT)
Dept: URGENT CARE | Facility: CLINIC | Age: 85
End: 2025-04-02
Payer: COMMERCIAL

## 2025-04-02 VITALS
RESPIRATION RATE: 18 BRPM | BODY MASS INDEX: 22.71 KG/M2 | SYSTOLIC BLOOD PRESSURE: 134 MMHG | WEIGHT: 133 LBS | HEART RATE: 92 BPM | TEMPERATURE: 98.4 F | DIASTOLIC BLOOD PRESSURE: 84 MMHG | HEIGHT: 64 IN | OXYGEN SATURATION: 99 %

## 2025-04-02 DIAGNOSIS — S51.812A: Primary | ICD-10-CM

## 2025-04-02 PROCEDURE — 99203 OFFICE O/P NEW LOW 30 MIN: CPT | Performed by: FAMILY MEDICINE

## 2025-04-02 RX ORDER — CEPHALEXIN 500 MG/1
500 CAPSULE ORAL EVERY 8 HOURS SCHEDULED
Qty: 21 CAPSULE | Refills: 0 | Status: SHIPPED | OUTPATIENT
Start: 2025-04-02 | End: 2025-04-09

## 2025-04-02 RX ORDER — AMOXICILLIN 500 MG/1
CAPSULE ORAL
COMMUNITY
Start: 2025-01-14

## 2025-04-02 NOTE — PROGRESS NOTES
[facility]        NAME: Felicia Jackson is a 85 y.o. female  : 1940    MRN: [id number]  DATE: 2025  TIME: 2:46 PM    Assessment and Plan   ISTAP type 1 skin tear of left forearm [S51.812A]  1. ISTAP type 1 skin tear of left forearm  cephalexin (KEFLEX) 500 mg capsule        Patient Instructions     Patient Instructions   Clinical presentation appears to be consistent with a skin tear of the left forearm with a mild superimposed infection of the site. I have prescribed the patient Keflex x 7 days, to be completed as directed. Patient is to gently wash the skin with soap and water daily, keep the area clean, apply topical Bacitracin ointment to the site, and gently cover with a band-aid. Patient may take Tylenol as needed for pain. Patient is to follow up w/ her PCP office for re-check in 3-5 days. If symptoms persist despite treatment, worsen, or any new symptoms present, patient is to be seen in the ER.    Follow up with PCP in 3-5 days.  Proceed to  ER if symptoms worsen.    If tests have been performed at [facility], our office will contact you with results if changes need to be made to the care plan discussed with you at the visit.  [facility]    Chief Complaint     Chief Complaint   Patient presents with    Arm Injury     Pt here for a left arm injury which happened 1 week ago, pt fell into her bannister, got a skin tear which now is painful and has drainage from it.     History of Present Illness     84 yo F presents for a skin tear on her left forearm that she feels may be getting infected. She states she tripped and hit her L forearm against her wooden banister while at home 1 week ago. She states it was not a direct impactful hit, more of scraping the skin against the banister which caused a skin tear at the site. Patient states she has been washing the wound site with soap and water and cleaning it w/ peroxide. The wound is healing well  however patient states over the past few days she has noted pain at the site and there is erythema of the overlying skin. She has noted scant drainage from the site. No fever/chills. Patient denies any history of MRSA. She states she has had a Tetanus shot within 10 years but cannot recall the exact year. Injury is low risk for tetanus infection. Patient is on blood thinners.      Review of Systems   Review of Systems   Constitutional: Negative.    Musculoskeletal: Negative.    Skin:  Positive for wound.   Allergic/Immunologic:        As noted in chart   Neurological: Negative.    Hematological:         Patient on Eliquis     Current Medications       Current Outpatient Medications:     allopurinol (ZYLOPRIM) 100 mg tablet, TAKE 1 TABLET (100 MG TOTAL) BY MOUTH DAILY, Disp: 90 tablet, Rfl: 1    amoxicillin (AMOXIL) 500 mg capsule, TAKE BY MOUTH 4 CAPSULES ONE HOUR PRIOR TO DENTAL APPOINTMENT, Disp: , Rfl:     aspirin 81 mg chewable tablet, Chew 1 tablet daily, Disp: , Rfl:     cephalexin (KEFLEX) 500 mg capsule, Take 1 capsule (500 mg total) by mouth every 8 (eight) hours for 7 days, Disp: 21 capsule, Rfl: 0    diphenhydrAMINE (BENADRYL) 25 mg tablet, Take 25 mg by mouth every 6 (six) hours as needed for allergies, Disp: , Rfl:     ELIQUIS 2.5 MG, 2.5 mg 2 (two) times a day  , Disp: , Rfl:     esomeprazole (NexIUM) 20 mg capsule, Take 1 capsule (20 mg total) by mouth in the morning, Disp: 30 capsule, Rfl: 5    fexofenadine (ALLEGRA) 180 MG tablet, Take 180 mg by mouth daily, Disp: , Rfl:     furosemide (LASIX) 20 mg tablet, TAKE 1 TABLET (20 MG TOTAL) BY MOUTH DAILY, Disp: 90 tablet, Rfl: 0    hydrocortisone 1 % cream, Apply topically 3 (three) times a day as needed for rash (itching), Disp: 45 g, Rfl: 0    hydrOXYzine HCL (ATARAX) 25 mg tablet, Take 1 tablet (25 mg total) by mouth every 6 (six) hours as needed for itching, Disp: 90 tablet, Rfl: 0    levothyroxine 50 mcg tablet, TAKE 1 TABLET (50 MCG TOTAL) BY MOUTH  DAILY IN THE EARLY MORNING, Disp: 100 tablet, Rfl: 1    metoprolol succinate (TOPROL-XL) 25 mg 24 hr tablet, Take 1 tablet (25 mg total) by mouth daily, Disp: 90 tablet, Rfl: 0    pimecrolimus (ELIDEL) 1 % cream, Apply topically daily, Disp: , Rfl:     simvastatin (ZOCOR) 20 mg tablet, Take 1 tablet by mouth daily, Disp: , Rfl:     tacrolimus (PROTOPIC) 0.1 % ointment, Apply topically 2 (two) times a day On groin area, Disp: 60 g, Rfl: 3    traMADol (ULTRAM) 50 mg tablet, Take 50 mg by mouth every 6 (six) hours as needed for moderate pain, Disp: , Rfl:     triamcinolone (KENALOG) 0.1 % cream, Apply topically 2 (two) times a day To legs, arms, body for 2 months twice daily to all areas or itch, Disp: 453 g, Rfl: 2    triamcinolone (KENALOG) 0.1 % ointment, APPLY TO VAGINAL AREA TWICE DAILY FOR 2 WEEKS, Disp: 30 g, Rfl: 5    colchicine (COLCRYS) 0.6 mg tablet, Take 1 tablet (0.6 mg total) by mouth 2 (two) times a day for 15 days, Disp: 30 tablet, Rfl: 0    indomethacin (INDOCIN) 50 mg capsule, Take 50 mg by mouth 2 (two) times a day, Disp: , Rfl:     polyethylene glycol-electrolytes (TriLyte) 4000 mL solution, Take 4,000 mL by mouth once for 1 dose Take 4000 mL by mouth once for 1 dose. Use as directed, Disp: 4000 mL, Rfl: 0    Current Allergies     Allergies as of 04/02/2025 - Reviewed 04/02/2025   Allergen Reaction Noted    Clobetasol Blisters 08/04/2023    Lidocaine  11/03/2015    Penicillins Other (See Comments) 10/13/2014    Sulfa antibiotics Edema 10/13/2014            The following portions of the patient's history were reviewed and updated as appropriate: allergies, current medications, past family history, past medical history, past social history, past surgical history and problem list.     Past Medical History:   Diagnosis Date    Anemia     Arthritis     Atherosclerosis of native coronary artery of native heart without angina pectoris 02/06/2019    Cataract     Chronic atrial fibrillation (HCC)      "Degeneration of cervical intervertebral disc     Diarrhea     onset 07/10    Disease of thyroid gland     Occipital infarction (HCC)     Wrist fracture     right        Past Surgical History:   Procedure Laterality Date    COLON SURGERY      FL INJECTION LEFT HIP (NON ARTHROGRAM)  05/28/2021    KNEE ARTHROSCOPY      with medial meniscus repair     MA ARTHROCENTESIS ASPIR&/INJ MAJOR JT/BURSA W/O US Left 05/28/2021    Procedure: Hip corticosteroid injection ([id number]);  Surgeon: Fortino Dean MD;  Location: [facility] MAIN OR;  Service: Pain Management     SKIN BIOPSY         Family History   Problem Relation Age of Onset    Heart attack Mother     Valvular heart disease Mother     Heart attack Father     Hypertension Sister     Mental illness Neg Hx          Medications have been verified.        Objective   /84 (Patient Position: Sitting, Cuff Size: Standard)   Pulse 92   Temp 98.4 °F (36.9 °C) (Tympanic)   Resp 18   Ht 5' 4\" (1.626 m)   Wt 60.3 kg (133 lb)   SpO2 99%   BMI 22.83 kg/m²   No LMP recorded. Patient is postmenopausal.       Physical Exam     Physical Exam  Vitals and nursing note reviewed.   Constitutional:       General: She is awake. She is not in acute distress.     Appearance: Normal appearance. She is well-developed and well-groomed. She is not ill-appearing, toxic-appearing or diaphoretic.   Cardiovascular:      Rate and Rhythm: Normal rate.      Pulses: Normal pulses.   Pulmonary:      Effort: Pulmonary effort is normal. No tachypnea, accessory muscle usage or respiratory distress.   Musculoskeletal:      Comments: Left forearm: there is a skin tear wound, linear, ~4 cm in length, present on the dorsal lateral aspect of the left forearm. The wound edges appear to be closed and healing, however there is mild localized erythema and soft tissue swelling overlying the wound site. The wound is mildly tender to touch. No fluctuant area consistent with an abscess. No drainage noted from " the wound site. No bruising present. No bony tenderness.   Skin:     General: Skin is warm and dry.      Capillary Refill: Capillary refill takes less than 2 seconds.      Coloration: Skin is not pale.      Findings: Erythema and wound present. No abrasion, abscess or rash.   Neurological:      General: No focal deficit present.      Mental Status: She is alert and oriented to person, place, and time. Mental status is at baseline.      Sensory: Sensation is intact.      Motor: Motor function is intact.   Psychiatric:         Mood and Affect: Mood normal.         Behavior: Behavior normal. Behavior is cooperative.         Thought Content: Thought content normal.         Judgment: Judgment normal.

## 2025-04-02 NOTE — PATIENT INSTRUCTIONS
Clinical presentation appears to be consistent with a skin tear of the left forearm with a mild superimposed infection of the site. I have prescribed the patient Keflex x 7 days, to be completed as directed. Patient is to gently wash the skin with soap and water daily, keep the area clean, apply topical Bacitracin ointment to the site, and gently cover with a band-aid. Patient may take Tylenol as needed for pain. Patient is to follow up w/ her PCP office for re-check in 3-5 days. If symptoms persist despite treatment, worsen, or any new symptoms present, patient is to be seen in the ER.

## 2025-04-29 DIAGNOSIS — M10.9 ACUTE GOUT OF RIGHT FOOT, UNSPECIFIED CAUSE: ICD-10-CM

## 2025-04-29 NOTE — TELEPHONE ENCOUNTER
Requested medication(s) are due for refill today: No  Patient has already received a courtesy refill: No  Other reason request has been forwarded to provider:  Valid encounter within last 12 months; PT was seen by AG on 12/3/24

## 2025-04-30 RX ORDER — COLCHICINE 0.6 MG/1
0.6 TABLET ORAL 2 TIMES DAILY
Qty: 30 TABLET | Refills: 0 | Status: SHIPPED | OUTPATIENT
Start: 2025-04-30 | End: 2025-05-15

## 2025-05-12 DIAGNOSIS — M10.9 ACUTE GOUT OF RIGHT FOOT, UNSPECIFIED CAUSE: ICD-10-CM

## 2025-05-12 RX ORDER — COLCHICINE 0.6 MG/1
0.6 TABLET ORAL 2 TIMES DAILY
Qty: 30 TABLET | Refills: 0 | Status: SHIPPED | OUTPATIENT
Start: 2025-05-12 | End: 2025-05-27

## 2025-05-12 NOTE — TELEPHONE ENCOUNTER
Requested medication(s) are due for refill today: No  Patient has already received a courtesy refill: Yes  Other reason request has been forwarded to provider: Cannot be delegated

## 2025-06-30 ENCOUNTER — TELEPHONE (OUTPATIENT)
Age: 85
End: 2025-06-30

## 2025-06-30 NOTE — TELEPHONE ENCOUNTER
Lm on vm for patient to return call to schedule appointment, advised on vm no appointments available this week with PCP, can schedule with NP if agreeable. AUGUSTINE, KATY

## 2025-06-30 NOTE — TELEPHONE ENCOUNTER
Patient states that for the past three years she has been struggling with abdominal bloating that prior pcp was aware about. Patient states that she feels like it is getting worse. Happens after every time she eats with associated mild pain. Patient would like to be seen this week in office anytime. Please advise.

## 2025-07-09 DIAGNOSIS — E03.9 HYPOTHYROIDISM, UNSPECIFIED TYPE: ICD-10-CM

## 2025-07-10 ENCOUNTER — OFFICE VISIT (OUTPATIENT)
Dept: FAMILY MEDICINE CLINIC | Facility: CLINIC | Age: 85
End: 2025-07-10
Payer: COMMERCIAL

## 2025-07-10 VITALS
SYSTOLIC BLOOD PRESSURE: 138 MMHG | OXYGEN SATURATION: 98 % | WEIGHT: 131.4 LBS | DIASTOLIC BLOOD PRESSURE: 88 MMHG | HEART RATE: 80 BPM | RESPIRATION RATE: 18 BRPM | HEIGHT: 64 IN | BODY MASS INDEX: 22.43 KG/M2 | TEMPERATURE: 97.7 F

## 2025-07-10 DIAGNOSIS — R09.89 ABDOMINAL BRUIT: ICD-10-CM

## 2025-07-10 DIAGNOSIS — K55.1 SUPERIOR MESENTERIC ARTERY STENOSIS (HCC): Primary | ICD-10-CM

## 2025-07-10 DIAGNOSIS — E03.9 ACQUIRED HYPOTHYROIDISM: ICD-10-CM

## 2025-07-10 DIAGNOSIS — I27.20 PULMONARY HYPERTENSION, UNSPECIFIED (HCC): ICD-10-CM

## 2025-07-10 DIAGNOSIS — I48.91 ATRIAL FIBRILLATION, UNSPECIFIED TYPE (HCC): ICD-10-CM

## 2025-07-10 PROCEDURE — 99214 OFFICE O/P EST MOD 30 MIN: CPT | Performed by: STUDENT IN AN ORGANIZED HEALTH CARE EDUCATION/TRAINING PROGRAM

## 2025-07-10 RX ORDER — LEVOTHYROXINE SODIUM 50 UG/1
50 TABLET ORAL
Qty: 30 TABLET | Refills: 0 | Status: SHIPPED | OUTPATIENT
Start: 2025-07-10

## 2025-07-10 NOTE — PROGRESS NOTES
Name: Felicia Jackson      : 1940      MRN: [id number]  Encounter Provider: Mindy Kidd MD  Encounter Date: 7/10/2025   Encounter department: [facility]  :  Assessment & Plan  Superior mesenteric artery stenosis (HCC)    Orders:  •  Ambulatory referral to Vascular Surgery; Future  -Continue aspirin 81 mg daily and simvastatin 20 mg daily  Pulmonary hypertension, unspecified (HCC)         Atrial fibrillation, unspecified type (HCC)  - Continue Eliquis 2.5 mg twice daily       Abdominal bruit         Acquired hypothyroidism  -Continue levothyroxine 50 mcg daily              History of Present Illness   HPI    Patient presents with ongoing stomach bloating and abdominal pain.  She notes that after she has her dinner she feels as though she ate 3 large meals.  She was noted to have superior mesenteric artery stenosis.  She saw a vascular surgeon last year who ordered further imaging but she did not follow-up.  She continues to take a statin and aspirin daily.  Patient also takes Eliquis for A-fib.  She was a smoker in the past.  She notes that she drinks a glass of wine with dinner.  She would like to establish with a different vascular surgeon.    Review of Systems   Constitutional:  Negative for activity change, chills, diaphoresis, fatigue and fever.   HENT:  Negative for congestion, postnasal drip, rhinorrhea and sore throat.    Respiratory:  Negative for cough, shortness of breath and wheezing.    Cardiovascular:  Negative for chest pain, palpitations and leg swelling.   Gastrointestinal:  Positive for abdominal distention and abdominal pain. Negative for constipation, diarrhea, nausea and vomiting.   Musculoskeletal:  Negative for myalgias.   Skin:  Negative for rash.   Neurological:  Negative for weakness, light-headedness and headaches.   Psychiatric/Behavioral:  The patient is not nervous/anxious.        Objective   /88   Pulse 80   Temp 97.7 °F (36.5 °C)   Resp 18  "  Ht 5' 4\" (1.626 m)   Wt 59.6 kg (131 lb 6.4 oz)   SpO2 98%   BMI 22.55 kg/m²      Physical Exam  Constitutional:       Appearance: Normal appearance.   Abdominal:      General: There is abdominal bruit.      Palpations: Abdomen is soft.     Neurological:      General: No focal deficit present.      Mental Status: She is alert and oriented to person, place, and time.     Psychiatric:         Mood and Affect: Mood normal.         Behavior: Behavior normal.         Thought Content: Thought content normal.         Judgment: Judgment normal.         "

## 2025-07-10 NOTE — ASSESSMENT & PLAN NOTE
Orders:  •  Ambulatory referral to Vascular Surgery; Future  -Continue aspirin 81 mg daily and simvastatin 20 mg daily

## 2025-08-18 ENCOUNTER — TELEPHONE (OUTPATIENT)
Dept: FAMILY MEDICINE CLINIC | Facility: CLINIC | Age: 85
End: 2025-08-18

## 2025-08-19 ENCOUNTER — TRANSITIONAL CARE MANAGEMENT (OUTPATIENT)
Dept: FAMILY MEDICINE CLINIC | Facility: CLINIC | Age: 85
End: 2025-08-19

## 2025-08-19 RX ORDER — OXYCODONE HYDROCHLORIDE 5 MG/1
TABLET ORAL EVERY 6 HOURS PRN
COMMUNITY
Start: 2025-08-18

## 2025-08-20 ENCOUNTER — OFFICE VISIT (OUTPATIENT)
Dept: FAMILY MEDICINE CLINIC | Facility: CLINIC | Age: 85
End: 2025-08-20
Payer: COMMERCIAL

## 2025-08-20 VITALS
RESPIRATION RATE: 12 BRPM | SYSTOLIC BLOOD PRESSURE: 98 MMHG | TEMPERATURE: 95.5 F | HEIGHT: 64 IN | WEIGHT: 126 LBS | DIASTOLIC BLOOD PRESSURE: 62 MMHG | HEART RATE: 68 BPM | OXYGEN SATURATION: 100 % | BODY MASS INDEX: 21.51 KG/M2

## 2025-08-20 DIAGNOSIS — Z87.81 S/P RIGHT HIP FRACTURE: ICD-10-CM

## 2025-08-20 DIAGNOSIS — S72.91XD AFTERCARE FOR HEALING TRAUMATIC FRACTURE OF RIGHT FEMUR: ICD-10-CM

## 2025-08-20 DIAGNOSIS — I48.91 ATRIAL FIBRILLATION, UNSPECIFIED TYPE (HCC): Primary | ICD-10-CM

## 2025-08-20 DIAGNOSIS — E03.9 ACQUIRED HYPOTHYROIDISM: ICD-10-CM

## 2025-08-20 PROBLEM — G25.0 ESSENTIAL TREMOR: Status: ACTIVE | Noted: 2025-08-20

## 2025-08-20 PROCEDURE — 99496 TRANSJ CARE MGMT HIGH F2F 7D: CPT | Performed by: STUDENT IN AN ORGANIZED HEALTH CARE EDUCATION/TRAINING PROGRAM

## (undated) DEVICE — TOWEL SET X-RAY

## (undated) DEVICE — GLOVE SRG BIOGEL 7.5

## (undated) DEVICE — SMALL NEEDLE COUNTER NEST

## (undated) DEVICE — PLASTIC ADHESIVE BANDAGE: Brand: CURITY

## (undated) DEVICE — TRAY EPIDURAL PERIFIX 20GA X 3.5IN TUOHY 8ML

## (undated) DEVICE — WIPES BABY PAMPERS SENSITIVE 36/PK

## (undated) DEVICE — NEEDLE SPINAL 25G X 3.5 IN QUINCKE

## (undated) DEVICE — RADIOLOGY STERILE LABELS: Brand: CENTURION

## (undated) DEVICE — NEEDLE BLUNT 18 G X 1 1/2 W FILTER

## (undated) DEVICE — CHLORAPREP HI-LITE 10.5ML ORANGE

## (undated) DEVICE — SYRINGE 5ML LL